# Patient Record
Sex: FEMALE | Race: BLACK OR AFRICAN AMERICAN | NOT HISPANIC OR LATINO | Employment: UNEMPLOYED | URBAN - METROPOLITAN AREA
[De-identification: names, ages, dates, MRNs, and addresses within clinical notes are randomized per-mention and may not be internally consistent; named-entity substitution may affect disease eponyms.]

---

## 2023-05-02 LAB
ESTIMATED AVERAGE GLUCOSE FOR HBA1C: 111 MG/DL
HEMOGLOBIN A1C/HEMOGLOBIN TOTAL IN BLOOD: 5.5 %
PROLACTIN (UG/L) IN SER/PLAS: 42.3 UG/L (ref 3–20)

## 2023-05-30 ENCOUNTER — PATIENT OUTREACH (OUTPATIENT)
Dept: CARE COORDINATION | Facility: CLINIC | Age: 23
End: 2023-05-30

## 2023-06-01 ENCOUNTER — OFFICE VISIT (OUTPATIENT)
Dept: PRIMARY CARE | Facility: CLINIC | Age: 23
End: 2023-06-01
Payer: MEDICAID

## 2023-06-01 ENCOUNTER — APPOINTMENT (OUTPATIENT)
Dept: LAB | Facility: LAB | Age: 23
End: 2023-06-01
Payer: MEDICAID

## 2023-06-01 VITALS
BODY MASS INDEX: 40.02 KG/M2 | DIASTOLIC BLOOD PRESSURE: 77 MMHG | HEART RATE: 125 BPM | TEMPERATURE: 98.1 F | OXYGEN SATURATION: 93 % | SYSTOLIC BLOOD PRESSURE: 113 MMHG | WEIGHT: 255 LBS | HEIGHT: 67 IN

## 2023-06-01 DIAGNOSIS — E55.9 VITAMIN D DEFICIENCY: ICD-10-CM

## 2023-06-01 DIAGNOSIS — Z09 HOSPITAL DISCHARGE FOLLOW-UP: Primary | ICD-10-CM

## 2023-06-01 DIAGNOSIS — F25.0 SCHIZOAFFECTIVE DISORDER, BIPOLAR TYPE (MULTI): ICD-10-CM

## 2023-06-01 DIAGNOSIS — K59.09 CHRONIC CONSTIPATION: ICD-10-CM

## 2023-06-01 DIAGNOSIS — N39.0 CHRONIC UTI (URINARY TRACT INFECTION): ICD-10-CM

## 2023-06-01 DIAGNOSIS — N39.42 URINARY INCONTINENCE WITHOUT SENSORY AWARENESS: ICD-10-CM

## 2023-06-01 DIAGNOSIS — E03.8 OTHER SPECIFIED HYPOTHYROIDISM: ICD-10-CM

## 2023-06-01 DIAGNOSIS — E11.9 TYPE 2 DIABETES MELLITUS WITHOUT COMPLICATION, WITHOUT LONG-TERM CURRENT USE OF INSULIN (MULTI): ICD-10-CM

## 2023-06-01 LAB
ESTIMATED AVERAGE GLUCOSE FOR HBA1C: 117 MG/DL
HEMOGLOBIN A1C/HEMOGLOBIN TOTAL IN BLOOD: 5.7 %
PROLACTIN (UG/L) IN SER/PLAS: 45.3 UG/L (ref 3–20)
THYROTROPIN (MIU/L) IN SER/PLAS BY DETECTION LIMIT <= 0.05 MIU/L: 1.81 MIU/L (ref 0.44–3.98)

## 2023-06-01 PROCEDURE — 3074F SYST BP LT 130 MM HG: CPT | Performed by: STUDENT IN AN ORGANIZED HEALTH CARE EDUCATION/TRAINING PROGRAM

## 2023-06-01 PROCEDURE — 3078F DIAST BP <80 MM HG: CPT | Performed by: STUDENT IN AN ORGANIZED HEALTH CARE EDUCATION/TRAINING PROGRAM

## 2023-06-01 PROCEDURE — 99214 OFFICE O/P EST MOD 30 MIN: CPT | Performed by: STUDENT IN AN ORGANIZED HEALTH CARE EDUCATION/TRAINING PROGRAM

## 2023-06-01 PROCEDURE — 3044F HG A1C LEVEL LT 7.0%: CPT | Performed by: STUDENT IN AN ORGANIZED HEALTH CARE EDUCATION/TRAINING PROGRAM

## 2023-06-01 PROCEDURE — 1036F TOBACCO NON-USER: CPT | Performed by: STUDENT IN AN ORGANIZED HEALTH CARE EDUCATION/TRAINING PROGRAM

## 2023-06-01 RX ORDER — FLASH GLUCOSE SENSOR
KIT MISCELLANEOUS
Status: ON HOLD | COMMUNITY
Start: 2023-05-16 | End: 2023-12-02 | Stop reason: WASHOUT

## 2023-06-01 RX ORDER — BUPROPION HYDROCHLORIDE 150 MG/1
TABLET ORAL
Status: ON HOLD | COMMUNITY
Start: 2023-02-15 | End: 2023-12-02 | Stop reason: WASHOUT

## 2023-06-01 RX ORDER — CLONAZEPAM 0.5 MG/1
TABLET ORAL
Status: ON HOLD | COMMUNITY
Start: 2023-05-19 | End: 2023-12-02 | Stop reason: WASHOUT

## 2023-06-01 RX ORDER — ETONOGESTREL 68 MG/1
IMPLANT SUBCUTANEOUS
COMMUNITY
Start: 2017-10-18 | End: 2023-12-11 | Stop reason: HOSPADM

## 2023-06-01 RX ORDER — MULTIVIT,CALC,MINS/IRON/FOLIC 9MG-400MCG
TABLET ORAL
Status: ON HOLD | COMMUNITY
Start: 2023-01-31 | End: 2023-12-02 | Stop reason: WASHOUT

## 2023-06-01 RX ORDER — FERROUS GLUCONATE 324(38)MG
TABLET ORAL
Status: ON HOLD | COMMUNITY
Start: 2023-01-13 | End: 2023-12-02 | Stop reason: WASHOUT

## 2023-06-01 RX ORDER — CHOLECALCIFEROL (VITAMIN D3) 25 MCG
1000 TABLET ORAL
Qty: 90 TABLET | Refills: 3 | Status: SHIPPED | OUTPATIENT
Start: 2023-06-01 | End: 2023-12-11 | Stop reason: HOSPADM

## 2023-06-01 RX ORDER — PRAZOSIN HYDROCHLORIDE 2 MG/1
2 CAPSULE ORAL NIGHTLY
Qty: 90 CAPSULE | Refills: 3 | Status: SHIPPED | OUTPATIENT
Start: 2023-06-01 | End: 2023-12-02 | Stop reason: WASHOUT

## 2023-06-01 RX ORDER — BLOOD SUGAR DIAGNOSTIC
STRIP MISCELLANEOUS
Status: ON HOLD | COMMUNITY
Start: 2021-04-13 | End: 2023-12-02 | Stop reason: WASHOUT

## 2023-06-01 RX ORDER — MULTIVITAMIN
TABLET ORAL
COMMUNITY
Start: 2023-03-20 | End: 2023-06-01 | Stop reason: SDUPTHER

## 2023-06-01 RX ORDER — BLOOD-GLUCOSE METER
EACH MISCELLANEOUS
Status: ON HOLD | COMMUNITY
Start: 2022-12-05 | End: 2023-12-02 | Stop reason: WASHOUT

## 2023-06-01 RX ORDER — HALOPERIDOL 10 MG/1
TABLET ORAL
COMMUNITY
Start: 2023-01-31 | End: 2023-11-14 | Stop reason: WASHOUT

## 2023-06-01 RX ORDER — ACETAMINOPHEN 325 MG/1
1 TABLET ORAL EVERY 6 HOURS PRN
Status: ON HOLD | COMMUNITY
Start: 2022-01-08 | End: 2023-12-02 | Stop reason: WASHOUT

## 2023-06-01 RX ORDER — PIOGLITAZONEHYDROCHLORIDE 15 MG/1
15 TABLET ORAL
COMMUNITY
Start: 2022-11-18 | End: 2023-06-01 | Stop reason: SDUPTHER

## 2023-06-01 RX ORDER — MULTIVITAMIN
1 TABLET ORAL DAILY
Qty: 90 TABLET | Refills: 3 | Status: ON HOLD | OUTPATIENT
Start: 2023-06-01 | End: 2023-12-02 | Stop reason: WASHOUT

## 2023-06-01 RX ORDER — PRAZOSIN HYDROCHLORIDE 2 MG/1
CAPSULE ORAL
COMMUNITY
Start: 2023-05-19 | End: 2023-06-01 | Stop reason: SDUPTHER

## 2023-06-01 RX ORDER — PIOGLITAZONEHYDROCHLORIDE 15 MG/1
15 TABLET ORAL
Qty: 90 TABLET | Refills: 3 | Status: SHIPPED | OUTPATIENT
Start: 2023-06-01 | End: 2023-11-29 | Stop reason: ALTCHOICE

## 2023-06-01 RX ORDER — LINACLOTIDE 145 UG/1
1 CAPSULE, GELATIN COATED ORAL DAILY
Status: ON HOLD | COMMUNITY
Start: 2021-01-20 | End: 2023-12-02 | Stop reason: WASHOUT

## 2023-06-01 RX ORDER — ZIPRASIDONE HYDROCHLORIDE 60 MG/1
CAPSULE ORAL
Status: ON HOLD | COMMUNITY
Start: 2023-05-19 | End: 2023-12-02 | Stop reason: WASHOUT

## 2023-06-01 RX ORDER — TRAZODONE HYDROCHLORIDE 100 MG/1
TABLET ORAL
Status: ON HOLD | COMMUNITY
Start: 2023-05-19 | End: 2023-12-02 | Stop reason: WASHOUT

## 2023-06-01 RX ORDER — HYDROXYZINE HYDROCHLORIDE 25 MG/1
TABLET, FILM COATED ORAL
Status: ON HOLD | COMMUNITY
Start: 2023-04-17 | End: 2023-12-02 | Stop reason: WASHOUT

## 2023-06-01 RX ORDER — DIVALPROEX SODIUM 500 MG/1
500 TABLET, DELAYED RELEASE ORAL 2 TIMES DAILY
Status: ON HOLD | COMMUNITY
Start: 2022-11-18 | End: 2023-12-02 | Stop reason: WASHOUT

## 2023-06-01 RX ORDER — BENZTROPINE MESYLATE 0.5 MG/1
TABLET ORAL
Status: ON HOLD | COMMUNITY
Start: 2023-05-19 | End: 2023-12-02 | Stop reason: WASHOUT

## 2023-06-01 RX ORDER — FAMOTIDINE 20 MG/1
TABLET, FILM COATED ORAL
Status: ON HOLD | COMMUNITY
End: 2023-12-02 | Stop reason: WASHOUT

## 2023-06-01 RX ORDER — AMMONIUM LACTATE 12 G/100G
CREAM TOPICAL
COMMUNITY
Start: 2022-02-21 | End: 2023-12-11 | Stop reason: HOSPADM

## 2023-06-01 RX ORDER — METFORMIN HYDROCHLORIDE 500 MG/1
1 TABLET ORAL
COMMUNITY
Start: 2022-11-18 | End: 2023-06-01 | Stop reason: ALTCHOICE

## 2023-06-01 RX ORDER — CHOLECALCIFEROL (VITAMIN D3) 25 MCG
1000 TABLET ORAL
COMMUNITY
Start: 2022-11-18 | End: 2023-06-01 | Stop reason: SDUPTHER

## 2023-06-01 RX ORDER — DOCUSATE SODIUM 100 MG/1
CAPSULE, LIQUID FILLED ORAL 2 TIMES DAILY
COMMUNITY
End: 2023-11-14 | Stop reason: WASHOUT

## 2023-06-01 RX ORDER — PAROXETINE HYDROCHLORIDE 20 MG/1
TABLET, FILM COATED ORAL
Status: ON HOLD | COMMUNITY
Start: 2023-05-19 | End: 2023-12-02 | Stop reason: WASHOUT

## 2023-06-01 ASSESSMENT — PAIN SCALES - GENERAL: PAINLEVEL: 0-NO PAIN

## 2023-06-01 NOTE — PROGRESS NOTES
"Subjective   Patient ID: Natividad Ricardo is a 22 y.o. female who presents for Follow-up (Follow up from hospital.).    HPI    Here with grandmother who provided most of the history. Pt lives in group home.    #Hospital follow up  #Pyelonephritis  #Sepsis  Patient admitted for pyenolnephritis and patient was septis. Patient does have a history of chronic UTIs and had told her grandmother \"her urine is broke\" meaning that she could not control her urine. Patient explains inability to make it to the bathroom in time and inability to sense when she has to pee.  Patient now wearing diapers due to this issue.  Patient follows with ID and is on Macrobid for chronic UTIs.  Patient states she is sexually active with a male partner and does not use condoms.  Patient is on birth control.  In the ED patient was tested for chlamydia, which was negative.  Since leaving the hospital patient states she still has some dysuria and still has urinary incontinence.  Otherwise patient feels much better and back to her normal self.    Unclear if patient was able to take macrobid BID for two days to finish abx course for the pyelonephritis. Grandmother and patient are unaware. However, patient is taking her daily macrobid for prophylaxis.      #Med Review  -methylprednisole 4mg  -famotidfine 20mg  -odansetron 4 mg  -macrobid 100mg  - multiple psych meds (not listed here)  Refills needed:  -pioglitazone 15mg  - vitamin D 1000mg  - levothyroxine 100mcg  -multivitamin  - Prazosin 2mg    Ms. Sophie busby [phone]- call with results of TSH and new levothryoxine dosing      Review of Systems   Constitutional:  Negative for chills and fever.   HENT:  Negative for congestion.    Eyes:  Negative for visual disturbance.   Respiratory:  Negative for cough and shortness of breath.    Cardiovascular:  Negative for chest pain.   Gastrointestinal:  Negative for abdominal pain, diarrhea and nausea.   Genitourinary:  Positive for dysuria " "(states sometimes) and urgency. Negative for flank pain and frequency.        Urinary incontence   Musculoskeletal:  Negative for arthralgias and joint swelling.   Neurological:  Negative for dizziness and headaches.   Psychiatric/Behavioral:          No changes in mood or behavior       Objective   /77 (BP Location: Right arm, Patient Position: Sitting)   Pulse (!) 125   Temp 36.7 °C (98.1 °F) (Temporal)   Ht 1.702 m (5' 7\")   Wt 116 kg (255 lb)   SpO2 93%   BMI 39.94 kg/m²      Physical Exam  Constitutional:       General: She is not in acute distress.  HENT:      Head: Normocephalic and atraumatic.   Eyes:      Extraocular Movements: Extraocular movements intact.      Conjunctiva/sclera: Conjunctivae normal.   Cardiovascular:      Rate and Rhythm: Normal rate and regular rhythm.      Heart sounds: Normal heart sounds. No murmur heard.  Pulmonary:      Effort: Pulmonary effort is normal.      Breath sounds: Normal breath sounds.   Abdominal:      General: There is no distension.      Palpations: Abdomen is soft.   Musculoskeletal:         General: Normal range of motion.      Cervical back: Normal range of motion.   Skin:     General: Skin is warm and dry.   Neurological:      General: No focal deficit present.      Mental Status: She is alert and oriented to person, place, and time.      Comments: CN 2-12 grossly intact   Psychiatric:         Mood and Affect: Mood normal.         Behavior: Behavior normal.      Comments: Blunted affect          Assessment/Plan   Natividad was seen today for follow-up.  Diagnoses and all orders for this visit:  Hospital discharge follow-up  Comments:  Recent hospiatlization for pyleonephritis and sepsis. Symptoms improved overall.  Chronic UTI (urinary tract infection)  Comments:  Improved sx from hospitalization. Still w/ dysuria and urgency. C/w home macrobid, follows w/ ID. Sent to [facility] for further workup  Orders:  -     Referral to Urogynecology; Future  -     " Referral to Urology; Future  Urinary incontinence without sensory awareness  Comments:  Sx suggestive of neurogenic vs urge incontinence. Sent to Urogyn for further workup  Orders:  -     Referral to Urogynecology; Future  -     Referral to Urology; Future  Vitamin D deficiency  Comments:  Stable. Refilled home Vit D supplement.  Orders:  -     cholecalciferol (Vitamin D-3) 25 MCG (1000 UT) tablet; Take 1 tablet (1,000 Units) by mouth once daily.  -     multivitamin tablet; Take 1 tablet by mouth once daily.  Type 2 diabetes mellitus without complication, without long-term current use of insulin (CMS/[facility])  Comments:  Stable. Refilled home pioglitazone 15mg QD. Follows w/ Endo.  Orders:  -     pioglitazone (Actos) 15 mg tablet; Take 1 tablet (15 mg) by mouth once daily.  Schizoaffective disorder, bipolar type (CMS/[facility])  Comments:  Stable. Lives in group home and follows closely w/ Pscyh. On multiple psych meds. Needs refill on only minipress until pt sees Pscyh.  Orders:  -     prazosin (Minipress) 2 mg capsule; Take 1 capsule (2 mg) by mouth once daily at bedtime.  Chronic constipation  Comments:  Stable. Needs refill on linzess. Follows w/ GI.  Orders:  -     linaCLOtide (Linzess) 145 mcg capsule; Take 1 capsule (145 mcg) by mouth every other day. Do not crush or chew. Take on empty stomach.  Other specified hypothyroidism  Comments:  Uncontrolled. Off synthroid for multiple months. Ordered repeat TSH  before refilling/restarting home syntrhoid.  Orders:  -     Tsh With Reflex To Free T4 If Abnormal; Future    Follow up/Return to the clinic in 3 months    Attending Supervision: Patient discussed with attending physician, Dr. Syeda Prieto MD  Family Medicine, PGY-2

## 2023-06-02 ENCOUNTER — PATIENT OUTREACH (OUTPATIENT)
Dept: CARE COORDINATION | Facility: CLINIC | Age: 23
End: 2023-06-02
Payer: MEDICARE

## 2023-06-02 NOTE — PROGRESS NOTES
Outreach call to patient to support a smooth transition of care from recent admission.  Spoke with patient, reviewed discharge medications, discharge instructions, assessed social needs, and provided education on importance of follow-up appointment with provider.  Will continue to monitor through transition period.  Engagement  Call Start Time: 1404 (6/2/2023  2:04 PM)    Medications  Medications reviewed with patient/caregiver?: Yes (6/2/2023  2:04 PM)  Is the patient having any side effects they believe may be caused by any medication additions or changes?: No (6/2/2023  2:04 PM)  Does the patient have all medications ordered at discharge?: Yes (6/2/2023  2:04 PM)  Prescription Comments: Macrobid 100 mg BID followed by daily dose until patient visit with ID (6/2/2023  2:04 PM)  Care Management Interventions: Provided patient education (6/2/2023  2:04 PM)    Appointments  Does the patient have a primary care provider?: Yes (6/2/2023  2:04 PM)  Care Management Interventions: -- (Patient has follow up already with new PCP) (6/2/2023  2:04 PM)  Has the patient kept scheduled appointments due by today?: Yes (6/2/2023  2:04 PM)    Patient Teaching  Does the patient have access to their discharge instructions?: Yes (6/2/2023  2:04 PM)  Care Management Interventions: Reviewed instructions with patient (6/2/2023  2:04 PM)    Wrap Up  Call End Time: 1407 (6/2/2023  2:04 PM)    NCM spoke with patient who stated that she has completed her Macrobid BID and is taking her Macrobid only once a day now.  Patient reported that she was feeling ok yesterday when she went to see her doctor but today she has not been able to go to the bathroom.  Patient reported that she has been drinking water and had the urge to urinate but when she went it would not come out.  Patient reported that she had lower abdominal pain and that she had to push really hard for the urine to start.  Patient reported burning and lower back pain.  Patient feels  like her UTI symptoms are still active.  Patient reported being cold and she never feels cold but does not feel like she has a fever.  Patient reported only having urinated one time today at this time.  Patient advised to call PCP or go to urgent care.  Patient reported that she would call her grandmother for a ride.  Patient gave permission for [facility] to speak to her group home coordinator.  [facility] educated coordinator that SHONDA was a RN and that based on patients symptoms it is recommended that patient seek medical attention at an urgent care center for futher recommendations.  Group home coordinator reported that patient has not been drinking water only sugar drinks.  [facility] educated group home coordinator that NCM could only make recommendation based off patient symptoms and that decision would be left to patient or guardian of patient.  Group home coordinator expressed understanding and would contact patients grandmother.  NCM will follow-up.    Rhoda MARTINEZ RN CCM

## 2023-06-02 NOTE — PROGRESS NOTES
NCM spoke with patients grandmother who provided more information regarding patient. Per grandmother patient is not compliant with her diabetic diet and valerio not drink appropriate fluids such as water. Patient is resistant to education provided by her grandmother for education on how to manage her diabetes.  NCM explained to patients grandmother that patient was not forth coming with NCM and that if patient is still having active UTI symptoms with urinary retention that she should be seen in urgent care just to make sure her bladder is not over extended.  Grandmother agreed to assess and to take appropriate actions if needed.  NCM contact provided for any further questions.    Rhoda BARRN RN CCM

## 2023-06-04 ASSESSMENT — ENCOUNTER SYMPTOMS
DIARRHEA: 0
COUGH: 0
CHILLS: 0
NAUSEA: 0
ABDOMINAL PAIN: 0
FREQUENCY: 0
HEADACHES: 0
JOINT SWELLING: 0
SHORTNESS OF BREATH: 0
FEVER: 0
FLANK PAIN: 0
DYSURIA: 1
DIZZINESS: 0
ARTHRALGIAS: 0

## 2023-06-05 NOTE — PROGRESS NOTES
I reviewed with the resident the medical history and the resident’s findings on physical examination.  I discussed with the resident the patient’s diagnosis and concur with the treatment plan as documented in the resident note. I saw patient and agree with plan of care.    Silver Landa MD       For information on Fall & Injury Prevention, visit: [email or web address] OR  [email or web address] OR  https://www.cdc.gov/steadi/patient.html

## 2023-06-08 ENCOUNTER — TELEPHONE (OUTPATIENT)
Dept: PRIMARY CARE | Facility: CLINIC | Age: 23
End: 2023-06-08
Payer: MEDICARE

## 2023-06-08 DIAGNOSIS — E03.8 OTHER SPECIFIED HYPOTHYROIDISM: Primary | ICD-10-CM

## 2023-06-08 NOTE — TELEPHONE ENCOUNTER
PT's mother would like to know the results of her blood work so she can get started on her medication.  Requests a call back.

## 2023-06-12 NOTE — TELEPHONE ENCOUNTER
Result Communication    Ordered TSH at last visit as pt has been off levothyroxine for over a month and it was only started back in March. Pt has no past history of thyroid disorder.     Spoke w/ grandmother over the phone. Given that patient has normal TSH while being off levo, it is okay to discontinue levothryoxine. In addition, we will recheck TSH in about 3 months to ensure that levels stay normal as pt has history of fluctuating TSH per chart review.    Results were successfully communicated with the grandmother and they acknowledged their understanding.      Natalee Prieto MD  Family Medicine, PGY-2

## 2023-06-13 ENCOUNTER — PATIENT OUTREACH (OUTPATIENT)
Dept: CARE COORDINATION | Facility: CLINIC | Age: 23
End: 2023-06-13
Payer: MEDICARE

## 2023-06-13 NOTE — PROGRESS NOTES
RN CM placed call to patient to follow up and assess his ongoing needs.  Patient reported that she remains symptomatic of a UTI and is currently in the ER. CM will monitor for discharge.    Rhoda MARTINEZ RN CCM

## 2023-07-19 LAB
ANION GAP IN SER/PLAS: 14 MMOL/L (ref 10–20)
CALCIUM (MG/DL) IN SER/PLAS: 10 MG/DL (ref 8.6–10.6)
CARBON DIOXIDE, TOTAL (MMOL/L) IN SER/PLAS: 28 MMOL/L (ref 21–32)
CHLORIDE (MMOL/L) IN SER/PLAS: 105 MMOL/L (ref 98–107)
CREATININE (MG/DL) IN SER/PLAS: 1.11 MG/DL (ref 0.5–1.05)
GFR FEMALE: 72 ML/MIN/1.73M2
GLUCOSE (MG/DL) IN SER/PLAS: 74 MG/DL (ref 74–99)
POTASSIUM (MMOL/L) IN SER/PLAS: 4.9 MMOL/L (ref 3.5–5.3)
SODIUM (MMOL/L) IN SER/PLAS: 142 MMOL/L (ref 136–145)
UREA NITROGEN (MG/DL) IN SER/PLAS: 7 MG/DL (ref 6–23)
VALPROIC ACID (UG/ML) IN SER/PLAS: 98 UG/ML (ref 50–100)

## 2023-08-29 PROBLEM — R45.6 VIOLENT BEHAVIOR: Status: ACTIVE | Noted: 2023-08-29

## 2023-08-29 PROBLEM — E11.69 DIABETES MELLITUS TYPE 2 IN OBESE: Status: ACTIVE | Noted: 2020-10-31

## 2023-08-29 PROBLEM — R29.818 SUSPECTED SLEEP APNEA: Status: ACTIVE | Noted: 2023-08-29

## 2023-08-29 PROBLEM — R03.0 ELEVATED BLOOD PRESSURE READING WITHOUT DIAGNOSIS OF HYPERTENSION: Status: ACTIVE | Noted: 2023-08-29

## 2023-08-29 PROBLEM — N64.3 GALACTORRHEA: Status: ACTIVE | Noted: 2023-08-29
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
## 2023-08-29 PROBLEM — E11.00 HYPEROSMOLAR HYPERGLYCEMIC STATE (HHS) (MULTI): Status: ACTIVE | Noted: 2020-10-31

## 2023-08-29 PROBLEM — R63.8 DECREASED ORAL INTAKE: Status: ACTIVE | Noted: 2023-08-29

## 2023-08-29 PROBLEM — J02.9 SORE THROAT: Status: ACTIVE | Noted: 2023-08-29

## 2023-08-29 PROBLEM — R10.9 FLANK PAIN: Status: ACTIVE | Noted: 2023-08-29

## 2023-08-29 PROBLEM — T14.91XA ATTEMPTED SUICIDE (MULTI): Status: ACTIVE | Noted: 2019-09-14

## 2023-08-29 PROBLEM — T50.901A POISONING BY DRUG OR MEDICINAL SUBSTANCE: Status: ACTIVE | Noted: 2019-03-04

## 2023-08-29 PROBLEM — N12 PYELONEPHRITIS: Status: ACTIVE | Noted: 2023-08-29

## 2023-08-29 PROBLEM — F25.0 SCHIZOAFFECTIVE DISORDER, BIPOLAR TYPE (MULTI): Status: ACTIVE | Noted: 2023-08-29

## 2023-08-29 PROBLEM — R00.0 TACHYCARDIA: Status: ACTIVE | Noted: 2020-11-04

## 2023-08-29 PROBLEM — Z86.0100 PERSONAL HISTORY OF COLONIC POLYPS: Status: ACTIVE | Noted: 2023-08-29

## 2023-08-29 PROBLEM — E22.1 HYPERPROLACTINEMIA (MULTI): Status: ACTIVE | Noted: 2023-08-29

## 2023-08-29 PROBLEM — R44.0 AUDITORY HALLUCINATIONS: Status: ACTIVE | Noted: 2020-08-20

## 2023-08-29 PROBLEM — R30.0 BURNING WITH URINATION: Status: ACTIVE | Noted: 2023-08-29

## 2023-08-29 PROBLEM — H53.8 BLURRY VISION: Status: ACTIVE | Noted: 2023-08-29

## 2023-08-29 PROBLEM — R45.850 HOMICIDAL IDEATION: Status: ACTIVE | Noted: 2023-08-29

## 2023-08-29 PROBLEM — N39.44 NOCTURNAL ENURESIS: Status: ACTIVE | Noted: 2023-08-29

## 2023-08-29 PROBLEM — T42.6X2A: Status: ACTIVE | Noted: 2019-03-04

## 2023-08-29 PROBLEM — G47.30 SLEEP APNEA: Status: ACTIVE | Noted: 2023-08-29

## 2023-08-29 PROBLEM — H52.03 HYPERMETROPIA OF BOTH EYES: Status: ACTIVE | Noted: 2023-08-29

## 2023-08-29 PROBLEM — F33.3 MDD (MAJOR DEPRESSIVE DISORDER), RECURRENT, SEVERE, WITH PSYCHOSIS (MULTI): Status: ACTIVE | Noted: 2022-11-15

## 2023-08-29 PROBLEM — A54.9 GONORRHEA: Status: ACTIVE | Noted: 2023-08-29

## 2023-08-29 PROBLEM — I10 HYPERTENSION: Status: ACTIVE | Noted: 2023-08-29

## 2023-08-29 PROBLEM — F33.2 MDD (MAJOR DEPRESSIVE DISORDER), RECURRENT SEVERE, WITHOUT PSYCHOSIS (MULTI): Status: ACTIVE | Noted: 2018-10-10

## 2023-08-29 PROBLEM — S61.519A SELF-CUTTING OF WRIST (MULTI): Status: ACTIVE | Noted: 2023-08-29

## 2023-08-29 PROBLEM — Z86.59 HISTORY OF COMMAND HALLUCINATIONS: Status: ACTIVE | Noted: 2023-08-29

## 2023-08-29 PROBLEM — S62.609A FRACTURE, FINGER: Status: ACTIVE | Noted: 2023-08-29

## 2023-08-29 PROBLEM — R10.814 ABDOMINAL TENDERNESS OF LEFT LOWER QUADRANT: Status: ACTIVE | Noted: 2019-04-05

## 2023-08-29 PROBLEM — Z90.49 HISTORY OF CHOLECYSTECTOMY: Status: ACTIVE | Noted: 2023-08-29

## 2023-08-29 PROBLEM — Z86.010 PERSONAL HISTORY OF COLONIC POLYPS: Status: ACTIVE | Noted: 2023-08-29

## 2023-08-29 PROBLEM — H46.9 OPTIC NEURITIS: Status: ACTIVE | Noted: 2023-08-29

## 2023-08-29 PROBLEM — R82.71 BACTERIURIA: Status: ACTIVE | Noted: 2023-08-29

## 2023-08-29 PROBLEM — T65.91XA INGESTION OF SUBSTANCE: Status: ACTIVE | Noted: 2023-08-29

## 2023-08-29 PROBLEM — F43.12 CHRONIC POST-TRAUMATIC STRESS DISORDER (PTSD): Status: ACTIVE | Noted: 2023-08-29

## 2023-08-29 PROBLEM — T39.1X1A TYLENOL TOXICITY: Status: ACTIVE | Noted: 2023-08-29

## 2023-08-29 PROBLEM — E66.9 DIABETES MELLITUS TYPE 2 IN OBESE: Status: ACTIVE | Noted: 2020-10-31

## 2023-08-29 PROBLEM — F41.9 ANXIETY DISORDER: Status: ACTIVE | Noted: 2023-08-29

## 2023-08-29 PROBLEM — G47.19 OTHER HYPERSOMNIA: Status: ACTIVE | Noted: 2023-08-29

## 2023-08-29 PROBLEM — E11.65 TYPE 2 DIABETES MELLITUS WITH HYPERGLYCEMIA (MULTI): Status: ACTIVE | Noted: 2023-08-29

## 2023-08-29 PROBLEM — E78.5 HLD (HYPERLIPIDEMIA): Status: ACTIVE | Noted: 2021-07-14

## 2023-08-29 PROBLEM — T88.7XXA MEDICATION SIDE EFFECT: Status: ACTIVE | Noted: 2023-08-29

## 2023-08-29 PROBLEM — F43.21 COMPLICATED BEREAVEMENT: Status: ACTIVE | Noted: 2023-08-29

## 2023-08-29 PROBLEM — E55.9 VITAMIN D DEFICIENCY: Status: ACTIVE | Noted: 2023-08-29

## 2023-08-29 PROBLEM — T39.1X1A ACETAMINOPHEN OVERDOSE: Status: ACTIVE | Noted: 2023-08-29

## 2023-08-29 PROBLEM — K80.20 CHOLELITHIASIS: Status: ACTIVE | Noted: 2023-08-29

## 2023-08-29 PROBLEM — R06.02 SHORTNESS OF BREATH: Status: ACTIVE | Noted: 2020-11-04

## 2023-08-29 PROBLEM — R79.89 LOW VITAMIN D LEVEL: Status: ACTIVE | Noted: 2018-10-11

## 2023-08-29 PROBLEM — F12.959: Status: ACTIVE | Noted: 2023-08-29

## 2023-08-29 PROBLEM — H52.13 MYOPIA OF BOTH EYES: Status: ACTIVE | Noted: 2023-08-29

## 2023-08-29 PROBLEM — R68.89 UNINTENTIONAL WEIGHT CHANGE: Status: ACTIVE | Noted: 2023-08-29
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
## 2023-08-29 PROBLEM — S41.112A ARM LACERATION, LEFT, INITIAL ENCOUNTER: Status: ACTIVE | Noted: 2018-10-19

## 2023-08-29 PROBLEM — R82.81 STERILE PYURIA: Status: ACTIVE | Noted: 2023-08-29

## 2023-08-29 PROBLEM — F31.30 BIPOLAR AFFECTIVE DISORDER, CURRENT EPISODE DEPRESSED (MULTI): Status: ACTIVE | Noted: 2020-06-08

## 2023-08-29 PROBLEM — K80.20 CALCULUS OF GALLBLADDER: Status: ACTIVE | Noted: 2019-06-13

## 2023-08-29 PROBLEM — R63.4 ABNORMAL WEIGHT LOSS: Status: ACTIVE | Noted: 2023-08-29

## 2023-08-29 PROBLEM — K21.9 GERD (GASTROESOPHAGEAL REFLUX DISEASE): Status: ACTIVE | Noted: 2023-08-29

## 2023-08-29 PROBLEM — R45.89 SUICIDAL BEHAVIOR: Status: ACTIVE | Noted: 2023-08-29

## 2023-08-29 RX ORDER — HYDROXYZINE HYDROCHLORIDE 50 MG/1
50 TABLET, FILM COATED ORAL EVERY 8 HOURS PRN
Status: ON HOLD | COMMUNITY
Start: 2020-11-09 | End: 2023-12-02 | Stop reason: WASHOUT

## 2023-08-29 RX ORDER — VENLAFAXINE HYDROCHLORIDE 225 MG/1
225 TABLET, EXTENDED RELEASE ORAL DAILY
Status: ON HOLD | COMMUNITY
Start: 2020-11-09 | End: 2023-12-02 | Stop reason: WASHOUT

## 2023-08-29 RX ORDER — RISPERIDONE 1 MG/1
1 TABLET ORAL 2 TIMES DAILY
COMMUNITY
Start: 2022-09-07 | End: 2023-11-14 | Stop reason: WASHOUT

## 2023-08-29 RX ORDER — BENZTROPINE MESYLATE 1 MG/1
1 TABLET ORAL 2 TIMES DAILY
Status: ON HOLD | COMMUNITY
Start: 2020-09-09 | End: 2023-12-02 | Stop reason: WASHOUT

## 2023-08-29 RX ORDER — METFORMIN HYDROCHLORIDE 500 MG/1
500 TABLET ORAL 2 TIMES DAILY
COMMUNITY
Start: 2020-11-09 | End: 2023-11-29 | Stop reason: ALTCHOICE

## 2023-08-29 RX ORDER — OLANZAPINE AND SAMIDORPHAN L-MALATE 20; 10 MG/1; MG/1
TABLET, FILM COATED ORAL
Status: ON HOLD | COMMUNITY
Start: 2022-09-15 | End: 2023-12-02 | Stop reason: WASHOUT

## 2023-08-29 RX ORDER — BUSPIRONE HYDROCHLORIDE 10 MG/1
TABLET ORAL
Status: ON HOLD | COMMUNITY
Start: 2023-04-17 | End: 2023-12-02 | Stop reason: WASHOUT

## 2023-08-29 RX ORDER — TRAZODONE HYDROCHLORIDE 150 MG/1
1 TABLET ORAL NIGHTLY
Status: ON HOLD | COMMUNITY
Start: 2022-06-01 | End: 2023-12-02 | Stop reason: WASHOUT

## 2023-08-29 RX ORDER — VENLAFAXINE HYDROCHLORIDE 150 MG/1
150 CAPSULE, EXTENDED RELEASE ORAL NIGHTLY
Status: ON HOLD | COMMUNITY
Start: 2020-11-09 | End: 2023-12-02 | Stop reason: WASHOUT

## 2023-08-29 RX ORDER — ZIPRASIDONE HYDROCHLORIDE 80 MG/1
80 CAPSULE ORAL 2 TIMES DAILY
Status: ON HOLD | COMMUNITY
Start: 2023-06-07 | End: 2023-12-02 | Stop reason: WASHOUT

## 2023-08-29 RX ORDER — VENLAFAXINE HYDROCHLORIDE 75 MG/1
75 TABLET, EXTENDED RELEASE ORAL DAILY
Status: ON HOLD | COMMUNITY
Start: 2020-11-09 | End: 2023-12-02 | Stop reason: WASHOUT

## 2023-08-29 RX ORDER — DIVALPROEX SODIUM 250 MG/1
250 TABLET, FILM COATED, EXTENDED RELEASE ORAL
Status: ON HOLD | COMMUNITY
Start: 2020-09-08 | End: 2023-12-02 | Stop reason: WASHOUT

## 2023-08-29 RX ORDER — INSULIN GLARGINE 100 [IU]/ML
45 INJECTION, SOLUTION SUBCUTANEOUS DAILY
Status: ON HOLD | COMMUNITY
Start: 2020-11-09 | End: 2023-12-02 | Stop reason: WASHOUT

## 2023-08-29 RX ORDER — ACETAMINOPHEN 500 MG
5 TABLET ORAL NIGHTLY
COMMUNITY
Start: 2020-11-09 | End: 2023-11-14 | Stop reason: WASHOUT

## 2023-08-29 RX ORDER — BUPROPION HYDROCHLORIDE 300 MG/1
TABLET ORAL
Status: ON HOLD | COMMUNITY
Start: 2023-05-04 | End: 2023-12-02 | Stop reason: WASHOUT

## 2023-08-29 RX ORDER — PAROXETINE 10 MG/1
10 TABLET, FILM COATED ORAL
Status: ON HOLD | COMMUNITY
Start: 2023-06-07 | End: 2023-12-02 | Stop reason: WASHOUT

## 2023-08-29 RX ORDER — PALIPERIDONE 6 MG/1
6 TABLET, EXTENDED RELEASE ORAL DAILY
Status: ON HOLD | COMMUNITY
Start: 2020-11-09 | End: 2023-12-02 | Stop reason: WASHOUT

## 2023-08-29 RX ORDER — RISPERIDONE 2 MG/1
TABLET ORAL
COMMUNITY
Start: 2022-10-07 | End: 2023-11-14 | Stop reason: WASHOUT

## 2023-08-29 RX ORDER — OLANZAPINE 10 MG/1
10 TABLET ORAL NIGHTLY
Status: ON HOLD | COMMUNITY
Start: 2020-11-09 | End: 2023-12-02 | Stop reason: WASHOUT

## 2023-08-30 PROBLEM — N32.81 OAB (OVERACTIVE BLADDER): Status: ACTIVE | Noted: 2023-08-30

## 2023-08-30 PROBLEM — K59.09 CHRONIC CONSTIPATION: Status: ACTIVE | Noted: 2023-08-30

## 2023-08-30 PROBLEM — K58.9 IBS (IRRITABLE BOWEL SYNDROME): Status: ACTIVE | Noted: 2023-08-30

## 2023-08-30 PROBLEM — R51.9 HEADACHE: Status: ACTIVE | Noted: 2023-08-30

## 2023-08-30 PROBLEM — N64.4 BREAST PAIN: Status: ACTIVE | Noted: 2023-08-30

## 2023-09-13 ENCOUNTER — HOSPITAL ENCOUNTER (OUTPATIENT)
Dept: DATA CONVERSION | Facility: HOSPITAL | Age: 23
End: 2023-09-13
Attending: INTERNAL MEDICINE | Admitting: INTERNAL MEDICINE
Payer: MEDICARE

## 2023-09-13 DIAGNOSIS — Z86.010 PERSONAL HISTORY OF COLONIC POLYPS: ICD-10-CM

## 2023-09-13 DIAGNOSIS — Z12.11 ENCOUNTER FOR SCREENING FOR MALIGNANT NEOPLASM OF COLON: ICD-10-CM

## 2023-10-05 ENCOUNTER — APPOINTMENT (OUTPATIENT)
Dept: INFECTIOUS DISEASES | Facility: CLINIC | Age: 23
End: 2023-10-05
Payer: MEDICARE

## 2023-10-12 ENCOUNTER — TELEMEDICINE (OUTPATIENT)
Dept: INFECTIOUS DISEASES | Facility: CLINIC | Age: 23
End: 2023-10-12
Payer: MEDICARE

## 2023-10-12 DIAGNOSIS — N39.0 RECURRENT UTI: Primary | ICD-10-CM

## 2023-10-12 PROCEDURE — 99214 OFFICE O/P EST MOD 30 MIN: CPT | Performed by: INTERNAL MEDICINE

## 2023-10-12 NOTE — PROGRESS NOTES
Infectious Diseases Clinic Follow-up:    Reason for Visit: Routine clinic visit    History of Current Issue    Natividad Ricardo is a 23 y.o. year old female. This was a virtual visit. Spoke to pt and her grandma for 12 mins and spent an additional 10 mins for documentation.    Initially saw her in 12/2022 for recurrent UTIs, full details of consult as below:     INITIAL ID CONSULT NOTE     23 yo F, group home resident, psych issues who as referred by PCP for recurrent UTIs. She is here with grandmother who was the primary historian has pt has limited ability to give a coherent history.     Per grandmother and chat review, had recurrent UTIs as a child and was on Bactrim ppx from age [age]. She was reportedly taken off when UTIs improved. However in the last 2 years, UTIs have returned. Primary syxs appear to be painful urination and frequency.      Last UTI was in October 2022. Denies has UTI now. Denies any history of renal stones. Records are not available to confirm this. One UCX from 6/2021 grew E. coli (S=Keflex, Macrobid, R= Bactrim). Apparently no imaging has been done recently to rule out structural abnormalities. She has been treated multiple times with Bactrim.     Endorses a hx of allergies to cipro and sulfa drugs (rash).     PAST MEDICAL HISTORY:  Past Medical History:   Diagnosis Date    Abnormal weight gain 12/17/2018    Abnormal weight gain    Blister (nonthermal), unspecified lower leg, initial encounter 05/30/2018    Blister of leg    Crushing injury of unspecified finger(s), initial encounter 06/30/2017    Crushing injury of finger, initial encounter    Drug-induced hypoglycemia without coma 08/04/2021    Hypoglycemia due to insulin    Migraine, unspecified, not intractable, without status migrainosus 12/27/2016    Headache, migraine    Other microscopic hematuria 06/01/2018    Hematuria, microscopic    Pain in left hand 06/29/2017    Pain of left hand    Pain in unspecified joint 12/30/2013     Multiple joint pain    Personal history of diseases of the blood and blood-forming organs and certain disorders involving the immune mechanism 06/07/2017    History of anemia    Personal history of diseases of the skin and subcutaneous tissue 06/13/2019    History of acne    Personal history of other complications of pregnancy, childbirth and the puerperium 07/25/2017    History of galactorrhea    Personal history of other diseases of the female genital tract 06/05/2018    History of vaginal bleeding    Personal history of other diseases of the nervous system and sense organs 08/03/2022    History of optic neuritis    Personal history of other endocrine, nutritional and metabolic disease 12/17/2018    History of elevated glucose    Personal history of other specified conditions 06/09/2017    History of nipple discharge    Urge incontinence 06/01/2018    Urge incontinence of urine       PAST SURGICAL HISTORY:  Past Surgical History:   Procedure Laterality Date    OTHER SURGICAL HISTORY  11/11/2019    Cholecystectomy       ALLERGIES:    Allergies   Allergen Reactions    Cefixime Hives    Ditropan Unknown     Reaction unknown    Esomeprazole Unknown    Hyoscyamine Unknown     Unknown reaction, documented from hospital stay per grandmother.    Penicillin Hives    Sulfa (Sulfonamide Antibiotics) Unknown    Ciprofloxacin Hives and Rash       MEDICATIONS:      Current Outpatient Medications:     acetaminophen (Tylenol) 325 mg tablet, Take 1 tablet (325 mg) by mouth every 6 hours if needed., Disp: , Rfl:     ammonium lactate (Amlactin) 12 % cream, Ammonium Lactate 12 % External Cream  Quantity: 385  Refills: 0      Start : 21-Feb-2022  Active, Disp: , Rfl:     benztropine (Cogentin) 0.5 mg tablet, , Disp: , Rfl:     benztropine (Cogentin) 1 mg tablet, Take 1 tablet (1 mg) by mouth 2 times a day., Disp: , Rfl:     buPROPion XL (Wellbutrin XL) 150 mg 24 hr tablet, , Disp: , Rfl:     buPROPion XL (Wellbutrin XL) 300 mg 24 hr  tablet, , Disp: , Rfl:     busPIRone (Buspar) 10 mg tablet, , Disp: , Rfl:     cholecalciferol (Vitamin D-3) 25 MCG (1000 UT) tablet, Take 1 tablet (1,000 Units) by mouth once daily., Disp: 90 tablet, Rfl: 3    clonazePAM (KlonoPIN) 0.5 mg tablet, , Disp: , Rfl:     divalproex (Depakote ER) 250 mg 24 hr tablet, 1 tablet (250 mg). As directed, Disp: , Rfl:     divalproex (Depakote) 500 mg EC tablet, Take 1 tablet (500 mg) by mouth twice a day., Disp: , Rfl:     docusate sodium (Colace) 100 mg capsule, Take by mouth twice a day., Disp: , Rfl:     Embrace Lancets 30 gauge misc, , Disp: , Rfl:     etonogestrel-eluting contraceptive (Nexplanon) 68 mg implant implant, Inject under the skin., Disp: , Rfl:     famotidine (Pepcid) 20 mg tablet, TAKE 1 TABLET EVERY DAY BY ORAL ROUTE IN THE MORNING FOR 30 DAYS., Disp: , Rfl:     ferrous gluconate 324 (38 Fe) MG tablet, , Disp: , Rfl:     FreeStyle Vic 2 Sensor kit, , Disp: , Rfl:     haloperidol (Haldol) 10 mg tablet, , Disp: , Rfl:     High Potency Multivit, w-iron, 9 mg iron-400 mcg tablet, , Disp: , Rfl:     hydrOXYzine HCL (Atarax) 25 mg tablet, , Disp: , Rfl:     hydrOXYzine HCL (Atarax) 50 mg tablet, Take 1 tablet (50 mg) by mouth every 8 hours if needed., Disp: , Rfl:     insulin glargine (Lantus U-100 Insulin) 100 unit/mL injection, 45 Units once daily., Disp: , Rfl:     linaCLOtide (Linzess) 145 mcg capsule, Take 1 capsule (145 mcg) by mouth every other day. Do not crush or chew. Take on empty stomach., Disp: 30 capsule, Rfl: 11    linaCLOtide (Linzess) 72 mcg capsule, TAKE 1 CAPSULE AT LEAST 30 MINUTES BEFORE FIRST MEAL OF THE DAY, Disp: 30 capsule, Rfl: 1    Linzess 145 mcg capsule, Take 1 capsule (145 mcg) by mouth once daily., Disp: , Rfl:     Lybalvi 20-10 mg tablet, , Disp: , Rfl:     melatonin 5 mg tablet, Take 1 tablet (5 mg) by mouth once daily at bedtime., Disp: , Rfl:     metFORMIN (Glucophage) 500 mg tablet, Take 1 tablet (500 mg) by mouth 2 times a  day., Disp: , Rfl:     multivitamin tablet, Take 1 tablet by mouth once daily., Disp: 90 tablet, Rfl: 3    multivitamin with minerals tablet, TAKE 1 TABLET BY MOUTH ONCE DAILY, Disp: 30 tablet, Rfl: 2    NON FORMULARY, Use one as directed when changing Free Style Vic sensor, Disp: , Rfl:     OLANZapine (ZyPREXA) 10 mg tablet, Take 1 tablet (10 mg) by mouth once daily at bedtime., Disp: , Rfl:     OneTouch Ultra Test strip, TEST BLOOD SUGARS BEFORE AND AFTER MEALS AS DIRECTED. 6 TIMES A DAY, Disp: , Rfl:     paliperidone (Invega) 6 mg 24 hr tablet, Take 1 tablet (6 mg) by mouth once daily., Disp: , Rfl:     paliperidone palmitate ER (Invega Sustenna) 234 mg/1.5 mL syringe, Inject 234 mg into the shoulder, thigh, or buttocks every 21 (twenty-one) days., Disp: , Rfl:     PARoxetine (Paxil) 10 mg tablet, 1 tablet (10 mg)., Disp: , Rfl:     PARoxetine (Paxil) 20 mg tablet, , Disp: , Rfl:     pioglitazone (Actos) 15 mg tablet, Take 1 tablet (15 mg) by mouth once daily., Disp: 90 tablet, Rfl: 3    prazosin (Minipress) 2 mg capsule, Take 1 capsule (2 mg) by mouth once daily at bedtime., Disp: 90 capsule, Rfl: 3    risperiDONE (RisperDAL) 1 mg tablet, Take 1 tablet (1 mg) by mouth twice a day., Disp: , Rfl:     risperiDONE (RisperDAL) 2 mg tablet, , Disp: , Rfl:     traZODone (Desyrel) 100 mg tablet, , Disp: , Rfl:     traZODone (Desyrel) 150 mg tablet, Take 1 tablet (150 mg) by mouth once daily at bedtime., Disp: , Rfl:     venlafaxine 225 mg 24 hr tablet, Take 1 tablet (225 mg) by mouth once daily., Disp: , Rfl:     venlafaxine 75 mg 24 hr tablet, Take 1 tablet (75 mg) by mouth once daily., Disp: , Rfl:     venlafaxine XR (Effexor XR) 150 mg 24 hr capsule, Take 1 capsule (150 mg) by mouth once daily at bedtime., Disp: , Rfl:     ziprasidone (Geodon) 60 mg capsule, , Disp: , Rfl:     ziprasidone (Geodon) 80 mg capsule, , Disp: , Rfl:     REVIEW OF SYSTEMS:  Review of Systems    PHYSICAL EXAMINATION:     Visit  Vitals  Smoking Status Never        EXAM: Not applicable as this was a virtual visit.       ASSESSMENT / RECOMMENDATIONS:    23 yo F, group home resident, psych issues who as referred by PCP for recurrent UTIs. She is here with grandmother who was the primary historian has pt has limited ability to give a coherent history.     Per grandmother and chat review, had recurrent UTIs as a child and was on Bactrim ppx from age [age]. She was reportedly taken off when UTIs improved. However in the last 2 years, UTIs have returned. Primary syxs appear to be painful urination and frequency.      Last UTI was in October 2022. Denies has UTI now. Denies any history of renal stones. Records are not available to confirm this. One UCX from 6/2021 grew E. coli (S=Keflex, Macrobid, R= Bactrim). Apparently no imaging has been done recently to rule out structural abnormalities. She has been treated multiple times with Bactrim.    Endorses a hx of allergies to cipro and sulfa drugs (rash).     CURRENT (10/12/2023):  Doing well on macrobid.  No new UTIs since last visit.    PLAN:  Macrobid 100 mg daily  RTC in 6 mo    Pt and grandma are in agreement with plan    I spent 28 minutes in the professional and overall care of this patient.      Celso Thomas MD MPH

## 2023-10-17 ENCOUNTER — APPOINTMENT (OUTPATIENT)
Dept: OBSTETRICS AND GYNECOLOGY | Facility: CLINIC | Age: 23
End: 2023-10-17
Payer: MEDICAID

## 2023-10-17 ENCOUNTER — HOSPITAL ENCOUNTER (EMERGENCY)
Facility: HOSPITAL | Age: 23
Discharge: OTHER INSTITUTION | End: 2023-10-18
Attending: STUDENT IN AN ORGANIZED HEALTH CARE EDUCATION/TRAINING PROGRAM
Payer: MEDICARE

## 2023-10-17 DIAGNOSIS — R45.851 SUICIDAL IDEATION: Primary | ICD-10-CM

## 2023-10-17 LAB
ALBUMIN SERPL BCP-MCNC: 3.6 G/DL (ref 3.4–5)
ALP SERPL-CCNC: 71 U/L (ref 33–110)
ALT SERPL W P-5'-P-CCNC: 10 U/L (ref 7–45)
ANION GAP SERPL CALC-SCNC: 12 MMOL/L (ref 10–20)
APAP SERPL-MCNC: <10 UG/ML
AST SERPL W P-5'-P-CCNC: 12 U/L (ref 9–39)
BASOPHILS # BLD AUTO: 0.06 X10*3/UL (ref 0–0.1)
BASOPHILS NFR BLD AUTO: 0.6 %
BILIRUB SERPL-MCNC: 0.3 MG/DL (ref 0–1.2)
BUN SERPL-MCNC: 8 MG/DL (ref 6–23)
CALCIUM SERPL-MCNC: 9.2 MG/DL (ref 8.6–10.6)
CHLORIDE SERPL-SCNC: 105 MMOL/L (ref 98–107)
CO2 SERPL-SCNC: 26 MMOL/L (ref 21–32)
CREAT SERPL-MCNC: 1.04 MG/DL (ref 0.5–1.05)
EOSINOPHIL # BLD AUTO: 0.09 X10*3/UL (ref 0–0.7)
EOSINOPHIL NFR BLD AUTO: 0.9 %
ERYTHROCYTE [DISTWIDTH] IN BLOOD BY AUTOMATED COUNT: 14.6 % (ref 11.5–14.5)
ETHANOL SERPL-MCNC: <10 MG/DL
GFR SERPL CREATININE-BSD FRML MDRD: 78 ML/MIN/1.73M*2
GLUCOSE SERPL-MCNC: 120 MG/DL (ref 74–99)
HCT VFR BLD AUTO: 36.1 % (ref 36–46)
HGB BLD-MCNC: 12.3 G/DL (ref 12–16)
HOLD SPECIMEN: NORMAL
IMM GRANULOCYTES # BLD AUTO: 0.03 X10*3/UL (ref 0–0.7)
IMM GRANULOCYTES NFR BLD AUTO: 0.3 % (ref 0–0.9)
LYMPHOCYTES # BLD AUTO: 4.14 X10*3/UL (ref 1.2–4.8)
LYMPHOCYTES NFR BLD AUTO: 41.8 %
MCH RBC QN AUTO: 31 PG (ref 26–34)
MCHC RBC AUTO-ENTMCNC: 34.1 G/DL (ref 32–36)
MCV RBC AUTO: 91 FL (ref 80–100)
MONOCYTES # BLD AUTO: 0.83 X10*3/UL (ref 0.1–1)
MONOCYTES NFR BLD AUTO: 8.4 %
NEUTROPHILS # BLD AUTO: 4.75 X10*3/UL (ref 1.2–7.7)
NEUTROPHILS NFR BLD AUTO: 48 %
NRBC BLD-RTO: 0 /100 WBCS (ref 0–0)
PLATELET # BLD AUTO: 282 X10*3/UL (ref 150–450)
PMV BLD AUTO: 10.4 FL (ref 7.5–11.5)
POTASSIUM SERPL-SCNC: 4.1 MMOL/L (ref 3.5–5.3)
PROT SERPL-MCNC: 7 G/DL (ref 6.4–8.2)
RBC # BLD AUTO: 3.97 X10*6/UL (ref 4–5.2)
SALICYLATES SERPL-MCNC: <3 MG/DL
SODIUM SERPL-SCNC: 139 MMOL/L (ref 136–145)
WBC # BLD AUTO: 9.9 X10*3/UL (ref 4.4–11.3)

## 2023-10-17 PROCEDURE — 99285 EMERGENCY DEPT VISIT HI MDM: CPT | Performed by: STUDENT IN AN ORGANIZED HEALTH CARE EDUCATION/TRAINING PROGRAM

## 2023-10-17 PROCEDURE — 99284 EMERGENCY DEPT VISIT MOD MDM: CPT | Performed by: STUDENT IN AN ORGANIZED HEALTH CARE EDUCATION/TRAINING PROGRAM

## 2023-10-17 PROCEDURE — 36415 COLL VENOUS BLD VENIPUNCTURE: CPT | Mod: CMCLAB

## 2023-10-17 PROCEDURE — 80053 COMPREHEN METABOLIC PANEL: CPT

## 2023-10-17 PROCEDURE — 80329 ANALGESICS NON-OPIOID 1 OR 2: CPT

## 2023-10-17 PROCEDURE — 85025 COMPLETE CBC W/AUTO DIFF WBC: CPT

## 2023-10-17 PROCEDURE — 93010 ELECTROCARDIOGRAM REPORT: CPT | Performed by: STUDENT IN AN ORGANIZED HEALTH CARE EDUCATION/TRAINING PROGRAM

## 2023-10-17 PROCEDURE — 99285 EMERGENCY DEPT VISIT HI MDM: CPT | Mod: 25 | Performed by: STUDENT IN AN ORGANIZED HEALTH CARE EDUCATION/TRAINING PROGRAM

## 2023-10-17 PROCEDURE — 84702 CHORIONIC GONADOTROPIN TEST: CPT | Mod: CMCLAB | Performed by: EMERGENCY MEDICINE

## 2023-10-17 ASSESSMENT — PAIN DESCRIPTION - PROGRESSION: CLINICAL_PROGRESSION: NOT CHANGED

## 2023-10-17 ASSESSMENT — COLUMBIA-SUICIDE SEVERITY RATING SCALE - C-SSRS
1. IN THE PAST MONTH, HAVE YOU WISHED YOU WERE DEAD OR WISHED YOU COULD GO TO SLEEP AND NOT WAKE UP?: YES
6. HAVE YOU EVER DONE ANYTHING, STARTED TO DO ANYTHING, OR PREPARED TO DO ANYTHING TO END YOUR LIFE?: YES
5. HAVE YOU STARTED TO WORK OUT OR WORKED OUT THE DETAILS OF HOW TO KILL YOURSELF? DO YOU INTEND TO CARRY OUT THIS PLAN?: YES
4. HAVE YOU HAD THESE THOUGHTS AND HAD SOME INTENTION OF ACTING ON THEM?: YES
2. HAVE YOU ACTUALLY HAD ANY THOUGHTS OF KILLING YOURSELF?: YES

## 2023-10-17 ASSESSMENT — LIFESTYLE VARIABLES
EVER FELT BAD OR GUILTY ABOUT YOUR DRINKING: NO
EVER HAD A DRINK FIRST THING IN THE MORNING TO STEADY YOUR NERVES TO GET RID OF A HANGOVER: NO
HAVE YOU EVER FELT YOU SHOULD CUT DOWN ON YOUR DRINKING: NO
HAVE PEOPLE ANNOYED YOU BY CRITICIZING YOUR DRINKING: NO
REASON UNABLE TO ASSESS: NO

## 2023-10-17 ASSESSMENT — PAIN SCALES - GENERAL: PAINLEVEL_OUTOF10: 0 - NO PAIN

## 2023-10-17 ASSESSMENT — PAIN - FUNCTIONAL ASSESSMENT: PAIN_FUNCTIONAL_ASSESSMENT: 0-10

## 2023-10-18 VITALS
SYSTOLIC BLOOD PRESSURE: 103 MMHG | HEIGHT: 66 IN | BODY MASS INDEX: 38.57 KG/M2 | RESPIRATION RATE: 16 BRPM | OXYGEN SATURATION: 96 % | TEMPERATURE: 97.8 F | WEIGHT: 240 LBS | DIASTOLIC BLOOD PRESSURE: 67 MMHG | HEART RATE: 69 BPM

## 2023-10-18 LAB
AMPHETAMINES UR QL SCN: ABNORMAL
B-HCG SERPL-ACNC: <3 MIU/ML
BARBITURATES UR QL SCN: ABNORMAL
BENZODIAZ UR QL SCN: ABNORMAL
BZE UR QL SCN: ABNORMAL
CANNABINOIDS UR QL SCN: ABNORMAL
FENTANYL+NORFENTANYL UR QL SCN: ABNORMAL
OPIATES UR QL SCN: ABNORMAL
OXYCODONE+OXYMORPHONE UR QL SCN: ABNORMAL
PCP UR QL SCN: ABNORMAL
SARS-COV-2 RNA RESP QL NAA+PROBE: NOT DETECTED

## 2023-10-18 PROCEDURE — 80307 DRUG TEST PRSMV CHEM ANLYZR: CPT | Mod: CMCLAB

## 2023-10-18 PROCEDURE — 87635 SARS-COV-2 COVID-19 AMP PRB: CPT | Mod: CMCLAB | Performed by: EMERGENCY MEDICINE

## 2023-10-18 SDOH — HEALTH STABILITY: MENTAL HEALTH: BEHAVIORS/MOOD: ANXIOUS

## 2023-10-18 SDOH — SOCIAL STABILITY: SOCIAL INSECURITY: FAMILY BEHAVIORS: UNABLE TO ASSESS

## 2023-10-18 SDOH — HEALTH STABILITY: MENTAL HEALTH: HOW DID YOU TRY TO KILL YOURSELF?: CUTTING, JUMPING INTO TRAFFIC, AND JUMPING FROM HEIGHTS

## 2023-10-18 SDOH — HEALTH STABILITY: MENTAL HEALTH: WHEN DID YOU TRY TO KILL YOURSELF?: PATIENT DOES NOT REMEMBER

## 2023-10-18 SDOH — HEALTH STABILITY: MENTAL HEALTH: SUICIDAL BEHAVIOR (LIFETIME): YES

## 2023-10-18 SDOH — SOCIAL STABILITY: SOCIAL NETWORK: EMOTIONAL SUPPORT GIVEN: PATIENT COUNSELING

## 2023-10-18 SDOH — HEALTH STABILITY: MENTAL HEALTH
SUICIDAL BEHAVIOR (DESCRIPTION): THE PATIENT HAS A VAST HISTORY OF SUICIDAL IDEATIONS AND ATTEMPTS. THE PATIENT IS NOW EXPERIENCING COMMAND HALLUCINATIONS TELLING HER TO KILL HERSELF.

## 2023-10-18 SDOH — HEALTH STABILITY: MENTAL HEALTH: ACTIVE SUICIDAL IDEATION WITH SPECIFIC PLAN AND INTENT (PAST 1 MONTH): YES

## 2023-10-18 SDOH — HEALTH STABILITY: MENTAL HEALTH: HAVE YOU EVER TRIED TO KILL YOURSELF?: YES

## 2023-10-18 SDOH — HEALTH STABILITY: MENTAL HEALTH: IN THE PAST FEW WEEKS, HAVE YOU FELT THAT YOU OR YOUR FAMILY WOULD BE BETTER OFF IF YOU WERE DEAD?: YES

## 2023-10-18 SDOH — HEALTH STABILITY: MENTAL HEALTH: WISH TO BE DEAD (PAST 1 MONTH): YES

## 2023-10-18 SDOH — HEALTH STABILITY: MENTAL HEALTH: NON-SPECIFIC ACTIVE SUICIDAL THOUGHTS (PAST 1 MONTH): YES

## 2023-10-18 SDOH — HEALTH STABILITY: MENTAL HEALTH: IN THE PAST FEW WEEKS, HAVE YOU WISHED YOU WERE DEAD?: YES

## 2023-10-18 SDOH — ECONOMIC STABILITY: HOUSING INSECURITY: FEELS SAFE LIVING IN HOME: YES

## 2023-10-18 SDOH — HEALTH STABILITY: MENTAL HEALTH: ANXIETY SYMPTOMS: GENERALIZED

## 2023-10-18 SDOH — HEALTH STABILITY: MENTAL HEALTH: ACTIVE SUICIDAL IDEATION WITH SOME INTENT TO ACT, WITHOUT SPECIFIC PLAN (PAST 1 MONTH): YES

## 2023-10-18 SDOH — HEALTH STABILITY: MENTAL HEALTH
DESCRIBE YOUR THOUGHTS OF KILLING YOURSELF RIGHT NOW:: "THE VOICES ARE MAKING ME WANT TO KILL MYSELF. THEY ARE TELLING ME TO KILL MYSELF"

## 2023-10-18 SDOH — HEALTH STABILITY: MENTAL HEALTH: IN THE PAST WEEK, HAVE YOU BEEN HAVING THOUGHTS ABOUT KILLING YOURSELF?: YES

## 2023-10-18 SDOH — HEALTH STABILITY: MENTAL HEALTH: DEPRESSION SYMPTOMS: FEELINGS OF HOPELESSESS;IMPAIRED CONCENTRATION;SLEEP DISTURBANCE

## 2023-10-18 SDOH — HEALTH STABILITY: MENTAL HEALTH: ARE YOU HAVING THOUGHTS OF KILLING YOURSELF RIGHT NOW?: YES

## 2023-10-18 SDOH — HEALTH STABILITY: MENTAL HEALTH: SUICIDAL BEHAVIOR (3 MONTHS): YES

## 2023-10-18 SDOH — SOCIAL STABILITY: SOCIAL NETWORK: VISITOR BEHAVIORS: UNABLE TO ASSESS

## 2023-10-18 ASSESSMENT — PAIN SCALES - GENERAL: PAINLEVEL_OUTOF10: 0 - NO PAIN

## 2023-10-18 ASSESSMENT — LIFESTYLE VARIABLES
PRESCIPTION_ABUSE_PAST_12_MONTHS: NO
SUBSTANCE_ABUSE_PAST_12_MONTHS: YES

## 2023-10-18 NOTE — SIGNIFICANT EVENT
Application for Emergency Admission      Ready for Transfer?  Is the patient medically cleared for transfer to inpatient psychiatry: Yes  Has the patient been accepted to an inpatient psychiatric hospital: Yes    Application for Emergency Admission  IN ACCORDANCE WITH [place] 5122[place]10 [place]  The Chief Clinical Officer of: Salvador Hayward 10/18/2023 .10:20 AM    Reason for Hospitalization  The undersigned has reason to believe that: Natividad Ricardo Is a mentally ill person subject to hospitalization by court order under division B Section 5122.01 of the Revised Code, i.e., this person:    1.Yes  Represents a substantial risk of physical harm to self as manifested by evidence of threats of, or attempts at, suicide or serious self-inflicted bodily harm    2. No Represents a substantial risk of physical harm to others as manifested by evidence of recent homicidal or other violent behavior, evidence of recent threats that place another in reasonable fear of violent behavior and serious physical harm, or other evidence of present dangerousness    3.Yes Represents a substantial and immediate risk of serious physical impairment or injury to self as manifested by  evidence that the person is unable to provide for and is not providing for the person's basic physical needs because of the person's mental illness and that appropriate provision for those needs cannot be made  immediately available in the community    4.Yes Would benefit from treatment in a hospital for his mental illness and is in need of such treatment as manifested by evidence of behavior that creates a grave and imminent risk to substantial rights of others or  himself.    5.Yes Would benefit from treatment as manifested by evidence of behavior that indicates all of the following:       (a) The person is unlikely to survive safely in the community without supervision, based on a clinical determination.       (b) The person has a history of lack of  compliance with treatment for mental illness and one of the following applies:      (i) At least twice within the thirty-six months prior to the filing of an affidavit seeking court-ordered treatment of the person under section 5122.111 of the Revised Code, the lack of compliance has been a significant factor in necessitating hospitalization in a hospital or receipt of services in a forensic or other mental health unit of a correctional facility, provided that the thirty-six-month period shall be extended by the length of any hospitalization or incarceration of the person that occurred within the thirty-six-month period.      (ii) Within the forty-eight months prior to the filing of an affidavit seeking court-ordered treatment of the person under section 5122.111 of the Revised Code, the lack of compliance resulted in one or more acts of serious violent behavior toward self or others or threats of, or attempts at, serious physical harm to self or others, provided that the forty-eight-month period shall be extended by the length of any hospitalization or incarceration of the person that occurred within the forty-eight-month period.      (c) The person, as a result of mental illness, is unlikely to voluntarily participate in necessary treatment.       (d) In view of the person's treatment history and current behavior, the person is in need of treatment in order to prevent a relapse or deterioration that would be likely to result in substantial risk of serious harm to the person or others.    (e) Represents a substantial risk of physical harm to self or others if allowed to remain at liberty pending examination.    Therefore, it is requested that said person be admitted to the above named facility.    STATEMENT OF BELIEF    Must be filled out by one of the following: a psychiatrist, licensed physician, licensed clinical psychologist, health or ,  or .  (Statement shall include the  circumstances under which the individual was taken into custody and the reason for the person's belief that hospitalization is necessary. The statement shall also include a reference to efforts made to secure the individual's property at his residence if he was taken into custody there. Every reasonable and appropriate effort should be made to take this person into custody in the least conspicuous manner possible.)    Patient endorsed suicidal ideations wanting to jump into traffic because the voices are telling her to.  Has history of suicide attempts and is high risk according to the [place] scale.  Denies HI.  Auditory hallucinations command hallucinations present telling her to call herself.  She would benefit from inpatient psychiatry at this point.    GAURAV Ramirez 10/18/2023     _____________________________________________________________   Place of Employment: [facility] emergency department    STATEMENT OF OBSERVATION BY PSYCHIATRIST, LICENSED PHYSICIAN, OR LICENSED CLINICAL PSYCHOLOGIST, IF APPLICABLE    Place of Observation (e.g., [facility], general hospital, office, emergency facility)  (If applicable, please complete)    GAURAV Ramirez 10/18/2023    _____________________________________________________________

## 2023-10-18 NOTE — ED PROVIDER NOTES
CC: Suicidal     HPI:  Patient is a 22-year-old female with history of schizoaffective disorder, NIDDM 2, presenting  to the emergency department today due to suicidal ideation.  Patient reports she has been having auditory hallucinations for a while now, and the voices have been telling her to do bad things to her self.  Reports she has had multiple suicide attempts and self-injurious attempts including cutting herself, jumping into traffic, jumping off of roofs, etc. In the past. Reports the voices are now worsening despite taking her medications appropriately and thinks she is decompensating.  Denies visual hallucinations.  Currently living in a group home and feels safe there.  Reports no other acute medical complaints, no fevers, chills, chest pain, shortness of breath, abdominal pain, or changes in urination/stool.        Records Reviewed:  Recent available ED and inpatient notes reviewed in EMR.    PMHx/PSHx:  Per HPI.   - has a past medical history of Abnormal weight gain, Blister (nonthermal), unspecified lower leg, initial encounter, Crushing injury of unspecified finger(s), initial encounter, Drug-induced hypoglycemia without coma, Migraine, unspecified, not intractable, without status migrainosus, Other microscopic hematuria, Pain in left hand, Pain in unspecified joint, Personal history of diseases of the blood and blood-forming organs and certain disorders involving the immune mechanism, Personal history of diseases of the skin and subcutaneous tissue, Personal history of other complications of pregnancy, childbirth and the puerperium, Personal history of other diseases of the female genital tract, Personal history of other diseases of the nervous system and sense organs, Personal history of other endocrine, nutritional and metabolic disease, Personal history of other specified conditions, and Urge incontinence.  - has a past surgical history that includes Other surgical history  (11/11/2019).    Medications:  Reviewed in EMR. See EMR for complete list of medications and doses.    Allergies:  Cefixime, Ditropan, Esomeprazole, Hyoscyamine, Penicillin, Sulfa (sulfonamide antibiotics), and Ciprofloxacin    Social History:  - Tobacco:  reports that she has never smoked. She has never used smokeless tobacco.   - Alcohol:  reports no history of alcohol use.   - Illicit Drugs:  reports no history of drug use.     ROS:  Per HPI.       ???????????????????????????????????????????????????????????????  Triage Vitals:  T 36.7 °C (98.1 °F)  HR 94  /68  RR 18  O2 95 %      Physical Exam  ???????????????????????????????????????????????????????????????  EKG:  Regular rate, regular rhythm.  [place], QRS, QTc intervals within normal limits.  No ST elevations, depressions, T wave inversions.    Assessment and Plan:  Patient is a 23-year-old female presented emergency department today for suicidal ideation.  On arrival, vital signs within normal limits.  On examination, patient appears internally stimulated.  Reports the voices are currently talking to her and appears disheveled in the room.  Reports she had multiple suicide attempts in the past and is worried that she is going to do something she would regret, and decided come to the emergency department today for evaluation.  Has had multiple previous hospitalizations for mental health, and reports is compliant with her medications.  We will get psychiatric labs and requesting EPAT consult today for further evaluation. Chemistries relatively unremarkable.  CBC within normal limits.  U tox panel negative.  At this time, patient is medically cleared and will be sent out to the oncoming provider with EPAT recommendations pending.      Social Determinants Limiting Care:      Disposition:  EPAT pending    Lawrence Krishna MD  Emergency Medicine PGY2      Procedures ? SmartLinks last updated 10/17/2023 8:46 PM          Lawrence Krishna  MD  Resident  10/17/23 [id number]
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
## 2023-10-19 ENCOUNTER — HOSPITAL ENCOUNTER (OUTPATIENT)
Dept: CARDIOLOGY | Facility: HOSPITAL | Age: 23
Discharge: HOME | End: 2023-10-19
Payer: MEDICARE

## 2023-10-19 LAB
ATRIAL RATE: 80 BPM
P AXIS: 40 DEGREES
P OFFSET: 203 MS
P ONSET: 155 MS
PR INTERVAL: 136 MS
Q ONSET: 223 MS
QRS COUNT: 13 BEATS
QRS DURATION: 76 MS
QT INTERVAL: 368 MS
QTC CALCULATION(BAZETT): 424 MS
QTC FREDERICIA: 405 MS
R AXIS: 29 DEGREES
T AXIS: 5 DEGREES
T OFFSET: 407 MS
VENTRICULAR RATE: 80 BPM

## 2023-10-19 PROCEDURE — 93005 ELECTROCARDIOGRAM TRACING: CPT

## 2023-11-10 ENCOUNTER — LAB (OUTPATIENT)
Dept: LAB | Facility: LAB | Age: 23
End: 2023-11-10
Payer: MEDICARE

## 2023-11-10 DIAGNOSIS — E03.8 OTHER SPECIFIED HYPOTHYROIDISM: ICD-10-CM

## 2023-11-10 LAB — TSH SERPL-ACNC: 2.16 MIU/L (ref 0.44–3.98)

## 2023-11-10 PROCEDURE — 36415 COLL VENOUS BLD VENIPUNCTURE: CPT

## 2023-11-10 PROCEDURE — 84443 ASSAY THYROID STIM HORMONE: CPT

## 2023-11-14 ENCOUNTER — LAB (OUTPATIENT)
Dept: LAB | Facility: LAB | Age: 23
End: 2023-11-14
Payer: MEDICARE

## 2023-11-14 ENCOUNTER — APPOINTMENT (OUTPATIENT)
Dept: OBSTETRICS AND GYNECOLOGY | Facility: HOSPITAL | Age: 23
End: 2023-11-14
Payer: MEDICARE

## 2023-11-14 ENCOUNTER — PROCEDURE VISIT (OUTPATIENT)
Dept: OBSTETRICS AND GYNECOLOGY | Facility: HOSPITAL | Age: 23
End: 2023-11-14
Payer: MEDICARE

## 2023-11-14 VITALS — BODY MASS INDEX: 39.06 KG/M2 | DIASTOLIC BLOOD PRESSURE: 77 MMHG | SYSTOLIC BLOOD PRESSURE: 114 MMHG | WEIGHT: 242 LBS

## 2023-11-14 DIAGNOSIS — Z30.017 ENCOUNTER FOR INITIAL PRESCRIPTION OF IMPLANTABLE SUBDERMAL CONTRACEPTIVE: Primary | ICD-10-CM

## 2023-11-14 DIAGNOSIS — Z11.3 ROUTINE SCREENING FOR STI (SEXUALLY TRANSMITTED INFECTION): ICD-10-CM

## 2023-11-14 DIAGNOSIS — E03.8 OTHER SPECIFIED HYPOTHYROIDISM: ICD-10-CM

## 2023-11-14 LAB
HBV SURFACE AG SERPL QL IA: NONREACTIVE
HCV AB SER QL: NONREACTIVE
HIV 1+2 AB+HIV1 P24 AG SERPL QL IA: NONREACTIVE
T PALLIDUM AB SER QL: NONREACTIVE
TSH SERPL-ACNC: 1.1 MIU/L (ref 0.44–3.98)

## 2023-11-14 PROCEDURE — 36415 COLL VENOUS BLD VENIPUNCTURE: CPT

## 2023-11-14 PROCEDURE — 87491 CHLMYD TRACH DNA AMP PROBE: CPT | Performed by: OBSTETRICS & GYNECOLOGY

## 2023-11-14 PROCEDURE — 87661 TRICHOMONAS VAGINALIS AMPLIF: CPT | Performed by: OBSTETRICS & GYNECOLOGY

## 2023-11-14 PROCEDURE — 86803 HEPATITIS C AB TEST: CPT

## 2023-11-14 PROCEDURE — 11983 REMOVE/INSERT DRUG IMPLANT: CPT | Performed by: OBSTETRICS & GYNECOLOGY

## 2023-11-14 PROCEDURE — 86780 TREPONEMA PALLIDUM: CPT

## 2023-11-14 PROCEDURE — 87591 N.GONORRHOEAE DNA AMP PROB: CPT | Performed by: OBSTETRICS & GYNECOLOGY

## 2023-11-14 PROCEDURE — 84443 ASSAY THYROID STIM HORMONE: CPT

## 2023-11-14 PROCEDURE — 2500000004 HC RX 250 GENERAL PHARMACY W/ HCPCS (ALT 636 FOR OP/ED): Performed by: OBSTETRICS & GYNECOLOGY

## 2023-11-14 PROCEDURE — 87389 HIV-1 AG W/HIV-1&-2 AB AG IA: CPT

## 2023-11-14 PROCEDURE — 87340 HEPATITIS B SURFACE AG IA: CPT

## 2023-11-14 PROCEDURE — 99213 OFFICE O/P EST LOW 20 MIN: CPT | Performed by: OBSTETRICS & GYNECOLOGY

## 2023-11-14 RX ADMIN — ETONOGESTREL 1 EACH: 68 IMPLANT SUBCUTANEOUS at 15:49

## 2023-11-14 ASSESSMENT — ENCOUNTER SYMPTOMS
SHORTNESS OF BREATH: 0
HEADACHES: 0
VOMITING: 0
PALPITATIONS: 0
HEMATURIA: 0
FREQUENCY: 0
OCCASIONAL FEELINGS OF UNSTEADINESS: 0
NAUSEA: 0
WEAKNESS: 0
DIARRHEA: 0
LOSS OF SENSATION IN FEET: 0
COUGH: 0
DYSURIA: 0
CONSTIPATION: 0
DIZZINESS: 0
DEPRESSION: 0
CHILLS: 0
ABDOMINAL PAIN: 0
FEVER: 0

## 2023-11-14 ASSESSMENT — PAIN SCALES - GENERAL: PAINLEVEL: 0-NO PAIN

## 2023-11-14 NOTE — PROGRESS NOTES
SUBJECTIVE    23 y.o. G0 Implant female presents for   Chief Complaint   Patient presents with    Contraception     Patient here for nexplanon removal and insertion  Patient wants STI testing  Patient denies falls  Patient denies pain      Amenorrheic on nexplanon. Would like to continue method.     OB/GYN History  No LMP recorded. Patient has had an implant.    Social History     Substance and Sexual Activity   Sexual Activity Not on file       OB History    Para Term  AB Living   0 0 0 0 0 0   SAB IAB Ectopic Multiple Live Births   0 0 0 0 0       The following portions of the chart were reviewed this encounter and updated as appropriate:           Screenings  Social Determinants of Health     Tobacco Use: Low Risk  (2023)    Patient History     Smoking Tobacco Use: Never     Smokeless Tobacco Use: Never     Passive Exposure: Not on file   Alcohol Use: Not on file   Financial Resource Strain: Not on file   Food Insecurity: Not on file   Transportation Needs: Not on file   Physical Activity: Not on file   Stress: Not on file   Social Connections: Not on file   Intimate Partner Violence: Not on file   Depression: Not at risk (2022)    PHQ-2     PHQ-2 Score: 2   Housing Stability: Not on file   Utilities: Not on file   Digital Equity: Not on file         Review of Systems  Review of Systems   Constitutional:  Negative for chills and fever.   Eyes:  Negative for visual disturbance.   Respiratory:  Negative for cough and shortness of breath.    Cardiovascular:  Negative for chest pain and palpitations.   Gastrointestinal:  Negative for abdominal pain, constipation, diarrhea, nausea and vomiting.   Genitourinary:  Negative for dyspareunia, dysuria, frequency, hematuria, urgency, vaginal bleeding and vaginal discharge.   Neurological:  Negative for dizziness, weakness and headaches.        OBJECTIVE  Vitals:    23 1504   BP: 114/77   Weight: 110 kg (242 lb)     Body mass index is 39.06  kg/m².     Physical Exam  Constitutional:       Appearance: Normal appearance.   HENT:      Head: Normocephalic and atraumatic.      Nose: Nose normal.      Mouth/Throat:      Mouth: Mucous membranes are moist.   Eyes:      Extraocular Movements: Extraocular movements intact.      Pupils: Pupils are equal, round, and reactive to light.   Cardiovascular:      Rate and Rhythm: Normal rate.   Pulmonary:      Effort: Pulmonary effort is normal.   Musculoskeletal:         General: Normal range of motion.      Cervical back: Normal range of motion.   Neurological:      General: No focal deficit present.      Mental Status: She is alert and oriented to person, place, and time.   Skin:     General: Skin is warm and dry.   Psychiatric:         Mood and Affect: Mood normal.         Behavior: Behavior normal.   Vitals and nursing note reviewed.          Last Pap: approximate date 12/2022 and was normal    Immunization History   Administered Date(s) Administered    DTaP vaccine, pediatric  (INFANRIX) 2001, 01/17/2002, 03/18/2002, 12/10/2002, 04/10/2006    Flu vaccine (IIV4), preservative free *Check age/dose* 10/14/2016, 10/11/2018, 09/16/2019    HPV, Quadrivalent 08/14/2012, 10/25/2012, 05/12/2014    Hep A, Unspecified 08/02/2011, 10/25/2012    Hepatitis B vaccine, adult (RECOMBIVAX, ENGERIX) 2001, 03/18/2002    HiB PRP-OMP conjugate vaccine, pediatric (PEDVAXHIB) 2001, 01/17/2002, 03/18/2002    Influenza, Unspecified 10/09/2009, 11/10/2010, 10/25/2012    Influenza, seasonal, injectable 03/14/2016    MMR vaccine, subcutaneous (MMR II) 07/30/2002, 04/10/2006    Meningococcal ACWY vaccine (MENVEO) 08/14/2012    Meningococcal B, Unspecified 05/30/2018, 07/10/2020    Meningococcal MCV4P 05/30/2018    PPD Test 06/17/2020    Pneumococcal conjugate vaccine, 13-valent (PREVNAR 13) 01/17/2002, 03/18/2002, 05/20/2002    Poliovirus vaccine, subcutaneous (IPOL) 2001, 01/17/2002, 03/18/2002, 04/10/2006    Td  vaccine, age 7 years and older (TDVAX) 08/14/2012    Varicella vaccine, subcutaneous (VARIVAX) 07/30/2002, 03/05/2007      Gardasil received    ASSESSMENT & PLAN  Problem List Items Addressed This Visit    None  Visit Diagnoses       Encounter for initial prescription of implantable subdermal contraceptive    -  Primary    Nexplanon placed in 2019. Extended use for 5 years only in BMI <30. Removal and replacement today.    Relevant Medications    etonogestrel-eluting contraceptive implant    Other Relevant Orders    Insertion of Contraceptive Capsule    Routine screening for STI (sexually transmitted infection)        Full panel STI screen    Relevant Orders    Chlamydia, PCR    Neisseria gonorrhoeae, Amplified    Trichomonas vaginalis, Amplified    HIV-1 and HIV-2 antibodies    Hepatitis B surface Ag    Hepatitis C Antibody    Syphilis Screen with Reflex          Follow up: KAILA Swanson MD  Obstetrics & Gynecology  11/14/23

## 2023-11-14 NOTE — PROGRESS NOTES
Patient ID: Natividad Ricardo is a 23 y.o. female.    Insertion of Contraceptive Capsule    Date/Time: 11/14/2023 3:34 PM    Performed by: Tamara Swanson MD  Authorized by: Tamara Swanson MD    Consent:     Consent obtained:  Written    Consent given by:  Guardian    Procedural risks discussed:  Bleeding and infection    Patient questions answered: yes      Patient agrees, verbalizes understanding, and wants to proceed: yes      Educational handouts given: yes      Instructions and paperwork completed: yes    Indication:     Indication: Insertion of non-biodegradable drug delivery implant    Pre-procedure:     Pre-procedure timeout performed: yes      Prepped with: povidone-iodine      Local anesthetic:  Lidocaine 1%  Procedure:     Procedure:  Removal with reinsertion    Small stab incision was made in arm: yes (And nexplanon removed intact)      Left/right:  Left    Preloaded contraceptive capsule trocar was placed subdermally: yes      Visualization of implant was obtained: yes      Contraceptive capsule was inserted and trocar removed: yes      Visualization of notch in stylet and palpation of device: yes      Palpation confirms placement by provider and patient: yes      Site was closed with steri-strips and pressure bandage applied: yes

## 2023-11-15 DIAGNOSIS — A59.01 TRICHOMONAL VAGINITIS: Primary | ICD-10-CM

## 2023-11-15 LAB
C TRACH RRNA SPEC QL NAA+PROBE: NEGATIVE
N GONORRHOEA DNA SPEC QL PROBE+SIG AMP: NEGATIVE
T VAGINALIS RRNA SPEC QL NAA+PROBE: POSITIVE

## 2023-11-15 RX ORDER — METRONIDAZOLE 500 MG/1
500 TABLET ORAL 2 TIMES DAILY
Qty: 14 TABLET | Refills: 0 | Status: SHIPPED | OUTPATIENT
Start: 2023-11-15 | End: 2023-11-22

## 2023-11-29 ENCOUNTER — OFFICE VISIT (OUTPATIENT)
Dept: ENDOCRINOLOGY | Facility: CLINIC | Age: 23
End: 2023-11-29
Payer: MEDICARE

## 2023-11-29 ENCOUNTER — HOSPITAL ENCOUNTER (EMERGENCY)
Facility: HOSPITAL | Age: 23
Discharge: HOME | End: 2023-11-29
Attending: EMERGENCY MEDICINE
Payer: MEDICARE

## 2023-11-29 VITALS
SYSTOLIC BLOOD PRESSURE: 120 MMHG | HEART RATE: 92 BPM | RESPIRATION RATE: 18 BRPM | TEMPERATURE: 97.8 F | WEIGHT: 239 LBS | BODY MASS INDEX: 37.51 KG/M2 | HEIGHT: 67 IN | DIASTOLIC BLOOD PRESSURE: 77 MMHG | OXYGEN SATURATION: 98 %

## 2023-11-29 VITALS
BODY MASS INDEX: 38.41 KG/M2 | HEART RATE: 133 BPM | SYSTOLIC BLOOD PRESSURE: 107 MMHG | DIASTOLIC BLOOD PRESSURE: 48 MMHG | HEIGHT: 66 IN | WEIGHT: 239 LBS

## 2023-11-29 DIAGNOSIS — R44.3 HALLUCINATIONS: ICD-10-CM

## 2023-11-29 DIAGNOSIS — R45.851 SUICIDAL IDEATIONS: Primary | ICD-10-CM

## 2023-11-29 DIAGNOSIS — R45.89 SUICIDAL BEHAVIOR WITHOUT ATTEMPTED SELF-INJURY: ICD-10-CM

## 2023-11-29 DIAGNOSIS — Z86.59 HISTORY OF COMMAND HALLUCINATIONS: ICD-10-CM

## 2023-11-29 DIAGNOSIS — E11.65 TYPE 2 DIABETES MELLITUS WITH HYPERGLYCEMIA, WITHOUT LONG-TERM CURRENT USE OF INSULIN (MULTI): Primary | ICD-10-CM

## 2023-11-29 PROBLEM — F20.9 SCHIZOPHRENIA (MULTI): Status: ACTIVE | Noted: 2023-08-29

## 2023-11-29 LAB
ALBUMIN SERPL BCP-MCNC: 3.2 G/DL (ref 3.4–5)
ALP SERPL-CCNC: 68 U/L (ref 33–110)
ALT SERPL W P-5'-P-CCNC: 9 U/L (ref 7–45)
AMPHETAMINES UR QL SCN: ABNORMAL
ANION GAP SERPL CALC-SCNC: 11 MMOL/L (ref 10–20)
APAP SERPL-MCNC: <10 UG/ML
AST SERPL W P-5'-P-CCNC: 18 U/L (ref 9–39)
BARBITURATES UR QL SCN: ABNORMAL
BASOPHILS # BLD AUTO: 0.08 X10*3/UL (ref 0–0.1)
BASOPHILS NFR BLD AUTO: 0.6 %
BENZODIAZ UR QL SCN: ABNORMAL
BILIRUB SERPL-MCNC: 0.3 MG/DL (ref 0–1.2)
BUN SERPL-MCNC: 9 MG/DL (ref 6–23)
BZE UR QL SCN: ABNORMAL
CALCIUM SERPL-MCNC: 9.2 MG/DL (ref 8.6–10.6)
CANNABINOIDS UR QL SCN: ABNORMAL
CHLORIDE SERPL-SCNC: 105 MMOL/L (ref 98–107)
CO2 SERPL-SCNC: 26 MMOL/L (ref 21–32)
CREAT SERPL-MCNC: 1.12 MG/DL (ref 0.5–1.05)
EOSINOPHIL # BLD AUTO: 0.03 X10*3/UL (ref 0–0.7)
EOSINOPHIL NFR BLD AUTO: 0.2 %
ERYTHROCYTE [DISTWIDTH] IN BLOOD BY AUTOMATED COUNT: 14.6 % (ref 11.5–14.5)
ETHANOL SERPL-MCNC: <10 MG/DL
FENTANYL+NORFENTANYL UR QL SCN: ABNORMAL
GFR SERPL CREATININE-BSD FRML MDRD: 71 ML/MIN/1.73M*2
GLUCOSE SERPL-MCNC: 65 MG/DL (ref 74–99)
HCT VFR BLD AUTO: 38.1 % (ref 36–46)
HGB BLD-MCNC: 12.8 G/DL (ref 12–16)
IMM GRANULOCYTES # BLD AUTO: 0.23 X10*3/UL (ref 0–0.7)
IMM GRANULOCYTES NFR BLD AUTO: 1.9 % (ref 0–0.9)
LYMPHOCYTES # BLD AUTO: 3.95 X10*3/UL (ref 1.2–4.8)
LYMPHOCYTES NFR BLD AUTO: 32.1 %
MCH RBC QN AUTO: 31.8 PG (ref 26–34)
MCHC RBC AUTO-ENTMCNC: 33.6 G/DL (ref 32–36)
MCV RBC AUTO: 95 FL (ref 80–100)
MONOCYTES # BLD AUTO: 1.09 X10*3/UL (ref 0.1–1)
MONOCYTES NFR BLD AUTO: 8.9 %
NEUTROPHILS # BLD AUTO: 6.93 X10*3/UL (ref 1.2–7.7)
NEUTROPHILS NFR BLD AUTO: 56.3 %
NRBC BLD-RTO: 0 /100 WBCS (ref 0–0)
OPIATES UR QL SCN: ABNORMAL
OXYCODONE+OXYMORPHONE UR QL SCN: ABNORMAL
PCP UR QL SCN: ABNORMAL
PLATELET # BLD AUTO: 397 X10*3/UL (ref 150–450)
POTASSIUM SERPL-SCNC: 4.3 MMOL/L (ref 3.5–5.3)
PROT SERPL-MCNC: 7.5 G/DL (ref 6.4–8.2)
RBC # BLD AUTO: 4.03 X10*6/UL (ref 4–5.2)
SALICYLATES SERPL-MCNC: <3 MG/DL
SODIUM SERPL-SCNC: 138 MMOL/L (ref 136–145)
WBC # BLD AUTO: 12.3 X10*3/UL (ref 4.4–11.3)

## 2023-11-29 PROCEDURE — 3074F SYST BP LT 130 MM HG: CPT | Performed by: PHYSICIAN ASSISTANT

## 2023-11-29 PROCEDURE — 3078F DIAST BP <80 MM HG: CPT | Performed by: PHYSICIAN ASSISTANT

## 2023-11-29 PROCEDURE — 80307 DRUG TEST PRSMV CHEM ANLYZR: CPT

## 2023-11-29 PROCEDURE — 99215 OFFICE O/P EST HI 40 MIN: CPT | Performed by: PHYSICIAN ASSISTANT

## 2023-11-29 PROCEDURE — 36415 COLL VENOUS BLD VENIPUNCTURE: CPT

## 2023-11-29 PROCEDURE — 80143 DRUG ASSAY ACETAMINOPHEN: CPT

## 2023-11-29 PROCEDURE — 99285 EMERGENCY DEPT VISIT HI MDM: CPT | Performed by: EMERGENCY MEDICINE

## 2023-11-29 PROCEDURE — 3044F HG A1C LEVEL LT 7.0%: CPT | Performed by: PHYSICIAN ASSISTANT

## 2023-11-29 PROCEDURE — 99284 EMERGENCY DEPT VISIT MOD MDM: CPT

## 2023-11-29 PROCEDURE — 95251 CONT GLUC MNTR ANALYSIS I&R: CPT | Performed by: PHYSICIAN ASSISTANT

## 2023-11-29 PROCEDURE — 80053 COMPREHEN METABOLIC PANEL: CPT

## 2023-11-29 PROCEDURE — 1036F TOBACCO NON-USER: CPT | Performed by: PHYSICIAN ASSISTANT

## 2023-11-29 PROCEDURE — 85025 COMPLETE CBC W/AUTO DIFF WBC: CPT

## 2023-11-29 RX ORDER — LEVOTHYROXINE SODIUM 100 UG/1
100 TABLET ORAL
Status: ON HOLD | COMMUNITY
End: 2023-12-02 | Stop reason: WASHOUT

## 2023-11-29 RX ORDER — DIVALPROEX SODIUM 250 MG/1
TABLET, DELAYED RELEASE ORAL
Status: ON HOLD | COMMUNITY
Start: 2023-08-30 | End: 2023-12-02 | Stop reason: WASHOUT

## 2023-11-29 RX ORDER — HYDROXYZINE PAMOATE 25 MG/1
25 CAPSULE ORAL 2 TIMES DAILY
Status: ON HOLD | COMMUNITY
Start: 2023-08-17 | End: 2023-12-02 | Stop reason: WASHOUT

## 2023-11-29 RX ORDER — UREA 40 %
CREAM (GRAM) TOPICAL 2 TIMES DAILY
COMMUNITY
Start: 2023-09-13 | End: 2023-12-11 | Stop reason: HOSPADM

## 2023-11-29 RX ORDER — PRAZOSIN HYDROCHLORIDE 1 MG/1
CAPSULE ORAL
Status: ON HOLD | COMMUNITY
Start: 2023-08-17 | End: 2023-12-02 | Stop reason: WASHOUT

## 2023-11-29 RX ORDER — NAPROXEN 500 MG/1
500 TABLET ORAL 2 TIMES DAILY PRN
Status: ON HOLD | COMMUNITY
Start: 2023-06-14 | End: 2023-12-02 | Stop reason: WASHOUT

## 2023-11-29 RX ORDER — IBUPROFEN 600 MG/1
600 TABLET ORAL EVERY 6 HOURS PRN
Status: ON HOLD | COMMUNITY
End: 2023-12-02 | Stop reason: WASHOUT

## 2023-11-29 SDOH — HEALTH STABILITY: MENTAL HEALTH: SUICIDAL BEHAVIOR (LIFETIME): YES

## 2023-11-29 SDOH — ECONOMIC STABILITY: HOUSING INSECURITY: FEELS SAFE LIVING IN HOME: YES

## 2023-11-29 SDOH — HEALTH STABILITY: MENTAL HEALTH: ACTIVE SUICIDAL IDEATION WITH SPECIFIC PLAN AND INTENT (PAST 1 MONTH): NO

## 2023-11-29 SDOH — HEALTH STABILITY: MENTAL HEALTH: IN THE PAST FEW WEEKS, HAVE YOU WISHED YOU WERE DEAD?: YES

## 2023-11-29 SDOH — HEALTH STABILITY: MENTAL HEALTH: NON-SPECIFIC ACTIVE SUICIDAL THOUGHTS (PAST 1 MONTH): YES

## 2023-11-29 SDOH — HEALTH STABILITY: MENTAL HEALTH

## 2023-11-29 SDOH — HEALTH STABILITY: MENTAL HEALTH: SUICIDAL BEHAVIOR (DESCRIPTION): PRIOR ATTEMPTS OF JUMPING IN FRONT OF CAR AND JUMPING OFF BALCONY

## 2023-11-29 SDOH — HEALTH STABILITY: MENTAL HEALTH: HOW DID YOU TRY TO KILL YOURSELF?: JUMPING IN FRONT OF CAR, JUMPING OFF BALCONY

## 2023-11-29 SDOH — HEALTH STABILITY: MENTAL HEALTH: HAVE YOU EVER TRIED TO KILL YOURSELF?: YES

## 2023-11-29 SDOH — HEALTH STABILITY: MENTAL HEALTH: SUICIDAL BEHAVIOR (3 MONTHS): NO

## 2023-11-29 SDOH — HEALTH STABILITY: MENTAL HEALTH: ACTIVE SUICIDAL IDEATION WITH SOME INTENT TO ACT, WITHOUT SPECIFIC PLAN (PAST 1 MONTH): NO

## 2023-11-29 SDOH — HEALTH STABILITY: MENTAL HEALTH: WHEN DID YOU TRY TO KILL YOURSELF?: ~FEW YEARS AGO

## 2023-11-29 SDOH — HEALTH STABILITY: MENTAL HEALTH: DEPRESSION SYMPTOMS: APPETITE CHANGE;CHANGE IN ENERGY LEVEL;LOSS OF INTEREST;SLEEP DISTURBANCE

## 2023-11-29 SDOH — HEALTH STABILITY: MENTAL HEALTH: ARE YOU HAVING THOUGHTS OF KILLING YOURSELF RIGHT NOW?: NO

## 2023-11-29 SDOH — ECONOMIC STABILITY: GENERAL

## 2023-11-29 SDOH — HEALTH STABILITY: MENTAL HEALTH: ANXIETY SYMPTOMS: NO PROBLEMS REPORTED OR OBSERVED.

## 2023-11-29 SDOH — HEALTH STABILITY: MENTAL HEALTH: WISH TO BE DEAD (PAST 1 MONTH): YES

## 2023-11-29 SDOH — HEALTH STABILITY: MENTAL HEALTH: IN THE PAST FEW WEEKS, HAVE YOU FELT THAT YOU OR YOUR FAMILY WOULD BE BETTER OFF IF YOU WERE DEAD?: YES

## 2023-11-29 SDOH — HEALTH STABILITY: MENTAL HEALTH: IN THE PAST WEEK, HAVE YOU BEEN HAVING THOUGHTS ABOUT KILLING YOURSELF?: NO

## 2023-11-29 ASSESSMENT — COLUMBIA-SUICIDE SEVERITY RATING SCALE - C-SSRS
1. IN THE PAST MONTH, HAVE YOU WISHED YOU WERE DEAD OR WISHED YOU COULD GO TO SLEEP AND NOT WAKE UP?: NO
4. HAVE YOU HAD THESE THOUGHTS AND HAD SOME INTENTION OF ACTING ON THEM?: NO
6. HAVE YOU EVER DONE ANYTHING, STARTED TO DO ANYTHING, OR PREPARED TO DO ANYTHING TO END YOUR LIFE?: YES
6. HAVE YOU EVER DONE ANYTHING, STARTED TO DO ANYTHING, OR PREPARED TO DO ANYTHING TO END YOUR LIFE?: YES
5. HAVE YOU STARTED TO WORK OUT OR WORKED OUT THE DETAILS OF HOW TO KILL YOURSELF? DO YOU INTEND TO CARRY OUT THIS PLAN?: NO
2. HAVE YOU ACTUALLY HAD ANY THOUGHTS OF KILLING YOURSELF?: YES

## 2023-11-29 ASSESSMENT — LIFESTYLE VARIABLES
SUBSTANCE_ABUSE_PAST_12_MONTHS: YES
PRESCIPTION_ABUSE_PAST_12_MONTHS: NO

## 2023-11-29 ASSESSMENT — PAIN SCALES - GENERAL
PAINLEVEL_OUTOF10: 0 - NO PAIN
PAINLEVEL: 4

## 2023-11-29 ASSESSMENT — PAIN - FUNCTIONAL ASSESSMENT: PAIN_FUNCTIONAL_ASSESSMENT: 0-10

## 2023-11-29 NOTE — PROGRESS NOTES
"Nahun Ricardo is a 23 y.o. female who presents for follow-up of Type 2 diabetes mellitus. The initial diagnosis of diabetes was made in 2018 . The patient does not have a known family history of diabetes.    Known complications due to diabetes included none    Cardiovascular risk factors include diabetes mellitus. The patient is not on an ACE inhibitor or angiotensin II receptor blocker.  The patient has not been previously hospitalized due to diabetic ketoacidosis.     Current symptoms/problems include none. Her clinical course has been stable.     Current diabetes regimen is as follows: Oral agent (monotherapy): pioglitazone (Actos)     The patient is currently checking the blood glucose 5+ times per day.    Patient is using: continuous glucose monitor - freestyle heather 2    CGM INTERPRETATION:  Average blood sugar 86 mg/dL, glucose management indicator 5.4%    Glucose less than 70 mg/dL equals 7% of time worn  Glucose ranging between 70 to 180 mg/dL represented by 93% of time worn  Glucose ranging greater than 180 mg/dL represented by 0% of time worn    72 hours of data reviewed in order to inform diabetes treatment plan decision making, patient is not currently at risk for recurrent hypoglycemia safety concerns      Hypoglycemia frequency: n/a  Hypoglycemia awareness: Yes     Exercise: never   Meal panning: She is using avoidance of concentrated sweets.    Review of Systems   Psychiatric/Behavioral:  Positive for dysphoric mood and suicidal ideas. Negative for agitation and self-injury.         Pt has been hearing voices (one is her own and one is not hers) telling her to kill herself for about 4 days   All other systems reviewed and are negative.      Objective   BP (!) 107/48 (BP Location: Right arm, Patient Position: Sitting, BP Cuff Size: Large adult)   Pulse (!) 133   Ht 1.676 m (5' 6\")   Wt 108 kg (239 lb)   BMI 38.58 kg/m²   Physical Exam  Psychiatric:         Attention and Perception: " Attention normal. She perceives auditory hallucinations. She does not perceive visual hallucinations.         Mood and Affect: Mood is depressed. Affect is blunt.         Speech: Speech normal.         Behavior: Behavior is cooperative.         Thought Content: Thought content includes suicidal ideation.         Cognition and Memory: Cognition and memory normal.         Judgment: Judgment normal.         Lab Review  Glucose (mg/dL)   Date Value   11/30/2023 97   11/29/2023 65 (L)   10/17/2023 120 (H)     Hemoglobin A1C (%)   Date Value   06/01/2023 5.7 (A)   05/02/2023 5.5   11/16/2022 5.6   10/29/2022 6.1 (A)     Bicarbonate (mmol/L)   Date Value   11/30/2023 26   11/29/2023 26   10/17/2023 26     Urea Nitrogen (mg/dL)   Date Value   11/30/2023 8   11/29/2023 9   10/17/2023 8     Creatinine (mg/dL)   Date Value   11/30/2023 1.08 (H)   11/29/2023 1.12 (H)   10/17/2023 1.04       Health Maintenance:   Foot Exam: due for update  Eye Exam: due for update  Lipid Panel: due for update, ordered today  Urine Albumin: dur for update, ordered today    Assessment/Plan   Diagnoses and all orders for this visit:  Type 2 diabetes mellitus with hyperglycemia, without long-term current use of insulin (CMS/[facility])  Suicidal behavior without attempted self-injury  History of command hallucinations      Type 2 diabetes mellitus, is at goal. A1C at target <6.5%    RX changes: none   Education:  self-monitoring of blood glucose skills - no  need to continue CGM use  Follow up: I recommend diabetes care be 6 months.    Suicidal Ideation/Depression  We walked to the Emergency room together for psychiatry asssessment  Please keep your upcoming therapy and psychiatry appointments
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
## 2023-11-29 NOTE — ED PROVIDER NOTES
Emergency Department Encounter  [facility] EMERGENCY MEDICINE    Patient: Natividad Ricardo  MRN: [id number]  : 2000  Date of Evaluation: 2023  ED Provider: Kendrick Zhong PA-C    ED care was supervised by Dr. Puri who independently examined and evaluated the patient. Please see their attestation note for further details.    Chief Complaint       Chief Complaint   Patient presents with    Suicidal     [place]    (Location/Symptom, Timing/Onset, Context/Setting, Quality, Duration, Modifying Factors, Severity) Note limiting factors.     Natividad Ricardo is a 23 y.o. female who presents to the emergency department for suicidal ideations and auditory hallucinations. Patient has a history of schizoaffective disorder.  Patient said in the past few days she has been having suicidal thoughts.  No suicidal plan at this time.  Patient's had previous suicidal attempts.  Patient also has been hearing voices telling her to harmself.  Denies any visual hallucinations. Denies any HI or drug use    Limitations to History: None  Records reviewed: EMR inpatient and outpatient notes, Care Everywhere    Past History     Past Medical History:   Diagnosis Date    Abnormal weight gain 2018    Abnormal weight gain    Blister (nonthermal), unspecified lower leg, initial encounter 2018    Blister of leg    Crushing injury of unspecified finger(s), initial encounter 2017    Crushing injury of finger, initial encounter    Depression     Diabetes mellitus (CMS/[facility])     Drug-induced hypoglycemia without coma 2021    Hypoglycemia due to insulin    Migraine, unspecified, not intractable, without status migrainosus 2016    Headache, migraine    Other microscopic hematuria 2018    Hematuria, microscopic    Pain in left hand 2017    Pain of left hand    Pain in unspecified joint 2013    Multiple joint pain    Personal history of diseases of the blood and blood-forming organs  and certain disorders involving the immune mechanism 06/07/2017    History of anemia    Personal history of diseases of the skin and subcutaneous tissue 06/13/2019    History of acne    Personal history of other complications of pregnancy, childbirth and the puerperium 07/25/2017    History of galactorrhea    Personal history of other diseases of the female genital tract 06/05/2018    History of vaginal bleeding    Personal history of other diseases of the nervous system and sense organs 08/03/2022    History of optic neuritis    Personal history of other endocrine, nutritional and metabolic disease 12/17/2018    History of elevated glucose    Personal history of other specified conditions 06/09/2017    History of nipple discharge    Schizophrenia (CMS/HCC)     Urge incontinence 06/01/2018    Urge incontinence of urine     Past Surgical History:   Procedure Laterality Date    OTHER SURGICAL HISTORY  11/11/2019    Cholecystectomy     Social History     Socioeconomic History    Marital status: Single     Spouse name: None    Number of children: None    Years of education: None    Highest education level: None   Occupational History    None   Tobacco Use    Smoking status: Never    Smokeless tobacco: Never   Substance and Sexual Activity    Alcohol use: Never    Drug use: Never    Sexual activity: None   Other Topics Concern    None   Social History Narrative    None     Social Determinants of Health     Financial Resource Strain: Not on file   Food Insecurity: Not on file   Transportation Needs: Not on file   Physical Activity: Not on file   Stress: Not on file   Social Connections: Not on file   Intimate Partner Violence: Not on file   Housing Stability: Not on file         Medications/Allergies     Discharge Medication List as of 11/29/2023  1:26 PM        CONTINUE these medications which have NOT CHANGED    Details   acetaminophen (Tylenol) 325 mg tablet Take 1 tablet (325 mg) by mouth every 6 hours if needed.,  Starting Sat 1/8/2022, Historical Med      ammonium lactate (Amlactin) 12 % cream Ammonium Lactate 12 % External Cream   Quantity: 385  Refills: 0        Start : 21-Feb-2022   Active, Historical Med      !! benztropine (Cogentin) 0.5 mg tablet Historical Med      !! benztropine (Cogentin) 1 mg tablet Take 1 tablet (1 mg) by mouth 2 times a day., Starting Wed 9/9/2020, Historical Med      !! buPROPion XL (Wellbutrin XL) 150 mg 24 hr tablet Historical Med      !! buPROPion XL (Wellbutrin XL) 300 mg 24 hr tablet Historical Med      busPIRone (Buspar) 10 mg tablet Historical Med      cholecalciferol (Vitamin D-3) 25 MCG (1000 UT) tablet Take 1 tablet (1,000 Units) by mouth once daily., Starting Thu 6/1/2023, Until Fri 5/31/2024, Normal      clonazePAM (KlonoPIN) 0.5 mg tablet Historical Med      divalproex (Depakote ER) 250 mg 24 hr tablet 1 tablet (250 mg). As directed, Starting Tue 9/8/2020, Historical Med      !! divalproex (Depakote) 250 mg EC tablet Historical Med      !! divalproex (Depakote) 500 mg EC tablet Take 1 tablet (500 mg) by mouth twice a day., Starting Fri 11/18/2022, Historical Med      Embrace Lancets 30 gauge misc Historical Med      etonogestrel-eluting contraceptive (Nexplanon) 68 mg implant implant Inject under the skin., Starting Wed 10/18/2017, Historical Med      famotidine (Pepcid) 20 mg tablet TAKE 1 TABLET EVERY DAY BY ORAL ROUTE IN THE MORNING FOR 30 DAYS., Historical Med      ferrous gluconate 324 (38 Fe) MG tablet Historical Med      FreeStyle Vic 2 Sensor kit Historical Med      High Potency Multivit, w-iron, 9 mg iron-400 mcg tablet Historical Med      !! hydrOXYzine HCL (Atarax) 25 mg tablet Historical Med      !! hydrOXYzine HCL (Atarax) 50 mg tablet Take 1 tablet (50 mg) by mouth every 8 hours if needed., Starting Mon 11/9/2020, Historical Med      hydrOXYzine pamoate (Vistaril) 25 mg capsule Take 1 capsule (25 mg) by mouth 2 times a day., Starting Thu 8/17/2023, Historical Med       ibuprofen 600 mg tablet Take 1 tablet (600 mg) by mouth every 6 hours if needed., Historical Med      insulin glargine (Lantus U-100 Insulin) 100 unit/mL injection 45 Units once daily., Starting Mon 11/9/2020, Historical Med      levothyroxine (Synthroid, Levoxyl) 100 mcg tablet Take 1 tablet (100 mcg) by mouth once daily in the morning. Take before meals., Historical Med      !! linaCLOtide (Linzess) 145 mcg capsule Take 1 capsule (145 mcg) by mouth every other day. Do not crush or chew. Take on empty stomach., Starting Thu 6/1/2023, Until Fri 5/31/2024, Normal      !! Linzess 145 mcg capsule Take 1 capsule (145 mcg) by mouth once daily., Starting Wed 1/20/2021, Historical Med      Lybalvi 20-10 mg tablet Historical Med      multivitamin tablet Take 1 tablet by mouth once daily., Starting Thu 6/1/2023, Until Fri 5/31/2024, Normal      naproxen (Naprosyn) 500 mg tablet Take 1 tablet (500 mg) by mouth 2 times a day as needed., Starting Wed 6/14/2023, Historical Med      NON FORMULARY Use one as directed when changing Free Style Vic sensor, Starting Wed 8/4/2021, Historical Med      OLANZapine (ZyPREXA) 10 mg tablet Take 1 tablet (10 mg) by mouth once daily at bedtime., Starting Mon 11/9/2020, Historical Med      OneTouch Ultra Test strip TEST BLOOD SUGARS BEFORE AND AFTER MEALS AS DIRECTED. 6 TIMES A DAY, Historical Med      paliperidone (Invega) 6 mg 24 hr tablet Take 1 tablet (6 mg) by mouth once daily., Starting Mon 11/9/2020, Historical Med      paliperidone palmitate ER (Invega Sustenna) 234 mg/1.5 mL syringe Inject 1.5 mL (234 mg) into the muscle every 21 (twenty-one) days., Starting Wed 6/9/2021, Historical Med      !! PARoxetine (Paxil) 10 mg tablet 1 tablet (10 mg)., Starting Wed 6/7/2023, Historical Med      !! PARoxetine (Paxil) 20 mg tablet Historical Med      prazosin (Minipress) 1 mg capsule TAKE 2 CAPS EVERY NIGHT AT BEDTIME FOR NIGHTMARES, Historical Med      !! traZODone (Desyrel) 100 mg tablet  Historical Med      !! traZODone (Desyrel) 150 mg tablet Take 1 tablet (150 mg) by mouth once daily at bedtime., Starting Wed 6/1/2022, Historical Med      urea (Carmol) 40 % cream Apply topically 2 times a day., Starting Wed 9/13/2023, Historical Med      !! venlafaxine 225 mg 24 hr tablet Take 1 tablet (225 mg) by mouth once daily., Starting Mon 11/9/2020, Historical Med      !! venlafaxine 75 mg 24 hr tablet Take 1 tablet (75 mg) by mouth once daily., Starting Mon 11/9/2020, Historical Med      venlafaxine XR (Effexor XR) 150 mg 24 hr capsule Take 1 capsule (150 mg) by mouth once daily at bedtime., Starting Mon 11/9/2020, Historical Med      !! ziprasidone (Geodon) 60 mg capsule Historical Med      !! ziprasidone (Geodon) 80 mg capsule Take 1 capsule (80 mg) by mouth 2 times a day., Historical Med       !! - Potential duplicate medications found. Please discuss with provider.        Allergies   Allergen Reactions    Penicillins Hives and Rash    Cefixime Hives    Ditropan Unknown     Reaction unknown    Esomeprazole Unknown    Hyoscyamine Unknown     Unknown reaction, documented from hospital stay per grandmother.    Hyoscyamine Sulfate Unknown    Penicillin Hives    Sulfa (Sulfonamide Antibiotics) Unknown    Ciprofloxacin Hives and Rash        Physical Exam       ED Triage Vitals [11/29/23 1058]   Temp Heart Rate Resp BP   36.6 °C (97.8 °F) (!) 125 18 142/77      SpO2 Temp src Heart Rate Source Patient Position   96 % -- -- --      BP Location FiO2 (%)     -- --       Physical Exam  GENERAL APPEARANCE: Awake and alert. Cooperative.   HEENT: Normocephalic. Atraumatic. Sclera anicteric. Tolerates saliva. No trismus.   NECK: Supple. Trachea midline.   CARDIO: Tachycardic. Radial pulses symmetrical and palpable  LUNGS: Respirations unlabored. CTAB.  ABDOMEN: Soft. Non-distended. Non-tender throughout.  MUSCULOSKELETAL: No acute deformities.   SKIN: Warm and dry.   NEUROLOGICAL: No gross facial drooping. No obvious  neurologic deficits.  strength symmetrical. Moves all 4 extremities spontaneously.   PSYCH: Flat affect      Diagnostics   Labs:  Labs Reviewed   CBC WITH AUTO DIFFERENTIAL - Abnormal       Result Value    WBC 12.3 (*)     nRBC 0.0      RBC 4.03      Hemoglobin 12.8      Hematocrit 38.1      MCV 95      MCH 31.8      MCHC 33.6      RDW 14.6 (*)     Platelets 397      Neutrophils % 56.3      Immature Granulocytes %, Automated 1.9 (*)     Lymphocytes % 32.1      Monocytes % 8.9      Eosinophils % 0.2      Basophils % 0.6      Neutrophils Absolute 6.93      Immature Granulocytes Absolute, Automated 0.23      Lymphocytes Absolute 3.95      Monocytes Absolute 1.09 (*)     Eosinophils Absolute 0.03      Basophils Absolute 0.08     COMPREHENSIVE METABOLIC PANEL - Abnormal    Glucose 65 (*)     Sodium 138      Potassium 4.3      Chloride 105      Bicarbonate 26      Anion Gap 11      Urea Nitrogen 9      Creatinine 1.12 (*)     eGFR 71      Calcium 9.2      Albumin 3.2 (*)     Alkaline Phosphatase 68      Total Protein 7.5      AST 18      Bilirubin, Total 0.3      ALT 9     DRUG SCREEN,URINE - Abnormal    Amphetamine Screen, Urine Presumptive Negative      Barbiturate Screen, Urine Presumptive Negative      Benzodiazepines Screen, Urine Presumptive Negative      Cannabinoid Screen, Urine Presumptive Positive (*)     Cocaine Metabolite Screen, Urine Presumptive Negative      Fentanyl Screen, Urine Presumptive Negative      Opiate Screen, Urine Presumptive Negative      Oxycodone Screen, Urine Presumptive Negative      PCP Screen, Urine Presumptive Negative      Narrative:     Drug screen results are presumptive and should not be used to assess   compliance with prescribed medication. Contact the performing [facility] laboratory   to add-on definitive confirmatory testing if clinically indicated.    Toxicology screening results are reported qualitatively. The concentration must   be greater than or equal to the cutoff to be  reported as positive. The concentration   at which the screening test can detect an individual drug or metabolite varies.   The absence of expected drug(s) and/or drug metabolite(s) may indicate non-compliance,   inappropriate timing of specimen collection relative to drug administration, poor drug   absorption, diluted/adulterated urine, or limitations of testing. For medical purposes   only; not valid for forensic use.    Interpretive questions should be directed to the laboratory medical directors.   ACUTE TOXICOLOGY PANEL, BLOOD - Normal    Acetaminophen <10.0      Salicylate  <3      Alcohol <10     POCT PREGNANCY, URINE     Radiographs:  No orders to display         EMERGENCY DEPARTMENT COURSE and DIFFERENTIAL DIAGNOSIS/MDM/PLAN:   Natividad Ricardo is a 23 y.o. female who presented to the emergency department for suicidal ideations and auditory hallucinations. Differential diagnosis included schizoaffective disorder, PTSD, anxiety, drug intoxication. Our workup consisted of ordering/reviewing CBC, CMP, POCT hCG, toxicology panel, UDS and showed white blood cell 12.3.  No anemia.  CMP showed glucose 65.  Patient given juice.  Ethanol negative.  UDS positive for cannabinoids.  Consulted EPAT team.  EPAT team recommended discharging patient with outpatient follow-up.    Patient presented with suicidal thoughts.  Patient will follow up closely with psychiatry.  Patient was comfortable with this plan.  Patient was encouraged to return to the ED immediately if she develops suicidal thoughts or call the suicide hotline.  Patient is able to contract for safety and I am comfortable discharging patient at this point with outpatient follow-up with psychiatry.  Patient has follow-up on 12/4/2023.  Natividad Ricardo (or their surrogate) and I have discussed the diagnosis and risks, and we agree with discharging home with close follow-up. We also discussed returning to the Emergency Department immediately if new or worsening  symptoms occur. We have discussed the symptoms which are most concerning that necessitate immediate return.     Final Diagnosis:   1. Suicidal ideations    2. Hallucinations        CONSULTS:  None    PROCEDURES:  Unless otherwise noted below, none     Procedures    DISPOSITION/PLAN  Discharge 11/29/2023 01:25:33 PM    PATIENT REFERRED TO:  Natalee Prieto MD  [address] [address] [address]  [phone]    In 2 days      [facility] Emergency Medicine  [address] [address] [address]  [phone]    As needed, If symptoms worsen      DISCHARGE MEDICATIONS:  Discharge Medication List as of 11/29/2023  1:26 PM        @[facility]([id number]:[email or web address])@    (Please note:  Portions of this note were completed with a voice recognition program.  Efforts were made to edit the dictations but occasionally words and phrases are mis-transcribed.)  Form [id number]       Kendrick Zhong PA-C  11/29/23 1409

## 2023-11-29 NOTE — DISCHARGE INSTRUCTIONS
Please follow-up with psychiatry as planned on 12/4/2023.  Return to ED if you develop any suicidal thoughts or call suicide hotline immediately.

## 2023-11-30 ENCOUNTER — HOSPITAL ENCOUNTER (EMERGENCY)
Facility: HOSPITAL | Age: 23
Discharge: PSYCHIATRIC OR SUBSTANCE USE FACILITY | End: 2023-12-02
Attending: EMERGENCY MEDICINE
Payer: MEDICARE

## 2023-11-30 DIAGNOSIS — R45.851 SUICIDAL IDEATION: Primary | ICD-10-CM

## 2023-11-30 LAB
ALBUMIN SERPL BCP-MCNC: 3.2 G/DL (ref 3.4–5)
ALP SERPL-CCNC: 72 U/L (ref 33–110)
ALT SERPL W P-5'-P-CCNC: 9 U/L (ref 7–45)
AMPHETAMINES UR QL SCN: ABNORMAL
ANION GAP SERPL CALC-SCNC: 11 MMOL/L (ref 10–20)
APAP SERPL-MCNC: <10 UG/ML
APPEARANCE UR: ABNORMAL
AST SERPL W P-5'-P-CCNC: 16 U/L (ref 9–39)
BARBITURATES UR QL SCN: ABNORMAL
BASOPHILS # BLD AUTO: 0.07 X10*3/UL (ref 0–0.1)
BASOPHILS NFR BLD AUTO: 0.5 %
BENZODIAZ UR QL SCN: ABNORMAL
BILIRUB SERPL-MCNC: 0.3 MG/DL (ref 0–1.2)
BILIRUB UR STRIP.AUTO-MCNC: NEGATIVE MG/DL
BUN SERPL-MCNC: 8 MG/DL (ref 6–23)
BZE UR QL SCN: ABNORMAL
CALCIUM SERPL-MCNC: 9.3 MG/DL (ref 8.6–10.6)
CANNABINOIDS UR QL SCN: ABNORMAL
CHLORIDE SERPL-SCNC: 102 MMOL/L (ref 98–107)
CO2 SERPL-SCNC: 26 MMOL/L (ref 21–32)
COLOR UR: ABNORMAL
CREAT SERPL-MCNC: 1.08 MG/DL (ref 0.5–1.05)
EOSINOPHIL # BLD AUTO: 0.05 X10*3/UL (ref 0–0.7)
EOSINOPHIL NFR BLD AUTO: 0.4 %
ERYTHROCYTE [DISTWIDTH] IN BLOOD BY AUTOMATED COUNT: 14.6 % (ref 11.5–14.5)
ETHANOL SERPL-MCNC: <10 MG/DL
FENTANYL+NORFENTANYL UR QL SCN: ABNORMAL
GFR SERPL CREATININE-BSD FRML MDRD: 75 ML/MIN/1.73M*2
GLUCOSE SERPL-MCNC: 97 MG/DL (ref 74–99)
GLUCOSE UR STRIP.AUTO-MCNC: NEGATIVE MG/DL
HCT VFR BLD AUTO: 37 % (ref 36–46)
HGB BLD-MCNC: 12.3 G/DL (ref 12–16)
HOLD SPECIMEN: NORMAL
IMM GRANULOCYTES # BLD AUTO: 0.35 X10*3/UL (ref 0–0.7)
IMM GRANULOCYTES NFR BLD AUTO: 2.5 % (ref 0–0.9)
KETONES UR STRIP.AUTO-MCNC: NEGATIVE MG/DL
LEUKOCYTE ESTERASE UR QL STRIP.AUTO: ABNORMAL
LYMPHOCYTES # BLD AUTO: 5.05 X10*3/UL (ref 1.2–4.8)
LYMPHOCYTES NFR BLD AUTO: 36.7 %
MCH RBC QN AUTO: 31.1 PG (ref 26–34)
MCHC RBC AUTO-ENTMCNC: 33.2 G/DL (ref 32–36)
MCV RBC AUTO: 93 FL (ref 80–100)
MONOCYTES # BLD AUTO: 0.97 X10*3/UL (ref 0.1–1)
MONOCYTES NFR BLD AUTO: 7 %
MUCOUS THREADS #/AREA URNS AUTO: ABNORMAL /LPF
NEUTROPHILS # BLD AUTO: 7.28 X10*3/UL (ref 1.2–7.7)
NEUTROPHILS NFR BLD AUTO: 52.9 %
NITRITE UR QL STRIP.AUTO: NEGATIVE
NRBC BLD-RTO: 0 /100 WBCS (ref 0–0)
OPIATES UR QL SCN: ABNORMAL
OXYCODONE+OXYMORPHONE UR QL SCN: ABNORMAL
PCP UR QL SCN: ABNORMAL
PH UR STRIP.AUTO: 5 [PH]
PLATELET # BLD AUTO: 359 X10*3/UL (ref 150–450)
POTASSIUM SERPL-SCNC: 4.3 MMOL/L (ref 3.5–5.3)
PREGNANCY TEST URINE, POC: NEGATIVE
PROT SERPL-MCNC: 6.9 G/DL (ref 6.4–8.2)
PROT UR STRIP.AUTO-MCNC: ABNORMAL MG/DL
RBC # BLD AUTO: 3.96 X10*6/UL (ref 4–5.2)
RBC # UR STRIP.AUTO: NEGATIVE /UL
RBC #/AREA URNS AUTO: ABNORMAL /HPF
SALICYLATES SERPL-MCNC: <3 MG/DL
SODIUM SERPL-SCNC: 135 MMOL/L (ref 136–145)
SP GR UR STRIP.AUTO: 1.02
SQUAMOUS #/AREA URNS AUTO: ABNORMAL /HPF
UROBILINOGEN UR STRIP.AUTO-MCNC: 2 MG/DL
WBC # BLD AUTO: 13.8 X10*3/UL (ref 4.4–11.3)
WBC #/AREA URNS AUTO: ABNORMAL /HPF
WBC CLUMPS #/AREA URNS AUTO: ABNORMAL /HPF

## 2023-11-30 PROCEDURE — 85025 COMPLETE CBC W/AUTO DIFF WBC: CPT | Performed by: STUDENT IN AN ORGANIZED HEALTH CARE EDUCATION/TRAINING PROGRAM

## 2023-11-30 PROCEDURE — 87086 URINE CULTURE/COLONY COUNT: CPT | Performed by: EMERGENCY MEDICINE

## 2023-11-30 PROCEDURE — 99285 EMERGENCY DEPT VISIT HI MDM: CPT | Performed by: EMERGENCY MEDICINE

## 2023-11-30 PROCEDURE — 80307 DRUG TEST PRSMV CHEM ANLYZR: CPT | Performed by: STUDENT IN AN ORGANIZED HEALTH CARE EDUCATION/TRAINING PROGRAM

## 2023-11-30 PROCEDURE — 99285 EMERGENCY DEPT VISIT HI MDM: CPT | Mod: 25 | Performed by: EMERGENCY MEDICINE

## 2023-11-30 PROCEDURE — 81001 URINALYSIS AUTO W/SCOPE: CPT | Performed by: EMERGENCY MEDICINE

## 2023-11-30 PROCEDURE — 93010 ELECTROCARDIOGRAM REPORT: CPT | Performed by: EMERGENCY MEDICINE

## 2023-11-30 PROCEDURE — 81025 URINE PREGNANCY TEST: CPT | Performed by: STUDENT IN AN ORGANIZED HEALTH CARE EDUCATION/TRAINING PROGRAM

## 2023-11-30 PROCEDURE — 80179 DRUG ASSAY SALICYLATE: CPT | Performed by: STUDENT IN AN ORGANIZED HEALTH CARE EDUCATION/TRAINING PROGRAM

## 2023-11-30 PROCEDURE — 36415 COLL VENOUS BLD VENIPUNCTURE: CPT | Performed by: STUDENT IN AN ORGANIZED HEALTH CARE EDUCATION/TRAINING PROGRAM

## 2023-11-30 PROCEDURE — 80053 COMPREHEN METABOLIC PANEL: CPT | Performed by: STUDENT IN AN ORGANIZED HEALTH CARE EDUCATION/TRAINING PROGRAM

## 2023-11-30 RX ORDER — NITROFURANTOIN (MACROCRYSTALS) 100 MG/1
100 CAPSULE ORAL ONCE
Status: COMPLETED | OUTPATIENT
Start: 2023-11-30 | End: 2023-12-01

## 2023-11-30 SDOH — HEALTH STABILITY: MENTAL HEALTH: HALLUCINATION: AUDITORY

## 2023-11-30 SDOH — HEALTH STABILITY: MENTAL HEALTH: NEEDS EXPRESSED: DENIES

## 2023-11-30 SDOH — HEALTH STABILITY: MENTAL HEALTH: CONTENT: UNREMARKABLE

## 2023-11-30 SDOH — HEALTH STABILITY: MENTAL HEALTH: BEHAVIORS/MOOD: CALM;HALLUCINATIONS

## 2023-11-30 ASSESSMENT — COLUMBIA-SUICIDE SEVERITY RATING SCALE - C-SSRS
1. IN THE PAST MONTH, HAVE YOU WISHED YOU WERE DEAD OR WISHED YOU COULD GO TO SLEEP AND NOT WAKE UP?: YES
6. HAVE YOU EVER DONE ANYTHING, STARTED TO DO ANYTHING, OR PREPARED TO DO ANYTHING TO END YOUR LIFE?: YES
5. HAVE YOU STARTED TO WORK OUT OR WORKED OUT THE DETAILS OF HOW TO KILL YOURSELF? DO YOU INTEND TO CARRY OUT THIS PLAN?: YES
6. HAVE YOU EVER DONE ANYTHING, STARTED TO DO ANYTHING, OR PREPARED TO DO ANYTHING TO END YOUR LIFE?: YES
4. HAVE YOU HAD THESE THOUGHTS AND HAD SOME INTENTION OF ACTING ON THEM?: NO
2. HAVE YOU ACTUALLY HAD ANY THOUGHTS OF KILLING YOURSELF?: NO

## 2023-11-30 NOTE — ED TRIAGE NOTES
Per PD and group home, Pt had an unknown trigger and came downstairs stating her voices are telling her to cut her throat. Pt confirms. +auditory hallucinations. States taking meds. Pt calm and cooperative. Belongings secured.
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
## 2023-12-01 LAB
HOLD SPECIMEN: NORMAL
SARS-COV-2 RNA RESP QL NAA+PROBE: DETECTED

## 2023-12-01 PROCEDURE — 2500000001 HC RX 250 WO HCPCS SELF ADMINISTERED DRUGS (ALT 637 FOR MEDICARE OP): Performed by: STUDENT IN AN ORGANIZED HEALTH CARE EDUCATION/TRAINING PROGRAM

## 2023-12-01 PROCEDURE — 2500000004 HC RX 250 GENERAL PHARMACY W/ HCPCS (ALT 636 FOR OP/ED): Performed by: STUDENT IN AN ORGANIZED HEALTH CARE EDUCATION/TRAINING PROGRAM

## 2023-12-01 PROCEDURE — 87635 SARS-COV-2 COVID-19 AMP PRB: CPT | Performed by: STUDENT IN AN ORGANIZED HEALTH CARE EDUCATION/TRAINING PROGRAM

## 2023-12-01 RX ORDER — ZIPRASIDONE HYDROCHLORIDE 20 MG/1
60 CAPSULE ORAL ONCE
Status: COMPLETED | OUTPATIENT
Start: 2023-12-01 | End: 2023-12-01

## 2023-12-01 RX ORDER — DIVALPROEX SODIUM 250 MG/1
500 TABLET, FILM COATED, EXTENDED RELEASE ORAL ONCE
Status: COMPLETED | OUTPATIENT
Start: 2023-12-01 | End: 2023-12-01

## 2023-12-01 RX ORDER — HYDROXYZINE HYDROCHLORIDE 25 MG/1
50 TABLET, FILM COATED ORAL ONCE
Status: COMPLETED | OUTPATIENT
Start: 2023-12-01 | End: 2023-12-01

## 2023-12-01 RX ADMIN — ZIPRASIDONE HYDROCHLORIDE 60 MG: 20 CAPSULE ORAL at 18:14

## 2023-12-01 RX ADMIN — DIVALPROEX SODIUM 500 MG: 250 TABLET, EXTENDED RELEASE ORAL at 18:14

## 2023-12-01 RX ADMIN — HYDROXYZINE HYDROCHLORIDE 50 MG: 25 TABLET, FILM COATED ORAL at 14:47

## 2023-12-01 RX ADMIN — NITROFURANTOIN 100 MG: 100 CAPSULE ORAL at 02:57

## 2023-12-01 ASSESSMENT — LIFESTYLE VARIABLES
EVER FELT BAD OR GUILTY ABOUT YOUR DRINKING: NO
REASON UNABLE TO ASSESS: NO
HAVE PEOPLE ANNOYED YOU BY CRITICIZING YOUR DRINKING: NO
EVER HAD A DRINK FIRST THING IN THE MORNING TO STEADY YOUR NERVES TO GET RID OF A HANGOVER: NO
HAVE YOU EVER FELT YOU SHOULD CUT DOWN ON YOUR DRINKING: NO
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
## 2023-12-01 NOTE — SIGNIFICANT EVENT
Application for Emergency Admission      Ready for Transfer?  Is the patient medically cleared for transfer to inpatient psychiatry: Yes  Has the patient been accepted to an inpatient psychiatric hospital: Yes    Application for Emergency Admission  IN ACCORDANCE WITH [place]  The Chief Clinical Officer of: Luke Jake Carlos 12/1/2023 .9:24 AM    Reason for Hospitalization  The undersigned has reason to believe that: Natividad Ricardo Is a mentally ill person subject to hospitalization by court order under division B Section 5122.01 of the Revised Code, i.e., this person:    1.Yes  Represents a substantial risk of physical harm to self as manifested by evidence of threats of, or attempts at, suicide or serious self-inflicted bodily harm    2.No Represents a substantial risk of physical harm to others as manifested by evidence of recent homicidal or other violent behavior, evidence of recent threats that place another in reasonable fear of violent behavior and serious physical harm, or other evidence of present dangerousness    3.Yes Represents a substantial and immediate risk of serious physical impairment or injury to self as manifested by  evidence that the person is unable to provide for and is not providing for the person's basic physical needs because of the person's mental illness and that appropriate provision for those needs cannot be made  immediately available in the community    4.Yes Would benefit from treatment in a hospital for his mental illness and is in need of such treatment as manifested by evidence of behavior that creates a grave and imminent risk to substantial rights of others or  himself.    5.Yes Would benefit from treatment as manifested by evidence of behavior that indicates all of the following:       (a) The person is unlikely to survive safely in the community without supervision, based on a clinical determination.       (b) The person has a history of lack of compliance with  treatment for mental illness and one of the following applies:      (i) At least twice within the thirty-six months prior to the filing of an affidavit seeking court-ordered treatment of the person under section 5122.111 of the Revised Code, the lack of compliance has been a significant factor in necessitating hospitalization in a hospital or receipt of services in a forensic or other mental health unit of a correctional facility, provided that the thirty-six-month period shall be extended by the length of any hospitalization or incarceration of the person that occurred within the thirty-six-month period.      (ii) Within the forty-eight months prior to the filing of an affidavit seeking court-ordered treatment of the person under section 5122.111 of the Revised Code, the lack of compliance resulted in one or more acts of serious violent behavior toward self or others or threats of, or attempts at, serious physical harm to self or others, provided that the forty-eight-month period shall be extended by the length of any hospitalization or incarceration of the person that occurred within the forty-eight-month period.      (c) The person, as a result of mental illness, is unlikely to voluntarily participate in necessary treatment.       (d) In view of the person's treatment history and current behavior, the person is in need of treatment in order to prevent a relapse or deterioration that would be likely to result in substantial risk of serious harm to the person or others.    (e) Represents a substantial risk of physical harm to self or others if allowed to remain at liberty pending examination.    Therefore, it is requested that said person be admitted to the above named facility.    STATEMENT OF BELIEF    Must be filled out by one of the following: a psychiatrist, licensed physician, licensed clinical psychologist, health or ,  or .  (Statement shall include the circumstances under  which the individual was taken into custody and the reason for the person's belief that hospitalization is necessary. The statement shall also include a reference to efforts made to secure the individual's property at his residence if he was taken into custody there. Every reasonable and appropriate effort should be made to take this person into custody in the least conspicuous manner possible.)    Patient is acute risk of self-harm and also suffering from auditory visual hallucinations and would benefit from inpatient psychiatric hospitalization.    Arley Dowd DO 12/1/2023     _____________________________________________________________   Place of Employment: [facility]    STATEMENT OF OBSERVATION BY PSYCHIATRIST, LICENSED PHYSICIAN, OR LICENSED CLINICAL PSYCHOLOGIST, IF APPLICABLE    Place of Observation (e.g., [facility] center, general hospital, office, emergency facility)  (If applicable, please complete)    Arley Dowd DO 12/1/2023    _____________________________________________________________

## 2023-12-01 NOTE — PROGRESS NOTES
Emergency Medicine Transition of Care Note.    I assumed care of Natividad Ricardo at signout.  Please see the previous ED provider note for all HPI, PE and MDM prior to my arrival. This is in addition to the primary record.    In brief Natividad Ricardo is an 23 y.o. female presenting for   Chief Complaint   Patient presents with    Suicidal     At the time of signout we were awaiting: Placement by [facility].    Under my care, patient was successfully placed at [facility].  [place] slip and transfer note were placed.  Patient stable for transfer.          Procedure  Procedures    Arley Dowd, DO     -------------------------------------------  This patient was seen by the resident physician.  I have seen and examined the patient, agree with the workup, evaluation, management and diagnosis. I reviewed and edited the above documentation where necessary.     Delbert Puri MD  EM Attending Physician

## 2023-12-02 ENCOUNTER — HOSPITAL ENCOUNTER (INPATIENT)
Facility: HOSPITAL | Age: 23
LOS: 9 days | Discharge: HOME | DRG: 885 | End: 2023-12-11
Attending: PSYCHIATRY & NEUROLOGY | Admitting: PSYCHIATRY & NEUROLOGY
Payer: MEDICARE

## 2023-12-02 VITALS
WEIGHT: 239 LBS | BODY MASS INDEX: 37.51 KG/M2 | DIASTOLIC BLOOD PRESSURE: 75 MMHG | HEART RATE: 112 BPM | RESPIRATION RATE: 20 BRPM | SYSTOLIC BLOOD PRESSURE: 108 MMHG | HEIGHT: 67 IN | OXYGEN SATURATION: 95 % | TEMPERATURE: 98.1 F

## 2023-12-02 DIAGNOSIS — F25.0 SCHIZOAFFECTIVE DISORDER, BIPOLAR TYPE (MULTI): Primary | ICD-10-CM

## 2023-12-02 LAB
BACTERIA UR CULT: NORMAL
GLUCOSE BLD MANUAL STRIP-MCNC: 131 MG/DL (ref 74–99)

## 2023-12-02 PROCEDURE — 2500000001 HC RX 250 WO HCPCS SELF ADMINISTERED DRUGS (ALT 637 FOR MEDICARE OP): Performed by: PSYCHIATRY & NEUROLOGY

## 2023-12-02 PROCEDURE — 2500000004 HC RX 250 GENERAL PHARMACY W/ HCPCS (ALT 636 FOR OP/ED)

## 2023-12-02 PROCEDURE — 2500000004 HC RX 250 GENERAL PHARMACY W/ HCPCS (ALT 636 FOR OP/ED): Performed by: PSYCHIATRY & NEUROLOGY

## 2023-12-02 PROCEDURE — 82947 ASSAY GLUCOSE BLOOD QUANT: CPT

## 2023-12-02 PROCEDURE — 1140000001 HC PRIVATE PSYCH ROOM DAILY

## 2023-12-02 PROCEDURE — 2500000001 HC RX 250 WO HCPCS SELF ADMINISTERED DRUGS (ALT 637 FOR MEDICARE OP): Performed by: STUDENT IN AN ORGANIZED HEALTH CARE EDUCATION/TRAINING PROGRAM

## 2023-12-02 RX ORDER — DIVALPROEX SODIUM 500 MG/1
500 TABLET, FILM COATED, EXTENDED RELEASE ORAL 2 TIMES DAILY
COMMUNITY
End: 2023-12-11 | Stop reason: HOSPADM

## 2023-12-02 RX ORDER — HYDROXYZINE HYDROCHLORIDE 25 MG/1
50 TABLET, FILM COATED ORAL ONCE
Status: COMPLETED | OUTPATIENT
Start: 2023-12-02 | End: 2023-12-02

## 2023-12-02 RX ORDER — TRAZODONE HYDROCHLORIDE 50 MG/1
100 TABLET ORAL NIGHTLY
Status: DISCONTINUED | OUTPATIENT
Start: 2023-12-02 | End: 2023-12-11 | Stop reason: HOSPADM

## 2023-12-02 RX ORDER — PALIPERIDONE PALMITATE 234 MG/1.5ML
234 INJECTION INTRAMUSCULAR
COMMUNITY
End: 2023-12-11 | Stop reason: HOSPADM

## 2023-12-02 RX ORDER — ZIPRASIDONE HYDROCHLORIDE 80 MG/1
80 CAPSULE ORAL
COMMUNITY
End: 2023-12-11 | Stop reason: HOSPADM

## 2023-12-02 RX ORDER — PRAZOSIN HYDROCHLORIDE 2 MG/1
2 CAPSULE ORAL NIGHTLY
COMMUNITY
End: 2023-12-11 | Stop reason: HOSPADM

## 2023-12-02 RX ORDER — ACETAMINOPHEN 325 MG/1
650 TABLET ORAL EVERY 6 HOURS PRN
COMMUNITY
End: 2023-12-11 | Stop reason: HOSPADM

## 2023-12-02 RX ORDER — PIOGLITAZONEHYDROCHLORIDE 30 MG/1
15 TABLET ORAL DAILY
Status: DISCONTINUED | OUTPATIENT
Start: 2023-12-02 | End: 2023-12-11 | Stop reason: HOSPADM

## 2023-12-02 RX ORDER — PRAZOSIN HYDROCHLORIDE 1 MG/1
2 CAPSULE ORAL NIGHTLY
Status: DISCONTINUED | OUTPATIENT
Start: 2023-12-02 | End: 2023-12-02

## 2023-12-02 RX ORDER — NITROFURANTOIN 25; 75 MG/1; MG/1
100 CAPSULE ORAL EVERY 12 HOURS SCHEDULED
Status: DISCONTINUED | OUTPATIENT
Start: 2023-12-02 | End: 2023-12-11

## 2023-12-02 RX ORDER — OLANZAPINE 5 MG/1
5 TABLET ORAL 4 TIMES DAILY PRN
Status: DISCONTINUED | OUTPATIENT
Start: 2023-12-02 | End: 2023-12-03

## 2023-12-02 RX ORDER — BENZTROPINE MESYLATE 1 MG/1
1 TABLET ORAL 2 TIMES DAILY
COMMUNITY
End: 2023-12-11 | Stop reason: HOSPADM

## 2023-12-02 RX ORDER — PIOGLITAZONEHYDROCHLORIDE 15 MG/1
15 TABLET ORAL DAILY
COMMUNITY
End: 2024-02-19 | Stop reason: WASHOUT

## 2023-12-02 RX ORDER — DIVALPROEX SODIUM 250 MG/1
500 TABLET, DELAYED RELEASE ORAL EVERY 12 HOURS SCHEDULED
Status: DISCONTINUED | OUTPATIENT
Start: 2023-12-02 | End: 2023-12-11 | Stop reason: HOSPADM

## 2023-12-02 RX ORDER — TRAZODONE HYDROCHLORIDE 100 MG/1
100 TABLET ORAL NIGHTLY
COMMUNITY
End: 2024-05-08 | Stop reason: WASHOUT

## 2023-12-02 RX ORDER — ZIPRASIDONE HYDROCHLORIDE 40 MG/1
80 CAPSULE ORAL
Status: DISCONTINUED | OUTPATIENT
Start: 2023-12-02 | End: 2023-12-11 | Stop reason: HOSPADM

## 2023-12-02 RX ORDER — METRONIDAZOLE 500 MG/1
500 TABLET ORAL 2 TIMES DAILY
COMMUNITY
End: 2023-12-11 | Stop reason: HOSPADM

## 2023-12-02 RX ORDER — HYDROXYZINE HYDROCHLORIDE 25 MG/1
25 TABLET, FILM COATED ORAL 2 TIMES DAILY
COMMUNITY
End: 2023-12-11 | Stop reason: HOSPADM

## 2023-12-02 RX ADMIN — DIVALPROEX SODIUM 500 MG: 250 TABLET, DELAYED RELEASE ORAL at 21:47

## 2023-12-02 RX ADMIN — DIVALPROEX SODIUM 500 MG: 250 TABLET, DELAYED RELEASE ORAL at 15:13

## 2023-12-02 RX ADMIN — ZIPRASIDONE HYDROCHLORIDE 80 MG: 40 CAPSULE ORAL at 17:02

## 2023-12-02 RX ADMIN — NITROFURANTOIN (MONOHYDRATE/MACROCRYSTALS) 100 MG: 75; 25 CAPSULE ORAL at 21:47

## 2023-12-02 RX ADMIN — TRAZODONE HYDROCHLORIDE 100 MG: 50 TABLET ORAL at 21:47

## 2023-12-02 RX ADMIN — PIOGLITAZONE HYDROCHLORIDE 15 MG: 30 TABLET ORAL at 17:02

## 2023-12-02 RX ADMIN — HYDROXYZINE HYDROCHLORIDE 50 MG: 25 TABLET, FILM COATED ORAL at 10:02

## 2023-12-02 RX ADMIN — OLANZAPINE 5 MG: 5 TABLET, FILM COATED ORAL at 15:14

## 2023-12-02 SDOH — HEALTH STABILITY: MENTAL HEALTH: HOW MANY STANDARD DRINKS CONTAINING ALCOHOL DO YOU HAVE ON A TYPICAL DAY?: PATIENT DOES NOT DRINK

## 2023-12-02 SDOH — SOCIAL STABILITY: SOCIAL INSECURITY
WITHIN THE LAST YEAR, HAVE TO BEEN RAPED OR FORCED TO HAVE ANY KIND OF SEXUAL ACTIVITY BY YOUR PARTNER OR EX-PARTNER?: YES

## 2023-12-02 SDOH — SOCIAL STABILITY: SOCIAL NETWORK: HOW OFTEN DO YOU ATTEND CHURCH OR RELIGIOUS SERVICES?: 1 TO 4 TIMES PER YEAR

## 2023-12-02 SDOH — SOCIAL STABILITY: SOCIAL NETWORK: ARE YOU MARRIED, WIDOWED, DIVORCED, SEPARATED, NEVER MARRIED, OR LIVING WITH A PARTNER?: NEVER MARRIED

## 2023-12-02 SDOH — HEALTH STABILITY: MENTAL HEALTH
STRESS IS WHEN SOMEONE FEELS TENSE, NERVOUS, ANXIOUS, OR CAN'T SLEEP AT NIGHT BECAUSE THEIR MIND IS TROUBLED. HOW STRESSED ARE YOU?: TO SOME EXTENT

## 2023-12-02 SDOH — ECONOMIC STABILITY: FOOD INSECURITY: WITHIN THE PAST 12 MONTHS, YOU WORRIED THAT YOUR FOOD WOULD RUN OUT BEFORE YOU GOT MONEY TO BUY MORE.: NEVER TRUE

## 2023-12-02 SDOH — ECONOMIC STABILITY: FOOD INSECURITY: WITHIN THE PAST 12 MONTHS, THE FOOD YOU BOUGHT JUST DIDN'T LAST AND YOU DIDN'T HAVE MONEY TO GET MORE.: NEVER TRUE

## 2023-12-02 SDOH — ECONOMIC STABILITY: HOUSING INSECURITY
IN THE LAST 12 MONTHS, WAS THERE A TIME WHEN YOU DID NOT HAVE A STEADY PLACE TO SLEEP OR SLEPT IN A SHELTER (INCLUDING NOW)?: NO

## 2023-12-02 SDOH — SOCIAL STABILITY: SOCIAL INSECURITY: WITHIN THE LAST YEAR, HAVE YOU BEEN AFRAID OF YOUR PARTNER OR EX-PARTNER?: YES

## 2023-12-02 SDOH — SOCIAL STABILITY: SOCIAL NETWORK: HOW OFTEN DO YOU ATTENT MEETINGS OF THE CLUB OR ORGANIZATION YOU BELONG TO?: MORE THAN 4 TIMES PER YEAR

## 2023-12-02 SDOH — ECONOMIC STABILITY: TRANSPORTATION INSECURITY
IN THE PAST 12 MONTHS, HAS LACK OF TRANSPORTATION KEPT YOU FROM MEETINGS, WORK, OR FROM GETTING THINGS NEEDED FOR DAILY LIVING?: NO

## 2023-12-02 SDOH — SOCIAL STABILITY: SOCIAL INSECURITY
WITHIN THE LAST YEAR, HAVE YOU BEEN KICKED, HIT, SLAPPED, OR OTHERWISE PHYSICALLY HURT BY YOUR PARTNER OR EX-PARTNER?: NO

## 2023-12-02 SDOH — HEALTH STABILITY: MENTAL HEALTH: HOW OFTEN DO YOU HAVE A DRINK CONTAINING ALCOHOL?: NEVER

## 2023-12-02 SDOH — SOCIAL STABILITY: SOCIAL NETWORK
DO YOU BELONG TO ANY CLUBS OR ORGANIZATIONS SUCH AS CHURCH GROUPS UNIONS, FRATERNAL OR ATHLETIC GROUPS, OR SCHOOL GROUPS?: YES

## 2023-12-02 SDOH — ECONOMIC STABILITY: INCOME INSECURITY: IN THE LAST 12 MONTHS, WAS THERE A TIME WHEN YOU WERE NOT ABLE TO PAY THE MORTGAGE OR RENT ON TIME?: NO

## 2023-12-02 SDOH — ECONOMIC STABILITY: INCOME INSECURITY: IN THE PAST 12 MONTHS, HAS THE ELECTRIC, GAS, OIL, OR WATER COMPANY THREATENED TO SHUT OFF SERVICE IN YOUR HOME?: NO

## 2023-12-02 SDOH — ECONOMIC STABILITY: TRANSPORTATION INSECURITY
IN THE PAST 12 MONTHS, HAS THE LACK OF TRANSPORTATION KEPT YOU FROM MEDICAL APPOINTMENTS OR FROM GETTING MEDICATIONS?: NO

## 2023-12-02 SDOH — ECONOMIC STABILITY: HOUSING INSECURITY: IN THE LAST 12 MONTHS, HOW MANY PLACES HAVE YOU LIVED?: 2

## 2023-12-02 SDOH — HEALTH STABILITY: PHYSICAL HEALTH: ON AVERAGE, HOW MANY MINUTES DO YOU ENGAGE IN EXERCISE AT THIS LEVEL?: 0 MIN

## 2023-12-02 SDOH — HEALTH STABILITY: MENTAL HEALTH: HOW OFTEN DO YOU HAVE 6 OR MORE DRINKS ON ONE OCCASION?: NEVER

## 2023-12-02 SDOH — HEALTH STABILITY: PHYSICAL HEALTH: ON AVERAGE, HOW MANY DAYS PER WEEK DO YOU ENGAGE IN MODERATE TO STRENUOUS EXERCISE (LIKE A BRISK WALK)?: 0 DAYS

## 2023-12-02 SDOH — SOCIAL STABILITY: SOCIAL NETWORK
IN A TYPICAL WEEK, HOW MANY TIMES DO YOU TALK ON THE PHONE WITH FAMILY, FRIENDS, OR NEIGHBORS?: MORE THAN THREE TIMES A WEEK

## 2023-12-02 SDOH — SOCIAL STABILITY: SOCIAL INSECURITY: WITHIN THE LAST YEAR, HAVE YOU BEEN HUMILIATED OR EMOTIONALLY ABUSED IN OTHER WAYS BY YOUR PARTNER OR EX-PARTNER?: YES

## 2023-12-02 SDOH — SOCIAL STABILITY: SOCIAL NETWORK: HOW OFTEN DO YOU GET TOGETHER WITH FRIENDS OR RELATIVES?: MORE THAN THREE TIMES A WEEK

## 2023-12-02 SDOH — ECONOMIC STABILITY: INCOME INSECURITY: HOW HARD IS IT FOR YOU TO PAY FOR THE VERY BASICS LIKE FOOD, HOUSING, MEDICAL CARE, AND HEATING?: NOT HARD AT ALL

## 2023-12-02 ASSESSMENT — COLUMBIA-SUICIDE SEVERITY RATING SCALE - C-SSRS
1. SINCE LAST CONTACT, HAVE YOU WISHED YOU WERE DEAD OR WISHED YOU COULD GO TO SLEEP AND NOT WAKE UP?: YES
6. HAVE YOU EVER DONE ANYTHING, STARTED TO DO ANYTHING, OR PREPARED TO DO ANYTHING TO END YOUR LIFE?: NO
6. HAVE YOU EVER DONE ANYTHING, STARTED TO DO ANYTHING, OR PREPARED TO DO ANYTHING TO END YOUR LIFE?: NO
2. HAVE YOU ACTUALLY HAD ANY THOUGHTS OF KILLING YOURSELF?: NO
1. SINCE LAST CONTACT, HAVE YOU WISHED YOU WERE DEAD OR WISHED YOU COULD GO TO SLEEP AND NOT WAKE UP?: YES
2. HAVE YOU ACTUALLY HAD ANY THOUGHTS OF KILLING YOURSELF?: NO

## 2023-12-02 ASSESSMENT — ENCOUNTER SYMPTOMS
FREQUENCY: 1
AGITATION: 0
ENDOCRINE NEGATIVE: 1
RESPIRATORY NEGATIVE: 1
MUSCULOSKELETAL NEGATIVE: 1
CONSTITUTIONAL NEGATIVE: 1
CARDIOVASCULAR NEGATIVE: 1
EYES NEGATIVE: 1
DYSPHORIC MOOD: 1
GASTROINTESTINAL NEGATIVE: 1
NEUROLOGICAL NEGATIVE: 1

## 2023-12-02 ASSESSMENT — PAIN - FUNCTIONAL ASSESSMENT
PAIN_FUNCTIONAL_ASSESSMENT: 0-10
PAIN_FUNCTIONAL_ASSESSMENT: 0-10

## 2023-12-02 ASSESSMENT — PAIN SCALES - GENERAL
PAINLEVEL_OUTOF10: 0 - NO PAIN
PAINLEVEL_OUTOF10: 0 - NO PAIN

## 2023-12-02 ASSESSMENT — LIFESTYLE VARIABLES
AUDIT-C TOTAL SCORE: 0
SKIP TO QUESTIONS 9-10: 1
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
## 2023-12-02 NOTE — PATIENT INSTRUCTIONS
Type 2 diabetes mellitus with hyperglycemia, without long-term current use of insulin (CMS/[facility])  Suicidal behavior without attempted self-injury  History of command hallucinations      Type 2 diabetes mellitus, is at goal. A1C at target <6.5%    RX changes: none   Education:  self-monitoring of blood glucose skills - no  need to continue CGM use  Follow up: I recommend diabetes care be 6 months.    Suicidal Ideation/Depression  We walked to the Emergency room together for psychiatry asssessment  Please keep your upcoming therapy and psychiatry appointments

## 2023-12-03 PROCEDURE — 99222 1ST HOSP IP/OBS MODERATE 55: CPT | Performed by: PSYCHIATRY & NEUROLOGY

## 2023-12-03 PROCEDURE — 2500000004 HC RX 250 GENERAL PHARMACY W/ HCPCS (ALT 636 FOR OP/ED)

## 2023-12-03 PROCEDURE — 2500000001 HC RX 250 WO HCPCS SELF ADMINISTERED DRUGS (ALT 637 FOR MEDICARE OP): Performed by: PSYCHIATRY & NEUROLOGY

## 2023-12-03 PROCEDURE — 1140000001 HC PRIVATE PSYCH ROOM DAILY

## 2023-12-03 PROCEDURE — 2500000004 HC RX 250 GENERAL PHARMACY W/ HCPCS (ALT 636 FOR OP/ED): Performed by: PSYCHIATRY & NEUROLOGY

## 2023-12-03 RX ORDER — MAGNESIUM HYDROXIDE 2400 MG/10ML
10 SUSPENSION ORAL DAILY PRN
Status: DISCONTINUED | OUTPATIENT
Start: 2023-12-03 | End: 2023-12-11 | Stop reason: HOSPADM

## 2023-12-03 RX ORDER — ALUMINUM HYDROXIDE, MAGNESIUM HYDROXIDE, AND SIMETHICONE 1200; 120; 1200 MG/30ML; MG/30ML; MG/30ML
30 SUSPENSION ORAL 4 TIMES DAILY PRN
Status: DISCONTINUED | OUTPATIENT
Start: 2023-12-03 | End: 2023-12-11 | Stop reason: HOSPADM

## 2023-12-03 RX ORDER — IBUPROFEN 400 MG/1
400 TABLET ORAL EVERY 6 HOURS PRN
Status: DISCONTINUED | OUTPATIENT
Start: 2023-12-03 | End: 2023-12-11 | Stop reason: HOSPADM

## 2023-12-03 RX ORDER — HALOPERIDOL 5 MG/1
5 TABLET ORAL EVERY 6 HOURS PRN
Status: DISCONTINUED | OUTPATIENT
Start: 2023-12-03 | End: 2023-12-11 | Stop reason: HOSPADM

## 2023-12-03 RX ORDER — DIPHENHYDRAMINE HCL 25 MG
50 TABLET ORAL EVERY 6 HOURS PRN
Status: DISCONTINUED | OUTPATIENT
Start: 2023-12-03 | End: 2023-12-11 | Stop reason: HOSPADM

## 2023-12-03 RX ORDER — HYDROXYZINE PAMOATE 25 MG/1
50 CAPSULE ORAL EVERY 6 HOURS PRN
Status: DISCONTINUED | OUTPATIENT
Start: 2023-12-03 | End: 2023-12-11 | Stop reason: HOSPADM

## 2023-12-03 RX ADMIN — HALOPERIDOL 5 MG: 5 TABLET ORAL at 10:35

## 2023-12-03 RX ADMIN — DIVALPROEX SODIUM 500 MG: 250 TABLET, DELAYED RELEASE ORAL at 20:50

## 2023-12-03 RX ADMIN — HALOPERIDOL 5 MG: 5 TABLET ORAL at 17:06

## 2023-12-03 RX ADMIN — NITROFURANTOIN (MONOHYDRATE/MACROCRYSTALS) 100 MG: 75; 25 CAPSULE ORAL at 20:50

## 2023-12-03 RX ADMIN — HYDROXYZINE PAMOATE 50 MG: 25 CAPSULE ORAL at 10:36

## 2023-12-03 RX ADMIN — TRAZODONE HYDROCHLORIDE 100 MG: 50 TABLET ORAL at 20:50

## 2023-12-03 RX ADMIN — PIOGLITAZONE HYDROCHLORIDE 15 MG: 30 TABLET ORAL at 10:36

## 2023-12-03 RX ADMIN — ZIPRASIDONE HYDROCHLORIDE 80 MG: 40 CAPSULE ORAL at 17:06

## 2023-12-03 RX ADMIN — NITROFURANTOIN (MONOHYDRATE/MACROCRYSTALS) 100 MG: 75; 25 CAPSULE ORAL at 10:36

## 2023-12-03 RX ADMIN — HYDROXYZINE PAMOATE 50 MG: 25 CAPSULE ORAL at 17:06

## 2023-12-03 RX ADMIN — ZIPRASIDONE HYDROCHLORIDE 80 MG: 40 CAPSULE ORAL at 10:36

## 2023-12-03 RX ADMIN — DIVALPROEX SODIUM 500 MG: 250 TABLET, DELAYED RELEASE ORAL at 10:35

## 2023-12-03 ASSESSMENT — COLUMBIA-SUICIDE SEVERITY RATING SCALE - C-SSRS
1. SINCE LAST CONTACT, HAVE YOU WISHED YOU WERE DEAD OR WISHED YOU COULD GO TO SLEEP AND NOT WAKE UP?: YES
5. HAVE YOU STARTED TO WORK OUT OR WORKED OUT THE DETAILS OF HOW TO KILL YOURSELF? DO YOU INTEND TO CARRY OUT THIS PLAN?: YES
1. SINCE LAST CONTACT, HAVE YOU WISHED YOU WERE DEAD OR WISHED YOU COULD GO TO SLEEP AND NOT WAKE UP?: YES
6. HAVE YOU EVER DONE ANYTHING, STARTED TO DO ANYTHING, OR PREPARED TO DO ANYTHING TO END YOUR LIFE?: NO
5. HAVE YOU STARTED TO WORK OUT OR WORKED OUT THE DETAILS OF HOW TO KILL YOURSELF? DO YOU INTEND TO CARRY OUT THIS PLAN?: NO
6. HAVE YOU EVER DONE ANYTHING, STARTED TO DO ANYTHING, OR PREPARED TO DO ANYTHING TO END YOUR LIFE?: NO
2. HAVE YOU ACTUALLY HAD ANY THOUGHTS OF KILLING YOURSELF?: YES
2. HAVE YOU ACTUALLY HAD ANY THOUGHTS OF KILLING YOURSELF?: YES

## 2023-12-03 ASSESSMENT — PAIN SCALES - GENERAL
PAINLEVEL_OUTOF10: 0 - NO PAIN
PAINLEVEL_OUTOF10: 0 - NO PAIN

## 2023-12-03 ASSESSMENT — PAIN - FUNCTIONAL ASSESSMENT
PAIN_FUNCTIONAL_ASSESSMENT: 0-10
PAIN_FUNCTIONAL_ASSESSMENT: 0-10

## 2023-12-03 NOTE — NURSING NOTE
Verified with pts grandmother/guardian that pts appointment for her invega sustenna is tomorrow and that she did NOT receive it during the last week.     Pt remains on one to one today and one to one in room at this time due to mentioning removing/gouging eyes due to hallucinations. Pt states that she is continuing to have these thoughts. Pt will talk about them and is able to then watch a show on the ipad like spongebob and laugh but if not distracted in the moment will begin to focus on hallucinations again. Pt with one to one talking and watching spongebobn currently

## 2023-12-03 NOTE — CARE PLAN
Problem: Sensory Perceptual Alteration as Evidenced by  Goal: Discusses signs/symptoms of illness/treatment options  Outcome: Progressing     Problem: Sensory Perceptual Alteration as Evidenced by  Goal: Will not act on psychotic perception  Outcome: Progressing     Problem: Potential for Harm to Self or Others  Goal: Notifies staff when experiencing harmful thoughts toward self/others  Outcome: Progressing     Problem: Ineffective Coping  Goal: Identifies healthy coping skills  Outcome: Progressing     Problem: Self Care Deficit  Goal: Accepts need for medications  Outcome: Progressing     Problem: Anxiety  Goal: Verbalizes ways to manage anxiety  Outcome: Progressing   The patient's goals for the shift include have voices be better    The clinical goals for the shift include no med side effects or self injury    Over the shift, the patient did not make progress toward the following goals. Barriers to progression include Continued voices, pt states that she does feel haldol helped them lessen.  Recommendations to address these barriers include continued hospitalization and med review, one to one to ensure pt does not act on any hallucinations.       Pt states that she was having both auditory and visual hallucinations. Pt given haldol and stated that it helped and also helped to have ipad and staff to talk to. Pt state sthat she hears these voices but knows she cannot act but feels she is in trouble for having them, reassured pt. Pt aughing and joking while talking or watching ipad but needs to be distracted or will talk aboutvoices. Ate well.

## 2023-12-03 NOTE — DISCHARGE INSTR - APPOINTMENTS
Invega Sustenna 234mg IM given 12/03/23     [facility] with Dr. Garcia  12/14/2023 at 10:20am  [facility] [phone]
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
## 2023-12-03 NOTE — CARE PLAN
The patient's goals for the shift include sleep    The clinical goals for the shift include sleep    Over the shift, the patient did not make progress toward the following goals. Barriers to progression include . Recommendations to address these barriers include .    Pt reports continued auditory command type hallucinations to harm self. Pt does not have plan and agrees to be safe while on unit. Informed pt to notify staff if voices become too overwhelming. Pt took HS medications and given snack per pt request. Pt rested in bed all night. 1:1 remains in place for safety.

## 2023-12-04 ENCOUNTER — APPOINTMENT (OUTPATIENT)
Dept: CARDIOLOGY | Facility: HOSPITAL | Age: 23
DRG: 885 | End: 2023-12-04
Payer: MEDICARE

## 2023-12-04 PROBLEM — F25.9 SCHIZOAFFECTIVE DISORDER (MULTI): Status: ACTIVE | Noted: 2023-12-04

## 2023-12-04 PROCEDURE — 97530 THERAPEUTIC ACTIVITIES: CPT | Mod: GO

## 2023-12-04 PROCEDURE — 99232 SBSQ HOSP IP/OBS MODERATE 35: CPT | Performed by: PSYCHIATRY & NEUROLOGY

## 2023-12-04 PROCEDURE — 2500000001 HC RX 250 WO HCPCS SELF ADMINISTERED DRUGS (ALT 637 FOR MEDICARE OP): Performed by: PSYCHIATRY & NEUROLOGY

## 2023-12-04 PROCEDURE — 2500000004 HC RX 250 GENERAL PHARMACY W/ HCPCS (ALT 636 FOR OP/ED): Performed by: PSYCHIATRY & NEUROLOGY

## 2023-12-04 PROCEDURE — 97165 OT EVAL LOW COMPLEX 30 MIN: CPT | Mod: GO

## 2023-12-04 PROCEDURE — 93010 ELECTROCARDIOGRAM REPORT: CPT | Performed by: INTERNAL MEDICINE

## 2023-12-04 PROCEDURE — 2500000004 HC RX 250 GENERAL PHARMACY W/ HCPCS (ALT 636 FOR OP/ED)

## 2023-12-04 PROCEDURE — 93005 ELECTROCARDIOGRAM TRACING: CPT

## 2023-12-04 PROCEDURE — 1140000001 HC PRIVATE PSYCH ROOM DAILY

## 2023-12-04 RX ADMIN — HYDROXYZINE PAMOATE 50 MG: 25 CAPSULE ORAL at 09:07

## 2023-12-04 RX ADMIN — DIVALPROEX SODIUM 500 MG: 250 TABLET, DELAYED RELEASE ORAL at 20:30

## 2023-12-04 RX ADMIN — DIVALPROEX SODIUM 500 MG: 250 TABLET, DELAYED RELEASE ORAL at 09:07

## 2023-12-04 RX ADMIN — TRAZODONE HYDROCHLORIDE 100 MG: 50 TABLET ORAL at 20:30

## 2023-12-04 RX ADMIN — NITROFURANTOIN (MONOHYDRATE/MACROCRYSTALS) 100 MG: 75; 25 CAPSULE ORAL at 09:08

## 2023-12-04 RX ADMIN — ZIPRASIDONE HYDROCHLORIDE 80 MG: 40 CAPSULE ORAL at 18:42

## 2023-12-04 RX ADMIN — NITROFURANTOIN (MONOHYDRATE/MACROCRYSTALS) 100 MG: 75; 25 CAPSULE ORAL at 20:30

## 2023-12-04 RX ADMIN — PIOGLITAZONE HYDROCHLORIDE 15 MG: 30 TABLET ORAL at 09:08

## 2023-12-04 RX ADMIN — HALOPERIDOL 5 MG: 5 TABLET ORAL at 09:08

## 2023-12-04 RX ADMIN — ZIPRASIDONE HYDROCHLORIDE 80 MG: 40 CAPSULE ORAL at 09:07

## 2023-12-04 SDOH — HEALTH STABILITY: MENTAL HEALTH: ANXIETY SYMPTOMS: GENERALIZED

## 2023-12-04 SDOH — HEALTH STABILITY: MENTAL HEALTH

## 2023-12-04 ASSESSMENT — LIFESTYLE VARIABLES: SUBSTANCE_ABUSE_PAST_12_MONTHS: YES

## 2023-12-04 ASSESSMENT — COLUMBIA-SUICIDE SEVERITY RATING SCALE - C-SSRS
1. SINCE LAST CONTACT, HAVE YOU WISHED YOU WERE DEAD OR WISHED YOU COULD GO TO SLEEP AND NOT WAKE UP?: YES
2. HAVE YOU ACTUALLY HAD ANY THOUGHTS OF KILLING YOURSELF?: YES
6. HAVE YOU EVER DONE ANYTHING, STARTED TO DO ANYTHING, OR PREPARED TO DO ANYTHING TO END YOUR LIFE?: NO
6. HAVE YOU EVER DONE ANYTHING, STARTED TO DO ANYTHING, OR PREPARED TO DO ANYTHING TO END YOUR LIFE?: NO
5. HAVE YOU STARTED TO WORK OUT OR WORKED OUT THE DETAILS OF HOW TO KILL YOURSELF? DO YOU INTEND TO CARRY OUT THIS PLAN?: NO
5. HAVE YOU STARTED TO WORK OUT OR WORKED OUT THE DETAILS OF HOW TO KILL YOURSELF? DO YOU INTEND TO CARRY OUT THIS PLAN?: YES
1. SINCE LAST CONTACT, HAVE YOU WISHED YOU WERE DEAD OR WISHED YOU COULD GO TO SLEEP AND NOT WAKE UP?: YES
2. HAVE YOU ACTUALLY HAD ANY THOUGHTS OF KILLING YOURSELF?: NO

## 2023-12-04 ASSESSMENT — PAIN SCALES - GENERAL
PAINLEVEL_OUTOF10: 0 - NO PAIN
PAINLEVEL_OUTOF10: 0 - NO PAIN

## 2023-12-04 ASSESSMENT — PAIN - FUNCTIONAL ASSESSMENT
PAIN_FUNCTIONAL_ASSESSMENT: 0-10
PAIN_FUNCTIONAL_ASSESSMENT: 0-10
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
## 2023-12-04 NOTE — CARE PLAN
Problem: Sensory Perceptual Alteration as Evidenced by  Goal: Discusses signs/symptoms of illness/treatment options  Outcome: Progressing     Problem: Sensory Perceptual Alteration as Evidenced by  Goal: Able to discuss content of hallucinations/delusions  Outcome: Progressing     Problem: Sensory Perceptual Alteration as Evidenced by  Goal: Will not act on psychotic perception  Outcome: Progressing   The patient's goals for the shift include minimize voices    The clinical goals for the shift include maintain safety    Over the shift, the patient did not make progress toward the following goals. Barriers to progression include continued auditory and visual hallucinations, command hallucinations.  Recommendations to address these barriers include continued inpt stay, med review.    Pt remains one to one due to continued thoughts/hallucinations to remove eyes. Pt states  that she does not think she will act on these thoughts. Pt is watching videos on ipad at times and reaching out to family. Pt did call her boyfriend who seemed to bring on an episode of t he voices being especially bad for her. He was upset at her for not calling earlier. Talked with pt about relationship. She said she met him during her last stay at a [facility] and he is much older than her. Pt states that he does not like it if she does not answer the phone or call him quickly. Pt states that they have been dating since October of this year. Pt remains one t one in room, is currently sleeping. Pt states that she does not want to die but the voices tell her to hurt herself.

## 2023-12-04 NOTE — CARE PLAN
"Met with pt privately to assess current mood and affect. Pt currently on COVID precautions and is isolated to room per protocol. Pt currently afebrile, not demonstrating any further symptoms of COVID. Will continue to monitor via protocol. Pt currently endorses SI with a plan to gouge her eyes out with a pen. Pt reports that these thoughts are worsened by hallucinations telling her to follow through with this plan. Pt currently on strict 1:1 protocol with staff downing PPE  present at bedside, pt having only supervised access to writing utensils or other like objects that may be used for self harm. Pt states that she does not feel safe to be alone but has demonstrated a pleasant and incongruent affect when engaged by 1:1 staff. Pt reports that she also has command hallucinations telling her to throw things at others. Pt reports high levels of depression and anxiety. Pt reports that aside from the command auditory hallucinations, she also experiences visual hallucinations of flashing lights or visions of her stabbing herself. Pt reports having a good appetite for the last 3 meals. Pt medication compliant. Pt slept 8 hours this shift as of 6 am.     The patient's goals for the shift include: \"I just don't want to feel this way.\"     The clinical goals for the shift include: maintain safety and management feelings of self harm, sleep at least 6 hours this shift, medication adherence, participation in current treatment plan     Over the shift, the patient made progress towards above listed clinical goals. Barriers to progression include: current mental health. Recommendations to address these barriers include: continue with current treatment plan.      Problem: Sensory Perceptual Alteration as Evidenced by  Goal: Cooperates with admission process  Outcome: Progressing  Goal: Discusses signs/symptoms of illness/treatment options  Outcome: Progressing  Goal: Initiates reality-based interactions  Outcome: " Progressing  Goal: Able to discuss content of hallucinations/delusions  Outcome: Progressing  Goal: Will not act on psychotic perception  Outcome: Progressing     Problem: Altered Thought Processes as Evidenced by  Goal: STG - Desires improvement in ability to think and concentrate  Outcome: Progressing     Problem: Potential for Harm to Self or Others  Goal: Notifies staff when experiencing harmful thoughts toward self/others  Outcome: Progressing  Goal: Denies harm toward self or others  Outcome: Progressing     Problem: Ineffective Coping  Goal: Identifies ineffective coping skills  Outcome: Progressing  Goal: Identifies healthy coping skills  Outcome: Progressing  Goal: Demonstrates healthy coping skills  Outcome: Progressing     Problem: Alteration in Sleep  Goal: STG - Reports nightly sleep, duration, and quality  Outcome: Progressing  Goal: STG - Identifies sleep hygiene aids  Outcome: Progressing  Goal: STG - Informs staff if unable to sleep  Outcome: Progressing     Problem: Anxiety  Goal: Attempts to manage anxiety with help  Outcome: Progressing  Goal: Verbalizes ways to manage anxiety  Outcome: Progressing  Goal: Implements measures to reduce anxiety  Outcome: Progressing     Problem: Self Care Deficit  Goal: Accepts need for medications  Outcome: Progressing     Problem: Defensive Coping  Goal: Identifies reckless/dangerous behavior  Outcome: Progressing  Goal: Identifies stressors that lead to reckless/dangerous behavior  Outcome: Progressing  Goal: Discusses and identifies healthy coping skills  Outcome: Progressing  Goal: Discusses signs/symptoms of illness/treatment options  Outcome: Progressing     Problem: Risk for Suicide  Goal: Accepts medications as prescribed/needed this shift  Outcome: Progressing  Goal: Identifies supports this shift  Outcome: Progressing  Goal: Makes needs known through verbalization or behaviors this shift  Outcome: Progressing  Goal: No self harm this shift  Outcome:  Progressing  Goal: Read Safety Guidelines this shift  Outcome: Progressing  Goal: Complete Mental Health Safety Plan (psychiatry only) this shift  Outcome: Progressing

## 2023-12-04 NOTE — PROGRESS NOTES
"Occupational Therapy     REHAB Therapy & Treatment    Patient Name: Natividad Ricardo  MRN: [id number]  Today's Date: 12/4/2023  Attendance:  Attendance  Activity: One-to-one  Participation: Active participation    1:1 Therapeutic Recreation Activity:  Treatment Approach  Approach : 1 to1 Therapy sessions  Patient Stated Goals: to\" learn helping coping tools to ignore the noise of my brain\"  Cognition: Attention  Emotional: Mood (Pt was given a paper on \"Identifying what makes me ME\" to improve self esteem)  Treatment Approach Comments: Pt was able to identify her skills as \"...a caring person caring for pets like grandma's cat and a neighbor's small dog as skills to pursue a career in and make money to live on my own.\"      Encounter Problems       Encounter Problems (Active)       OT Goals       OT Goal 1 (Progressing)       Start:  12/04/23    Expected End:  01/01/24       Communication/Interaction Skills (Self-expression/social Behavior/assertive)  Explore/demonstrate 1-2 effective methods of expressing feelings/wants/needs (can include assertion/engaging/sharing/asking) prior to discharge          OT Goal 2 (Progressing)       Start:  12/04/23    Expected End:  01/01/24       Coping Skills  Explore/identify 1-2 effective strategies of expressing feelings, wants, needs(can include assertion, engaging, sharing, asking) prior to discharge          OT Goal 3 (Progressing)       Start:  12/04/23    Expected End:  01/01/24        Emotion Regulation/self control  Pt will exhibit appropriate range, regulation of emotions, impulse control, frustration tolerance in OT groups prior to discharge.                Educational Documents provided & review ed w/Pt in learning how she can improve her self esteem by focusing on tasks that give Pt fernando and distraction from A/V hallucinations. Pt was given a paper to complete for next OT session on \"Identifying and establishing a concrete, structured schedule to effectively complete " ADLS/IADLs for increased esteem.

## 2023-12-04 NOTE — PROGRESS NOTES
Natividad Ricardo is a 23 y.o. female on day 2 of admission presenting with schizoaffective disorder, bipolar type.    Subjective   Patient continues to endorse intrusive thoughts, and intermittent suicidal ideation.  She states that she is trying to stay positive and maintain a future-oriented thinking.  She is looking forward to spending time with family for the upcoming holidays.  Patient remains on one-to-one observation, due to impulsivity, and thoughts of self-harm.  Patient received Invega Sustenna 234 mg IM yesterday, patient tolerating medication well.  Will continue with Geodon 80 oral twice daily.  Patient remains on COVID precautions.    Objective     MSE  General: Appropriately groomed and dressed.  Appearance: Appears stated age.  Attitude: Calm, cooperative.  Behavior: Appropriate eye contact.  Motor activity: No agitation or retardation. no EPS.  Normal gait.  Speech: Regular rate, rhythm, volume and tone.  Mood: Dysphoric  Affect: Restricted  Thought process: Organized, reports intrusive negative thoughts  Thought content: Suicidal ideation  Thought perception: Endorses auditory and visual hallucinations   Cognition: Alert, oriented x3.  no deficit in memory or attention.  Insight: Poor  Judgment: Poor    Last Recorded Vitals  /61 (Patient Position: Lying)   Pulse 90   Temp 37.1 °C (98.8 °F) (Temporal)   Resp 12   SpO2 92%      Relevant Results  Scheduled medications  divalproex, 500 mg, oral, q12h DAGO  nitrofurantoin (macrocrystal-monohydrate), 100 mg, oral, q12h DAGO  pioglitazone, 15 mg, oral, Daily  traZODone, 100 mg, oral, Nightly  ziprasidone, 80 mg, oral, BID with meals      Continuous medications     PRN medications  PRN medications: alum-mag hydroxide-simeth, diphenhydrAMINE, haloperidol, hydrOXYzine pamoate, ibuprofen, magnesium hydroxide    Results for orders placed or performed during the hospital encounter of 12/02/23 (from the past 24 hour(s))   EKG 12 lead   Result Value Ref  Range    Ventricular Rate 105 BPM    Atrial Rate 105 BPM    MS Interval 122 ms    QRS Duration 70 ms    QT Interval 350 ms    QTC Calculation(Bazett) 462 ms    P Axis 55 degrees    R Axis 38 degrees    T Axis 10 degrees    QRS Count 17 beats    Q Onset 226 ms    P Onset 165 ms    P Offset 216 ms    T Offset 401 ms    QTC Fredericia 421 ms        Assessment/Plan   Diagnosis:  Schizoaffective disorder, bipolar type    Impression:     Labs and Chart: reviewed  Case discussed with treatment team members  Encouraged patient to attend group and other mileu activity  Collateral from family - pending  Obtain updated EKG today  Discharge planing  Medication:       -Patient received Invega Sustenna 234 mg IM on 12/3       -Geodon 80 mg oral twice daily    Medication Consent  Medication Consent: no medication changes necessary for review    JEANETTE Peacock-CNP

## 2023-12-05 LAB
ALBUMIN SERPL BCP-MCNC: 3.4 G/DL (ref 3.4–5)
ALP SERPL-CCNC: 63 U/L (ref 33–110)
ALT SERPL W P-5'-P-CCNC: 5 U/L (ref 7–45)
ANION GAP SERPL CALC-SCNC: 15 MMOL/L (ref 10–20)
AST SERPL W P-5'-P-CCNC: 15 U/L (ref 9–39)
BILIRUB SERPL-MCNC: 0.4 MG/DL (ref 0–1.2)
BUN SERPL-MCNC: 11 MG/DL (ref 6–23)
CALCIUM SERPL-MCNC: 9.5 MG/DL (ref 8.6–10.3)
CHLORIDE SERPL-SCNC: 102 MMOL/L (ref 98–107)
CO2 SERPL-SCNC: 21 MMOL/L (ref 21–32)
CREAT SERPL-MCNC: 1.12 MG/DL (ref 0.5–1.05)
GFR SERPL CREATININE-BSD FRML MDRD: 71 ML/MIN/1.73M*2
GLUCOSE SERPL-MCNC: 87 MG/DL (ref 74–99)
POTASSIUM SERPL-SCNC: 4 MMOL/L (ref 3.5–5.3)
PROT SERPL-MCNC: 7.5 G/DL (ref 6.4–8.2)
SODIUM SERPL-SCNC: 134 MMOL/L (ref 136–145)

## 2023-12-05 PROCEDURE — 2500000005 HC RX 250 GENERAL PHARMACY W/O HCPCS: Performed by: REGISTERED NURSE

## 2023-12-05 PROCEDURE — 2500000001 HC RX 250 WO HCPCS SELF ADMINISTERED DRUGS (ALT 637 FOR MEDICARE OP): Performed by: PSYCHIATRY & NEUROLOGY

## 2023-12-05 PROCEDURE — 99232 SBSQ HOSP IP/OBS MODERATE 35: CPT | Performed by: PSYCHIATRY & NEUROLOGY

## 2023-12-05 PROCEDURE — 84155 ASSAY OF PROTEIN SERUM: CPT | Performed by: PSYCHIATRY & NEUROLOGY

## 2023-12-05 PROCEDURE — 1140000001 HC PRIVATE PSYCH ROOM DAILY

## 2023-12-05 PROCEDURE — 36415 COLL VENOUS BLD VENIPUNCTURE: CPT | Performed by: PSYCHIATRY & NEUROLOGY

## 2023-12-05 PROCEDURE — 2500000004 HC RX 250 GENERAL PHARMACY W/ HCPCS (ALT 636 FOR OP/ED)

## 2023-12-05 RX ORDER — ONDANSETRON 4 MG/1
4 TABLET, FILM COATED ORAL ONCE
Status: COMPLETED | OUTPATIENT
Start: 2023-12-05 | End: 2023-12-05

## 2023-12-05 RX ADMIN — NITROFURANTOIN (MONOHYDRATE/MACROCRYSTALS) 100 MG: 75; 25 CAPSULE ORAL at 20:58

## 2023-12-05 RX ADMIN — PIOGLITAZONE HYDROCHLORIDE 15 MG: 30 TABLET ORAL at 08:39

## 2023-12-05 RX ADMIN — DIVALPROEX SODIUM 500 MG: 250 TABLET, DELAYED RELEASE ORAL at 08:39

## 2023-12-05 RX ADMIN — ONDANSETRON 4 MG: 4 TABLET, FILM COATED ORAL at 10:23

## 2023-12-05 RX ADMIN — DIVALPROEX SODIUM 500 MG: 250 TABLET, DELAYED RELEASE ORAL at 20:58

## 2023-12-05 RX ADMIN — TRAZODONE HYDROCHLORIDE 100 MG: 50 TABLET ORAL at 20:58

## 2023-12-05 RX ADMIN — ZIPRASIDONE HYDROCHLORIDE 80 MG: 40 CAPSULE ORAL at 16:53

## 2023-12-05 RX ADMIN — NITROFURANTOIN (MONOHYDRATE/MACROCRYSTALS) 100 MG: 75; 25 CAPSULE ORAL at 08:39

## 2023-12-05 RX ADMIN — ZIPRASIDONE HYDROCHLORIDE 80 MG: 40 CAPSULE ORAL at 08:39

## 2023-12-05 ASSESSMENT — COLUMBIA-SUICIDE SEVERITY RATING SCALE - C-SSRS
5. HAVE YOU STARTED TO WORK OUT OR WORKED OUT THE DETAILS OF HOW TO KILL YOURSELF? DO YOU INTEND TO CARRY OUT THIS PLAN?: NO
2. HAVE YOU ACTUALLY HAD ANY THOUGHTS OF KILLING YOURSELF?: NO
2. HAVE YOU ACTUALLY HAD ANY THOUGHTS OF KILLING YOURSELF?: YES
1. SINCE LAST CONTACT, HAVE YOU WISHED YOU WERE DEAD OR WISHED YOU COULD GO TO SLEEP AND NOT WAKE UP?: NO
5. HAVE YOU STARTED TO WORK OUT OR WORKED OUT THE DETAILS OF HOW TO KILL YOURSELF? DO YOU INTEND TO CARRY OUT THIS PLAN?: NO
2. HAVE YOU ACTUALLY HAD ANY THOUGHTS OF KILLING YOURSELF?: YES
6. HAVE YOU EVER DONE ANYTHING, STARTED TO DO ANYTHING, OR PREPARED TO DO ANYTHING TO END YOUR LIFE?: NO
5. HAVE YOU STARTED TO WORK OUT OR WORKED OUT THE DETAILS OF HOW TO KILL YOURSELF? DO YOU INTEND TO CARRY OUT THIS PLAN?: YES
6. HAVE YOU EVER DONE ANYTHING, STARTED TO DO ANYTHING, OR PREPARED TO DO ANYTHING TO END YOUR LIFE?: NO
1. SINCE LAST CONTACT, HAVE YOU WISHED YOU WERE DEAD OR WISHED YOU COULD GO TO SLEEP AND NOT WAKE UP?: NO
1. SINCE LAST CONTACT, HAVE YOU WISHED YOU WERE DEAD OR WISHED YOU COULD GO TO SLEEP AND NOT WAKE UP?: YES
6. HAVE YOU EVER DONE ANYTHING, STARTED TO DO ANYTHING, OR PREPARED TO DO ANYTHING TO END YOUR LIFE?: NO

## 2023-12-05 ASSESSMENT — PAIN - FUNCTIONAL ASSESSMENT
PAIN_FUNCTIONAL_ASSESSMENT: 0-10
PAIN_FUNCTIONAL_ASSESSMENT: 0-10

## 2023-12-05 ASSESSMENT — PAIN SCALES - GENERAL
PAINLEVEL_OUTOF10: 0 - NO PAIN
PAINLEVEL_OUTOF10: 0 - NO PAIN

## 2023-12-05 NOTE — PROGRESS NOTES
Natividad Ricardo is a 23 y.o. female on day 3 of admission presenting with schizoaffective disorder, bipolar type.    Subjective   Patient reports improved mood.  Patient continues to endorse auditory visualizations, but states they are decreasing in frequency and severity.  Patient slept well last night.  Patient tolerating medication with no adverse effects.  Will draw valproic acid level tomorrow morning.    Objective     MSE  General: Appropriately groomed and dressed.  Appearance: Appears stated age.  Attitude: Calm, cooperative.  Behavior: Appropriate eye contact.  Motor activity: No agitation or retardation. no EPS.  Normal gait.  Speech: Regular rate, rhythm, volume and tone.  Mood: Dysphoric  Affect: Restricted  Thought process: Organized, reports intrusive negative thoughts  Thought content: Suicidal ideation  Thought perception: Endorses auditory and visual hallucinations but improving  Cognition: Alert, oriented x3.  no deficit in memory or attention.  Insight: Poor  Judgment: Poor    Last Recorded Vitals  /63   Pulse 80   Temp 37.2 °C (99 °F) Comment: recorded for [facility] behavioral health tech  Resp 16   SpO2 97%      Relevant Results  Scheduled medications  divalproex, 500 mg, oral, q12h DAGO  nitrofurantoin (macrocrystal-monohydrate), 100 mg, oral, q12h DAGO  pioglitazone, 15 mg, oral, Daily  traZODone, 100 mg, oral, Nightly  ziprasidone, 80 mg, oral, BID with meals      Continuous medications     PRN medications  PRN medications: alum-mag hydroxide-simeth, diphenhydrAMINE, haloperidol, hydrOXYzine pamoate, ibuprofen, magnesium hydroxide    Results for orders placed or performed during the hospital encounter of 12/02/23 (from the past 24 hour(s))   Comprehensive metabolic panel   Result Value Ref Range    Glucose 87 74 - 99 mg/dL    Sodium 134 (L) 136 - 145 mmol/L    Potassium 4.0 3.5 - 5.3 mmol/L    Chloride 102 98 - 107 mmol/L    Bicarbonate 21 21 - 32 mmol/L    Anion Gap 15 10 - 20 mmol/L     Urea Nitrogen 11 6 - 23 mg/dL    Creatinine 1.12 (H) 0.50 - 1.05 mg/dL    eGFR 71 >60 mL/min/1.73m*2    Calcium 9.5 8.6 - 10.3 mg/dL    Albumin 3.4 3.4 - 5.0 g/dL    Alkaline Phosphatase 63 33 - 110 U/L    Total Protein 7.5 6.4 - 8.2 g/dL    AST 15 9 - 39 U/L    Bilirubin, Total 0.4 0.0 - 1.2 mg/dL    ALT 5 (L) 7 - 45 U/L        Assessment/Plan   Diagnosis:  Schizoaffective disorder, bipolar type    Impression:     Labs and Chart: reviewed  Case discussed with treatment team members  Encouraged patient to attend group and other mileu activity  Collateral from family - pending  Discharge planing  Medication:         -Patient received Invega Sustenna 234 mg IM on 12/3         -Geodon 80 mg oral twice daily         -Depakote 500 mg oral twice daily       Medication Consent  Medication Consent: no medication changes necessary for review    JEANETTE Peacock-CNP

## 2023-12-05 NOTE — CARE PLAN
"Met with pt privately to assess current affect and mood. Pt affect continues to be incongruent with pt reported affect. Pt pleasant and smiling when interacting with 1:1 staff. Due to the severity of patient suicidal statements ( pt continues to state that she hears voices telling her to gouge her eyes out and kill herself), 1:1 staff remains at bedside donning full PPE related to pt positive COVID status. In reference to pt COVID status, pt has not demonstrated symptoms of COVID this shift and states that she is in no distress when breathing.     When asked about presence of SI, pt states that it has been intermittent today and states \"I still wouldn't be safe alone.\" 1:1 at bedside as ordered. Pt denies HI. Pt rates depression 7/10 and anxiety 5/10 on a 1-10 scale with 10 being the worst.  Pt continues to report auditory command hallucinations telling her to gouge her eyes out and visual hallucinations of \"bright lights.\" Pt reports having a good appetite for the last 3 meals.     Pt slept 7 hours this shift as of 6 am.      The patient's goals for the shift include: \"have a good night and be happy.\"     The clinical goals for the shift include: sleep at least 6 hours this shift, maintain safety and reduce self harm ideation and suciidal ideation, medication adherence, participation in the treatment plan     Over the shift, the patient made progress towards above listed clinical goals. Barriers to progression include: current mental health. Recommendations to address these barriers include: continue with current treatment plan.      Problem: Sensory Perceptual Alteration as Evidenced by  Goal: Cooperates with admission process  Outcome: Progressing  Goal: Discusses signs/symptoms of illness/treatment options  Outcome: Progressing  Goal: Initiates reality-based interactions  Outcome: Progressing  Goal: Able to discuss content of hallucinations/delusions  Outcome: Progressing  Goal: Will not act on psychotic " perception  Outcome: Progressing     Problem: Altered Thought Processes as Evidenced by  Goal: STG - Desires improvement in ability to think and concentrate  Outcome: Progressing     Problem: Potential for Harm to Self or Others  Goal: Notifies staff when experiencing harmful thoughts toward self/others  Outcome: Progressing  Goal: Denies harm toward self or others  Outcome: Progressing     Problem: Ineffective Coping  Goal: Identifies ineffective coping skills  Outcome: Progressing  Goal: Identifies healthy coping skills  Outcome: Progressing  Goal: Demonstrates healthy coping skills  Outcome: Progressing     Problem: Alteration in Sleep  Goal: STG - Reports nightly sleep, duration, and quality  Outcome: Progressing  Goal: STG - Identifies sleep hygiene aids  Outcome: Progressing  Goal: STG - Informs staff if unable to sleep  Outcome: Progressing     Problem: Anxiety  Goal: Attempts to manage anxiety with help  Outcome: Progressing  Goal: Verbalizes ways to manage anxiety  Outcome: Progressing  Goal: Implements measures to reduce anxiety  Outcome: Progressing     Problem: Self Care Deficit  Goal: Accepts need for medications  Outcome: Progressing     Problem: Defensive Coping  Goal: Identifies reckless/dangerous behavior  Outcome: Progressing  Goal: Identifies stressors that lead to reckless/dangerous behavior  Outcome: Progressing  Goal: Discusses and identifies healthy coping skills  Outcome: Progressing  Goal: Discusses signs/symptoms of illness/treatment options  Outcome: Progressing     Problem: Risk for Suicide  Goal: Accepts medications as prescribed/needed this shift  Outcome: Progressing  Goal: Identifies supports this shift  Outcome: Progressing  Goal: Makes needs known through verbalization or behaviors this shift  Outcome: Progressing  Goal: No self harm this shift  Outcome: Progressing  Goal: Read Safety Guidelines this shift  Outcome: Progressing  Goal: Complete Mental Health Safety Plan (psychiatry  only) this shift  Outcome: Progressing

## 2023-12-05 NOTE — NURSING NOTE
"1:1 staff remains at bedside donning full PPE related to pt positive COVID status and active auditory hallucinations telling her to harm self. Says she sees pictures and \"like movies\" showing different ways to harm self. Following safety plan of care. In reference to pts COVID status, pt has not demonstrated symptoms of COVID this shift and states that she is in no distress when breathing. Received order for Zofran 4mg due to complaints of nausea and reported emesis. Unobserved by staff.      Patient states effectiveness from Zofran. Spent most of day utilizing tablet and talking on phone. Had meals in room, denies HI/AVH, Patient did say she had one episode of thoughts to hurt self this afternoon but was able redirect self with talking to nurse and then resting. Continues on 1:1, Complying with safety plan and staates feeling better today compared to yesterday.      Goal: Notifies staff when experiencing harmful thoughts toward self/others  Outcome: Progressing  Goal: Denies harm toward self or others  Outcome: Progressing     Problem: Ineffective Coping  Goal: Identifies ineffective coping skills  Outcome: Progressing  Goal: Identifies healthy coping skills  Outcome: Progressing  Goal: Demonstrates healthy coping skills  Outcome: Progressing     Problem: Alteration in Sleep  Goal: STG - Reports nightly sleep, duration, and quality  Outcome: Progressing  Goal: STG - Identifies sleep hygiene aids  Outcome: Progressing  Goal: STG - Informs staff if unable to sleep  Outcome: Progressing     Problem: Anxiety  Goal: Attempts to manage anxiety with help  Outcome: Progressing  Goal: Verbalizes ways to manage anxiety  Outcome: Progressing  Goal: Implements measures to reduce anxiety  Outcome: Progressing     Problem: Self Care Deficit  Goal: Accepts need for medications  Outcome: Progressing     Problem: Defensive Coping  Goal: Identifies reckless/dangerous behavior  Outcome: Progressing  Goal: Identifies stressors that " lead to reckless/dangerous behavior  Outcome: Progressing  Goal: Discusses and identifies healthy coping skills  Outcome: Progressing  Goal: Discusses signs/symptoms of illness/treatment options  Outcome: Progressing     Problem: Risk for Suicide  Goal: Accepts medications as prescribed/needed this shift  Outcome: Progressing  Goal: Identifies supports this shift  Outcome: Progressing  Goal: Makes needs known through verbalization or behaviors this shift  Outcome: Progressing  Goal: No self harm this shift  Outcome: Progressing  Goal: Read Safety Guidelines this shift  Outcome: Progressing  Goal: Complete Mental Health Safety Plan (psychiatry only) this shift  Outcome: Progressing     Problem: Risk for Suicide  Goal: Accepts medications as prescribed/needed this shift  Outcome: Progressing  Goal: Identifies supports this shift  Outcome: Progressing  Goal: Makes needs known through verbalization or behaviors this shift  Outcome: Progressing  Goal: No self harm this shift  Outcome: Progressing  Goal: Read Safety Guidelines this shift  Outcome: Progressing  Goal: Complete Mental Health Safety Plan (psychiatry only) this shift  Outcome: Progressing   The patient's goals for the shift include minimize voices    The clinical goals for the shift include maintain safety

## 2023-12-05 NOTE — PROGRESS NOTES
Social Work Note  Message left for [facility] team Opal Hurtado [phone] about appts and  likely needing to  meds from [facility] post-discharge.

## 2023-12-05 NOTE — PROGRESS NOTES
Social Work Note  Left message for [facility] team member Opal Hurtado [phone] to call back about follow up appointments and  likely needing to  discharge meds from [facility] for group home post discharge.
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
## 2023-12-06 LAB
ATRIAL RATE: 105 BPM
HOLD SPECIMEN: NORMAL
P AXIS: 55 DEGREES
P OFFSET: 216 MS
P ONSET: 165 MS
PR INTERVAL: 122 MS
Q ONSET: 226 MS
QRS COUNT: 17 BEATS
QRS DURATION: 70 MS
QT INTERVAL: 350 MS
QTC CALCULATION(BAZETT): 462 MS
QTC FREDERICIA: 421 MS
R AXIS: 38 DEGREES
T AXIS: 10 DEGREES
T OFFSET: 401 MS
VALPROATE SERPL-MCNC: 76 UG/ML (ref 50–100)
VENTRICULAR RATE: 105 BPM

## 2023-12-06 PROCEDURE — 2500000004 HC RX 250 GENERAL PHARMACY W/ HCPCS (ALT 636 FOR OP/ED)

## 2023-12-06 PROCEDURE — 80164 ASSAY DIPROPYLACETIC ACD TOT: CPT | Performed by: PSYCHIATRY & NEUROLOGY

## 2023-12-06 PROCEDURE — 2500000001 HC RX 250 WO HCPCS SELF ADMINISTERED DRUGS (ALT 637 FOR MEDICARE OP): Performed by: PSYCHIATRY & NEUROLOGY

## 2023-12-06 PROCEDURE — 97535 SELF CARE MNGMENT TRAINING: CPT | Mod: GO

## 2023-12-06 PROCEDURE — 99232 SBSQ HOSP IP/OBS MODERATE 35: CPT | Performed by: PSYCHIATRY & NEUROLOGY

## 2023-12-06 PROCEDURE — 36415 COLL VENOUS BLD VENIPUNCTURE: CPT | Performed by: PSYCHIATRY & NEUROLOGY

## 2023-12-06 PROCEDURE — 1140000001 HC PRIVATE PSYCH ROOM DAILY

## 2023-12-06 RX ADMIN — NITROFURANTOIN (MONOHYDRATE/MACROCRYSTALS) 100 MG: 75; 25 CAPSULE ORAL at 08:08

## 2023-12-06 RX ADMIN — DIVALPROEX SODIUM 500 MG: 250 TABLET, DELAYED RELEASE ORAL at 21:14

## 2023-12-06 RX ADMIN — TRAZODONE HYDROCHLORIDE 100 MG: 50 TABLET ORAL at 21:14

## 2023-12-06 RX ADMIN — PIOGLITAZONE HYDROCHLORIDE 15 MG: 30 TABLET ORAL at 08:09

## 2023-12-06 RX ADMIN — DIVALPROEX SODIUM 500 MG: 250 TABLET, DELAYED RELEASE ORAL at 08:08

## 2023-12-06 RX ADMIN — ZIPRASIDONE HYDROCHLORIDE 80 MG: 40 CAPSULE ORAL at 08:08

## 2023-12-06 RX ADMIN — ZIPRASIDONE HYDROCHLORIDE 80 MG: 40 CAPSULE ORAL at 17:40

## 2023-12-06 RX ADMIN — NITROFURANTOIN (MONOHYDRATE/MACROCRYSTALS) 100 MG: 75; 25 CAPSULE ORAL at 21:14

## 2023-12-06 ASSESSMENT — PAIN SCALES - GENERAL
PAINLEVEL_OUTOF10: 0 - NO PAIN
PAINLEVEL_OUTOF10: 0 - NO PAIN

## 2023-12-06 ASSESSMENT — PAIN - FUNCTIONAL ASSESSMENT
PAIN_FUNCTIONAL_ASSESSMENT: 0-10
PAIN_FUNCTIONAL_ASSESSMENT: 0-10

## 2023-12-06 ASSESSMENT — ACTIVITIES OF DAILY LIVING (ADL): HOME_MANAGEMENT_TIME_ENTRY: 15

## 2023-12-06 NOTE — PROGRESS NOTES
"Occupational Therapy     REHAB Therapy Assessment & Treatment    Patient Name: Natividad Ricardo  MRN: [id number]  Today's Date: 12/6/2023      Activity:  \"Short-Circuiting Stress\" Group    Attendance:  Attendance  Activity: Discussion/reminisce (Short-Circuiting Stress)  Participation: Unable/off unit (Pt unable to participate w/ group in dayroom due to being restricted to room for COVID precautions. Will attempt to review this information in 1:1 w/ pt at next opportunity.)        Encounter Problems       Encounter Problems (Active)       OT Goals       OT Goal 1 (Progressing)       Start:  12/04/23    Expected End:  01/01/24       Communication/Interaction Skills (Self-expression/social Behavior/assertive)  Explore/demonstrate 1-2 effective methods of expressing feelings/wants/needs (can include assertion/engaging/sharing/asking) prior to discharge          OT Goal 2 (Progressing)       Start:  12/04/23    Expected End:  01/01/24       Coping Skills  Explore/identify 1-2 effective strategies of expressing feelings, wants, needs(can include assertion, engaging, sharing, asking) prior to discharge          OT Goal 3 (Progressing)       Start:  12/04/23    Expected End:  01/01/24        Emotion Regulation/self control  Pt will exhibit appropriate range, regulation of emotions, impulse control, frustration tolerance in OT groups prior to discharge.                  "

## 2023-12-06 NOTE — PROGRESS NOTES
Social Work Note  [facility] Opal Hurtado [phone] to call back about follow up appointments and  likely needing to  discharge meds from [facility] for group home post discharge.

## 2023-12-06 NOTE — PROGRESS NOTES
"Occupational Therapy     REHAB Therapy Assessment & Treatment    Patient Name: Natividad Ricardo  MRN: [id number]  Today's Date: 12/6/2023      Activity:  1:1 Therapy Session - Depression Education & Counteracting Negative Thoughts    Attendance:  Attendance  Activity: One-to-one  Participation: Active participation    Treatment Approach  Approach : 1 to1 Therapy sessions, 15 to 25 minutes  Patient Stated Goals: \"to learn more coping tools\"  Cognition: Attention, Directions (Pt demonstrates sustained attention for duration of session w/ active listening & responses to prompts that are on-topic & organized. Pt requires min. additional verbal cueing to follow complex directions.)  Social Skills: Stimulation, Skills (Pt appears appropriately stimulated during session, no signs of responding to internal stimuli. Pt social skills WFL - demonstrates active listening, good eye contact, appropriate disclosure & boundaries)  Emotional: Mood (Pt mood euthymic, affect blunted)  Treatment Approach Comments: Pt participated in 1:1 therapy session focused on depression coping skills. Pt provided handout describing \"The Cycle of Depression\". Pt attentive while handout reviewed, w/ emphasis on the role of thoughts, feelings, &  physical sensations. Asked pt how depression usually feels to her. Pt responds, \"Not wanting to do things I usually like to do.\" Pt also describes difficulty w/ self-care when depressed. Pt also provided handout on \"Counteracting Negative Thoughts\". This handout used in conjunction w/ handout on cognitive distortions, which pt had from a previous session. Pt reviewed the sample negative statements & successfully identified what cognitive distortions were represented in these. Regarding her own thought processes, pt relates especially to \"catastrophizing\" & \"mind-reading\". Pt then participated in discussion of rational counterstatements, first to sample statements then to one of pt's own negative thoughts. Pt " "required some additional verbal cueing & time to relate the material to her own thoughts to come up w/ an example; eventually stated that she often assumes her grandmother is mad at her. When asked about a rational counterstatement, pt stated she could combat mind-reading by asking her grandmother if she's mad & having a conversation. Pt very receptive to education & demonstrates emerging insight.      Encounter Problems       Encounter Problems (Active)       OT Goals       OT Goal 1 (Progressing)       Start:  12/04/23    Expected End:  01/01/24       Communication/Interaction Skills (Self-expression/social Behavior/assertive)  Explore/demonstrate 1-2 effective methods of expressing feelings/wants/needs (can include assertion/engaging/sharing/asking) prior to discharge          OT Goal 2 (Progressing)       Start:  12/04/23    Expected End:  01/01/24       Coping Skills  Explore/identify 1-2 effective strategies of expressing feelings, wants, needs(can include assertion, engaging, sharing, asking) prior to discharge          OT Goal 3 (Progressing)       Start:  12/04/23    Expected End:  01/01/24        Emotion Regulation/self control  Pt will exhibit appropriate range, regulation of emotions, impulse control, frustration tolerance in OT groups prior to discharge.                  Education:  Pt provided educational handouts on \"The Cycle of Depression\" & \"Counteracting Negative Thoughts\". Reviewed & discussed. Pt demonstrates good understanding.       "

## 2023-12-06 NOTE — CARE PLAN
"Met with pt privately to assess current affect and mood. Pt affect bright and agreeable, however pt is much more guarded when answering assessment questions. Pt reports that she talked with her grandma today and \"it made me feel better about everything.\" Pt continues to be on COVID protocol. Vitals WNL and pt not currently presenting with symptoms of COVID. Pt remains on 1:1 observation for safety. Due to pt improvement, Dr. Zaidi was agreeable to 1:1 staff staying outside the patient's room at this time per day shift report. Dr. Zaidi called by this writer this shift to confirm as it was not indicated in note or order. Dr. Zaidi confirmed.     Pt currently denies SI and HI. Pt denies current feelings of depression and anxiety. Pt denies having any sort of hallucination at this time and has not been observed responding to internal stimuli this shift. Pt reports having a good appetite for the last 3 meals.     Pt slept 8.5 hours this shift as of 6 am.     The patient's goals for the shift include: \"watch Youtube.\"     The clinical goals for the shift include: sleep at least 6 hours, maintain safety via 1:1 observation,     Over the shift, the patient made progress towards above listed clinical goals. Barriers to progression include: current mental health. Recommendations to address these barriers include: continue with treatment plan.      Problem: Sensory Perceptual Alteration as Evidenced by  Goal: Cooperates with admission process  Outcome: Progressing  Goal: Discusses signs/symptoms of illness/treatment options  Outcome: Progressing  Goal: Initiates reality-based interactions  Outcome: Progressing  Goal: Able to discuss content of hallucinations/delusions  Outcome: Progressing  Goal: Will not act on psychotic perception  Outcome: Progressing     Problem: Altered Thought Processes as Evidenced by  Goal: STG - Desires improvement in ability to think and concentrate  Outcome: Progressing     Problem: Potential for Harm to " Self or Others  Goal: Notifies staff when experiencing harmful thoughts toward self/others  Outcome: Progressing  Goal: Denies harm toward self or others  Outcome: Progressing     Problem: Ineffective Coping  Goal: Identifies ineffective coping skills  Outcome: Progressing  Goal: Identifies healthy coping skills  Outcome: Progressing  Goal: Demonstrates healthy coping skills  Outcome: Progressing     Problem: Alteration in Sleep  Goal: STG - Reports nightly sleep, duration, and quality  Outcome: Progressing  Goal: STG - Identifies sleep hygiene aids  Outcome: Progressing  Goal: STG - Informs staff if unable to sleep  Outcome: Progressing     Problem: Anxiety  Goal: Attempts to manage anxiety with help  Outcome: Progressing  Goal: Verbalizes ways to manage anxiety  Outcome: Progressing  Goal: Implements measures to reduce anxiety  Outcome: Progressing     Problem: Self Care Deficit  Goal: Accepts need for medications  Outcome: Progressing     Problem: Defensive Coping  Goal: Identifies reckless/dangerous behavior  Outcome: Progressing  Goal: Identifies stressors that lead to reckless/dangerous behavior  Outcome: Progressing  Goal: Discusses and identifies healthy coping skills  Outcome: Progressing  Goal: Discusses signs/symptoms of illness/treatment options  Outcome: Progressing     Problem: Risk for Suicide  Goal: Accepts medications as prescribed/needed this shift  Outcome: Progressing  Goal: Identifies supports this shift  Outcome: Progressing  Goal: Makes needs known through verbalization or behaviors this shift  Outcome: Progressing  Goal: No self harm this shift  Outcome: Progressing  Goal: Read Safety Guidelines this shift  Outcome: Progressing  Goal: Complete Mental Health Safety Plan (psychiatry only) this shift  Outcome: Progressing     Problem: Risk for Suicide  Goal: Accepts medications as prescribed/needed this shift  Outcome: Progressing  Goal: Identifies supports this shift  Outcome:  Progressing  Goal: Makes needs known through verbalization or behaviors this shift  Outcome: Progressing  Goal: No self harm this shift  Outcome: Progressing  Goal: Read Safety Guidelines this shift  Outcome: Progressing  Goal: Complete Mental Health Safety Plan (psychiatry only) this shift  Outcome: Progressing

## 2023-12-06 NOTE — PROGRESS NOTES
Occupational Therapy    [facility] Occupational Therapy Assessment & Treatment    Patient Name: Natividad Ricardo  MRN: [id number]  Today's Date: 12/6/2023      Activity:  Assertive Communication Group    Attendance:  Attendance  Activity: Discussion/reminisce (Assertive Communication)  Participation: Unable/off unit    Pt unable to participate in group this date due to being restricted to room w/ COVID precautions. Plan to deliver this information to pt in 1:1 setting at next opportunity.      Encounter Problems       Encounter Problems (Active)       OT Goals       OT Goal 1 (Progressing)       Start:  12/04/23    Expected End:  01/01/24       Communication/Interaction Skills (Self-expression/social Behavior/assertive)  Explore/demonstrate 1-2 effective methods of expressing feelings/wants/needs (can include assertion/engaging/sharing/asking) prior to discharge          OT Goal 2 (Progressing)       Start:  12/04/23    Expected End:  01/01/24       Coping Skills  Explore/identify 1-2 effective strategies of expressing feelings, wants, needs(can include assertion, engaging, sharing, asking) prior to discharge          OT Goal 3 (Progressing)       Start:  12/04/23    Expected End:  01/01/24        Emotion Regulation/self control  Pt will exhibit appropriate range, regulation of emotions, impulse control, frustration tolerance in OT groups prior to discharge.

## 2023-12-06 NOTE — NURSING NOTE
Pt has been alert, occupying self on tablet and phone. Did cooperate with private group in room by OT. Verbal with needs. Eating in room approximately 75% each meal. Denies pain, denies SI/HI/AVH. Discharge focused. 1:1 discontinued per Dr Zaidi. Pt has been compliant with plan of care and does not voice desire to harm self or others. Mood bright, engages in conversation, expresses appreciation.

## 2023-12-06 NOTE — PROGRESS NOTES
Social Work Note  Spoke with  Sophie Cabrales [phone], to advise her pt was COVID positive 12-1-23.  Their policy is to quarantine 10 days.  Pt's room is on second floor and if discharged and in quarantine window, would be unable to come down for dinner, etc.

## 2023-12-06 NOTE — PROGRESS NOTES
Natividad Ricardo is a 23 y.o. female on day 4 of admission presenting with schizoaffective disorder, bipolar type    Subjective   Patient reports improved mood, no longer having commanding hallucinations.  No thoughts of self-harm.  Patient reports that she slept well.  Patient tolerating medication without adverse effects.  Will continue to monitor patient for any reoccurring symptoms of psychosis.  Will discontinue one-to-one observation at this time.    Objective     MSE  General: Appropriately groomed and dressed.  Appearance: Appears stated age.  Attitude: Calm, cooperative.  Behavior: Appropriate eye contact.  Motor activity: No agitation or retardation. no EPS.  Normal gait.  Speech: Regular rate, rhythm, volume and tone.  Mood: Less depressed  Affect: Appropriate range  Thought process: Organized, linear, goal-directed.  Associations are logical.  Thought content: Does not endorse suicidal or homicidal ideation, no delusions elicited.  Thought perception: Still reports occasional auditory gestations, not commanding in nature  Cognition: Alert, oriented x3.  no deficit in memory or attention.  Insight: Fair.  Judgment: Fair.    Last Recorded Vitals  /56   Pulse 91   Temp 36.5 °C (97.7 °F)   Resp 16   SpO2 97%      Relevant Results  Scheduled medications  divalproex, 500 mg, oral, q12h DAGO  nitrofurantoin (macrocrystal-monohydrate), 100 mg, oral, q12h DAGO  pioglitazone, 15 mg, oral, Daily  traZODone, 100 mg, oral, Nightly  ziprasidone, 80 mg, oral, BID with meals      Continuous medications     PRN medications  PRN medications: alum-mag hydroxide-simeth, diphenhydrAMINE, haloperidol, hydrOXYzine pamoate, ibuprofen, magnesium hydroxide    Results for orders placed or performed during the hospital encounter of 12/02/23 (from the past 24 hour(s))   Valproic Acid   Result Value Ref Range    Valproic Acid 76 50 - 100 ug/mL   Lavender Top   Result Value Ref Range    Extra Tube Hold for add-ons.    SST TOP    Result Value Ref Range    Extra Tube Hold for add-ons.    SST TOP   Result Value Ref Range    Extra Tube Hold for add-ons.         Assessment/Plan   Diagnosis:  Schizoaffective disorder, bipolar type    Impression:     Labs and Chart: reviewed  Case discussed with treatment team members  Encouraged patient to attend group and other mileu activity  Collateral from family - pending  Discharge planing  Medication:         -Patient received Invega Sustenna 234 mg IM on 12/3         -Geodon 80 mg oral twice daily         -Depakote 500 mg oral twice daily       Medication Consent  Medication Consent: no medication changes necessary for review    José Williamson, APRN-CNP
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
## 2023-12-07 PROCEDURE — 2500000001 HC RX 250 WO HCPCS SELF ADMINISTERED DRUGS (ALT 637 FOR MEDICARE OP): Performed by: PSYCHIATRY & NEUROLOGY

## 2023-12-07 PROCEDURE — 2500000004 HC RX 250 GENERAL PHARMACY W/ HCPCS (ALT 636 FOR OP/ED)

## 2023-12-07 PROCEDURE — 99232 SBSQ HOSP IP/OBS MODERATE 35: CPT | Performed by: PSYCHIATRY & NEUROLOGY

## 2023-12-07 PROCEDURE — 97530 THERAPEUTIC ACTIVITIES: CPT | Mod: GO

## 2023-12-07 PROCEDURE — 1140000001 HC PRIVATE PSYCH ROOM DAILY

## 2023-12-07 PROCEDURE — 97535 SELF CARE MNGMENT TRAINING: CPT | Mod: GO

## 2023-12-07 RX ADMIN — NITROFURANTOIN (MONOHYDRATE/MACROCRYSTALS) 100 MG: 75; 25 CAPSULE ORAL at 08:44

## 2023-12-07 RX ADMIN — DIVALPROEX SODIUM 500 MG: 250 TABLET, DELAYED RELEASE ORAL at 08:44

## 2023-12-07 RX ADMIN — DIVALPROEX SODIUM 500 MG: 250 TABLET, DELAYED RELEASE ORAL at 20:47

## 2023-12-07 RX ADMIN — NITROFURANTOIN (MONOHYDRATE/MACROCRYSTALS) 100 MG: 75; 25 CAPSULE ORAL at 20:47

## 2023-12-07 RX ADMIN — ZIPRASIDONE HYDROCHLORIDE 80 MG: 40 CAPSULE ORAL at 17:08

## 2023-12-07 RX ADMIN — ZIPRASIDONE HYDROCHLORIDE 80 MG: 40 CAPSULE ORAL at 08:44

## 2023-12-07 RX ADMIN — PIOGLITAZONE HYDROCHLORIDE 15 MG: 30 TABLET ORAL at 08:44

## 2023-12-07 RX ADMIN — TRAZODONE HYDROCHLORIDE 100 MG: 50 TABLET ORAL at 20:47

## 2023-12-07 ASSESSMENT — ACTIVITIES OF DAILY LIVING (ADL): HOME_MANAGEMENT_TIME_ENTRY: 15

## 2023-12-07 ASSESSMENT — PAIN SCALES - GENERAL
PAINLEVEL_OUTOF10: 0 - NO PAIN
PAINLEVEL_OUTOF10: 0 - NO PAIN

## 2023-12-07 ASSESSMENT — PAIN - FUNCTIONAL ASSESSMENT
PAIN_FUNCTIONAL_ASSESSMENT: 0-10
PAIN_FUNCTIONAL_ASSESSMENT: 0-10

## 2023-12-07 NOTE — PROGRESS NOTES
Occupational Therapy     [facility] Occupational Therapy Assessment & Treatment    Patient Name: Natividad Ricardo  MRN: [id number]  Today's Date: 12/7/2023      Activity:  Relaxation Skills Group    Attendance:  Attendance  Activity: Relaxation therapy, Discussion/reminisce  Participation: Unable/off unit (Pt unable to attend group in day room due to being restricted to room for COVID precautions. Will review this information 1:1 w/ pt at next opportunity.)      Encounter Problems       Encounter Problems (Active)       OT Goals       OT Goal 1 (Progressing)       Start:  12/04/23    Expected End:  01/01/24       Communication/Interaction Skills (Self-expression/social Behavior/assertive)  Explore/demonstrate 1-2 effective methods of expressing feelings/wants/needs (can include assertion/engaging/sharing/asking) prior to discharge          OT Goal 2 (Progressing)       Start:  12/04/23    Expected End:  01/01/24       Coping Skills  Explore/identify 1-2 effective strategies of expressing feelings, wants, needs(can include assertion, engaging, sharing, asking) prior to discharge          OT Goal 3 (Progressing)       Start:  12/04/23    Expected End:  01/01/24        Emotion Regulation/self control  Pt will exhibit appropriate range, regulation of emotions, impulse control, frustration tolerance in OT groups prior to discharge.

## 2023-12-07 NOTE — PROGRESS NOTES
"Natividad Ricardo is a 23 y.o. female on day 5 of admission presenting with schizoaffective disorder, bipolar type.    Subjective   Patient ports improved mood, brighter affect.  Patient still endorses auditory hallucinations, but states that they are \"manageable \".  Patient not ruminating on negative thoughts.  Patient tolerating medication well with no adverse effects.  Patient remains on COVID isolation precautions.    Objective     MSE  General: Appropriately groomed and dressed.  Appearance: Appears stated age.  Attitude: Calm, cooperative.  Behavior: Appropriate eye contact.  Motor activity: No agitation or retardation. no EPS.  Normal gait.  Speech: Regular rate, rhythm, volume and tone.  Mood: Less depressed  Affect: Reported  Thought process: Organized, linear, goal-directed.  Associations are logical.  Thought content: Does not endorse suicidal or homicidal ideation, no delusions elicited.  Thought perception: Endorses auditory and visual donations, states they are manageable, much improved  Cognition: Alert, oriented x3.  no deficit in memory or attention.  Insight: Fair.  Judgment: Fair.    Last Recorded Vitals  /60   Pulse 90   Temp 37 °C (98.6 °F)   Resp 16   SpO2 98%      Relevant Results  Scheduled medications  divalproex, 500 mg, oral, q12h DAGO  nitrofurantoin (macrocrystal-monohydrate), 100 mg, oral, q12h DAGO  pioglitazone, 15 mg, oral, Daily  traZODone, 100 mg, oral, Nightly  ziprasidone, 80 mg, oral, BID with meals      Continuous medications     PRN medications  PRN medications: alum-mag hydroxide-simeth, diphenhydrAMINE, haloperidol, hydrOXYzine pamoate, ibuprofen, magnesium hydroxide    No results found for this or any previous visit (from the past 24 hour(s)).     Assessment/Plan   Diagnosis:  Schizoaffective disorder, bipolar type    Impression:     Labs and Chart: reviewed  Case discussed with treatment team members  Encouraged patient to attend group and other mileu " activity  Collateral from family - pending  Discharge planing  Medication:       - Reviewed. To continue as ordered    Medication Consent  Medication Consent: no medication changes necessary for review    JEANETTE Peacock-CNP

## 2023-12-07 NOTE — CARE PLAN
The patient's goals for the shift include sleep    The clinical goals for the shift include sleep    Over the shift, the patient did not make progress toward the following goals. Barriers to progression include   Problem: Sensory Perceptual Alteration as Evidenced by  Goal: Cooperates with admission process  Outcome: Progressing  Goal: Discusses signs/symptoms of illness/treatment options  Outcome: Progressing  Goal: Initiates reality-based interactions  Outcome: Progressing  Goal: Able to discuss content of hallucinations/delusions  Outcome: Progressing  Goal: Will not act on psychotic perception  Outcome: Progressing   . Recommendations to address these barriers include .    Pt reports decreased depression and anxiety. Pt denies self harm thoughts however continues to have intermitted auditory and visual hallucinations. Pt given snack and patient allowed to shower. Pt took HS medications and rested in bed all night.

## 2023-12-07 NOTE — PROGRESS NOTES
"Occupational Therapy     [facility] Occupational Therapy Assessment & Treatment    Patient Name: Natividad Ricardo  MRN: [id number]  Today's Date: 12/7/2023      Activity:  1:1 Therapeutic Session - \"Short-Circuiting Stress\" & Grounding Techniques       Attendance:  Attendance  Activity: One-to-one (\"Short-Circuiting Stress\" & ground techniques)  Participation: Active participation    Treatment Approach  Approach : 1 to1 Therapy sessions, 30 to 45 minutes  Patient Stated Goals: none stated  Cognition: Attention, Directions (Pt focused & attentive during session. Responses to discussion questions are linear, organized, & on-topic. Pt able to follow simple multi-step instructions.)  Social Skills: Stimulation, Skills (Pt appears appropriately stimulated. Social skills WFL during interaction - pt demonstrates active listening, calm & cooperative interaction skills, appropriate boundaries & disclosure)  Emotional: Mood (Pt mood euthymic & affect somewhat constricted)  Stress Management/Relaxation Training: Identifies benefits of stress management/relaxation techniques  Treatment Approach Comments: Pt participated in 1:1 therapeutic session focused on stress management & grounding techniques. Pt provided handout on “Short-Circuiting Stress” & educated on cognitive restructuring. Related this to previous lesson on \"cognitive distortions\". Pt able to independently recall cognitive distortions including catastrophizing, black-and-white thinking, & labeling. Pt actively listening while \"Short-Circuiting Stress\" handout reviewed. Pt answered questions about stress responses accurately, and provided some examples to different \"Short-Circuiting\" methods; for example, in \"finding the good in the bad\" in a stressful situation, pt stated, \"Maybe someone can help you.\" Pt receptive to this education. Pt then provided a handout on grounding techniques. Pt expressed that she already utilizes the \"5-4-3-2-1\" technique & was able to describe " "why this is helpful, demonstrating good insight. Reviewed the mental exercises w/ pt, then attempted to engage pt in \"Body Awareness\" activity on handout. During first step, deep breathing, pt stated, \"I don't like this\", so moved on to next step. A few steps later, when doing progressive muscle relaxation w/ hands, pt stated again, \"I don't like this.\" Attempted to inquire further; pt stated she was not liking the body awareness activity as a whole but was not sure why. Stopped the activity at this point. Encouraged pt to review handout for some additional strategies for managing distress.      Encounter Problems       Encounter Problems (Active)       OT Goals       OT Goal 1 (Progressing)       Start:  12/04/23    Expected End:  01/01/24       Communication/Interaction Skills (Self-expression/social Behavior/assertive)  Explore/demonstrate 1-2 effective methods of expressing feelings/wants/needs (can include assertion/engaging/sharing/asking) prior to discharge          OT Goal 2 (Progressing)       Start:  12/04/23    Expected End:  01/01/24       Coping Skills  Explore/identify 1-2 effective strategies of expressing feelings, wants, needs(can include assertion, engaging, sharing, asking) prior to discharge          OT Goal 3 (Progressing)       Start:  12/04/23    Expected End:  01/01/24        Emotion Regulation/self control  Pt will exhibit appropriate range, regulation of emotions, impulse control, frustration tolerance in OT groups prior to discharge.                    Education:   Pt provided educational handouts on cognitive restructuring to reduce stress & grounding techniques. Reviewed & discussed. Pt demonstrates emerging-good understanding.      "

## 2023-12-07 NOTE — CARE PLAN
Problem: Sensory Perceptual Alteration as Evidenced by  Goal: Initiates reality-based interactions  Outcome: Progressing     Problem: Sensory Perceptual Alteration as Evidenced by  Goal: Will not act on psychotic perception  Outcome: Progressing     Problem: Potential for Harm to Self or Others  Goal: Denies harm toward self or others  Outcome: Progressing     Problem: Ineffective Coping  Goal: Identifies healthy coping skills  Outcome: Progressing   The patient's goals for the shift include use coping skills    The clinical goals for the shift include no side effects, continue to have minimal hallucinations    Pt states that she is feeling much better since she last saw this nurse on Monday night. Pt states that her auditory hallucinations are back to a level that they are usually at where they do not bother her and that her visual hallucinations are not active at all now and that they are often there. Pt states that she is sleeping well and looking forward to going home and that is how she knows she is doing well. Pt states that she has no suicidal thoughts, no homicidal thoughts. Pt talked about support system and how she has continued to talk to grandma and sister while here and that she is looking forward to spending time with them once discharged and through holidays. Pt states that she is going to try to spend more time doing creative type crafts once discharged. Pt states that watching funny videos on the ipad and talking have really helped her. Pt has went over coping skills paperwork and I statements about fighting fair. Pt states that she is also trying to work on her self esteem stating that she knows it is not always the best, that she has dated people that have negatively affected it. Pt in room eating dinner.

## 2023-12-07 NOTE — PROGRESS NOTES
Social Work Discharge Planning Note  Messages left for Opal at [facility] about 12/11/23 for pending discharge and scripts to [place] on 12/8/23, and for  of pt's pending discharge for 12/11/23.

## 2023-12-08 PROCEDURE — 99232 SBSQ HOSP IP/OBS MODERATE 35: CPT | Performed by: PSYCHIATRY & NEUROLOGY

## 2023-12-08 PROCEDURE — 97530 THERAPEUTIC ACTIVITIES: CPT | Mod: GO

## 2023-12-08 PROCEDURE — 2500000004 HC RX 250 GENERAL PHARMACY W/ HCPCS (ALT 636 FOR OP/ED)

## 2023-12-08 PROCEDURE — 1140000001 HC PRIVATE PSYCH ROOM DAILY

## 2023-12-08 PROCEDURE — 2500000001 HC RX 250 WO HCPCS SELF ADMINISTERED DRUGS (ALT 637 FOR MEDICARE OP): Performed by: PSYCHIATRY & NEUROLOGY

## 2023-12-08 RX ORDER — DIVALPROEX SODIUM 500 MG/1
500 TABLET, DELAYED RELEASE ORAL EVERY 12 HOURS SCHEDULED
Qty: 60 TABLET | Refills: 0 | Status: SHIPPED | OUTPATIENT
Start: 2023-12-08 | End: 2024-02-19

## 2023-12-08 RX ORDER — ZIPRASIDONE HYDROCHLORIDE 80 MG/1
80 CAPSULE ORAL
Qty: 60 CAPSULE | Refills: 0 | Status: SHIPPED | OUTPATIENT
Start: 2023-12-08 | End: 2024-05-08 | Stop reason: WASHOUT

## 2023-12-08 RX ADMIN — ZIPRASIDONE HYDROCHLORIDE 80 MG: 40 CAPSULE ORAL at 17:47

## 2023-12-08 RX ADMIN — NITROFURANTOIN (MONOHYDRATE/MACROCRYSTALS) 100 MG: 75; 25 CAPSULE ORAL at 21:15

## 2023-12-08 RX ADMIN — DIVALPROEX SODIUM 500 MG: 250 TABLET, DELAYED RELEASE ORAL at 21:15

## 2023-12-08 RX ADMIN — PIOGLITAZONE HYDROCHLORIDE 15 MG: 30 TABLET ORAL at 09:28

## 2023-12-08 RX ADMIN — DIVALPROEX SODIUM 500 MG: 250 TABLET, DELAYED RELEASE ORAL at 09:28

## 2023-12-08 RX ADMIN — TRAZODONE HYDROCHLORIDE 100 MG: 50 TABLET ORAL at 21:15

## 2023-12-08 RX ADMIN — NITROFURANTOIN (MONOHYDRATE/MACROCRYSTALS) 100 MG: 75; 25 CAPSULE ORAL at 09:28

## 2023-12-08 RX ADMIN — ZIPRASIDONE HYDROCHLORIDE 80 MG: 40 CAPSULE ORAL at 09:28

## 2023-12-08 ASSESSMENT — PAIN SCALES - GENERAL
PAINLEVEL_OUTOF10: 0 - NO PAIN
PAINLEVEL_OUTOF10: 0 - NO PAIN

## 2023-12-08 ASSESSMENT — COLUMBIA-SUICIDE SEVERITY RATING SCALE - C-SSRS
2. HAVE YOU ACTUALLY HAD ANY THOUGHTS OF KILLING YOURSELF?: NO
1. SINCE LAST CONTACT, HAVE YOU WISHED YOU WERE DEAD OR WISHED YOU COULD GO TO SLEEP AND NOT WAKE UP?: NO
6. HAVE YOU EVER DONE ANYTHING, STARTED TO DO ANYTHING, OR PREPARED TO DO ANYTHING TO END YOUR LIFE?: NO

## 2023-12-08 ASSESSMENT — PAIN - FUNCTIONAL ASSESSMENT: PAIN_FUNCTIONAL_ASSESSMENT: 0-10

## 2023-12-08 NOTE — PROGRESS NOTES
"Occupational Therapy    [facility] Occupational Therapy Assessment & Treatment    Patient Name: Natividad Ricardo  MRN: [id number]  Today's Date: 12/8/2023      Activity:  1:1 Session - Assertive Communication    Attendance:  Attendance  Activity: One-to-one (Assertive Communication)  Participation: Active participation    Treatment Approach  Approach : 1 to1 Therapy sessions, 30 to 45 minutes  Patient Stated Goals: none stated  Cognition: Attention (Pt focused & attentive for duration of session)  Social Skills: Stimulation, Skills (Pt appropiately stimulated & social skills WFL)  Emotional: Mood (Pt mood euthymic, affect animated & appropriate)  Stress Management/Relaxation Training: Other (Comment) (n/a this session)  Treatment Approach Comments: Pt participated in 1:1 session focsed on assertive communication skills. Pt attentive while educational handouts on different types of communication, “I” statements, and assertive communication techniques were reviewed. Pt demonstrates good baseline understanding of communication styles & \"I\" statements. Pt admitted that she tends to be a passive-aggressive communicator. Discussed strategies to practice & improve assertive communication, including \"I\" statements, avoiding blame, & compromise. Pt receptive & discussed how she can use these skills in her romantic relationship & w/ her grandmother.      Encounter Problems       Encounter Problems (Active)       OT Goals       OT Goal 1 (Progressing)       Start:  12/04/23    Expected End:  01/01/24       Communication/Interaction Skills (Self-expression/social Behavior/assertive)  Explore/demonstrate 1-2 effective methods of expressing feelings/wants/needs (can include assertion/engaging/sharing/asking) prior to discharge          OT Goal 2 (Progressing)       Start:  12/04/23    Expected End:  01/01/24       Coping Skills  Explore/identify 1-2 effective strategies of expressing feelings, wants, needs(can include assertion, " engaging, sharing, asking) prior to discharge          OT Goal 3 (Progressing)       Start:  12/04/23    Expected End:  01/01/24        Emotion Regulation/self control  Pt will exhibit appropriate range, regulation of emotions, impulse control, frustration tolerance in OT groups prior to discharge.                  Education:   Pt provided educational handouts on assertive communication. Reviewed & discussed. Pt demonstrates good understanding.

## 2023-12-08 NOTE — PROGRESS NOTES
Natividad Ricardo is a 23 y.o. female on day 6 of admission presenting with schizoaffective disorder, bipolar type.    Subjective   Patient reports she is sleeping well, mood continues to improve.  Patient remaining hopeful despite being in isolation precautions for COVID-19.  Patient states she has been speaking with her grandmother daily, which has been acting her spirits.  She states her grandma is very supportive of her.  Patient tolerating medication without adverse effects.  Patient reporting auditory hallucinations but states they are manageable.    Objective     MSE  General: Appropriately groomed and dressed.  Appearance: Appears stated age.  Attitude: Calm, cooperative.  Behavior: Appropriate eye contact.  Motor activity: No agitation or retardation. no EPS.  Normal gait.  Speech: Regular rate, rhythm, volume and tone.  Mood: Less depressed  Affect: Reported  Thought process: Organized, linear, goal-directed.  Associations are logical.  Thought content: Does not endorse suicidal or homicidal ideation, no delusions elicited.  Thought perception: Endorses auditory and visual donations, states they are manageable, much improved  Cognition: Alert, oriented x3.  no deficit in memory or attention.  Insight: Fair.  Judgment: Fair.       Last Recorded Vitals  /54   Pulse 89   Temp 36.7 °C (98.1 °F)   Resp 16   SpO2 97%      Relevant Results  Scheduled medications  divalproex, 500 mg, oral, q12h DAGO  nitrofurantoin (macrocrystal-monohydrate), 100 mg, oral, q12h DAGO  pioglitazone, 15 mg, oral, Daily  traZODone, 100 mg, oral, Nightly  ziprasidone, 80 mg, oral, BID with meals      Continuous medications     PRN medications  PRN medications: alum-mag hydroxide-simeth, diphenhydrAMINE, haloperidol, hydrOXYzine pamoate, ibuprofen, magnesium hydroxide    No results found for this or any previous visit (from the past 24 hour(s)).     Assessment/Plan   Diagnosis:  Schizoaffective disorder, bipolar  type    Impression:     Labs and Chart: reviewed  Case discussed with treatment team members  Encouraged patient to attend group and other mileu activity  Collateral from family - pending  Discharge planing  Medication:        -Patient received Invega Sustenna 234 mg IM on 12/3        -Geodon 80 mg oral twice daily        -Depakote 500 mg oral twice daily    Medication Consent  Medication Consent: no medication changes necessary for review    José Williamson, APRN-CNP

## 2023-12-08 NOTE — CARE PLAN
Problem: Sensory Perceptual Alteration as Evidenced by  Goal: Cooperates with admission process  Outcome: Progressing  Goal: Discusses signs/symptoms of illness/treatment options  Outcome: Progressing  Goal: Initiates reality-based interactions  Outcome: Progressing  Goal: Able to discuss content of hallucinations/delusions  Outcome: Progressing  Goal: Will not act on psychotic perception  Outcome: Progressing   The patient's goals for the shift include sleep    The clinical goals for the shift include sleep    Over the shift, the patient did not make progress toward the following goals. Barriers to progression include . Recommendations to address these barriers include .    PT reports low level of anxiety and depression. Pt denies self harm thoughts however some intermittent auditory hallucinations persist. Pt affect appears more bright and reactive. Pt is discharge focused and future thinking. Pt spoke at length about what laurence presents she is going to get her family when she is discharged. Pt continues to be in isolation for + covid protocols. Pt rested in bed all night.

## 2023-12-09 PROCEDURE — 2500000001 HC RX 250 WO HCPCS SELF ADMINISTERED DRUGS (ALT 637 FOR MEDICARE OP): Performed by: PSYCHIATRY & NEUROLOGY

## 2023-12-09 PROCEDURE — 97530 THERAPEUTIC ACTIVITIES: CPT | Mod: GO

## 2023-12-09 PROCEDURE — 96372 THER/PROPH/DIAG INJ SC/IM: CPT | Performed by: NURSE PRACTITIONER

## 2023-12-09 PROCEDURE — 2500000004 HC RX 250 GENERAL PHARMACY W/ HCPCS (ALT 636 FOR OP/ED): Performed by: PSYCHIATRY & NEUROLOGY

## 2023-12-09 PROCEDURE — 99232 SBSQ HOSP IP/OBS MODERATE 35: CPT | Performed by: PSYCHIATRY & NEUROLOGY

## 2023-12-09 PROCEDURE — 1140000001 HC PRIVATE PSYCH ROOM DAILY

## 2023-12-09 PROCEDURE — 2500000004 HC RX 250 GENERAL PHARMACY W/ HCPCS (ALT 636 FOR OP/ED)

## 2023-12-09 PROCEDURE — 2500000004 HC RX 250 GENERAL PHARMACY W/ HCPCS (ALT 636 FOR OP/ED): Performed by: NURSE PRACTITIONER

## 2023-12-09 RX ORDER — PRAZOSIN HYDROCHLORIDE 1 MG/1
2 CAPSULE ORAL NIGHTLY
Status: DISCONTINUED | OUTPATIENT
Start: 2023-12-09 | End: 2023-12-11 | Stop reason: HOSPADM

## 2023-12-09 RX ORDER — PROCHLORPERAZINE EDISYLATE 5 MG/ML
10 INJECTION INTRAMUSCULAR; INTRAVENOUS EVERY 6 HOURS PRN
Status: DISCONTINUED | OUTPATIENT
Start: 2023-12-09 | End: 2023-12-11 | Stop reason: HOSPADM

## 2023-12-09 RX ADMIN — NITROFURANTOIN (MONOHYDRATE/MACROCRYSTALS) 100 MG: 75; 25 CAPSULE ORAL at 08:08

## 2023-12-09 RX ADMIN — DIVALPROEX SODIUM 500 MG: 250 TABLET, DELAYED RELEASE ORAL at 08:08

## 2023-12-09 RX ADMIN — PRAZOSIN HYDROCHLORIDE 2 MG: 1 CAPSULE ORAL at 20:55

## 2023-12-09 RX ADMIN — PROCHLORPERAZINE EDISYLATE 10 MG: 5 INJECTION INTRAMUSCULAR; INTRAVENOUS at 11:11

## 2023-12-09 RX ADMIN — HYDROXYZINE PAMOATE 50 MG: 25 CAPSULE ORAL at 15:00

## 2023-12-09 RX ADMIN — PIOGLITAZONE HYDROCHLORIDE 15 MG: 30 TABLET ORAL at 08:08

## 2023-12-09 RX ADMIN — DIVALPROEX SODIUM 500 MG: 250 TABLET, DELAYED RELEASE ORAL at 20:55

## 2023-12-09 RX ADMIN — TRAZODONE HYDROCHLORIDE 100 MG: 50 TABLET ORAL at 20:55

## 2023-12-09 RX ADMIN — NITROFURANTOIN (MONOHYDRATE/MACROCRYSTALS) 100 MG: 75; 25 CAPSULE ORAL at 20:56

## 2023-12-09 RX ADMIN — ZIPRASIDONE HYDROCHLORIDE 80 MG: 40 CAPSULE ORAL at 18:47

## 2023-12-09 RX ADMIN — ALUMINUM HYDROXIDE, MAGNESIUM HYDROXIDE, AND SIMETHICONE 30 ML: 200; 200; 20 SUSPENSION ORAL at 09:47

## 2023-12-09 RX ADMIN — ZIPRASIDONE HYDROCHLORIDE 80 MG: 40 CAPSULE ORAL at 08:08

## 2023-12-09 ASSESSMENT — PAIN - FUNCTIONAL ASSESSMENT
PAIN_FUNCTIONAL_ASSESSMENT: 0-10
PAIN_FUNCTIONAL_ASSESSMENT: 0-10

## 2023-12-09 ASSESSMENT — PAIN SCALES - GENERAL
PAINLEVEL_OUTOF10: 0 - NO PAIN
PAINLEVEL_OUTOF10: 0 - NO PAIN

## 2023-12-09 NOTE — CARE PLAN
Problem: Sensory Perceptual Alteration as Evidenced by  Goal: Cooperates with admission process  Outcome: Progressing  Goal: Discusses signs/symptoms of illness/treatment options  Outcome: Progressing  Goal: Initiates reality-based interactions  Outcome: Progressing  Goal: Able to discuss content of hallucinations/delusions  Outcome: Progressing  Goal: Will not act on psychotic perception  Outcome: Progressing     Problem: Altered Thought Processes as Evidenced by  Goal: STG - Desires improvement in ability to think and concentrate  Outcome: Progressing     Problem: Potential for Harm to Self or Others  Goal: Notifies staff when experiencing harmful thoughts toward self/others  Outcome: Progressing     Problem: Ineffective Coping  Goal: Identifies ineffective coping skills  Outcome: Progressing  Goal: Identifies healthy coping skills  Outcome: Progressing  Goal: Demonstrates healthy coping skills  Outcome: Progressing     Problem: Alteration in Sleep  Goal: STG - Reports nightly sleep, duration, and quality  Outcome: Progressing  Goal: STG - Identifies sleep hygiene aids  Outcome: Progressing  Goal: STG - Informs staff if unable to sleep  Outcome: Progressing     Problem: Anxiety  Goal: Attempts to manage anxiety with help  Outcome: Progressing  Goal: Verbalizes ways to manage anxiety  Outcome: Progressing     Problem: Defensive Coping  Goal: Identifies reckless/dangerous behavior  Outcome: Progressing  Goal: Identifies stressors that lead to reckless/dangerous behavior  Outcome: Progressing  Goal: Discusses and identifies healthy coping skills  Outcome: Progressing  Goal: Discusses signs/symptoms of illness/treatment options  Outcome: Progressing     Problem: Risk for Suicide  Goal: Accepts medications as prescribed/needed this shift  Outcome: Progressing  Goal: Identifies supports this shift  Outcome: Progressing  Goal: Read Safety Guidelines this shift  Outcome: Progressing  Goal: Complete Mental Health Safety  Plan (psychiatry only) this shift  Outcome: Progressing     Problem: Risk for Suicide  Goal: Accepts medications as prescribed/needed this shift  Outcome: Progressing  Goal: Identifies supports this shift  Outcome: Progressing  Goal: Makes needs known through verbalization or behaviors this shift  Outcome: Progressing  Goal: Read Safety Guidelines this shift  Outcome: Progressing  Goal: Complete Mental Health Safety Plan (psychiatry only) this shift  Outcome: Progressing   The patient's goals for the shift include sleep    The clinical goals for the shift include sleep. Pt has been isolative to room related to Covid positive results upon admit. Pt currently on day 9. No respiratory distress, Had episode of nausea/vomiting this afternoon, compazine IM given per md order. Affective results. Pt given vistaril for anxiety x1 pt also voiced effectiveness. Utilized ipad and telephone while in room. OT provided group in room with good participation.

## 2023-12-09 NOTE — CARE PLAN
The patient's goals for the shift include be able to come out of room    The clinical goals for the shift include anticipate and meet Patient needs    Over the shift, the patient did not make progress toward the following goals. Barriers to progression include quarantine status.  Recommendations to address these barriers include maintain therapeutic environment,.

## 2023-12-09 NOTE — CARE PLAN
Problem: Sensory Perceptual Alteration as Evidenced by  Goal: Cooperates with admission process  Outcome: Progressing  Goal: Discusses signs/symptoms of illness/treatment options  Outcome: Progressing  Goal: Initiates reality-based interactions  Outcome: Progressing  Goal: Able to discuss content of hallucinations/delusions  Outcome: Progressing  Goal: Will not act on psychotic perception  Outcome: Progressing   The patient's goals for the shift include sleep    The clinical goals for the shift include sleep    Over the shift, the patient did not make progress toward the following goals. Barriers to progression include . Recommendations to address these barriers include .    Pt affect observed to be more bright and reactive this evening. Pt verbalized anxiety about having to continue isolation but is looking forward to discharge soon. Pt rested in bed all night.

## 2023-12-09 NOTE — PROGRESS NOTES
Occupational Therapy     REHAB Therapy Assessment & Treatment    Patient Name: Natividad Ricardo  MRN: [id number]  Today's Date: 12/9/2023      A  Attendance:  Attendance  Activity: One-to-one (Relapse Prevention Plan)  Participation: Active participation    Therapeutic Recreation:  Treatment Approach  Approach : 30 to 45 minutes  Cognition: Attention, Directions, Orientation (Pt alert and oriented, able to attend to directives of activity for full duration of session.  Pt able to follow multi step directions.  Appropriate and thoughtful answers given.)  Social Skills: Demonstrates ability to listen to others (Calm and cooperative, appropriate in conversation)  Emotional: Mood (Pt reporting improvement in mood and symptoms, pleasant throughout session)  Stress Management/Relaxation Training:  (Able to identify three current stressors and verbalize effective coping skills to use (music, talking with grandmother and boyfriend Dagoberto, writing negative thoughts down and ripping paper up).)  Treatment Approach Comments: Pt participated in 1:1 OT session focused on creating a relapse prevention plan for upcoming DC.  Attentive to handout and information provided by OT.  Pt. able to follow directives of handout to create relapse prevention plan including recognizing red flags, support people to reach out to and coping skills to utilize.      Encounter Problems       Encounter Problems (Active)       OT Goals       OT Goal 1 (Progressing)       Start:  12/04/23    Expected End:  01/01/24       Communication/Interaction Skills (Self-expression/social Behavior/assertive)  Explore/demonstrate 1-2 effective methods of expressing feelings/wants/needs (can include assertion/engaging/sharing/asking) prior to discharge          OT Goal 2 (Progressing)       Start:  12/04/23    Expected End:  01/01/24       Coping Skills  Explore/identify 1-2 effective strategies of expressing feelings, wants, needs(can include assertion, engaging,  sharing, asking) prior to discharge          OT Goal 3 (Progressing)       Start:  12/04/23    Expected End:  01/01/24        Emotion Regulation/self control  Pt will exhibit appropriate range, regulation of emotions, impulse control, frustration tolerance in OT groups prior to discharge.                    Education Documentation  No documentation found.  Education Comments  No comments found.          Additional Comments:

## 2023-12-09 NOTE — PROGRESS NOTES
Natividad Ricardo is a 23 y.o. female on day 7 of admission presenting with schizoaffective disorder, bipolar type.    Subjective   Pt continue to feel better  Still hear voices - asking to harm self but less intense and does not have plans to harm self  Pt live in [place]  Mood better  Following the covid seclusion rule  Speaking with grandma    Objective     MSE  General: Appropriately groomed and dressed.  Appearance: Appears stated age.  Attitude: Calm, cooperative.  Behavior: Appropriate eye contact.  Motor activity: No agitation or retardation. no EPS.  Normal gait.  Speech: Regular rate, rhythm, volume and tone.  Mood: Less depressed  Affect: Reported  Thought process: Organized, linear, goal-directed.  Associations are logical.  Thought content: Does not endorse suicidal or homicidal ideation, no delusions elicited.  Thought perception: Endorses auditory and visual donations, states they are manageable, much improved  Cognition: Alert, oriented x3.  no deficit in memory or attention.  Insight: Fair.  Judgment: Fair.       Last Recorded Vitals  /60   Pulse 82   Temp 37.2 °C (99 °F)   Resp 16   SpO2 100%      Relevant Results  Scheduled medications  divalproex, 500 mg, oral, q12h DAGO  nitrofurantoin (macrocrystal-monohydrate), 100 mg, oral, q12h DAGO  pioglitazone, 15 mg, oral, Daily  traZODone, 100 mg, oral, Nightly  ziprasidone, 80 mg, oral, BID with meals      Continuous medications     PRN medications  PRN medications: alum-mag hydroxide-simeth, diphenhydrAMINE, haloperidol, hydrOXYzine pamoate, ibuprofen, magnesium hydroxide    No results found for this or any previous visit (from the past 24 hour(s)).     Assessment/Plan   Diagnosis:  Schizoaffective disorder, bipolar type    Impression:     Labs and Chart: reviewed  Case discussed with treatment team members  Encouraged patient to attend group and other mileu activity  Collateral from family - pending  Discharge planing  Medication:        -Patient  received Invega Sustenna 234 mg IM on 12/3        -Geodon 80 mg oral twice daily        -Depakote 500 mg oral twice daily    Medication Consent  Medication Consent: no medication changes necessary for review    Vaibhav Spears MD

## 2023-12-10 PROCEDURE — 2500000004 HC RX 250 GENERAL PHARMACY W/ HCPCS (ALT 636 FOR OP/ED): Performed by: NURSE PRACTITIONER

## 2023-12-10 PROCEDURE — 99232 SBSQ HOSP IP/OBS MODERATE 35: CPT | Performed by: PSYCHIATRY & NEUROLOGY

## 2023-12-10 PROCEDURE — 2500000004 HC RX 250 GENERAL PHARMACY W/ HCPCS (ALT 636 FOR OP/ED)

## 2023-12-10 PROCEDURE — 1240000001 HC SEMI-PRIVATE BH ROOM DAILY

## 2023-12-10 PROCEDURE — 2500000001 HC RX 250 WO HCPCS SELF ADMINISTERED DRUGS (ALT 637 FOR MEDICARE OP): Performed by: PSYCHIATRY & NEUROLOGY

## 2023-12-10 PROCEDURE — 96372 THER/PROPH/DIAG INJ SC/IM: CPT | Performed by: NURSE PRACTITIONER

## 2023-12-10 RX ADMIN — NITROFURANTOIN (MONOHYDRATE/MACROCRYSTALS) 100 MG: 75; 25 CAPSULE ORAL at 21:04

## 2023-12-10 RX ADMIN — PRAZOSIN HYDROCHLORIDE 2 MG: 1 CAPSULE ORAL at 21:04

## 2023-12-10 RX ADMIN — ZIPRASIDONE HYDROCHLORIDE 80 MG: 40 CAPSULE ORAL at 17:45

## 2023-12-10 RX ADMIN — PIOGLITAZONE HYDROCHLORIDE 15 MG: 30 TABLET ORAL at 08:47

## 2023-12-10 RX ADMIN — DIVALPROEX SODIUM 500 MG: 250 TABLET, DELAYED RELEASE ORAL at 21:05

## 2023-12-10 RX ADMIN — DIVALPROEX SODIUM 500 MG: 250 TABLET, DELAYED RELEASE ORAL at 08:47

## 2023-12-10 RX ADMIN — PROCHLORPERAZINE EDISYLATE 10 MG: 5 INJECTION INTRAMUSCULAR; INTRAVENOUS at 11:37

## 2023-12-10 RX ADMIN — TRAZODONE HYDROCHLORIDE 100 MG: 50 TABLET ORAL at 21:04

## 2023-12-10 RX ADMIN — ZIPRASIDONE HYDROCHLORIDE 80 MG: 40 CAPSULE ORAL at 08:47

## 2023-12-10 RX ADMIN — NITROFURANTOIN (MONOHYDRATE/MACROCRYSTALS) 100 MG: 75; 25 CAPSULE ORAL at 08:47

## 2023-12-10 RX ADMIN — PROCHLORPERAZINE EDISYLATE 10 MG: 5 INJECTION INTRAMUSCULAR; INTRAVENOUS at 17:52

## 2023-12-10 ASSESSMENT — PAIN - FUNCTIONAL ASSESSMENT
PAIN_FUNCTIONAL_ASSESSMENT: 0-10
PAIN_FUNCTIONAL_ASSESSMENT: 0-10

## 2023-12-10 ASSESSMENT — PAIN SCALES - GENERAL
PAINLEVEL_OUTOF10: 0 - NO PAIN
PAINLEVEL_OUTOF10: 0 - NO PAIN

## 2023-12-10 ASSESSMENT — COLUMBIA-SUICIDE SEVERITY RATING SCALE - C-SSRS
6. HAVE YOU EVER DONE ANYTHING, STARTED TO DO ANYTHING, OR PREPARED TO DO ANYTHING TO END YOUR LIFE?: NO
1. SINCE LAST CONTACT, HAVE YOU WISHED YOU WERE DEAD OR WISHED YOU COULD GO TO SLEEP AND NOT WAKE UP?: NO
2. HAVE YOU ACTUALLY HAD ANY THOUGHTS OF KILLING YOURSELF?: NO

## 2023-12-10 NOTE — CARE PLAN
Problem: Sensory Perceptual Alteration as Evidenced by  Goal: Cooperates with admission process  Outcome: Progressing  Goal: Discusses signs/symptoms of illness/treatment options  Outcome: Progressing  Goal: Initiates reality-based interactions  Outcome: Progressing  Goal: Able to discuss content of hallucinations/delusions  Outcome: Progressing  Goal: Will not act on psychotic perception  Outcome: Progressing     Problem: Altered Thought Processes as Evidenced by  Goal: STG - Desires improvement in ability to think and concentrate  Outcome: Progressing     Problem: Potential for Harm to Self or Others  Goal: Notifies staff when experiencing harmful thoughts toward self/others  Outcome: Progressing     Problem: Ineffective Coping  Goal: Identifies ineffective coping skills  Outcome: Progressing  Goal: Identifies healthy coping skills  Outcome: Progressing  Goal: Demonstrates healthy coping skills  Outcome: Progressing     Problem: Alteration in Sleep  Goal: STG - Reports nightly sleep, duration, and quality  Outcome: Progressing  Goal: STG - Identifies sleep hygiene aids  Outcome: Progressing  Goal: STG - Informs staff if unable to sleep  Outcome: Progressing     Problem: Anxiety  Goal: Attempts to manage anxiety with help  Outcome: Progressing  Goal: Verbalizes ways to manage anxiety  Outcome: Progressing     Problem: Defensive Coping  Goal: Identifies reckless/dangerous behavior  Outcome: Progressing  Goal: Identifies stressors that lead to reckless/dangerous behavior  Outcome: Progressing  Goal: Discusses and identifies healthy coping skills  Outcome: Progressing  Goal: Discusses signs/symptoms of illness/treatment options  Outcome: Progressing     Problem: Risk for Suicide  Goal: Accepts medications as prescribed/needed this shift  Outcome: Progressing  Goal: Identifies supports this shift  Outcome: Progressing  Goal: Read Safety Guidelines this shift  Outcome: Progressing  Goal: Complete Mental Health Safety  Plan (psychiatry only) this shift  Outcome: Progressing     Problem: Risk for Suicide  Goal: Accepts medications as prescribed/needed this shift  Outcome: Progressing  Goal: Identifies supports this shift  Outcome: Progressing  Goal: Makes needs known through verbalization or behaviors this shift  Outcome: Progressing  Goal: Read Safety Guidelines this shift  Outcome: Progressing  Goal: Complete Mental Health Safety Plan (psychiatry only) this shift  Outcome: Progressing   The patient's goals for the shift include interact with staff and peers    The clinical goals for the shift include Remain free from self injury or harm throughout shift.    Pt has been cleared from isolation. No s/s of covid. Interacting with peers, laughing and talking with appropriate distance. Appetite good aeb eating 75-80% breakfast and lunch. Took nap this afternoon, compliant with plan of care and safety maintained. Denies SI/HI, Took shower and expresses desire to go home. Pt expresses that she worries about her Grandmother as she is all she has left, became tearful when talking about her mom and occasional visions of her in her casket after a car accident that took her life. Emotional support provided resulting in smiles and conversation.

## 2023-12-10 NOTE — PROGRESS NOTES
Natividad Ricardo is a 23 y.o. female on day 8 of admission presenting with schizoaffective disorder, bipolar type.    Subjective   Pt continue to feel better  Out of seclusion  Pt asked for nightmare which she took at home but was not started here  Slept better last night  Minimal AH- unable to understanding the message, non commanding    Objective     MSE  General: Appropriately groomed and dressed.  Appearance: Appears stated age.  Attitude: Calm, cooperative.  Behavior: Appropriate eye contact.  Motor activity: No agitation or retardation. no EPS.  Normal gait.  Speech: Regular rate, rhythm, volume and tone.  Mood: Less depressed  Affect: Reported  Thought process: Organized, linear, goal-directed.  Associations are logical.  Thought content: Does not endorse suicidal or homicidal ideation, no delusions elicited.  Thought perception: less intense non commanding AH  Cognition: Alert, oriented x3.  no deficit in memory or attention.  Insight: Fair.  Judgment: Fair.       Last Recorded Vitals  /74   Pulse 105   Temp 37.5 °C (99.5 °F)   Resp 16   SpO2 96%      Relevant Results  Scheduled medications  divalproex, 500 mg, oral, q12h DAGO  nitrofurantoin (macrocrystal-monohydrate), 100 mg, oral, q12h DAGO  pioglitazone, 15 mg, oral, Daily  prazosin, 2 mg, oral, Nightly  traZODone, 100 mg, oral, Nightly  ziprasidone, 80 mg, oral, BID with meals      Continuous medications     PRN medications  PRN medications: alum-mag hydroxide-simeth, diphenhydrAMINE, haloperidol, hydrOXYzine pamoate, ibuprofen, magnesium hydroxide, prochlorperazine    No results found for this or any previous visit (from the past 24 hour(s)).     Assessment/Plan   Diagnosis:  Schizoaffective disorder, bipolar type    Impression:     Labs and Chart: reviewed  Case discussed with treatment team members  Encouraged patient to attend group and other mileu activity  Collateral from family - pending  Discharge planing  Medication:        -Patient  received Invega Sustenna 234 mg IM on 12/3        -Geodon 80 mg oral twice daily        -Depakote 500 mg oral twice daily        - Prazosin 2 mg po qhs    Medication Consent  Medication Consent: no medication changes necessary for review    Vaibhav Spears MD

## 2023-12-10 NOTE — CARE PLAN
The patient's goals for the shift include sleep    The clinical goals for the shift include sleep    Over the shift, the patient did not make progress toward the following goals. Barriers to progression include . Recommendations to address these barriers include   Problem: Sensory Perceptual Alteration as Evidenced by  Goal: Cooperates with admission process  Outcome: Progressing  Goal: Discusses signs/symptoms of illness/treatment options  Outcome: Progressing  Goal: Initiates reality-based interactions  Outcome: Progressing  Goal: Able to discuss content of hallucinations/delusions  Outcome: Progressing  Goal: Will not act on psychotic perception  Outcome: Progressing      Pt reports high level of anxiety at this time due to concern for recurring night terrors. Pt requested home med Minipress be restarted to address this concern. Dr. Spears consulted and agreed with restarting this medication. Pt to receive this med with HS medications. Pt reported relief when informed of this. Pt affect is bright but is struggling with current isolation protocol due to being Covid +. Pt informed that tomorrow will be allowed onto unit as quarantine will have . Pt rested in bed all night.

## 2023-12-11 ENCOUNTER — APPOINTMENT (OUTPATIENT)
Dept: CARDIOLOGY | Facility: HOSPITAL | Age: 23
DRG: 885 | End: 2023-12-11
Payer: MEDICARE

## 2023-12-11 VITALS
DIASTOLIC BLOOD PRESSURE: 78 MMHG | SYSTOLIC BLOOD PRESSURE: 127 MMHG | HEART RATE: 116 BPM | TEMPERATURE: 97.9 F | OXYGEN SATURATION: 97 % | RESPIRATION RATE: 18 BRPM

## 2023-12-11 LAB
ALBUMIN SERPL BCP-MCNC: 3.5 G/DL (ref 3.4–5)
ALP SERPL-CCNC: 67 U/L (ref 33–110)
ALT SERPL W P-5'-P-CCNC: 6 U/L (ref 7–45)
ANION GAP SERPL CALC-SCNC: 13 MMOL/L (ref 10–20)
AST SERPL W P-5'-P-CCNC: 16 U/L (ref 9–39)
ATRIAL RATE: 78 BPM
BILIRUB SERPL-MCNC: 0.5 MG/DL (ref 0–1.2)
BUN SERPL-MCNC: 10 MG/DL (ref 6–23)
CALCIUM SERPL-MCNC: 9.4 MG/DL (ref 8.6–10.3)
CHLORIDE SERPL-SCNC: 102 MMOL/L (ref 98–107)
CO2 SERPL-SCNC: 24 MMOL/L (ref 21–32)
CREAT SERPL-MCNC: 1.03 MG/DL (ref 0.5–1.05)
GFR SERPL CREATININE-BSD FRML MDRD: 79 ML/MIN/1.73M*2
GLUCOSE SERPL-MCNC: 88 MG/DL (ref 74–99)
HOLD SPECIMEN: NORMAL
HOLD SPECIMEN: NORMAL
P AXIS: 45 DEGREES
P OFFSET: 208 MS
P ONSET: 158 MS
POTASSIUM SERPL-SCNC: 4.1 MMOL/L (ref 3.5–5.3)
PR INTERVAL: 130 MS
PROT SERPL-MCNC: 7.9 G/DL (ref 6.4–8.2)
Q ONSET: 223 MS
QRS COUNT: 13 BEATS
QRS DURATION: 76 MS
QT INTERVAL: 388 MS
QTC CALCULATION(BAZETT): 442 MS
QTC FREDERICIA: 423 MS
R AXIS: 61 DEGREES
SODIUM SERPL-SCNC: 135 MMOL/L (ref 136–145)
T AXIS: 27 DEGREES
T OFFSET: 417 MS
VENTRICULAR RATE: 78 BPM

## 2023-12-11 PROCEDURE — 80053 COMPREHEN METABOLIC PANEL: CPT | Performed by: REGISTERED NURSE

## 2023-12-11 PROCEDURE — 97535 SELF CARE MNGMENT TRAINING: CPT | Mod: GO

## 2023-12-11 PROCEDURE — 93010 ELECTROCARDIOGRAM REPORT: CPT | Performed by: INTERNAL MEDICINE

## 2023-12-11 PROCEDURE — 2500000001 HC RX 250 WO HCPCS SELF ADMINISTERED DRUGS (ALT 637 FOR MEDICARE OP): Performed by: PSYCHIATRY & NEUROLOGY

## 2023-12-11 PROCEDURE — 93005 ELECTROCARDIOGRAM TRACING: CPT

## 2023-12-11 PROCEDURE — 2500000004 HC RX 250 GENERAL PHARMACY W/ HCPCS (ALT 636 FOR OP/ED)

## 2023-12-11 PROCEDURE — 97150 GROUP THERAPEUTIC PROCEDURES: CPT | Mod: GO

## 2023-12-11 PROCEDURE — 99239 HOSP IP/OBS DSCHRG MGMT >30: CPT | Performed by: PSYCHIATRY & NEUROLOGY

## 2023-12-11 PROCEDURE — 36415 COLL VENOUS BLD VENIPUNCTURE: CPT | Performed by: REGISTERED NURSE

## 2023-12-11 RX ORDER — PRAZOSIN HYDROCHLORIDE 2 MG/1
2 CAPSULE ORAL NIGHTLY
Qty: 30 CAPSULE | Refills: 0 | Status: SHIPPED | OUTPATIENT
Start: 2023-12-11 | End: 2024-02-19

## 2023-12-11 RX ADMIN — DIVALPROEX SODIUM 500 MG: 250 TABLET, DELAYED RELEASE ORAL at 08:44

## 2023-12-11 RX ADMIN — NITROFURANTOIN (MONOHYDRATE/MACROCRYSTALS) 100 MG: 75; 25 CAPSULE ORAL at 08:43

## 2023-12-11 RX ADMIN — PIOGLITAZONE HYDROCHLORIDE 15 MG: 30 TABLET ORAL at 08:43

## 2023-12-11 RX ADMIN — ZIPRASIDONE HYDROCHLORIDE 80 MG: 40 CAPSULE ORAL at 08:43

## 2023-12-11 ASSESSMENT — PAIN SCALES - GENERAL: PAINLEVEL_OUTOF10: 0 - NO PAIN

## 2023-12-11 ASSESSMENT — ACTIVITIES OF DAILY LIVING (ADL): HOME_MANAGEMENT_TIME_ENTRY: 30

## 2023-12-11 ASSESSMENT — PAIN - FUNCTIONAL ASSESSMENT: PAIN_FUNCTIONAL_ASSESSMENT: 0-10

## 2023-12-11 NOTE — PROGRESS NOTES
Occupational Therapy     REHAB Therapy Assessment & Treatment    Patient Name: Natividad Ricardo  MRN: [id number]  Today's Date: 12/11/2023      Activity:  Balanced Lifestyle Group       Attendance:  Attendance  Activity: Discussion/reminisce (Balanced Lifestyle)  Participation: Active participation    Treatment Approach  Approach : Group therapy sessions, 30 to 45 minutes  Patient Stated Goals: none stated  Cognition: Attention (Pt attentive to  & peers for duration of session. Pt's contributions to discussion are linear, organized, & on-topic, though pt sometimes requires additional verbal cueing to understand.)  Social Skills: Stimulation, Skills (Pt appears appropriately stimulated during session & social skills WFL demonstrated by active listening, turn-taking, good eye contact, & appropriate boundaries & disclosure.)  Emotional: Mood (Pt mood euthymic, affect bright)  Stress Management/Relaxation Training: Identifies benefits of stress management/relaxation techniques  Treatment Approach Comments: Pt attended & actively participated in therapy group focused on stress management through building a balanced lifestyle. Pt provided handouts on ideal work/leisure balance and using PACE (Play, Achieve, Care, Energy) & PLAN (Prepare, List, Action, Notice) to assist w/ creating a balanced routine. Pt participates in discussion independently, sometimes requiring min. verbal cueing to understand what is being asked. Pt also provided handouts on stress management for the holidays & participated in discussion about managing holiday stress. Pt participates in discussion by stating supportive people she will try to spend time around during the holidays, including her sisters & grandma. Pt receptive to education & demonstrates emerging understanding of topic.      Encounter Problems       Encounter Problems (Active)       OT Goals       OT Goal 1 (Progressing)       Start:  12/04/23    Expected End:  01/01/24        Communication/Interaction Skills (Self-expression/social Behavior/assertive)  Explore/demonstrate 1-2 effective methods of expressing feelings/wants/needs (can include assertion/engaging/sharing/asking) prior to discharge          OT Goal 2 (Progressing)       Start:  12/04/23    Expected End:  01/01/24       Coping Skills  Explore/identify 1-2 effective strategies of expressing feelings, wants, needs(can include assertion, engaging, sharing, asking) prior to discharge          OT Goal 3 (Progressing)       Start:  12/04/23    Expected End:  01/01/24        Emotion Regulation/self control  Pt will exhibit appropriate range, regulation of emotions, impulse control, frustration tolerance in OT groups prior to discharge.                Education:  Pt provided educational handouts on an ideal balanced lifestyle, ways to pace & plan to promote a balanced lifestyle, & managing holiday stress. Reviewed & discussed. Pt demonstrates emerging understanding.

## 2023-12-11 NOTE — PROGRESS NOTES
Pt is discharging back to group home via [facility] this afternoon.   Notified Sophie Cabrales [phone] and guardian Joelle Sheetsetter [phone].  Mike REID also spoke to guardian about the medications.  Message left for ACT team Opal Hurtado [phone] that scripts were called into [facility] on 12/08/23, except the Prazocin that was sent in 12/11/23.

## 2023-12-11 NOTE — CARE PLAN
"The patient's goals for the shift include interact w/ peers    The clinical goals for the shift include sleep with no nightmares    Patient reports chronic thoughts of SI with no plan. Patient is future oriented, talks about wishes to spend Sandy with family. Patient denies HI. Patient endorses AH of voices telling her to hurt herself. Patient endorses VH and describes the hallucination as a television show. Patient tearful upon assessment. Patient states she wants to go home and wants to \"do right\" but she is worried about the suicidal thoughts she has. Patient reports she has chronic thoughts of SI. Reports she lives in a group home with 24 hour supervision and feels comfortable talking to peer and staff. Patient states she will report thoughts of SI to support staff should they arise. Patient compliant with scheduled medications. Patient observed to be out on unit interacting with peers and staff this shift. Patient up to day room for breakfast this morning.   Problem: Sensory Perceptual Alteration as Evidenced by  Goal: Cooperates with admission process  Outcome: Progressing  Goal: Discusses signs/symptoms of illness/treatment options  Outcome: Progressing  Goal: Initiates reality-based interactions  Outcome: Progressing  Goal: Able to discuss content of hallucinations/delusions  Outcome: Progressing  Goal: Will not act on psychotic perception  Outcome: Progressing     Problem: Altered Thought Processes as Evidenced by  Goal: STG - Desires improvement in ability to think and concentrate  Outcome: Progressing     Problem: Potential for Harm to Self or Others  Goal: Notifies staff when experiencing harmful thoughts toward self/others  Outcome: Progressing     Problem: Ineffective Coping  Goal: Identifies ineffective coping skills  Outcome: Progressing  Goal: Identifies healthy coping skills  Outcome: Progressing  Goal: Demonstrates healthy coping skills  Outcome: Progressing     Problem: Alteration in " Sleep  Goal: STG - Reports nightly sleep, duration, and quality  Outcome: Progressing  Goal: STG - Identifies sleep hygiene aids  Outcome: Progressing  Goal: STG - Informs staff if unable to sleep  Outcome: Progressing     Problem: Anxiety  Goal: Attempts to manage anxiety with help  Outcome: Progressing  Goal: Verbalizes ways to manage anxiety  Outcome: Progressing     Problem: Defensive Coping  Goal: Identifies reckless/dangerous behavior  Outcome: Progressing  Goal: Identifies stressors that lead to reckless/dangerous behavior  Outcome: Progressing  Goal: Discusses and identifies healthy coping skills  Outcome: Progressing  Goal: Discusses signs/symptoms of illness/treatment options  Outcome: Progressing     Problem: Risk for Suicide  Goal: Accepts medications as prescribed/needed this shift  Outcome: Progressing  Goal: Identifies supports this shift  Outcome: Progressing  Goal: Read Safety Guidelines this shift  Outcome: Progressing  Goal: Complete Mental Health Safety Plan (psychiatry only) this shift  Outcome: Progressing     Problem: Risk for Suicide  Goal: Accepts medications as prescribed/needed this shift  Outcome: Progressing  Goal: Identifies supports this shift  Outcome: Progressing  Goal: Makes needs known through verbalization or behaviors this shift  Outcome: Progressing  Goal: Read Safety Guidelines this shift  Outcome: Progressing  Goal: Complete Mental Health Safety Plan (psychiatry only) this shift  Outcome: Progressing

## 2023-12-11 NOTE — PROGRESS NOTES
"Social Work Note  Pt returned from breakfast room with a scowl. When LISW inquired, pt reported \"I hate breakfast.\"  LISW engaged pt in discussing her other meal preferences, preferred main dishes, etc.  Pt smiling at times.  Pt reciprocated, appropriately asked LISW about food and family.  "
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
## 2023-12-11 NOTE — NURSING NOTE
Patient presents with tachycardia. Labs and EKG to be completed. José FRANK and Tere FRANK aware.

## 2023-12-11 NOTE — SIGNIFICANT EVENT
Medicine -  Contacted for elevated HR on machine  ECG shows NSR with 78 beats per minute  78bpm, 442 Qtc    Glucose 88, sodium 135, potassium 4.1, creatinine 1.03, AST ALT within normal limits  CMP within normal limits    Macrobid stopped due to completion of antibiotic course    Orthostatics completed, negative orthostatic vital signs  Heart rate 78, /76 supine  Heart rate 92, 135/91 sitting  Heart rate 116, 127/78 standing    Advised to take Apical HR if concerned with HR  Defer further management to Psych NP   Contact Medicine if there are acute needs that develop

## 2023-12-11 NOTE — CARE PLAN
Problem: Risk for Suicide  Goal: Accepts medications as prescribed/needed this shift  Outcome: Progressing  Goal: Identifies supports this shift  Outcome: Progressing  Goal: Makes needs known through verbalization or behaviors this shift  Outcome: Progressing   The patient's goals for the shift include interact w/ peers    The clinical goals for the shift include sleep with no nightmares    Over the shift, the patient made progress toward the following goals:  Had no awakening from nightmares and did interact with peers. Patient denies AVH, SI, and HI.  Rates her anxiety and depression as low feels that she is making progress. She was concerned that maybe she was not receiving her Minipress because she did have a vivid dream about her mother last night. Reassured her that she is receiving that medication. Encouraged her to inform staff if she has another recurrence of nightmares.  She rate her anxiety 5/10 and depression 3/10. She has been interacting with peers this evening and appropriate conversation and eye contact. She slept through the entire night.

## 2023-12-11 NOTE — NURSING NOTE
Labs collected, EKG completed.    EKG: normal sinus rhythm   HR: 78  QT: 388  QTC: 442    Tere FRANK aware.

## 2023-12-29 ENCOUNTER — OFFICE VISIT (OUTPATIENT)
Dept: UROLOGY | Facility: HOSPITAL | Age: 23
End: 2023-12-29
Payer: MEDICARE

## 2023-12-29 VITALS — TEMPERATURE: 97.5 F | WEIGHT: 239 LBS | BODY MASS INDEX: 34.22 KG/M2 | HEIGHT: 70 IN

## 2023-12-29 DIAGNOSIS — R39.15 URINARY URGENCY: Primary | ICD-10-CM

## 2023-12-29 PROCEDURE — 3044F HG A1C LEVEL LT 7.0%: CPT | Performed by: NURSE PRACTITIONER

## 2023-12-29 PROCEDURE — 1036F TOBACCO NON-USER: CPT | Performed by: NURSE PRACTITIONER

## 2023-12-29 PROCEDURE — 99214 OFFICE O/P EST MOD 30 MIN: CPT | Performed by: NURSE PRACTITIONER

## 2023-12-29 RX ORDER — SULFAMETHOXAZOLE AND TRIMETHOPRIM 800; 160 MG/1; MG/1
1 TABLET ORAL DAILY
COMMUNITY
End: 2024-02-19 | Stop reason: WASHOUT

## 2023-12-29 RX ORDER — METRONIDAZOLE 500 MG/1
500 TABLET ORAL SEE ADMIN INSTRUCTIONS
COMMUNITY
Start: 2023-12-26 | End: 2024-01-05

## 2023-12-29 ASSESSMENT — PATIENT HEALTH QUESTIONNAIRE - PHQ9
2. FEELING DOWN, DEPRESSED OR HOPELESS: NOT AT ALL
SUM OF ALL RESPONSES TO PHQ9 QUESTIONS 1 AND 2: 0
1. LITTLE INTEREST OR PLEASURE IN DOING THINGS: NOT AT ALL

## 2023-12-29 ASSESSMENT — COLUMBIA-SUICIDE SEVERITY RATING SCALE - C-SSRS
6. HAVE YOU EVER DONE ANYTHING, STARTED TO DO ANYTHING, OR PREPARED TO DO ANYTHING TO END YOUR LIFE?: NO
1. IN THE PAST MONTH, HAVE YOU WISHED YOU WERE DEAD OR WISHED YOU COULD GO TO SLEEP AND NOT WAKE UP?: NO
2. HAVE YOU ACTUALLY HAD ANY THOUGHTS OF KILLING YOURSELF?: NO

## 2023-12-29 ASSESSMENT — PAIN SCALES - GENERAL: PAINLEVEL: 0-NO PAIN

## 2023-12-29 ASSESSMENT — ENCOUNTER SYMPTOMS
LOSS OF SENSATION IN FEET: 0
DEPRESSION: 0
OCCASIONAL FEELINGS OF UNSTEADINESS: 0

## 2023-12-29 NOTE — PROGRESS NOTES
[facility]  Outpatient Clinic Note      Patient: Berna Ricardo  Age/Sex: 23 y.o., female  MRN: [id number]      History of Present Illness  23-year-old female presents for follow-up. She has a history of depression, schizoaffective disorder, PTSD, frequent UTIs followed by ID and DM. She reports a longstanding history of urinary incontinence, primarily urge incontinence which improved with constipation management. She voids 2-3 times per day on the toilet and feels she empties her bladder well. She admits to significant constipation and has a BM 1-2 times per week. No dysuria, gross hematuria, flank pain, fevers or chills.     Past Medical & Surgical History  Past Medical History:   Diagnosis Date    Abnormal weight gain 12/17/2018    Abnormal weight gain    Anxiety     Blister (nonthermal), unspecified lower leg, initial encounter 05/30/2018    Blister of leg    Crushing injury of unspecified finger(s), initial encounter 06/30/2017    Crushing injury of finger, initial encounter    Depression     Diabetes mellitus (CMS/HCC)     Drug-induced hypoglycemia without coma 08/04/2021    Hypoglycemia due to insulin    Migraine, unspecified, not intractable, without status migrainosus 12/27/2016    Headache, migraine    Other microscopic hematuria 06/01/2018    Hematuria, microscopic    Pain in left hand 06/29/2017    Pain of left hand    Pain in unspecified joint 12/30/2013    Multiple joint pain    Personal history of diseases of the blood and blood-forming organs and certain disorders involving the immune mechanism 06/07/2017    History of anemia    Personal history of diseases of the skin and subcutaneous tissue 06/13/2019    History of acne    Personal history of other complications of pregnancy, childbirth and the puerperium 07/25/2017    History of galactorrhea    Personal history of other diseases of the female genital tract 06/05/2018    History of vaginal bleeding    Personal history of other diseases of  the nervous system and sense organs 08/03/2022    History of optic neuritis    Personal history of other endocrine, nutritional and metabolic disease 12/17/2018    History of elevated glucose    Personal history of other specified conditions 06/09/2017    History of nipple discharge    Psychiatric illness     Schizophrenia (CMS/HCC)     Urge incontinence 06/01/2018    Urge incontinence of urine      Past Surgical History:   Procedure Laterality Date    OTHER SURGICAL HISTORY  11/11/2019    Cholecystectomy          Labs      Medications:  Current Outpatient Medications on File Prior to Visit   Medication Sig Dispense Refill    divalproex (Depakote) 500 mg EC tablet Take 1 tablet (500 mg) by mouth every 12 hours. Do not crush, chew, or split. 60 tablet 0    pioglitazone (Actos) 15 mg tablet Take 1 tablet (15 mg) by mouth once daily.      prazosin (Minipress) 2 mg capsule Take 1 capsule (2 mg) by mouth once daily at bedtime. 30 capsule 0    traZODone (Desyrel) 100 mg tablet Take 1 tablet (100 mg) by mouth once daily at bedtime.      ziprasidone (Geodon) 80 mg capsule Take 1 capsule (80 mg) by mouth 2 times a day with meals. 60 capsule 0     Current Facility-Administered Medications on File Prior to Visit   Medication Dose Route Frequency Provider Last Rate Last Admin    paliperidone palmitate ER (Invega Sustenna) injection 234 mg  234 mg intramuscular Once José Williamson, APRN-CNP              Physical Exam                                                                                                                      General: Well developed, well nourished, alert and cooperative, appears in no acute distress  Eyes: Non-injected conjunctiva, sclera clear, no proptosis  Cardiac: Extremities are warm and well perfused. No edema, cyanosis or pallor.   Lungs: Breathing is easy, non-labored. Speaking in clear and complete sentences. Normal diaphragmatic movement.  MSK: Ambulatory with steady gait, unassisted  Neuro:  "alert and oriented to person, place and time  Psych: Demonstrates good judgement and reason, without hallucinations, abnormal affect or abnormal behaviors.  Skin: no obvious lesions, no rashes      Review of Systems  Review of Systems   All other systems reviewed and are negative.         Imaging  NA      Assessment & Plan  23-year-old female presents for follow-up. She has a history of depression, schizoaffective disorder, PTSD, frequent UTIs followed by ID and DM. She reports a longstanding history of urinary incontinence, primarily urge incontinence which improved with constipation management. She voids 2-3 times per day on the toilet and feels she empties her bladder well. She admits to significant constipation and has a BM 1-2 times per week. No dysuria, gross hematuria, flank pain, fevers or chills.     Plan:     1) Recurrent UTI  - Continue to follow with ID  - Ideally if incontinence improves, infections should improve  - Daily prophylaxis      2) OAB  - Discussed behavioral modification, primarily timed voiding  - Today we discussed the definition of Overactive Bladder (OAB) as a clinical diagnosis that refers to \"urinary urgency, usually accompanied by frequency and nocturia, with or without urge incontinence, in the absence of urinary tract infection or any other obvious pathology.\" We discussed in detail the risk factors for OAB including bladder inflammation, chronic bladder outlet obstruction (BPH, urethral stenosis), central nervous systems disorders, and vaginal delivery of a child, postmenopausal status. I had a long discussion with patient regarding the first line treatment for OAB is behavioral therapy with or without medication therapy. Behavioral therapy include the following elements:   1)       Avoid activities that exacerbate incontinence  2)       Maintain a voiding diary  3)       Timed/scheduled voiding to empty the bladder before incontinence or severe urgency occurs  4)       Bladder " training  5)       Kegel exercises and pelvic floor muscle training  6)       Fluid intake management-avoid excessive fluid intake  7)       Dietary management-avoid bladder irritants (caffeine, alcohol, spicy food, acidic food)  8)       Avoid constipation     3) Constipation  - Continue to follow with GI     Follow-up 6 months, or sooner if needed.    Lizzy REID, CNP  Office Phone: [phone]

## 2024-01-05 ENCOUNTER — HOSPITAL ENCOUNTER (EMERGENCY)
Facility: HOSPITAL | Age: 24
Discharge: HOME | End: 2024-01-05
Attending: EMERGENCY MEDICINE
Payer: COMMERCIAL

## 2024-01-05 ENCOUNTER — APPOINTMENT (OUTPATIENT)
Dept: RADIOLOGY | Facility: HOSPITAL | Age: 24
End: 2024-01-05
Payer: COMMERCIAL

## 2024-01-05 VITALS
SYSTOLIC BLOOD PRESSURE: 112 MMHG | TEMPERATURE: 98.4 F | DIASTOLIC BLOOD PRESSURE: 84 MMHG | RESPIRATION RATE: 16 BRPM | OXYGEN SATURATION: 98 % | HEART RATE: 96 BPM

## 2024-01-05 DIAGNOSIS — R11.2 NAUSEA AND VOMITING, UNSPECIFIED VOMITING TYPE: ICD-10-CM

## 2024-01-05 DIAGNOSIS — K59.00 CONSTIPATION, UNSPECIFIED CONSTIPATION TYPE: Primary | ICD-10-CM

## 2024-01-05 LAB
ALBUMIN SERPL BCP-MCNC: 3.8 G/DL (ref 3.4–5)
ALP SERPL-CCNC: 67 U/L (ref 33–110)
ALT SERPL W P-5'-P-CCNC: 7 U/L (ref 7–45)
ANION GAP SERPL CALC-SCNC: 11 MMOL/L (ref 10–20)
APPEARANCE UR: ABNORMAL
AST SERPL W P-5'-P-CCNC: 19 U/L (ref 9–39)
BACTERIA #/AREA URNS AUTO: ABNORMAL /HPF
BASOPHILS # BLD AUTO: 0.03 X10*3/UL (ref 0–0.1)
BASOPHILS NFR BLD AUTO: 0.3 %
BILIRUB SERPL-MCNC: 0.4 MG/DL (ref 0–1.2)
BILIRUB UR STRIP.AUTO-MCNC: NEGATIVE MG/DL
BUN SERPL-MCNC: 3 MG/DL (ref 6–23)
CALCIUM SERPL-MCNC: 9.5 MG/DL (ref 8.6–10.6)
CHLORIDE SERPL-SCNC: 102 MMOL/L (ref 98–107)
CLUE CELLS SPEC QL WET PREP: NORMAL
CO2 SERPL-SCNC: 26 MMOL/L (ref 21–32)
COLOR UR: YELLOW
CREAT SERPL-MCNC: 1.23 MG/DL (ref 0.5–1.05)
EOSINOPHIL # BLD AUTO: 0.01 X10*3/UL (ref 0–0.7)
EOSINOPHIL NFR BLD AUTO: 0.1 %
ERYTHROCYTE [DISTWIDTH] IN BLOOD BY AUTOMATED COUNT: 14.6 % (ref 11.5–14.5)
GFR SERPL CREATININE-BSD FRML MDRD: 64 ML/MIN/1.73M*2
GLUCOSE SERPL-MCNC: 101 MG/DL (ref 74–99)
GLUCOSE UR STRIP.AUTO-MCNC: NEGATIVE MG/DL
HCT VFR BLD AUTO: 38.2 % (ref 36–46)
HGB BLD-MCNC: 13.2 G/DL (ref 12–16)
IMM GRANULOCYTES # BLD AUTO: 0.08 X10*3/UL (ref 0–0.7)
IMM GRANULOCYTES NFR BLD AUTO: 0.8 % (ref 0–0.9)
KETONES UR STRIP.AUTO-MCNC: NEGATIVE MG/DL
LEUKOCYTE ESTERASE UR QL STRIP.AUTO: ABNORMAL
LIPASE SERPL-CCNC: 23 U/L (ref 9–82)
LYMPHOCYTES # BLD AUTO: 3.4 X10*3/UL (ref 1.2–4.8)
LYMPHOCYTES NFR BLD AUTO: 33.4 %
MCH RBC QN AUTO: 31.7 PG (ref 26–34)
MCHC RBC AUTO-ENTMCNC: 34.6 G/DL (ref 32–36)
MCV RBC AUTO: 92 FL (ref 80–100)
MONOCYTES # BLD AUTO: 0.9 X10*3/UL (ref 0.1–1)
MONOCYTES NFR BLD AUTO: 8.8 %
NEUTROPHILS # BLD AUTO: 5.75 X10*3/UL (ref 1.2–7.7)
NEUTROPHILS NFR BLD AUTO: 56.6 %
NITRITE UR QL STRIP.AUTO: NEGATIVE
NRBC BLD-RTO: 0 /100 WBCS (ref 0–0)
PH UR STRIP.AUTO: 6 [PH]
PLATELET # BLD AUTO: 284 X10*3/UL (ref 150–450)
POTASSIUM SERPL-SCNC: 4.2 MMOL/L (ref 3.5–5.3)
PREGNANCY TEST URINE, POC: NEGATIVE
PROT SERPL-MCNC: 7.3 G/DL (ref 6.4–8.2)
PROT UR STRIP.AUTO-MCNC: NEGATIVE MG/DL
RBC # BLD AUTO: 4.17 X10*6/UL (ref 4–5.2)
RBC # UR STRIP.AUTO: NEGATIVE /UL
RBC #/AREA URNS AUTO: ABNORMAL /HPF
SODIUM SERPL-SCNC: 135 MMOL/L (ref 136–145)
SP GR UR STRIP.AUTO: 1.03
SQUAMOUS #/AREA URNS AUTO: ABNORMAL /HPF
T VAGINALIS SPEC QL WET PREP: NORMAL
TRICHOMONAS REFLEX COMMENT: NORMAL
TSH SERPL-ACNC: 3.49 MIU/L (ref 0.44–3.98)
UROBILINOGEN UR STRIP.AUTO-MCNC: <2 MG/DL
WBC # BLD AUTO: 10.2 X10*3/UL (ref 4.4–11.3)
WBC #/AREA URNS AUTO: ABNORMAL /HPF
WBC VAG QL WET PREP: NORMAL
YEAST VAG QL WET PREP: NORMAL

## 2024-01-05 PROCEDURE — 87800 DETECT AGNT MULT DNA DIREC: CPT | Performed by: PHYSICIAN ASSISTANT

## 2024-01-05 PROCEDURE — 81003 URINALYSIS AUTO W/O SCOPE: CPT | Performed by: EMERGENCY MEDICINE

## 2024-01-05 PROCEDURE — 2500000004 HC RX 250 GENERAL PHARMACY W/ HCPCS (ALT 636 FOR OP/ED): Performed by: EMERGENCY MEDICINE

## 2024-01-05 PROCEDURE — 2500000001 HC RX 250 WO HCPCS SELF ADMINISTERED DRUGS (ALT 637 FOR MEDICARE OP): Performed by: PHYSICIAN ASSISTANT

## 2024-01-05 PROCEDURE — 2500000004 HC RX 250 GENERAL PHARMACY W/ HCPCS (ALT 636 FOR OP/ED): Performed by: PHYSICIAN ASSISTANT

## 2024-01-05 PROCEDURE — 99284 EMERGENCY DEPT VISIT MOD MDM: CPT | Performed by: EMERGENCY MEDICINE

## 2024-01-05 PROCEDURE — 96374 THER/PROPH/DIAG INJ IV PUSH: CPT | Mod: 59 | Performed by: EMERGENCY MEDICINE

## 2024-01-05 PROCEDURE — 96361 HYDRATE IV INFUSION ADD-ON: CPT | Mod: 59 | Performed by: EMERGENCY MEDICINE

## 2024-01-05 PROCEDURE — 87086 URINE CULTURE/COLONY COUNT: CPT | Performed by: EMERGENCY MEDICINE

## 2024-01-05 PROCEDURE — 36415 COLL VENOUS BLD VENIPUNCTURE: CPT | Performed by: EMERGENCY MEDICINE

## 2024-01-05 PROCEDURE — 85025 COMPLETE CBC W/AUTO DIFF WBC: CPT | Performed by: EMERGENCY MEDICINE

## 2024-01-05 PROCEDURE — 74177 CT ABD & PELVIS W/CONTRAST: CPT

## 2024-01-05 PROCEDURE — 87661 TRICHOMONAS VAGINALIS AMPLIF: CPT | Mod: 59 | Performed by: PHYSICIAN ASSISTANT

## 2024-01-05 PROCEDURE — 96375 TX/PRO/DX INJ NEW DRUG ADDON: CPT | Mod: 59 | Performed by: EMERGENCY MEDICINE

## 2024-01-05 PROCEDURE — 84443 ASSAY THYROID STIM HORMONE: CPT | Performed by: PHYSICIAN ASSISTANT

## 2024-01-05 PROCEDURE — 99285 EMERGENCY DEPT VISIT HI MDM: CPT | Performed by: PHYSICIAN ASSISTANT

## 2024-01-05 PROCEDURE — 87210 SMEAR WET MOUNT SALINE/INK: CPT | Mod: 59 | Performed by: PHYSICIAN ASSISTANT

## 2024-01-05 PROCEDURE — 83690 ASSAY OF LIPASE: CPT | Performed by: EMERGENCY MEDICINE

## 2024-01-05 PROCEDURE — 80053 COMPREHEN METABOLIC PANEL: CPT | Performed by: EMERGENCY MEDICINE

## 2024-01-05 PROCEDURE — 2550000001 HC RX 255 CONTRASTS: Performed by: EMERGENCY MEDICINE

## 2024-01-05 PROCEDURE — 74177 CT ABD & PELVIS W/CONTRAST: CPT | Performed by: RADIOLOGY

## 2024-01-05 RX ORDER — ONDANSETRON HYDROCHLORIDE 2 MG/ML
4 INJECTION, SOLUTION INTRAVENOUS ONCE
Status: COMPLETED | OUTPATIENT
Start: 2024-01-05 | End: 2024-01-05

## 2024-01-05 RX ORDER — ACETAMINOPHEN 325 MG/1
975 TABLET ORAL ONCE
Status: COMPLETED | OUTPATIENT
Start: 2024-01-05 | End: 2024-01-05

## 2024-01-05 RX ORDER — DICYCLOMINE HYDROCHLORIDE 10 MG/1
20 CAPSULE ORAL ONCE
Status: COMPLETED | OUTPATIENT
Start: 2024-01-05 | End: 2024-01-05

## 2024-01-05 RX ORDER — ONDANSETRON 4 MG/1
4 TABLET, ORALLY DISINTEGRATING ORAL EVERY 8 HOURS PRN
Qty: 21 TABLET | Refills: 0 | Status: SHIPPED | OUTPATIENT
Start: 2024-01-05 | End: 2024-05-08 | Stop reason: WASHOUT

## 2024-01-05 RX ORDER — FAMOTIDINE 10 MG/ML
40 INJECTION INTRAVENOUS ONCE
Status: COMPLETED | OUTPATIENT
Start: 2024-01-05 | End: 2024-01-05

## 2024-01-05 RX ORDER — DOCUSATE SODIUM 100 MG/1
100 CAPSULE, LIQUID FILLED ORAL 2 TIMES DAILY
Qty: 14 CAPSULE | Refills: 0 | Status: SHIPPED | OUTPATIENT
Start: 2024-01-05 | End: 2024-01-12

## 2024-01-05 RX ADMIN — IOHEXOL 80 ML: 350 INJECTION, SOLUTION INTRAVENOUS at 15:41

## 2024-01-05 RX ADMIN — ACETAMINOPHEN 975 MG: 325 TABLET ORAL at 14:01

## 2024-01-05 RX ADMIN — ONDANSETRON 4 MG: 2 INJECTION INTRAMUSCULAR; INTRAVENOUS at 14:01

## 2024-01-05 RX ADMIN — SODIUM CHLORIDE, POTASSIUM CHLORIDE, SODIUM LACTATE AND CALCIUM CHLORIDE 1000 ML: 600; 310; 30; 20 INJECTION, SOLUTION INTRAVENOUS at 14:01

## 2024-01-05 RX ADMIN — FAMOTIDINE 40 MG: 10 INJECTION INTRAVENOUS at 14:01

## 2024-01-05 RX ADMIN — DICYCLOMINE HYDROCHLORIDE 20 MG: 10 CAPSULE ORAL at 17:39

## 2024-01-05 ASSESSMENT — COLUMBIA-SUICIDE SEVERITY RATING SCALE - C-SSRS
2. HAVE YOU ACTUALLY HAD ANY THOUGHTS OF KILLING YOURSELF?: NO
6. HAVE YOU EVER DONE ANYTHING, STARTED TO DO ANYTHING, OR PREPARED TO DO ANYTHING TO END YOUR LIFE?: NO
1. IN THE PAST MONTH, HAVE YOU WISHED YOU WERE DEAD OR WISHED YOU COULD GO TO SLEEP AND NOT WAKE UP?: NO

## 2024-01-05 NOTE — ED PROVIDER NOTES
This is a 23-year-old female with past medical history of schizoaffective disorder, anxiety, depression, bipolar disorder, chronic constipation who presents to the ED with abdominal pain.  She is present she came to the ED today with her grandmother who was at bedside.  She states that she has been having pain for the past 4 to 5 days.  The pain is located in epigastric region as well as lower abdomen, worse in the suprapubic region and right lower quadrant.  She states that she was recently taken off her Linzess and believes this may be contributing to her symptoms.  She states that she only had a bowel movement 3 days ago after taking magnesium citrate and has not had a bowel movement since then.  She is still able to pass gas.  She endorses intermittent nausea and vomiting.  She has otherwise been able to tolerate p.o. intake.  She denies any urinary frequency, urgency, or dysuria but does endorse having decreased urination versus her baseline.  Additionally she does state that she was at an urgent care at the beginning of January and was started on Bactrim as well as metronidazole and was given a dose of antibiotics while in the urgent care to treat for chlamydia.  She states that she has not been sexually active since this visit.      History provided by:  Patient   used: No       No trauma, falls, or injuries. No passing out. She denies any headache, dizziness, vision changes, neck pain, chest pain, shortness of breath, urinary symptoms, numbness, tingling, fevers, or chills.       Visit Vitals  /84   Pulse 96   Temp 36.9 °C (98.4 °F) (Temporal)   Resp 16   SpO2 98%   OB Status Implant   Smoking Status Never          Physical Exam     Physical exam:    General: Vitals noted, no distress. Afebrile. Resting calmly    Head: normocephalic, atraumatic.     EENT: PERRL, EOM's intact. Eye lids without lesions. No scleral icterus. Normal phonation. Nares patent. MMM.     Cardiac: Regular,  rate, rhythm, no murmur.    Pulmonary: Lungs clear bilaterally with good aeration. No adventitious breath sounds.    Abdomen: Tenderness palpation epigastric region, right lower quadrant, and suprapubic region.  No rebound or guarding.  Abdomen, soft, nondistended. No peritoneal signs. Normoactive bowel sounds. No CVA tenderness.     Pelvic Exam: Chaperone present. External genitalia normal. No rashes or lesions.  Patient declined speculum exam, there was a small amount of white discharge visualized within the vagina.  Bimanual exam shows no adnexal pain or mass. No cervical motion tenderness.    Extremities: No peripheral edema. Full range of motion. Moves all extremities freely.     Skin: No rash. Warm and dry. No discoloration noted.     Neuro: No focal neurologic deficits noted.  Pt is A&O x3, speech is clear, moves all extremities independently sensation is intact.      Psych: calm and cooperative, denies suicidal or homicidal ideation.       Labs Reviewed   CBC WITH AUTO DIFFERENTIAL - Abnormal       Result Value    WBC 10.2      nRBC 0.0      RBC 4.17      Hemoglobin 13.2      Hematocrit 38.2      MCV 92      MCH 31.7      MCHC 34.6      RDW 14.6 (*)     Platelets 284      Neutrophils % 56.6      Immature Granulocytes %, Automated 0.8      Lymphocytes % 33.4      Monocytes % 8.8      Eosinophils % 0.1      Basophils % 0.3      Neutrophils Absolute 5.75      Immature Granulocytes Absolute, Automated 0.08      Lymphocytes Absolute 3.40      Monocytes Absolute 0.90      Eosinophils Absolute 0.01      Basophils Absolute 0.03     COMPREHENSIVE METABOLIC PANEL - Abnormal    Glucose 101 (*)     Sodium 135 (*)     Potassium 4.2      Chloride 102      Bicarbonate 26      Anion Gap 11      Urea Nitrogen 3 (*)     Creatinine 1.23 (*)     eGFR 64      Calcium 9.5      Albumin 3.8      Alkaline Phosphatase 67      Total Protein 7.3      AST 19      Bilirubin, Total 0.4      ALT 7     URINALYSIS WITH REFLEX CULTURE AND  MICROSCOPIC - Abnormal    Color, Urine Yellow      Appearance, Urine Hazy (*)     Specific Gravity, Urine 1.031      pH, Urine 6.0      Protein, Urine NEGATIVE      Glucose, Urine NEGATIVE      Blood, Urine NEGATIVE      Ketones, Urine NEGATIVE      Bilirubin, Urine NEGATIVE      Urobilinogen, Urine <2.0      Nitrite, Urine NEGATIVE      Leukocyte Esterase, Urine LARGE (3+) (*)    MICROSCOPIC ONLY, URINE - Abnormal    WBC, Urine 21-50 (*)     RBC, Urine 3-5      Squamous Epithelial Cells, Urine 10-25 (FEW)      Bacteria, Urine 1+ (*)    LIPASE - Normal    Lipase 23      Narrative:     Venipuncture immediately after or during the administration of Metamizole may lead to falsely low results. Testing should be performed immediately prior to Metamizole dosing.   TSH WITH REFLEX TO FREE T4 IF ABNORMAL - Normal    Thyroid Stimulating Hormone 3.49      Narrative:     TSH testing is performed using different testing methodology at [facility] than at other [facility]. Direct result comparisons should only be made within the same method.     POCT PREGNANCY, URINE - Normal    Preg Test, Ur Negative     URINE CULTURE   TRICHOMONAS WET PREP REFLEX TO PCR    Trichomonas None Seen      Clue Cells None Seen      Yeast None Seen      WBC 9-20      Trichomonas reflex comment        Value: Trichomonas was not seen by wet prep. Reflex Trichomonas vaginalis by Amplified Detection.   URINALYSIS WITH REFLEX CULTURE AND MICROSCOPIC    Narrative:     The following orders were created for panel order Urinalysis with Reflex Culture and Microscopic.  Procedure                               Abnormality         Status                     ---------                               -----------         ------                     Urinalysis with Reflex C...[[id number]]  Abnormal            Final result               Extra Urine Gray Tube[[id number]]                            In process                   Please view results for these  tests on the individual orders.   EXTRA URINE GRAY TUBE   C. TRACHOMATIS + N. GONORRHOEAE, AMPLIFIED   TRICH VAGINALIS, AMPLIFIED       CT abdomen pelvis w IV contrast   Final Result   1. No evidence of acute appendicitis.   2. Again seen multiple injection granulomas within the subcutaneous   soft tissue of the bilateral lower back with associated fat   stranding, similar in appearance when compared to prior CT from   05/25/2023. No evidence of superficial skin thickening within this   region or soft tissue gas to suggest cellulitis or abscess. Recommend   correlation with physical exam.        I personally reviewed the images/study and I agree with the findings   as stated by Krys Dempsey DO, PGY-2. This study was interpreted   at [facility], [place], [place].        MACRO:   None        Signed by: Kendall Fabian 1/5/2024 5:11 PM   Dictation workstation:   [id number]            ED Course & MDM     Medical Decision Making  This is a 23-year-old female with a past medical history of schizoaffective, anxiety, depression, and bipolar disorders as well as chronic constipation who presents to the ED with abdominal pain associated nausea and vomiting as well as constipation.  Vitals did show she was tachycardic 111 bpm upon arrival to the ED, vitals otherwise grossly unremarkable.  On examination she had tenderness palpation epigastric region, suprapubic region, and right lower quadrant.  No CVA tenderness.  No rebound or guarding to her abdomen.  Examination of patient's external genitalia was performed by myself as well as a bimanual exam where she had no cervical motion tenderness no adnexal pain or masses palpated, however patient declined to have a speculum exam.  Wet prep as well as GC and chlamydia swabs were obtained by myself during her  exam.  Based on her having no cervical motion tenderness and no adnexal pain or masses I have low suspicion for PID/TOA at this  time.  IV established laboratory studies obtained.  CT abdomen pelvis ordered.  Patient medicated with Tylenol, Pepcid, Zofran, and 1 L IV fluids.  CT abdomen pelvis grossly unremarkable without any acute abnormalities.  There was multiple injection granulomas within the subcutaneous soft tissue of the bilateral lower back and associated fat stranding appear to be similar to previous CT scans.  Patient's blood work grossly unremarkable, creatinine is 1.23, however this appears to be fairly consistent with her baseline.  Wet prep negative.  Urinalysis positive for leukocyte Estrace but was otherwise grossly unremarkable.  At this time the patient was feeling improved.  She was able to tolerate p.o. intake.  She felt improved and felt comfortable being discharged home.  She was given a refill on her Linzess as well as Colace and a prescription for Zofran for her symptoms at home.  She was advised to follow with her primary care provider as well as her GI provider.  She is given signs and symptoms to return to the ED with and was discharged from emergency department in stable condition.    Amount and/or Complexity of Data Reviewed  Labs: ordered.  Radiology: ordered and independent interpretation performed.     Details: No visualized SBO on CT abdomen pelvis    Risk  Prescription drug management.         Diagnoses as of 01/05/24 2050   Constipation, unspecified constipation type   Nausea and vomiting, unspecified vomiting type       Procedures    MARY ELLEN Latif, PAUrielC     Keerthi Gillespie PA-C  01/05/24 2052       Suzanne D eSouza MD  01/05/24 2128

## 2024-01-05 NOTE — Clinical Note
Berna Ricardo was seen and treated in our emergency department on 1/5/2024.  She may return to work on 01/07/2024.       If you have any questions or concerns, please don't hesitate to call.      Keerthi Gillespie PA-C

## 2024-01-05 NOTE — ED TRIAGE NOTES
Pt reports to ED with abdominal pain, pt unable to report when it started, family reports that it started Sunday or Monday. Pt endorses vomiting and constipation. Pt also states her heart is racing. Was discharged from [facility] in December and was taken off meds, which family thinks is causing some symptoms. Pt denies HI/SI/AH/VH, family adamant that visit is not for psych. Pt lives in group home.

## 2024-01-06 LAB
C TRACH RRNA SPEC QL NAA+PROBE: NEGATIVE
HOLD SPECIMEN: NORMAL
N GONORRHOEA DNA SPEC QL PROBE+SIG AMP: NEGATIVE
T VAGINALIS RRNA SPEC QL NAA+PROBE: NEGATIVE

## 2024-01-07 LAB — BACTERIA UR CULT: NORMAL

## 2024-01-09 ENCOUNTER — HOSPITAL ENCOUNTER (EMERGENCY)
Facility: HOSPITAL | Age: 24
Discharge: OTHER INSTITUTION | End: 2024-01-10
Attending: EMERGENCY MEDICINE
Payer: COMMERCIAL

## 2024-01-09 ENCOUNTER — CLINICAL SUPPORT (OUTPATIENT)
Dept: EMERGENCY MEDICINE | Facility: HOSPITAL | Age: 24
End: 2024-01-09
Payer: COMMERCIAL

## 2024-01-09 DIAGNOSIS — R45.851 SUICIDAL IDEATION: Primary | ICD-10-CM

## 2024-01-09 LAB
ALBUMIN SERPL BCP-MCNC: 3.8 G/DL (ref 3.4–5)
ALP SERPL-CCNC: 57 U/L (ref 33–110)
ALT SERPL W P-5'-P-CCNC: 5 U/L (ref 7–45)
AMPHETAMINES UR QL SCN: NORMAL
ANION GAP SERPL CALC-SCNC: 15 MMOL/L (ref 10–20)
APAP SERPL-MCNC: <10 UG/ML
AST SERPL W P-5'-P-CCNC: 13 U/L (ref 9–39)
B-HCG SERPL-ACNC: <3 MIU/ML
BARBITURATES UR QL SCN: NORMAL
BASOPHILS # BLD AUTO: 0.05 X10*3/UL (ref 0–0.1)
BASOPHILS NFR BLD AUTO: 0.4 %
BENZODIAZ UR QL SCN: NORMAL
BILIRUB SERPL-MCNC: 0.5 MG/DL (ref 0–1.2)
BUN SERPL-MCNC: 6 MG/DL (ref 6–23)
BZE UR QL SCN: NORMAL
CALCIUM SERPL-MCNC: 9.7 MG/DL (ref 8.6–10.6)
CANNABINOIDS UR QL SCN: NORMAL
CHLORIDE SERPL-SCNC: 103 MMOL/L (ref 98–107)
CO2 SERPL-SCNC: 24 MMOL/L (ref 21–32)
CREAT SERPL-MCNC: 1.1 MG/DL (ref 0.5–1.05)
EGFRCR SERPLBLD CKD-EPI 2021: 73 ML/MIN/1.73M*2
EOSINOPHIL # BLD AUTO: 0.01 X10*3/UL (ref 0–0.7)
EOSINOPHIL NFR BLD AUTO: 0.1 %
ERYTHROCYTE [DISTWIDTH] IN BLOOD BY AUTOMATED COUNT: 14.6 % (ref 11.5–14.5)
ETHANOL SERPL-MCNC: <10 MG/DL
FENTANYL+NORFENTANYL UR QL SCN: NORMAL
GLUCOSE SERPL-MCNC: 91 MG/DL (ref 74–99)
HCT VFR BLD AUTO: 36.7 % (ref 36–46)
HGB BLD-MCNC: 12.9 G/DL (ref 12–16)
HOLD SPECIMEN: NORMAL
HOLD SPECIMEN: NORMAL
IMM GRANULOCYTES # BLD AUTO: 0.09 X10*3/UL (ref 0–0.7)
IMM GRANULOCYTES NFR BLD AUTO: 0.8 % (ref 0–0.9)
LYMPHOCYTES # BLD AUTO: 3.47 X10*3/UL (ref 1.2–4.8)
LYMPHOCYTES NFR BLD AUTO: 30.2 %
MCH RBC QN AUTO: 32.2 PG (ref 26–34)
MCHC RBC AUTO-ENTMCNC: 35.1 G/DL (ref 32–36)
MCV RBC AUTO: 92 FL (ref 80–100)
MONOCYTES # BLD AUTO: 0.98 X10*3/UL (ref 0.1–1)
MONOCYTES NFR BLD AUTO: 8.5 %
NEUTROPHILS # BLD AUTO: 6.9 X10*3/UL (ref 1.2–7.7)
NEUTROPHILS NFR BLD AUTO: 60 %
NRBC BLD-RTO: 0 /100 WBCS (ref 0–0)
OPIATES UR QL SCN: NORMAL
OXYCODONE+OXYMORPHONE UR QL SCN: NORMAL
PCP UR QL SCN: NORMAL
PLATELET # BLD AUTO: 213 X10*3/UL (ref 150–450)
POTASSIUM SERPL-SCNC: 4 MMOL/L (ref 3.5–5.3)
PREGNANCY TEST URINE, POC: NEGATIVE
PROT SERPL-MCNC: 7.9 G/DL (ref 6.4–8.2)
RBC # BLD AUTO: 4.01 X10*6/UL (ref 4–5.2)
SALICYLATES SERPL-MCNC: <3 MG/DL
SARS-COV-2 RNA RESP QL NAA+PROBE: NOT DETECTED
SODIUM SERPL-SCNC: 138 MMOL/L (ref 136–145)
WBC # BLD AUTO: 11.5 X10*3/UL (ref 4.4–11.3)

## 2024-01-09 PROCEDURE — 93010 ELECTROCARDIOGRAM REPORT: CPT | Performed by: EMERGENCY MEDICINE

## 2024-01-09 PROCEDURE — 87635 SARS-COV-2 COVID-19 AMP PRB: CPT

## 2024-01-09 PROCEDURE — 84702 CHORIONIC GONADOTROPIN TEST: CPT

## 2024-01-09 PROCEDURE — 2500000001 HC RX 250 WO HCPCS SELF ADMINISTERED DRUGS (ALT 637 FOR MEDICARE OP)

## 2024-01-09 PROCEDURE — 93005 ELECTROCARDIOGRAM TRACING: CPT

## 2024-01-09 PROCEDURE — 85025 COMPLETE CBC W/AUTO DIFF WBC: CPT

## 2024-01-09 PROCEDURE — 99285 EMERGENCY DEPT VISIT HI MDM: CPT | Performed by: EMERGENCY MEDICINE

## 2024-01-09 PROCEDURE — 81025 URINE PREGNANCY TEST: CPT | Performed by: EMERGENCY MEDICINE

## 2024-01-09 PROCEDURE — 36415 COLL VENOUS BLD VENIPUNCTURE: CPT

## 2024-01-09 PROCEDURE — 80307 DRUG TEST PRSMV CHEM ANLYZR: CPT

## 2024-01-09 PROCEDURE — 80053 COMPREHEN METABOLIC PANEL: CPT

## 2024-01-09 PROCEDURE — 80143 DRUG ASSAY ACETAMINOPHEN: CPT

## 2024-01-09 RX ORDER — LORAZEPAM 0.5 MG/1
1 TABLET ORAL ONCE
Status: COMPLETED | OUTPATIENT
Start: 2024-01-09 | End: 2024-01-09

## 2024-01-09 RX ORDER — LORAZEPAM 1 MG/1
TABLET ORAL
Status: COMPLETED
Start: 2024-01-09 | End: 2024-01-09

## 2024-01-09 RX ADMIN — LORAZEPAM 1 MG: 0.5 TABLET ORAL at 14:11

## 2024-01-09 RX ADMIN — LORAZEPAM 1 MG: 1 TABLET ORAL at 14:11

## 2024-01-09 RX ADMIN — LORAZEPAM 1 MG: 0.5 TABLET ORAL at 17:07

## 2024-01-09 SDOH — HEALTH STABILITY: MENTAL HEALTH: NON-SPECIFIC ACTIVE SUICIDAL THOUGHTS (PAST 1 MONTH): YES

## 2024-01-09 SDOH — HEALTH STABILITY: MENTAL HEALTH: SUICIDAL BEHAVIOR (DESCRIPTION): JUMPING IN FRONT OF CAR AND JUMPING OFF BALCONY.

## 2024-01-09 SDOH — HEALTH STABILITY: MENTAL HEALTH: DESCRIBE YOUR THOUGHTS OF KILLING YOURSELF RIGHT NOW:: CUTTING SELF WITH RAZOR WITH SOME INTENT TO ACT.

## 2024-01-09 SDOH — HEALTH STABILITY: MENTAL HEALTH: DEPRESSION SYMPTOMS: CHANGE IN ENERGY LEVEL;FEELINGS OF HOPELESSESS;FEELINGS OF HELPLESSNESS;LOSS OF INTEREST

## 2024-01-09 SDOH — HEALTH STABILITY: MENTAL HEALTH: NEEDS EXPRESSED: DENIES

## 2024-01-09 SDOH — HEALTH STABILITY: MENTAL HEALTH: SUICIDAL BEHAVIOR (3 MONTHS): NO

## 2024-01-09 SDOH — HEALTH STABILITY: MENTAL HEALTH: HOW DID YOU TRY TO KILL YOURSELF?: JUMPING IN FRONT OF CAR AND OFF BALCONY

## 2024-01-09 SDOH — ECONOMIC STABILITY: HOUSING INSECURITY: FEELS SAFE LIVING IN HOME: YES

## 2024-01-09 SDOH — HEALTH STABILITY: MENTAL HEALTH: BEHAVIORS/MOOD: CALM

## 2024-01-09 SDOH — HEALTH STABILITY: MENTAL HEALTH: IN THE PAST FEW WEEKS, HAVE YOU FELT THAT YOU OR YOUR FAMILY WOULD BE BETTER OFF IF YOU WERE DEAD?: YES

## 2024-01-09 SDOH — HEALTH STABILITY: MENTAL HEALTH: ARE YOU HAVING THOUGHTS OF KILLING YOURSELF RIGHT NOW?: YES

## 2024-01-09 SDOH — HEALTH STABILITY: MENTAL HEALTH: WISH TO BE DEAD (PAST 1 MONTH): YES

## 2024-01-09 SDOH — HEALTH STABILITY: MENTAL HEALTH: CONTENT: BLAMING SELF

## 2024-01-09 SDOH — SOCIAL STABILITY: SOCIAL NETWORK: VISITOR BEHAVIORS: ANXIOUS

## 2024-01-09 SDOH — HEALTH STABILITY: MENTAL HEALTH: SUICIDAL BEHAVIOR (LIFETIME): YES

## 2024-01-09 SDOH — HEALTH STABILITY: MENTAL HEALTH: IN THE PAST FEW WEEKS, HAVE YOU WISHED YOU WERE DEAD?: YES

## 2024-01-09 SDOH — HEALTH STABILITY: MENTAL HEALTH: HALLUCINATION: AUDITORY;VISUAL

## 2024-01-09 SDOH — HEALTH STABILITY: MENTAL HEALTH: HAVE YOU EVER TRIED TO KILL YOURSELF?: YES

## 2024-01-09 SDOH — HEALTH STABILITY: MENTAL HEALTH: ANXIETY SYMPTOMS: GENERALIZED;COMPULSIVE;FEELINGS OF DOOM

## 2024-01-09 SDOH — HEALTH STABILITY: MENTAL HEALTH: ACTIVE SUICIDAL IDEATION WITH SOME INTENT TO ACT, WITHOUT SPECIFIC PLAN (PAST 1 MONTH): NO

## 2024-01-09 SDOH — HEALTH STABILITY: MENTAL HEALTH: WHEN DID YOU TRY TO KILL YOURSELF?: ~FEW YEARS AGO

## 2024-01-09 SDOH — HEALTH STABILITY: MENTAL HEALTH: ACTIVE SUICIDAL IDEATION WITH SPECIFIC PLAN AND INTENT (PAST 1 MONTH): YES

## 2024-01-09 SDOH — HEALTH STABILITY: MENTAL HEALTH: IN THE PAST WEEK, HAVE YOU BEEN HAVING THOUGHTS ABOUT KILLING YOURSELF?: YES

## 2024-01-09 SDOH — HEALTH STABILITY: MENTAL HEALTH: BEHAVIORS/MOOD: CRYING;SAD;ANXIOUS

## 2024-01-09 SDOH — ECONOMIC STABILITY: GENERAL: FINANCIAL CONCERNS: OTHER (COMMENT)

## 2024-01-09 ASSESSMENT — LIFESTYLE VARIABLES
EVER HAD A DRINK FIRST THING IN THE MORNING TO STEADY YOUR NERVES TO GET RID OF A HANGOVER: NO
PRESCIPTION_ABUSE_PAST_12_MONTHS: NO
EVER FELT BAD OR GUILTY ABOUT YOUR DRINKING: NO
HAVE YOU EVER FELT YOU SHOULD CUT DOWN ON YOUR DRINKING: NO
REASON UNABLE TO ASSESS: NO
SUBSTANCE_ABUSE_PAST_12_MONTHS: YES
HAVE PEOPLE ANNOYED YOU BY CRITICIZING YOUR DRINKING: NO

## 2024-01-09 ASSESSMENT — COLUMBIA-SUICIDE SEVERITY RATING SCALE - C-SSRS
5. HAVE YOU STARTED TO WORK OUT OR WORKED OUT THE DETAILS OF HOW TO KILL YOURSELF? DO YOU INTEND TO CARRY OUT THIS PLAN?: NO
1. SINCE LAST CONTACT, HAVE YOU WISHED YOU WERE DEAD OR WISHED YOU COULD GO TO SLEEP AND NOT WAKE UP?: YES
6. HAVE YOU EVER DONE ANYTHING, STARTED TO DO ANYTHING, OR PREPARED TO DO ANYTHING TO END YOUR LIFE?: NO
2. HAVE YOU ACTUALLY HAD ANY THOUGHTS OF KILLING YOURSELF?: YES

## 2024-01-09 ASSESSMENT — PAIN - FUNCTIONAL ASSESSMENT: PAIN_FUNCTIONAL_ASSESSMENT: 0-10

## 2024-01-09 NOTE — PROGRESS NOTES
"Patient was handed off to me from the previous team. For full history, physical, and prior ED course, please see previous provider note prior to patient handoff. This is an addendum to the record.    HPI/prior hospital course:   In brief, patient is a 23-year-old female with history of schizoaffective disorder, anxiety, depression, diabetes presenting with concern for suicidal ideations.  Significant concern from prior provider for patient calling 911 with plan to hurt herself and patient having acute exacerbations every year around time of death of her mother.  Particular concern the patient was endorsing that she wants to \"go to heaven\".  Patient signed out pending EPAT recommendations.    Hospital Course/MDM:  Labs generally unremarkably deviated from normal limits.  Patient not endorsing any acute complaints or symptoms at this point.  Patient at this point is medically cleared for placement by EPAT.  EPAT recommending placement based on SI which is in agreement with ED assessment.  Patient treated for anxiety with additional 1 mg oral Ativan.  Patient handed off pending placement location.    Disposition:  Handed off pending placement by EPAT.    Patient discussed with Dr. Ramy Rivera MD, PhD  Emergency Medicine PGY2       I performed a history and physical examination of Berna Ricardo and discussed her management with Dr. Rivera.  I agree with the history, physical, assessment, and plan of care, with the following exceptions: None    I was present for the following procedures: None  Time Spent in Critical Care of the patient: None      Anh Mccann MD         "

## 2024-01-09 NOTE — ED PROVIDER NOTES
"CC: Suicidal     HPI:   Patient is a 23-year-old female with past medical history of schizoaffective disorder, anxiety, depression, bipolar disorder, chronic constipation presenting from her group home due to severe depressive mood.  Patient told 911 that she was very sad and had plans to hurt herself by either cutting or overdosing on medication.  Patient reports that she has access to both pills and sharp objects but does not have access to firearms.  Patient does report that she feels significantly sad for the past week because the death anniversary of her mother is coming up who passed away 2 years ago.  Patient reports that she has been admitted every year around her mother's death anniversary because she is extremely emotional and has thoughts of hurting herself.  She repeatedly reports that she \"wants to go to heaven to be with her mother \".  Patient is unable to be consoled and is tearing throughout her conversation and hyperventilating without being able to be redirected.  She denies any substance use and reports shortness of breath and chest tightness associated with her crying.    Limitations to History: crying  Additional History Obtained from: patient alone    PMHx/PSHx:  Per HPI.   - has a past medical history of Abnormal weight gain (12/17/2018), Anxiety, Blister (nonthermal), unspecified lower leg, initial encounter (05/30/2018), Crushing injury of unspecified finger(s), initial encounter (06/30/2017), Depression, Diabetes mellitus (CMS/[facility]), Drug-induced hypoglycemia without coma (08/04/2021), Migraine, unspecified, not intractable, without status migrainosus (12/27/2016), Other microscopic hematuria (06/01/2018), Pain in left hand (06/29/2017), Pain in unspecified joint (12/30/2013), Personal history of diseases of the blood and blood-forming organs and certain disorders involving the immune mechanism (06/07/2017), Personal history of diseases of the skin and subcutaneous tissue (06/13/2019), " Personal history of other complications of pregnancy, childbirth and the puerperium (07/25/2017), Personal history of other diseases of the female genital tract (06/05/2018), Personal history of other diseases of the nervous system and sense organs (08/03/2022), Personal history of other endocrine, nutritional and metabolic disease (12/17/2018), Personal history of other specified conditions (06/09/2017), Psychiatric illness, Schizophrenia (CMS/[facility]), and Urge incontinence (06/01/2018).  - has a past surgical history that includes Other surgical history (11/11/2019).    Social History:  - Tobacco:  reports that she has never smoked. She has never used smokeless tobacco.   - Alcohol:  reports no history of alcohol use.   - Drugs:  reports no history of drug use.     Medications: Reviewed in EMR.     Allergies:  Penicillins, Cefixime, Ditropan, Esomeprazole, Hyoscyamine, Hyoscyamine sulfate, Penicillin, Sulfa (sulfonamide antibiotics), and Ciprofloxacin    ???????????????????????????????????????????????????????????????  Triage Vitals:  T 36.8 °C (98.2 °F)  HR 66  BP (!) 147/115  RR 18  O2 98 % None (Room air)    Physical Exam  Vitals and nursing note reviewed.   Constitutional:       General: She is not in acute distress.     Appearance: She is well-developed.   HENT:      Head: Normocephalic and atraumatic.      Mouth/Throat:      Mouth: Mucous membranes are dry.      Pharynx: Oropharynx is clear.   Eyes:      Conjunctiva/sclera: Conjunctivae normal.   Cardiovascular:      Rate and Rhythm: Normal rate and regular rhythm.      Heart sounds: No murmur heard.  Pulmonary:      Effort: Pulmonary effort is normal. No respiratory distress.      Breath sounds: Normal breath sounds. No wheezing, rhonchi or rales.   Abdominal:      General: There is no distension.      Palpations: Abdomen is soft.      Tenderness: There is no abdominal tenderness. There is no guarding or rebound.   Musculoskeletal:         General: No  "swelling or tenderness.      Cervical back: Neck supple.   Skin:     General: Skin is warm and dry.      Capillary Refill: Capillary refill takes less than 2 seconds.   Neurological:      Mental Status: She is alert and oriented to person, place, and time.      Sensory: No sensory deficit.      Motor: No weakness.      Gait: Gait normal.   Psychiatric:      Comments: Crying throughout examination and repeatedly saying that she wishes to \"join her mother and have\".  No future planning and poor insight       ???????????????????????????????????????????????????????????????  EKG (per my interpretation):  Sinus rhythm with a rate of 97.  No SC or QRS progression.  Normal axis with a QTc of 430.  T wave inversions noted in leads aVR and V1 that are noted on prior EKG in December 2023.  No STEMI.    ED Course  Diagnoses as of 01/10/24 1947   Suicidal ideation       Medical Decision Making:  This is a 23-year-old female with history of schizoaffective disorder, anxiety, depression, bipolar disorder presenting due to a severely depressed mood secondary to her mother's death anniversary that is upcoming.  Patient is extremely tearful and is finding it difficult to comply with psychiatric workup.  Patient was given 1 mg of oral Ativan to help facilitate examination and lab work.  Patient is pending the results of her lab work and EPAT evaluation however given the fact that she has been admitted multiple times for similar presentations in the past and has no future planning and access to both pills and sharp objects with the plan, will likely need admission.  Patient care was handed off to oncoming ED team pending EPAT and lab work.    External records reviewed: recent inpatient, clinic, and prior ED notes  Diagnostic imaging independently reviewed/interpreted by me (as reflected in MDM) includes: none  Social Determinants Affecting Care:   Discussion of management with other providers: attending  Prescription Drug " Consideration: none  Escalation of Care: EPAT to help with disposition    Impression:   SI  Grief    Disposition: Handoff      Procedures ? [employer] last updated 1/9/2024 2:52 PM     Sahra Sandoval  PGY-2 Emergency Medicine  [facility]      Attending Note:  The patient was seen by the resident/fellow/SOULEYMANE.  I have personally performed a substantive portion of the encounter.  I have seen and examined the patient; agree with the workup, evaluation, MDM, management and diagnosis with the exception/addition of the following.  The care plan has been discussed with the resident/fellow; I have reviewed the resident/fellow’s note and agree with the documented findings with the exception/addition of the following:       HPI:   Berna Ricardo is a 23 year old female with history of schizoaffective disorder, anxiety, depression and chronic constipation presenting to the ED for concern for SI. The patient is presenting from  due to depression and SI.  She reports plan to OD or cut herself. She notes that it is the anniversary of her mother's death soon. She denies immediate access to knives or access to firearms. She also reports AH consisting of voices of unknown people telling her to harm herself and VH consisting of various unknown people.  She denies neck pain/stiffness, chest pain, SOB, palpitations, cough, abdominal pain, nausea, or vomiting.     Per chart review, admitted to [facility] 12/2/23 for psychosis and SI. Seen in ED 1/5/24 for abdominal pain and constipation. CT A/P without acute process per notes.  Discharged home.     Additional History Obtained from: See HPI       Physical Exam:  General: Grossly well-developed, awake, alert, NAD    Head: Normocephalic, no overt external signs of trauma    ENT: Mucus membranes moist, nares patent    Neck: Supple, trachea midline    Neurologic: A&Ox4, FS, TM, EOMI, PERRL, IVAN x 4 with grossly symmetric strength, 5/5, SILT grossly intact  BUE and BLE    Cardiovascular: RRR, no MRG, pulses grossly symmetric    Respiratory: CTA B/L, no wheeze, rales or rhonchi    Abdomen: Soft, not appreciably tender, no evident rebound/guarding, no pulsatile masses palpable.    Back: No apparent CVA TTP, no midline TLS spine TTP, stepoffs, or acute deformities    MSK: No gross bony deformities in BUE and BLE    Skin/Integumentary: Warm and dry    Psychiatric: Tearful but cooperative with exam.  No obvious signs of responding to internal stimulation on exam.        EKG Interpretation:  Pending    Differential Diagnoses Considered:  See MDM below    Chronic Medical Conditions Significantly Affecting Care:  See MDM below    External Records Reviewed:  I reviewed recent and relevant outside records as noted in HPI above and MDM below.     Independent Interpretation of Studies:    Laboratory/Imaging Studies:  Laboratory results personally reviewed and independently interpreted:  CBC without significant anemia or thrombocytopenia. WBC 11.5 (prior 10.2-13.8 recently)  Metabolic panel without TERESA, HAGMA or significant electrolyte anomalies. No transaminitis or hyperbilirubinemia   APAP/ASA/ETOH not elevated  HCG negative  Urine tox negative  COVID-19 pending    Social Determinants of Health Significantly Affecting Care:  See HPI/MDM    Prescription Drug Consideration:  See MDM    Diagnostic testing considered:  See MDM and relevant sections      Medical Decision Making/Course:  23 year old female with history of schizoaffective disorder, anxiety, depression and chronic constipation presenting to the ED for concern for SI. The patient was afebrile and HDS on arrival.  She exhibited symptoms of significant anxiety on exam. She denies chest pain, SOB or palpitations. EKG ordered and pending. She was given oral Ativan with improvement of symptoms.  Laboratory studies obtained and without significant leukocytosis (appears chronically borderline elevated on recent labs). No TERESA,  HAGMA or significant electrolyte anomalies.  She does not appear acutely intoxicated, and APAP/ASA/ETOH not elevated, tox and HCG negative. She was signed out to oncoming ED provider team at approximately 1600, pending EKG, remaining labs, and EPAT/psychiatry evaluation/final disposition. She was closely observed and had no signs of hemodynamic, respiratory or neurological decompensation during ED shift.      Sahra Sandoval MD  Resident  01/10/24 7622

## 2024-01-09 NOTE — ED TRIAGE NOTES
Pt brought in by PD having SI/HI thoughts. PT reports hearing voices and seeing things. Pt states her plan was to O.D and she tried in the past. Pt states this has been going on a for a week now. She at first refused to tell me her name. She is tearful and upset at the fact we have to take her phone. She is refusing to change at this time.
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
## 2024-01-10 VITALS
WEIGHT: 231 LBS | HEIGHT: 67 IN | BODY MASS INDEX: 36.26 KG/M2 | SYSTOLIC BLOOD PRESSURE: 118 MMHG | OXYGEN SATURATION: 99 % | DIASTOLIC BLOOD PRESSURE: 80 MMHG | HEART RATE: 92 BPM | RESPIRATION RATE: 18 BRPM | TEMPERATURE: 98.2 F

## 2024-01-10 LAB
ATRIAL RATE: 97 BPM
P AXIS: 61 DEGREES
P OFFSET: 208 MS
P ONSET: 159 MS
PR INTERVAL: 122 MS
Q ONSET: 220 MS
QRS COUNT: 16 BEATS
QRS DURATION: 76 MS
QT INTERVAL: 342 MS
QTC CALCULATION(BAZETT): 434 MS
QTC FREDERICIA: 401 MS
R AXIS: 84 DEGREES
T AXIS: 17 DEGREES
T OFFSET: 391 MS
VENTRICULAR RATE: 97 BPM

## 2024-01-10 PROCEDURE — 2500000001 HC RX 250 WO HCPCS SELF ADMINISTERED DRUGS (ALT 637 FOR MEDICARE OP): Performed by: NURSE PRACTITIONER

## 2024-01-10 RX ORDER — LORAZEPAM 0.5 MG/1
1 TABLET ORAL ONCE
Status: COMPLETED | OUTPATIENT
Start: 2024-01-10 | End: 2024-01-10

## 2024-01-10 RX ADMIN — LORAZEPAM 1 MG: 0.5 TABLET ORAL at 08:50

## 2024-01-10 NOTE — SIGNIFICANT EVENT
Application for Emergency Admission      Ready for Transfer?  Is the patient medically cleared for transfer to inpatient psychiatry: Yes  Has the patient been accepted to an inpatient psychiatric hospital: Yes    Application for Emergency Admission  IN ACCORDANCE [place]  The Chief Clinical Officer of: Salvador Hayward 1/10/2024 .3:02 AM    Reason for Hospitalization  The undersigned has reason to believe that: Berna Ricardo Is a mentally ill person subject to hospitalization by court order under division B Section 5122.01 of the Revised Code, i.e., this person:    1.Yes  Represents a substantial risk of physical harm to self as manifested by evidence of threats of, or attempts at, suicide or serious self-inflicted bodily harm    2.No Represents a substantial risk of physical harm to others as manifested by evidence of recent homicidal or other violent behavior, evidence of recent threats that place another in reasonable fear of violent behavior and serious physical harm, or other evidence of present dangerousness    3.Yes Represents a substantial and immediate risk of serious physical impairment or injury to self as manifested by  evidence that the person is unable to provide for and is not providing for the person's basic physical needs because of the person's mental illness and that appropriate provision for those needs cannot be made  immediately available in the community    4.Yes Would benefit from treatment in a hospital for his mental illness and is in need of such treatment as manifested by evidence of behavior that creates a grave and imminent risk to substantial rights of others or  himself.    5.Yes Would benefit from treatment as manifested by evidence of behavior that indicates all of the following:       (a) The person is unlikely to survive safely in the community without supervision, based on a clinical determination.       (b) The person has a history of lack of compliance  with treatment for mental illness and one of the following applies:      (i) At least twice within the thirty-six months prior to the filing of an affidavit seeking court-ordered treatment of the person under section 5122.111 of the Revised Code, the lack of compliance has been a significant factor in necessitating hospitalization in a hospital or receipt of services in a forensic or other mental health unit of a correctional facility, provided that the thirty-six-month period shall be extended by the length of any hospitalization or incarceration of the person that occurred within the thirty-six-month period.      (ii) Within the forty-eight months prior to the filing of an affidavit seeking court-ordered treatment of the person under section 5122.111 of the Revised Code, the lack of compliance resulted in one or more acts of serious violent behavior toward self or others or threats of, or attempts at, serious physical harm to self or others, provided that the forty-eight-month period shall be extended by the length of any hospitalization or incarceration of the person that occurred within the forty-eight-month period.      (c) The person, as a result of mental illness, is unlikely to voluntarily participate in necessary treatment.       (d) In view of the person's treatment history and current behavior, the person is in need of treatment in order to prevent a relapse or deterioration that would be likely to result in substantial risk of serious harm to the person or others.    (e) Represents a substantial risk of physical harm to self or others if allowed to remain at liberty pending examination.    Therefore, it is requested that said person be admitted to the above named facility.    STATEMENT OF BELIEF    Must be filled out by one of the following: a psychiatrist, licensed physician, licensed clinical psychologist, health or ,  or .  (Statement shall include the circumstances  under which the individual was taken into custody and the reason for the person's belief that hospitalization is necessary. The statement shall also include a reference to efforts made to secure the individual's property at his residence if he was taken into custody there. Every reasonable and appropriate effort should be made to take this person into custody in the least conspicuous manner possible.)    Patient is active suicidal ideation and meets inpatient criteria for admission today.    Lawrence Krishna MD 1/10/2024     _____________________________________________________________   Place of Employment: Joint venture between [employer] and [employer]    STATEMENT OF OBSERVATION BY PSYCHIATRIST, LICENSED PHYSICIAN, OR LICENSED CLINICAL PSYCHOLOGIST, IF APPLICABLE    Place of Observation (e.g., [facility] center, general hospital, office, emergency facility)  (If applicable, please complete)    Lawrence Krishna MD 1/10/2024    _____________________________________________________________

## 2024-01-10 NOTE — ED NOTES
Discharge paperwork gone over with pt. Education provided and prescriptions (if applicable) were given to pt. Pt IV removed and there is no bleeding at the site of removal. Pt was ambulatory out of ED independently. Pt has no further questions or concerns at this time. Treatment complete.      Belongings given to [facility].      Virgilio Jimenez, RN  01/10/24 [id number]

## 2024-01-15 ENCOUNTER — APPOINTMENT (OUTPATIENT)
Dept: PRIMARY CARE | Facility: CLINIC | Age: 24
End: 2024-01-15

## 2024-01-24 ENCOUNTER — HOSPITAL ENCOUNTER (EMERGENCY)
Facility: HOSPITAL | Age: 24
Discharge: HOME | End: 2024-01-24
Attending: EMERGENCY MEDICINE
Payer: COMMERCIAL

## 2024-01-24 ENCOUNTER — CLINICAL SUPPORT (OUTPATIENT)
Dept: EMERGENCY MEDICINE | Facility: HOSPITAL | Age: 24
End: 2024-01-24
Payer: COMMERCIAL

## 2024-01-24 VITALS
DIASTOLIC BLOOD PRESSURE: 85 MMHG | HEART RATE: 95 BPM | TEMPERATURE: 97.5 F | RESPIRATION RATE: 15 BRPM | SYSTOLIC BLOOD PRESSURE: 128 MMHG | OXYGEN SATURATION: 100 %

## 2024-01-24 DIAGNOSIS — N39.0 UTI (URINARY TRACT INFECTION), UNCOMPLICATED: Primary | ICD-10-CM

## 2024-01-24 DIAGNOSIS — R45.851 SUICIDAL IDEATION: ICD-10-CM

## 2024-01-24 LAB
ALBUMIN SERPL BCP-MCNC: 3.5 G/DL (ref 3.4–5)
ALP SERPL-CCNC: 50 U/L (ref 33–110)
ALT SERPL W P-5'-P-CCNC: 5 U/L (ref 7–45)
AMPHETAMINES UR QL SCN: ABNORMAL
ANION GAP SERPL CALC-SCNC: 13 MMOL/L (ref 10–20)
APAP SERPL-MCNC: <10 UG/ML
APPEARANCE UR: ABNORMAL
AST SERPL W P-5'-P-CCNC: 10 U/L (ref 9–39)
ATRIAL RATE: 79 BPM
BARBITURATES UR QL SCN: ABNORMAL
BASOPHILS # BLD AUTO: 0.03 X10*3/UL (ref 0–0.1)
BASOPHILS NFR BLD AUTO: 0.3 %
BENZODIAZ UR QL SCN: ABNORMAL
BILIRUB SERPL-MCNC: 0.5 MG/DL (ref 0–1.2)
BILIRUB UR STRIP.AUTO-MCNC: ABNORMAL MG/DL
BUN SERPL-MCNC: 6 MG/DL (ref 6–23)
BZE UR QL SCN: ABNORMAL
CALCIUM SERPL-MCNC: 9.3 MG/DL (ref 8.6–10.6)
CANNABINOIDS UR QL SCN: ABNORMAL
CHLORIDE SERPL-SCNC: 105 MMOL/L (ref 98–107)
CO2 SERPL-SCNC: 23 MMOL/L (ref 21–32)
COLOR UR: ABNORMAL
CREAT SERPL-MCNC: 1.19 MG/DL (ref 0.5–1.05)
EGFRCR SERPLBLD CKD-EPI 2021: 66 ML/MIN/1.73M*2
EOSINOPHIL # BLD AUTO: 0.04 X10*3/UL (ref 0–0.7)
EOSINOPHIL NFR BLD AUTO: 0.4 %
ERYTHROCYTE [DISTWIDTH] IN BLOOD BY AUTOMATED COUNT: 14.7 % (ref 11.5–14.5)
ETHANOL SERPL-MCNC: <10 MG/DL
FENTANYL+NORFENTANYL UR QL SCN: ABNORMAL
GLUCOSE SERPL-MCNC: 82 MG/DL (ref 74–99)
GLUCOSE UR STRIP.AUTO-MCNC: NEGATIVE MG/DL
HCT VFR BLD AUTO: 35.7 % (ref 36–46)
HGB BLD-MCNC: 12.4 G/DL (ref 12–16)
HOLD SPECIMEN: NORMAL
IMM GRANULOCYTES # BLD AUTO: 0.08 X10*3/UL (ref 0–0.7)
IMM GRANULOCYTES NFR BLD AUTO: 0.8 % (ref 0–0.9)
KETONES UR STRIP.AUTO-MCNC: ABNORMAL MG/DL
LEUKOCYTE ESTERASE UR QL STRIP.AUTO: ABNORMAL
LYMPHOCYTES # BLD AUTO: 3.1 X10*3/UL (ref 1.2–4.8)
LYMPHOCYTES NFR BLD AUTO: 32.5 %
MCH RBC QN AUTO: 32.2 PG (ref 26–34)
MCHC RBC AUTO-ENTMCNC: 34.7 G/DL (ref 32–36)
MCV RBC AUTO: 93 FL (ref 80–100)
MONOCYTES # BLD AUTO: 0.92 X10*3/UL (ref 0.1–1)
MONOCYTES NFR BLD AUTO: 9.6 %
MUCOUS THREADS #/AREA URNS AUTO: ABNORMAL /LPF
NEUTROPHILS # BLD AUTO: 5.37 X10*3/UL (ref 1.2–7.7)
NEUTROPHILS NFR BLD AUTO: 56.4 %
NITRITE UR QL STRIP.AUTO: NEGATIVE
NRBC BLD-RTO: 0 /100 WBCS (ref 0–0)
OPIATES UR QL SCN: ABNORMAL
OXYCODONE+OXYMORPHONE UR QL SCN: ABNORMAL
P AXIS: 21 DEGREES
P OFFSET: 206 MS
P ONSET: 160 MS
PCP UR QL SCN: ABNORMAL
PH UR STRIP.AUTO: 5 [PH]
PLATELET # BLD AUTO: 241 X10*3/UL (ref 150–450)
POTASSIUM SERPL-SCNC: 4.1 MMOL/L (ref 3.5–5.3)
PR INTERVAL: 120 MS
PREGNANCY TEST URINE, POC: NEGATIVE
PROT SERPL-MCNC: 7 G/DL (ref 6.4–8.2)
PROT UR STRIP.AUTO-MCNC: ABNORMAL MG/DL
Q ONSET: 220 MS
QRS COUNT: 13 BEATS
QRS DURATION: 76 MS
QT INTERVAL: 370 MS
QTC CALCULATION(BAZETT): 424 MS
QTC FREDERICIA: 405 MS
R AXIS: 18 DEGREES
RBC # BLD AUTO: 3.85 X10*6/UL (ref 4–5.2)
RBC # UR STRIP.AUTO: NEGATIVE /UL
RBC #/AREA URNS AUTO: ABNORMAL /HPF
SALICYLATES SERPL-MCNC: <3 MG/DL
SODIUM SERPL-SCNC: 137 MMOL/L (ref 136–145)
SP GR UR STRIP.AUTO: 1.03
SQUAMOUS #/AREA URNS AUTO: ABNORMAL /HPF
T AXIS: 6 DEGREES
T OFFSET: 405 MS
UROBILINOGEN UR STRIP.AUTO-MCNC: 4 MG/DL
VENTRICULAR RATE: 79 BPM
WBC # BLD AUTO: 9.5 X10*3/UL (ref 4.4–11.3)
WBC #/AREA URNS AUTO: ABNORMAL /HPF
YEAST BUDDING #/AREA UR COMP ASSIST: PRESENT /HPF

## 2024-01-24 PROCEDURE — 2500000004 HC RX 250 GENERAL PHARMACY W/ HCPCS (ALT 636 FOR OP/ED): Performed by: PHYSICIAN ASSISTANT

## 2024-01-24 PROCEDURE — 80143 DRUG ASSAY ACETAMINOPHEN: CPT | Performed by: PHYSICIAN ASSISTANT

## 2024-01-24 PROCEDURE — 80053 COMPREHEN METABOLIC PANEL: CPT | Performed by: PHYSICIAN ASSISTANT

## 2024-01-24 PROCEDURE — 85025 COMPLETE CBC W/AUTO DIFF WBC: CPT | Performed by: PHYSICIAN ASSISTANT

## 2024-01-24 PROCEDURE — 87086 URINE CULTURE/COLONY COUNT: CPT | Performed by: PHYSICIAN ASSISTANT

## 2024-01-24 PROCEDURE — 81001 URINALYSIS AUTO W/SCOPE: CPT | Mod: CCI | Performed by: PHYSICIAN ASSISTANT

## 2024-01-24 PROCEDURE — 36415 COLL VENOUS BLD VENIPUNCTURE: CPT | Performed by: PHYSICIAN ASSISTANT

## 2024-01-24 PROCEDURE — 93005 ELECTROCARDIOGRAM TRACING: CPT

## 2024-01-24 PROCEDURE — 80307 DRUG TEST PRSMV CHEM ANLYZR: CPT | Performed by: PHYSICIAN ASSISTANT

## 2024-01-24 PROCEDURE — 99285 EMERGENCY DEPT VISIT HI MDM: CPT | Performed by: PHYSICIAN ASSISTANT

## 2024-01-24 PROCEDURE — 81025 URINE PREGNANCY TEST: CPT | Performed by: PHYSICIAN ASSISTANT

## 2024-01-24 PROCEDURE — 99285 EMERGENCY DEPT VISIT HI MDM: CPT | Mod: 25,27

## 2024-01-24 PROCEDURE — 99285 EMERGENCY DEPT VISIT HI MDM: CPT | Performed by: EMERGENCY MEDICINE

## 2024-01-24 RX ORDER — NITROFURANTOIN 25; 75 MG/1; MG/1
100 CAPSULE ORAL 2 TIMES DAILY
Qty: 14 CAPSULE | Refills: 0 | Status: SHIPPED | OUTPATIENT
Start: 2024-01-24 | End: 2024-01-31

## 2024-01-24 RX ORDER — ACETAMINOPHEN 500 MG
500 TABLET ORAL EVERY 6 HOURS PRN
Qty: 20 TABLET | Refills: 0 | Status: SHIPPED | OUTPATIENT
Start: 2024-01-24 | End: 2024-01-29

## 2024-01-24 RX ORDER — NITROFURANTOIN 25; 75 MG/1; MG/1
100 CAPSULE ORAL EVERY 12 HOURS SCHEDULED
Status: DISCONTINUED | OUTPATIENT
Start: 2024-01-24 | End: 2024-01-24 | Stop reason: HOSPADM

## 2024-01-24 RX ADMIN — NITROFURANTOIN MONOHYDRATE/MACROCRYSTALS 100 MG: 75; 25 CAPSULE ORAL at 12:07

## 2024-01-24 SDOH — HEALTH STABILITY: MENTAL HEALTH: WHEN DID YOU TRY TO KILL YOURSELF?: "A LONG TIME AGO WHEN I WAS YOUNG"

## 2024-01-24 SDOH — HEALTH STABILITY: MENTAL HEALTH: ANXIETY SYMPTOMS: NO PROBLEMS REPORTED OR OBSERVED.

## 2024-01-24 SDOH — HEALTH STABILITY: MENTAL HEALTH: IN THE PAST FEW WEEKS, HAVE YOU WISHED YOU WERE DEAD?: NO

## 2024-01-24 SDOH — HEALTH STABILITY: MENTAL HEALTH: HAVE YOU EVER TRIED TO KILL YOURSELF?: YES

## 2024-01-24 SDOH — ECONOMIC STABILITY: HOUSING INSECURITY: FEELS SAFE LIVING IN HOME: YES

## 2024-01-24 SDOH — HEALTH STABILITY: MENTAL HEALTH: IN THE PAST WEEK, HAVE YOU BEEN HAVING THOUGHTS ABOUT KILLING YOURSELF?: NO

## 2024-01-24 SDOH — HEALTH STABILITY: MENTAL HEALTH: DEPRESSION SYMPTOMS: APPETITE CHANGE;SLEEP DISTURBANCE

## 2024-01-24 SDOH — HEALTH STABILITY: MENTAL HEALTH: IN THE PAST FEW WEEKS, HAVE YOU FELT THAT YOU OR YOUR FAMILY WOULD BE BETTER OFF IF YOU WERE DEAD?: NO

## 2024-01-24 SDOH — ECONOMIC STABILITY: GENERAL

## 2024-01-24 SDOH — HEALTH STABILITY: MENTAL HEALTH: ARE YOU HAVING THOUGHTS OF KILLING YOURSELF RIGHT NOW?: NO

## 2024-01-24 SDOH — HEALTH STABILITY: MENTAL HEALTH: HOW DID YOU TRY TO KILL YOURSELF?: OD

## 2024-01-24 ASSESSMENT — PAIN DESCRIPTION - LOCATION: LOCATION: HEAD

## 2024-01-24 ASSESSMENT — LIFESTYLE VARIABLES
PRESCIPTION_ABUSE_PAST_12_MONTHS: NO
REASON UNABLE TO ASSESS: NO
HAVE PEOPLE ANNOYED YOU BY CRITICIZING YOUR DRINKING: NO
EVER HAD A DRINK FIRST THING IN THE MORNING TO STEADY YOUR NERVES TO GET RID OF A HANGOVER: NO
EVER FELT BAD OR GUILTY ABOUT YOUR DRINKING: NO
SUBSTANCE_ABUSE_PAST_12_MONTHS: YES
HAVE YOU EVER FELT YOU SHOULD CUT DOWN ON YOUR DRINKING: NO

## 2024-01-24 ASSESSMENT — PAIN SCALES - GENERAL
PAINLEVEL_OUTOF10: 3
PAINLEVEL_OUTOF10: 6

## 2024-01-24 ASSESSMENT — PAIN - FUNCTIONAL ASSESSMENT: PAIN_FUNCTIONAL_ASSESSMENT: 0-10

## 2024-01-24 NOTE — ED NOTES
Spoke to pt's legal guardian, Joelle, and she stated pt is okay to take an Uber or Lyft home     Gio Oliver, ROXANNE  01/24/24 [id number]

## 2024-01-24 NOTE — ED NOTES
All belongings returned to pt. AVS provided. Will set up ride once dressed and ready.     Gio Oliver RN  01/24/24 [id number]

## 2024-01-24 NOTE — ED TRIAGE NOTES
[facility] where staff called 911 due to concern for SI. Pt c/o SI due to chronic voices telling her to O-D on meds or cut herself. She also reports chronic VH stating there are small animals, shadows, and tall people. She states she has no intentional self SI but does hear the voices worse today. No HI. Pt lives at a group home and states she is safe there. Calm and cooperative.    Pt also c/o 6/10 generalized HA, urinary frequency, and dysuria

## 2024-01-24 NOTE — ED NOTES
Pt reports voices telling her to cut herself or O-D but pt states she has no personal intent to hurt self.     Gio Oliver RN  01/24/24 [id number]

## 2024-01-24 NOTE — PROGRESS NOTES
EPAT - Social Work Psychiatric Assessment    Arrival Details  Mode of Arrival: Ambulance  Admission Source: Emergency department  Admission Type: Involuntary  EPAT Assessment Start Date: 24  EPAT Assessment Start Time: 1200  Name of : Kenyetta Murphy M.Ed., FAVIO    History of Present Illness    Admission Reason: HA/SI    HPI: The patient is a 23 yr old AA female with a history of schizoaffective disorder. She presents in the ED with concerns of auditory hallucinations and suicidal ideation. According to ED notes, the patient was at her recreational activity center when staff called 911 due to concerns for SI. The patient was reporting that she was hearing voices telling her to OD on meds or to cut herself. She also claims to have chronic visual hallucinations stating there are small animals, shadows, and tall people. The patient states that she has no intent to self-harm but is hearing voices worse today. The patient lives in a group home and claims to feel safe there. The patient scored low risk for suicide on the C-SSRS when assessed in the ED. The patient was referred to EPAT for further psychiatric evaluation.       Patient history was reviewed before EPAT evaluation.       Psychiatric Diagnosis History: schizoaffective disorder, schizophrenia, anxiety disorder, mood disorder, drug induced psychotic disorder, PTSD, and major depressive disorder.  (PTSD: Patient's mother  in a MVA in )       Psychiatric Medication/Treatment History: Cogentin, Wellbutrin, buspar, klonopin, Depakote, Haldol, atarax, melatonin, Zyprexa, lybalvi, invega, invega sustenna, paxil, Risperdal, trazodone, Effexor, venlafaxine, and Geodon/ Patient has a vast history of inpatient psychiatric admissions. Most recent hospitalization was at [facility] on 1/10/2024 and another at [address] [place] [address] in 2023.       The patient lives in a group home and receives outpatient MH care on a regular basis.       The patient’s  legal guardian is Joelle Neil [phone]     Readmission Information   Readmission within 30 Days: Yes  Previous ED Visit Date and Reason : 1/10/2024 HA/SI  Previous Discharge Date and Location: 1/10/2024 [facility] (Patient admitted 1/10/2024 to [facility])  Factors Contributing to  Readmission Inpatient/ED (Team Perspective): Other (Comments) (Patient has HA and SI at baseline)    Psychiatric Symptoms  Anxiety Symptoms: No problems reported or observed.  Depression Symptoms: Appetite change, Sleep disturbance  Helga Symptoms: No problems reported or observed.    Psychosis Symptoms  Hallucination Type: Auditory, Command, Visual  Delusion Type: No problems reported or observed.    Additional Symptoms - Adult  Generalized Anxiety Disorder: No problems reported or observed.  Obsessive Compulsive Disorder: No problems reported or observed.  Panic Attack: No problems reported or observed.  Post Traumatic Stress Disorder: Traumatic event (Mother killed in MVA)  Delirium: No problems reported or observed.  Review of Symptoms Comments: The patient is experiencing auditory hallucinations with voices telling her to OD or to cut herself.    Past Psychiatric History/Meds/Treatments  Past Psychiatric History: schizoaffective disorder, schizophrenia, anxiety disorder, mood disorder, drug induced psychotic disorder, PTSD, and major depressive disorder  Past Psychiatric Meds/Treatments: Cogentin, Wellbutrin, buspar, klonopin, Depakote, Haldol, atarax, melatonin, Zyprexa, lybalvi, invega, invega sustenna, paxil, Risperdal, trazodone, Effexor, venlafaxine, and Geodon/ Patient has a vast history of inpatient psychiatric admissions. Most recent hospitalization was at [facility] on 1/10/2024 and another at [address] [place] [address] in December 2023.  Past Violence/Victimization History: None reported    Current Mental Health Contacts   Name/Phone Number: Opal Hurtado [facility] .[phone]   Last  Appointment Date: Unknown  Provider Name/Phone Number: Natalee Prieto MD/ [phone]  Provider Last Appointment Date: 12/29/2023    Support System: Extended family, Community, Friends    Living Arrangement: Assisted living (Group Home)    Home Safety  Feels Safe Living in Home: Yes  Potentially Unsafe Housing Conditions: Unable to Assess    Income Information  Employment Status for: Patient  Employment Status: Disabled  Income Source: Disability  Current/Previous Occupation:  (Unknown)  Financial Concerns: None  Who Manages Finances if Patient Unable: Patient's legal guardian    [facility] Service/Education History  Current or Previous  Service: None  Education Level: College (some college courses at [employer])  History of Learning Problems: Yes  History of School Behavior Problems: Yes (Had IEP due to MH issues)    Social/Cultural History  Social History: Patient has a legal guardian and is a [place] citizen  Cultural Requests During Hospitalization: None  Spiritual Requests During Hospitalization: None  Important Activities: Social    Legal  Legal Considerations: Patient/ Family Ability to Make Healthcare Decisions (Patient has a legal guardian)  Assistance with Managing/Advocating Healthcare Needs: Legal Guardian  Criminal Activity/ Legal Involvement Pertinent to Current Situation/ Hospitalization: None  Legal Concerns: None  Legal Comments: None    Drug Screening  Have you used any substances (canabis, cocaine, heroin, hallucinogens, inhalants, etc.) in the past 12 months?: Yes (Marijuana)  Have you used any prescription drugs other than prescribed in the past 12 months?: No  Is a toxicology screen needed?: Yes    Stage of Change  Stage of Change: Action  History of Treatment: Other (Comment) (Patient claims that she currently has services through [facility])  Type of Treatment Offered: Other (Comment) (Patient reports already getting services offered through [facility] resources)  Treatment Offered: Other (Comment)  (Patient reports [facility])  Duration of Substance Use: Unknown  Frequency of Substance Use: Unknown  Age of First Substance Use: Unknown    Psychosocial  Psychosocial (WDL): Within Defined Limits    Orientation  Orientation Level: Oriented X4    General Appearance  Motor Activity: Unremarkable  Speech Pattern: Other (Comment) (Normal Speech Pattern)  General Attitude: Attentive, Cooperative, Pleasant  Appearance/Hygiene: Unremarkable    Thought Process  Coherency: Other (Comment) (Patient appeared coherent)  Content: Unremarkable  Delusions: Other (Comment) (No delusions observed)  Perception: Hallucinations  Hallucination: Auditory, Command, Visual  Judgment/Insight: Impaired  Confusion: None  Cognition: Appropriate judgement, Appropriate attention/concentration, Follows commands    Sleep Pattern  Sleep Pattern: Disturbed/interrupted sleep, Early awakening    Risk Factors  Self Harm/Suicidal Ideation Plan: Patient denies wanting to kill herself and says she hears voices telling her to OD or to cut self  Previous Self Harm/Suicidal Plans: OD/ Cut self  Risk Factors: 1st psychiatric hospitalization by age 19, Command hallucinations, Major mental illness  Description of Thoughts/Ideas Leaving Unit Now: The patient said she does not want to go to [facility] if she gets admitted    Violence Risk Assessment  Assessment of Violence: On admission  Thoughts of Harm to Others: No - not currently/within last 6 months    Ability to Assess Risk Screen  Risk Screen - Ability to Assess: Able to be screened  Ask Suicide-Screening Questions  1. In the past few weeks, have you wished you were dead?: No  2. In the past few weeks, have you felt that you or your family would be better off if you were dead?: No  3. In the past week, have you been having thoughts about killing yourself?: No  4. Have you ever tried to kill yourself?: Yes  How did you try to kill yourself?: OD  When did you try to  "kill yourself?: \"A long time ago when I was young\"  5. Are you having thoughts of killing yourself right now?: No (Just voices bothering her to harm herself.)  Calculated Risk Score: Potential Risk  Step 1: Risk Factors  Current & Past Psychiatric Dx: Psychotic disorder, PTSD  Presenting Symptoms: Command hallucinations, Psychosis  Family History: Other (Comment) (None reported)  Precipitants/Stressors: Triggering events leading to humiliation, shame, and/or despair (e.g. loss of relationship, financial or health status) (real or anticipated)  Change in Treatment: Recent inpatient discharge  Access to Lethal Methods : No  Step 2: Protective Factors   Protective Factors Internal: Identifies reasons for living  Protective Factors External: Supportive social network or family or friends, Positive therapeutic relationships  Step 3: Suicidal Ideation Intensity  Most Severe Suicidal Ideation Identified: Voices tell me to OD or to cut myself  How Many Times Have You Had These Thoughts: Daily or almost daily  When You Have the Thoughts How Long do They Last : 4-8 hours/most of the day  Could/Can You Stop Thinking About Killing Yourself or Wanting to Die if You Want to: Can control thoughts with a lot of difficulty  Are There Things - Anyone or Anything - That Stopped You From Wanting to Die or Acting on: Deterrents definitely stopped you from attempting suicide  What Sort of Reasons Did You Have For Thinking About Wanting to Die or Killing Yourself: Mostly to get attention, revenge, or a reaction from others  Total Score: 15  Step 5: Documentation  Risk Level: Moderate suicide risk (Patient will always be at moderate risk at baseline due to past attempts and baseline psychosis)    Psychiatric Impression and Plan of Care    Assessment and Plan: The patient is a 23 yr old AA female with a history of schizoaffective disorder. She presents with concerns of auditory hallucinations and suicidal ideation. The patient is in a bed " in the ED. She was pleasant and cooperative throughout the EPAT evaluation.       The patient denies any HI and alcohol use. She endorses SI due to auditory command hallucinations telling her to OD or to cut herself. She reports that she sees evil tall people, small animals, and shadows that “shape shift”. When asked if she had a plan for suicide the patient said, “The voices tell me to OD or to cut myself”. When asked if she intends to follow the command she said, “No. I don’t want to harm myself but the voices are mean and constantly tell me to. I think they want me to do it and then maybe they’ll go away”. The patient endorsed to smoking marijuana and that she already had treatment through [facility] services. Patient’s drug and alcohol screens were normal. The patient did not appear to be internally distracted and was smiling and appeared pleasant which is incongruent with reports of SI.       The patient was recently discharged from [facility] a few days ago and prior to that had inpatient admissions to [address] last month. She also has an upcoming outpatient psychiatry appointment on 1/29/2024. This writer has seen this patient before and she appears to be at her baseline. The patient scored low risk for suicide on the C-SSRS when assessed by the ED and moderate risk when assessed by EPAT. This difference in scores is due to EPAT considering the patient’s baseline of command AH telling her to harm herself. The patient is consistently hallucinating at baseline. Due to patient living in a group home she is well supported and most likely does not have access to sharp objects and/or her meds and therefore she does not have access to the means to harm herself. She also will be seeing her outpatient psychiatrist in a few days.   Collateral: This writer spoke with patient’s legal guardian and she said that she has no new concerns for the patient other than a urinary tract infection and if EPAT though she was  okay to discharge back to group home she is okay with it. She also said, “She just got out of inpatient psych admission a few days ago”.       The patient most likely would not benefit from another inpatient psychiatric admission and is recommended for discharge. ED provider agrees after discussing patient evaluation with EPAT.     Diagnostic Impression: Schizoaffective disorder    Specific Resources Provided to Patient: None (Patient already has outpatient psych, counseling, and [facility] services)  CM Notified: No  PHP/IOP Recommended: None  Specific Information Provided for PHP/IOP: None  Plan Comments: None    Outcome/Disposition  Patient's Perception of Outcome Achieved: Unable to assess  Assessment, Recommendations and Risk Level Reviewed with: Conner Rao MD  Contact Name: Joelle Neil  Contact Number(s): [phone]  Contact Relationship: Legal Guardian  EPAT Assessment Completed Date: 01/24/24  EPAT Assessment Completed Time: 1322  Patient Disposition: Home

## 2024-01-24 NOTE — DISCHARGE INSTRUCTIONS
You do have a UTI, we will treat with Macrobid given your extensive medication allergies until urine culture results, someone from pharmacy will be contacting you based on susceptibility results to change antibiotics.

## 2024-01-24 NOTE — ED NOTES
Pt changed into two gowns and gripped socks.    Belongings collected as follows:    Three single dollar bills  Bus pass  Debit card    Police card    Above items placed in small bag that was then placed inside a larger belongings bag    Vest  Jacket  Shirt  Pants  Boots    All belongings placed in two labeled white belongings bags, tied together, and placed in left locker in ambulance bay.     Pt kept cell phone and headphones. Behavioral expectations explained to pt to keep cell phone in which she provided understanding. Case removed and inspected by RN.     Gio Oliver RN  01/24/24 1104       Gio Oliver RN  01/24/24 122

## 2024-01-25 LAB — BACTERIA UR CULT: NORMAL

## 2024-01-30 NOTE — ED PROVIDER NOTES
"HPI   Chief Complaint   Patient presents with    Hallucinations       Patient is a 23-year-old female presents today for evaluation of chief complaint of hallucinations, patient tells me that she always hallucinates, states she sees animals, people and shapes, also states that she hears voices that tell her to kill herself.  Patient states that today they did a trip from her group home for activities, she states when they got there somebody asked her how she was doing today and she told him \"I want to kill myself\".  Patient states she oftentimes wants to kill herself, she does have a plan to overdose on all of her medications.  She is taking all of her medications as prescribed, has not had any recent overdose attempts.  She states she did previously attempt to kill herself in her youth but nothing recent.  Patient denies homicidal ideation.  She does endorse smoking marijuana and cigarettes but otherwise no drug or alcohol use.  She denies any medical complaints at this time however I was informed by her RN that she was complaining of some urinary frequency as well.                            [place] Coma Scale Score: 15                  Patient History   Past Medical History:   Diagnosis Date    Abnormal weight gain 12/17/2018    Abnormal weight gain    Anxiety     Blister (nonthermal), unspecified lower leg, initial encounter 05/30/2018    Blister of leg    Crushing injury of unspecified finger(s), initial encounter 06/30/2017    Crushing injury of finger, initial encounter    Depression     Diabetes mellitus (CMS/[facility])     Drug-induced hypoglycemia without coma 08/04/2021    Hypoglycemia due to insulin    Migraine, unspecified, not intractable, without status migrainosus 12/27/2016    Headache, migraine    Other microscopic hematuria 06/01/2018    Hematuria, microscopic    Pain in left hand 06/29/2017    Pain of left hand    Pain in unspecified joint 12/30/2013    Multiple joint pain    Personal history of " diseases of the blood and blood-forming organs and certain disorders involving the immune mechanism 06/07/2017    History of anemia    Personal history of diseases of the skin and subcutaneous tissue 06/13/2019    History of acne    Personal history of other complications of pregnancy, childbirth and the puerperium 07/25/2017    History of galactorrhea    Personal history of other diseases of the female genital tract 06/05/2018    History of vaginal bleeding    Personal history of other diseases of the nervous system and sense organs 08/03/2022    History of optic neuritis    Personal history of other endocrine, nutritional and metabolic disease 12/17/2018    History of elevated glucose    Personal history of other specified conditions 06/09/2017    History of nipple discharge    Psychiatric illness     Schizophrenia (CMS/HCC)     Urge incontinence 06/01/2018    Urge incontinence of urine     Past Surgical History:   Procedure Laterality Date    OTHER SURGICAL HISTORY  11/11/2019    Cholecystectomy     Family History   Problem Relation Name Age of Onset    Kidney nephrosis Mother      Other (Dialysis Patient) Mother's Sister      Lupus Mother's Sister          Systemic lupus erythematosus    Other (Dialysis Patient) Maternal Grandfather      Other (Cardiovascular disease) Maternal Grandfather      Hypertension Maternal Grandfather       Social History     Tobacco Use    Smoking status: Never    Smokeless tobacco: Never   Vaping Use    Vaping Use: Never used   Substance Use Topics    Alcohol use: Never    Drug use: Never       Physical Exam   ED Triage Vitals [01/24/24 1100]   Temperature Heart Rate Respirations BP   36.4 °C (97.5 °F) (!) 106 15 127/85      Pulse Ox Temp Source Heart Rate Source Patient Position   100 % Oral -- --      BP Location FiO2 (%)     -- --       Physical Exam  Vitals and nursing note reviewed.   Constitutional:       General: She is not in acute distress.     Appearance: Normal appearance.  She is not toxic-appearing.   HENT:      Head: Normocephalic and atraumatic.      Nose: Nose normal.   Eyes:      Extraocular Movements: Extraocular movements intact.   Cardiovascular:      Rate and Rhythm: Normal rate and regular rhythm.   Pulmonary:      Effort: Pulmonary effort is normal.   Abdominal:      Palpations: Abdomen is soft.   Musculoskeletal:         General: Normal range of motion.      Cervical back: Normal range of motion and neck supple.   Skin:     General: Skin is warm and dry.   Neurological:      General: No focal deficit present.      Mental Status: She is alert.   Psychiatric:         Mood and Affect: Mood normal.         Thought Content: Thought content normal.       No orders to display     Labs Reviewed   CBC WITH AUTO DIFFERENTIAL - Abnormal       Result Value    WBC 9.5      nRBC 0.0      RBC 3.85 (*)     Hemoglobin 12.4      Hematocrit 35.7 (*)     MCV 93      MCH 32.2      MCHC 34.7      RDW 14.7 (*)     Platelets 241      Neutrophils % 56.4      Immature Granulocytes %, Automated 0.8      Lymphocytes % 32.5      Monocytes % 9.6      Eosinophils % 0.4      Basophils % 0.3      Neutrophils Absolute 5.37      Immature Granulocytes Absolute, Automated 0.08      Lymphocytes Absolute 3.10      Monocytes Absolute 0.92      Eosinophils Absolute 0.04      Basophils Absolute 0.03     COMPREHENSIVE METABOLIC PANEL - Abnormal    Glucose 82      Sodium 137      Potassium 4.1      Chloride 105      Bicarbonate 23      Anion Gap 13      Urea Nitrogen 6      Creatinine 1.19 (*)     eGFR 66      Calcium 9.3      Albumin 3.5      Alkaline Phosphatase 50      Total Protein 7.0      AST 10      Bilirubin, Total 0.5      ALT 5 (*)    DRUG SCREEN,URINE - Abnormal    Amphetamine Screen, Urine Presumptive Negative      Barbiturate Screen, Urine Presumptive Negative      Benzodiazepines Screen, Urine Presumptive Negative      Cannabinoid Screen, Urine Presumptive Positive (*)     Cocaine Metabolite Screen,  Urine Presumptive Negative      Fentanyl Screen, Urine Presumptive Negative      Opiate Screen, Urine Presumptive Negative      Oxycodone Screen, Urine Presumptive Negative      PCP Screen, Urine Presumptive Negative      Narrative:     Drug screen results are presumptive and should not be used to assess   compliance with prescribed medication. Contact the performing [facility] laboratory   to add-on definitive confirmatory testing if clinically indicated.    Toxicology screening results are reported qualitatively. The concentration must   be greater than or equal to the cutoff to be reported as positive. The concentration   at which the screening test can detect an individual drug or metabolite varies.   The absence of expected drug(s) and/or drug metabolite(s) may indicate non-compliance,   inappropriate timing of specimen collection relative to drug administration, poor drug   absorption, diluted/adulterated urine, or limitations of testing. For medical purposes   only; not valid for forensic use.    Interpretive questions should be directed to the laboratory medical directors.   URINALYSIS WITH REFLEX CULTURE AND MICROSCOPIC - Abnormal    Color, Urine Marquita (*)     Appearance, Urine Hazy (*)     Specific Gravity, Urine 1.026      pH, Urine 5.0      Protein, Urine 100 (2+) (*)     Glucose, Urine NEGATIVE      Blood, Urine NEGATIVE      Ketones, Urine 5 (TRACE) (*)     Bilirubin, Urine SMALL (1+) (*)     Urobilinogen, Urine 4.0 (*)     Nitrite, Urine NEGATIVE      Leukocyte Esterase, Urine LARGE (3+) (*)    MICROSCOPIC ONLY, URINE - Abnormal    WBC, Urine 21-50 (*)     RBC, Urine 11-20 (*)     Squamous Epithelial Cells, Urine 26-50 (1+)      Budding Yeast, Urine PRESENT (*)     Mucus, Urine 2+     ACUTE TOXICOLOGY PANEL, BLOOD - Normal    Acetaminophen <10.0      Salicylate  <3      Alcohol <10     POCT PREGNANCY, URINE - Normal    Preg Test, Ur Negative     URINE CULTURE   URINALYSIS WITH REFLEX CULTURE AND MICROSCOPIC     Narrative:     The following orders were created for panel order Urinalysis with Reflex Culture and Microscopic.  Procedure                               Abnormality         Status                     ---------                               -----------         ------                     Urinalysis with Reflex C...[[id number]]  Abnormal            Final result               Extra Urine Gray Tube[[id number]]                            In process                   Please view results for these tests on the individual orders.   EXTRA URINE GRAY TUBE         ED Course & MDM   ED Course as of 01/24/24 1619   Wed Jan 24, 2024   1154 Leukocyte Esterase, Urine(!): LARGE (3+)  Patient does endorse urinary frequency as well as burning with urination, she is not sure how long it has been going on, no new flank pain or abdominal pain.  Has tolerated Macrobid in the past. [MK]      ED Course User Index  [MK] Gypsy Sommer PA-C         Diagnoses as of 01/24/24 1619   UTI (urinary tract infection), uncomplicated   Suicidal ideation       Medical Decision Making    MDM: Patient is a 23-year-old female presents today for evaluation of hallucinations of voices telling her to kill herself with the plan, she was placed on suicide precautions, labs were obtained, EPAT consulted.  Final disposition will be per EPAT.  Discussed with Dr. Rao. Patient CMP showed creatinine of 1.19 which is right around her baseline, tox panel was negative, CBC unremarkable without leukocytosis or anemia, pregnancy test negative, she is positive for cannabinoids on urine drug screen which she readily admits to, 3+ leukocyte esterase with 21-50 white blood cells, will start patient on Macrobid given that she has been on it in the past and is one of the only medications that she is not allergic to well pending urine culture results.  Patient seen and evaluated by EPAT who  do not feel that she warrants inpatient psychiatric admission.  Patient be  discharged in stable condition.  She also requested a prescription for Tylenol which was provided.        Procedure  Procedures     Gypsy Sommer PA-C  01/24/24 3231     Detail Level: Detailed Sunscreen Recommendations: Zinc based sunscreen

## 2024-02-01 ENCOUNTER — APPOINTMENT (OUTPATIENT)
Dept: PRIMARY CARE | Facility: CLINIC | Age: 24
End: 2024-02-01
Payer: MEDICARE

## 2024-02-18 ENCOUNTER — CLINICAL SUPPORT (OUTPATIENT)
Dept: EMERGENCY MEDICINE | Facility: HOSPITAL | Age: 24
End: 2024-02-18
Payer: COMMERCIAL

## 2024-02-18 ENCOUNTER — HOSPITAL ENCOUNTER (EMERGENCY)
Facility: HOSPITAL | Age: 24
Discharge: OTHER INSTITUTION | End: 2024-02-19
Attending: EMERGENCY MEDICINE
Payer: COMMERCIAL

## 2024-02-18 DIAGNOSIS — R45.851 SUICIDAL IDEATION: Primary | ICD-10-CM

## 2024-02-18 LAB
ALBUMIN SERPL BCP-MCNC: 3.4 G/DL (ref 3.4–5)
ALP SERPL-CCNC: 70 U/L (ref 33–110)
ALT SERPL W P-5'-P-CCNC: 4 U/L (ref 7–45)
AMPHETAMINES UR QL SCN: NORMAL
ANION GAP SERPL CALC-SCNC: 12 MMOL/L (ref 10–20)
APAP SERPL-MCNC: <10 UG/ML
AST SERPL W P-5'-P-CCNC: 11 U/L (ref 9–39)
ATRIAL RATE: 77 BPM
BARBITURATES UR QL SCN: NORMAL
BASOPHILS # BLD AUTO: 0.06 X10*3/UL (ref 0–0.1)
BASOPHILS NFR BLD AUTO: 0.6 %
BENZODIAZ UR QL SCN: NORMAL
BILIRUB SERPL-MCNC: 0.3 MG/DL (ref 0–1.2)
BUN SERPL-MCNC: 7 MG/DL (ref 6–23)
BZE UR QL SCN: NORMAL
CALCIUM SERPL-MCNC: 9.7 MG/DL (ref 8.6–10.6)
CANNABINOIDS UR QL SCN: NORMAL
CHLORIDE SERPL-SCNC: 107 MMOL/L (ref 98–107)
CO2 SERPL-SCNC: 22 MMOL/L (ref 21–32)
CREAT SERPL-MCNC: 0.94 MG/DL (ref 0.5–1.05)
EGFRCR SERPLBLD CKD-EPI 2021: 88 ML/MIN/1.73M*2
EOSINOPHIL # BLD AUTO: 0.04 X10*3/UL (ref 0–0.7)
EOSINOPHIL NFR BLD AUTO: 0.4 %
ERYTHROCYTE [DISTWIDTH] IN BLOOD BY AUTOMATED COUNT: 13.4 % (ref 11.5–14.5)
ETHANOL SERPL-MCNC: <10 MG/DL
FENTANYL+NORFENTANYL UR QL SCN: NORMAL
GLUCOSE SERPL-MCNC: 78 MG/DL (ref 74–99)
HCT VFR BLD AUTO: 34.8 % (ref 36–46)
HGB BLD-MCNC: 12.7 G/DL (ref 12–16)
IMM GRANULOCYTES # BLD AUTO: 0.04 X10*3/UL (ref 0–0.7)
IMM GRANULOCYTES NFR BLD AUTO: 0.4 % (ref 0–0.9)
LYMPHOCYTES # BLD AUTO: 4.65 X10*3/UL (ref 1.2–4.8)
LYMPHOCYTES NFR BLD AUTO: 43 %
MCH RBC QN AUTO: 32.8 PG (ref 26–34)
MCHC RBC AUTO-ENTMCNC: 36.5 G/DL (ref 32–36)
MCV RBC AUTO: 90 FL (ref 80–100)
MONOCYTES # BLD AUTO: 0.97 X10*3/UL (ref 0.1–1)
MONOCYTES NFR BLD AUTO: 9 %
NEUTROPHILS # BLD AUTO: 5.05 X10*3/UL (ref 1.2–7.7)
NEUTROPHILS NFR BLD AUTO: 46.6 %
NRBC BLD-RTO: 0 /100 WBCS (ref 0–0)
OPIATES UR QL SCN: NORMAL
OXYCODONE+OXYMORPHONE UR QL SCN: NORMAL
P AXIS: 36 DEGREES
P OFFSET: 197 MS
P ONSET: 148 MS
PCP UR QL SCN: NORMAL
PLATELET # BLD AUTO: 281 X10*3/UL (ref 150–450)
POTASSIUM SERPL-SCNC: 4.1 MMOL/L (ref 3.5–5.3)
PR INTERVAL: 142 MS
PREGNANCY TEST URINE, POC: NEGATIVE
PROT SERPL-MCNC: 6.7 G/DL (ref 6.4–8.2)
Q ONSET: 219 MS
QRS COUNT: 12 BEATS
QRS DURATION: 76 MS
QT INTERVAL: 386 MS
QTC CALCULATION(BAZETT): 436 MS
QTC FREDERICIA: 419 MS
R AXIS: 54 DEGREES
RBC # BLD AUTO: 3.87 X10*6/UL (ref 4–5.2)
SALICYLATES SERPL-MCNC: <3 MG/DL
SARS-COV-2 RNA RESP QL NAA+PROBE: NOT DETECTED
SODIUM SERPL-SCNC: 137 MMOL/L (ref 136–145)
T AXIS: 1 DEGREES
T OFFSET: 412 MS
TSH SERPL-ACNC: 1.48 MIU/L (ref 0.44–3.98)
VENTRICULAR RATE: 77 BPM
WBC # BLD AUTO: 10.8 X10*3/UL (ref 4.4–11.3)

## 2024-02-18 PROCEDURE — 80307 DRUG TEST PRSMV CHEM ANLYZR: CPT

## 2024-02-18 PROCEDURE — 93010 ELECTROCARDIOGRAM REPORT: CPT | Performed by: PHYSICIAN ASSISTANT

## 2024-02-18 PROCEDURE — 80053 COMPREHEN METABOLIC PANEL: CPT

## 2024-02-18 PROCEDURE — 85025 COMPLETE CBC W/AUTO DIFF WBC: CPT

## 2024-02-18 PROCEDURE — 84443 ASSAY THYROID STIM HORMONE: CPT

## 2024-02-18 PROCEDURE — 36415 COLL VENOUS BLD VENIPUNCTURE: CPT

## 2024-02-18 PROCEDURE — 87635 SARS-COV-2 COVID-19 AMP PRB: CPT

## 2024-02-18 PROCEDURE — 99285 EMERGENCY DEPT VISIT HI MDM: CPT | Mod: 25,CS

## 2024-02-18 PROCEDURE — 93005 ELECTROCARDIOGRAM TRACING: CPT

## 2024-02-18 PROCEDURE — 99285 EMERGENCY DEPT VISIT HI MDM: CPT

## 2024-02-18 PROCEDURE — 80143 DRUG ASSAY ACETAMINOPHEN: CPT

## 2024-02-18 SDOH — HEALTH STABILITY: MENTAL HEALTH: SUICIDAL BEHAVIOR (3 MONTHS): NO

## 2024-02-18 SDOH — HEALTH STABILITY: MENTAL HEALTH: SUICIDAL BEHAVIOR (LIFETIME): YES

## 2024-02-18 SDOH — HEALTH STABILITY: MENTAL HEALTH
HAVE YOU STARTED TO WORK OUT OR WORKED OUT THE DETAILS OF HOW TO KILL YOURSELF? DO YOU INTENT TO CARRY OUT THIS PLAN?: YES

## 2024-02-18 SDOH — HEALTH STABILITY: MENTAL HEALTH: BEHAVIORS/MOOD: CRYING;UNCOOPERATIVE;ANXIOUS

## 2024-02-18 SDOH — HEALTH STABILITY: MENTAL HEALTH: WHEN DID YOU TRY TO KILL YOURSELF?: MULTIPLE TIMES

## 2024-02-18 SDOH — HEALTH STABILITY: MENTAL HEALTH: IN THE PAST WEEK, HAVE YOU BEEN HAVING THOUGHTS ABOUT KILLING YOURSELF?: NO

## 2024-02-18 SDOH — HEALTH STABILITY: MENTAL HEALTH: DESCRIBE YOUR THOUGHTS OF KILLING YOURSELF RIGHT NOW:: COMMAND AH

## 2024-02-18 SDOH — HEALTH STABILITY: MENTAL HEALTH: WAS THIS WITHIN THE PAST THREE MONTHS?: YES

## 2024-02-18 SDOH — SOCIAL STABILITY: SOCIAL NETWORK: PARENT/GUARDIAN/SIGNIFICANT OTHER INVOLVEMENT: OTHER (COMMENT)

## 2024-02-18 SDOH — HEALTH STABILITY: MENTAL HEALTH: SUICIDAL BEHAVIOR (DESCRIPTION): PLAN TO CUT WITH A RAZOR

## 2024-02-18 SDOH — HEALTH STABILITY: MENTAL HEALTH: CONTENT: UNABLE TO ASSESS

## 2024-02-18 SDOH — HEALTH STABILITY: MENTAL HEALTH: HAVE YOU ACTUALLY HAD ANY THOUGHTS OF KILLING YOURSELF?: YES

## 2024-02-18 SDOH — SOCIAL STABILITY: SOCIAL INSECURITY: FAMILY BEHAVIORS: OTHER (COMMENT)

## 2024-02-18 SDOH — HEALTH STABILITY: MENTAL HEALTH: IN THE PAST FEW WEEKS, HAVE YOU FELT THAT YOU OR YOUR FAMILY WOULD BE BETTER OFF IF YOU WERE DEAD?: NO

## 2024-02-18 SDOH — HEALTH STABILITY: MENTAL HEALTH: NON-SPECIFIC ACTIVE SUICIDAL THOUGHTS (PAST 1 MONTH): NO

## 2024-02-18 SDOH — HEALTH STABILITY: MENTAL HEALTH: DELUSIONS: OTHER (COMMENT)

## 2024-02-18 SDOH — HEALTH STABILITY: MENTAL HEALTH: HOW DID YOU TRY TO KILL YOURSELF?: CUTTING

## 2024-02-18 SDOH — HEALTH STABILITY: MENTAL HEALTH: HAVE YOU BEEN THINKING ABOUT HOW YOU MIGHT DO THIS?: YES

## 2024-02-18 SDOH — HEALTH STABILITY: MENTAL HEALTH: HAVE YOU EVER DONE ANYTHING, STARTED TO DO ANYTHING, OR PREPARED TO DO ANYTHING TO END YOUR LIFE?: YES

## 2024-02-18 SDOH — HEALTH STABILITY: MENTAL HEALTH: NEEDS EXPRESSED: DENIES

## 2024-02-18 SDOH — HEALTH STABILITY: MENTAL HEALTH: SLEEP PATTERN: UNABLE TO ASSESS

## 2024-02-18 SDOH — HEALTH STABILITY: MENTAL HEALTH: WISH TO BE DEAD (PAST 1 MONTH): NO

## 2024-02-18 SDOH — HEALTH STABILITY: MENTAL HEALTH: IN THE PAST FEW WEEKS, HAVE YOU WISHED YOU WERE DEAD?: NO

## 2024-02-18 SDOH — HEALTH STABILITY: MENTAL HEALTH: HAVE YOU WISHED YOU WERE DEAD OR WISHED YOU COULD GO TO SLEEP AND NOT WAKE UP?: YES

## 2024-02-18 SDOH — HEALTH STABILITY: MENTAL HEALTH: HALLUCINATION: COMMAND

## 2024-02-18 SDOH — HEALTH STABILITY: MENTAL HEALTH: SUICIDE ASSESSMENT: ADULT (C-SSRS)

## 2024-02-18 SDOH — HEALTH STABILITY: MENTAL HEALTH: ANXIETY SYMPTOMS: GENERALIZED;PANIC ATTACK;COMPULSIVE

## 2024-02-18 SDOH — SOCIAL STABILITY: SOCIAL NETWORK: VISITOR BEHAVIORS: OTHER (COMMENT)

## 2024-02-18 SDOH — HEALTH STABILITY: MENTAL HEALTH: HAVE YOU EVER TRIED TO KILL YOURSELF?: YES

## 2024-02-18 SDOH — HEALTH STABILITY: MENTAL HEALTH: DEPRESSION SYMPTOMS: CHANGE IN ENERGY LEVEL;INCREASED IRRITABILITY

## 2024-02-18 SDOH — HEALTH STABILITY: MENTAL HEALTH: ARE YOU HAVING THOUGHTS OF KILLING YOURSELF RIGHT NOW?: YES

## 2024-02-18 SDOH — ECONOMIC STABILITY: HOUSING INSECURITY: FEELS SAFE LIVING IN HOME: YES

## 2024-02-18 SDOH — HEALTH STABILITY: MENTAL HEALTH: HAVE YOU HAD THESE THOUGHTS AND HAD SOME INTENTION OF ACTING ON THEM?: YES

## 2024-02-18 ASSESSMENT — LIFESTYLE VARIABLES
PRESCIPTION_ABUSE_PAST_12_MONTHS: NO
SUBSTANCE_ABUSE_PAST_12_MONTHS: YES

## 2024-02-18 NOTE — ED PROVIDER NOTES
HPI   Chief Complaint   Patient presents with    Psychiatric Evaluation       Limitations to History: None    HPI: This is a 23-year-old female with a past medical history of schizoaffective disorder, anxiety, depression, bipolar disorder, chronic constipation who presents to the emergency room with suicidal ideations.  Patient requested to be brought in on her own volition.  She denies any medical complaints at this time such as fevers, chills, cough, nasal congestion, chest pain, shortness of breath, nausea, vomiting, abdominal pain, or changes in bowel habits.  She endorses auditory hallucinations which are telling her to kill herself.  She has had previous attempts in the past.  She does not have a plan currently but says that she would try to harm herself.  She denies visual hallucinations or HI.    Additional History Obtained from: None    12 point review of systems was performed and is negative unless otherwise specified    ------------------------------------------------------------------------------------------------------------------------------------------  Physical Exam:    VS: As documented in the triage note and EMR flowsheet from this visit were reviewed.    CONSTITUTIONAL: Well appearing, well nourished, awake, alert, oriented to person, place, time/situation and in no apparent distress.   EYES: Clear bilaterally, pupils equal, round and reactive to light.   CARDIOVASCULAR: Normal rate, regular rhythm.  Heart sounds S1, S2.  No murmurs, rubs or gallops.  RESPIRATORY: Breath sounds clear and equal bilaterally. No wheezes or rhonchi.   GASTROINTESTINAL: Abdomen soft, non-distended, no rebound, no guarding.   MUSCULOSKELETAL: Spine appears normal, range of motion is not limited, no muscle or joint tenderness.   NEUROLOGICAL: Alert and oriented, no focal deficits, no motor or sensory deficits.   SKIN: Skin normal color for race, warm, dry and intact. No evidence of trauma.   PSYCHIATRIC: Alert and  oriented to person, place, time/situation.  Blunted affect.  Endorses SI and AH.  Denies VH or HI.    ------------------------------------------------------------------------------------------------------------------------------------------    Medical Decision Making:    This is a 23-year-old female who presents to the emergency room for psychiatric evaluation.  Patient remained stable throughout course of care.  This patient was staffed with my supervising attending.  Patient was placed on suicide precautions and elopement precautions.  EKG, acute tox panel, CBC with differential, CMP, drug screen, pregnancy test, COVID screen, and TSH ordered.  EPAT was consulted. Drug screen was negative.  CBC and CMP were unremarkable.  Pregnancy test was negative.  Acute tox panel was negative.  COVID was negative.  Patient has been medically cleared from an emergency room standpoint.  EPAT evaluated patient and did recommend psychiatric inpatient admission.  Patient was handed off with EPAT placement still pending.  Patient remained stable throughout the remainder course of this provider's care.      External Records Reviewed: I reviewed recent and relevant outside records including: Previous provider notes      Escalation of Care:  Appropriate for inpatient psychiatric admission    Social Determinants Affecting Care:  Schizoaffective patient      Discussion of Management with Other Providers:   I discussed the patient/results with: JOSEPH, supervising attending      YEVGENIY Lane PAUrielC  [facility]                            Tk Coma Scale Score: 15                     Patient History   Past Medical History:   Diagnosis Date    Abnormal weight gain 12/17/2018    Abnormal weight gain    Anxiety     Blister (nonthermal), unspecified lower leg, initial encounter 05/30/2018    Blister of leg    Crushing injury of unspecified finger(s), initial encounter  06/30/2017    Crushing injury of finger, initial encounter    Depression     Diabetes mellitus (CMS/formerly [facility])     Drug-induced hypoglycemia without coma 08/04/2021    Hypoglycemia due to insulin    Migraine, unspecified, not intractable, without status migrainosus 12/27/2016    Headache, migraine    Other microscopic hematuria 06/01/2018    Hematuria, microscopic    Pain in left hand 06/29/2017    Pain of left hand    Pain in unspecified joint 12/30/2013    Multiple joint pain    Personal history of diseases of the blood and blood-forming organs and certain disorders involving the immune mechanism 06/07/2017    History of anemia    Personal history of diseases of the skin and subcutaneous tissue 06/13/2019    History of acne    Personal history of other complications of pregnancy, childbirth and the puerperium 07/25/2017    History of galactorrhea    Personal history of other diseases of the female genital tract 06/05/2018    History of vaginal bleeding    Personal history of other diseases of the nervous system and sense organs 08/03/2022    History of optic neuritis    Personal history of other endocrine, nutritional and metabolic disease 12/17/2018    History of elevated glucose    Personal history of other specified conditions 06/09/2017    History of nipple discharge    Psychiatric illness     Schizophrenia (CMS/formerly [facility])     Urge incontinence 06/01/2018    Urge incontinence of urine     Past Surgical History:   Procedure Laterality Date    OTHER SURGICAL HISTORY  11/11/2019    Cholecystectomy     Family History   Problem Relation Name Age of Onset    Kidney nephrosis Mother      Other (Dialysis Patient) Mother's Sister      Lupus Mother's Sister          Systemic lupus erythematosus    Other (Dialysis Patient) Maternal Grandfather      Other (Cardiovascular disease) Maternal Grandfather      Hypertension Maternal Grandfather       Social History     Tobacco Use    Smoking status: Never    Smokeless tobacco: Never    Vaping Use    Vaping Use: Never used   Substance Use Topics    Alcohol use: Never    Drug use: Never       Physical Exam   ED Triage Vitals [02/18/24 1716]   Temperature Heart Rate Respirations BP   36.4 °C (97.6 °F) 76 16 (!) 142/98      Pulse Ox Temp Source Heart Rate Source Patient Position   98 % Oral Monitor --      BP Location FiO2 (%)     -- --       Physical Exam    ED Course & [facility]   ED Course as of 02/18/24 1814   Sun Feb 18, 2024   1700 Talked with the patient.  Patient was very tearful and was feeling sad.  She felt like she is having a lack of control over her situation.  She states that she just wanted her phone back so she could listen to gospel music which would help relax her.  I discussed with her that at this time that was not an option.  Patient was agreeable to have labs drawn so that she would be able to speak to our social workers.  Patient was comfortable with this plan and remained calm.  Patient was placed on elopement precautions given her thoughts.  Patient states that she did not want to kill herself but did feel that she might be better off dead.  At this time we are pending laboratories and psychiatric service evaluation. [YT]      ED Course User Index  [YT] Joaquina Jimenes MD       Medical Decision Making      Procedure  Procedures     Chandler Nguyen PA-C  02/18/24 6018

## 2024-02-18 NOTE — ED TRIAGE NOTES
Pt presents to the ED with the complaint of psychiatric evaluation. Pt endorses SI with command hallucinations telling her to kill herself. Upon arrival, pt refusing to answer questions and uncooperative with staff. Pt refusing to allow RN to obtain vital signs.

## 2024-02-19 VITALS
SYSTOLIC BLOOD PRESSURE: 121 MMHG | HEIGHT: 67 IN | RESPIRATION RATE: 18 BRPM | TEMPERATURE: 98.4 F | WEIGHT: 230 LBS | BODY MASS INDEX: 36.1 KG/M2 | DIASTOLIC BLOOD PRESSURE: 77 MMHG | OXYGEN SATURATION: 97 % | HEART RATE: 75 BPM

## 2024-02-19 RX ORDER — NITROFURANTOIN 25; 75 MG/1; MG/1
100 CAPSULE ORAL DAILY
COMMUNITY
End: 2024-04-26 | Stop reason: SDUPTHER

## 2024-02-19 RX ORDER — BENZTROPINE MESYLATE 1 MG/1
1 TABLET ORAL 2 TIMES DAILY
COMMUNITY
End: 2024-05-08 | Stop reason: WASHOUT

## 2024-02-19 RX ORDER — HYDROXYZINE HYDROCHLORIDE 25 MG/1
25 TABLET, FILM COATED ORAL 3 TIMES DAILY
COMMUNITY

## 2024-02-19 RX ORDER — LORAZEPAM 1 MG/1
TABLET ORAL
COMMUNITY
End: 2024-05-08 | Stop reason: WASHOUT

## 2024-02-19 RX ORDER — ACETAMINOPHEN 500 MG
TABLET ORAL
COMMUNITY
End: 2024-05-07 | Stop reason: SDUPTHER

## 2024-02-19 RX ORDER — DOCUSATE SODIUM 100 MG/1
100 CAPSULE, LIQUID FILLED ORAL 2 TIMES DAILY
COMMUNITY
End: 2024-05-08 | Stop reason: WASHOUT

## 2024-02-19 RX ORDER — PALIPERIDONE PALMITATE 234 MG/1.5ML
234 INJECTION INTRAMUSCULAR
COMMUNITY
End: 2024-05-08 | Stop reason: WASHOUT
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
## 2024-02-19 NOTE — SIGNIFICANT EVENT
Application for Emergency Admission      Ready for Transfer?  Is the patient medically cleared for transfer to inpatient psychiatry: Yes  Has the patient been accepted to an inpatient psychiatric hospital: Yes    Application for Emergency Admission  IN ACCORDANCE WITH [place]  The Chief Clinical Officer of: Jeff Peguero 2/19/2024 .4:17 AM    Reason for Hospitalization  The undersigned has reason to believe that: Berna Ricardo Is a mentally ill person subject to hospitalization by court order under division B Section 5122.01 of the Revised Code, i.e., this person:    1.Yes  Represents a substantial risk of physical harm to self as manifested by evidence of threats of, or attempts at, suicide or serious self-inflicted bodily harm    2.No Represents a substantial risk of physical harm to others as manifested by evidence of recent homicidal or other violent behavior, evidence of recent threats that place another in reasonable fear of violent behavior and serious physical harm, or other evidence of present dangerousness    3.Yes Represents a substantial and immediate risk of serious physical impairment or injury to self as manifested by  evidence that the person is unable to provide for and is not providing for the person's basic physical needs because of the person's mental illness and that appropriate provision for those needs cannot be made  immediately available in the community    4.Yes Would benefit from treatment in a hospital for his mental illness and is in need of such treatment as manifested by evidence of behavior that creates a grave and imminent risk to substantial rights of others or  himself.    5.No Would benefit from treatment as manifested by evidence of behavior that indicates all of the following:       (a) The person is unlikely to survive safely in the community without supervision, based on a clinical determination.       (b) The person has a history of lack of compliance with  treatment for mental illness and one of the following applies:      (i) At least twice within the thirty-six months prior to the filing of an affidavit seeking court-ordered treatment of the person under section 5122.111 of the Revised Code, the lack of compliance has been a significant factor in necessitating hospitalization in a hospital or receipt of services in a forensic or other mental health unit of a correctional facility, provided that the thirty-six-month period shall be extended by the length of any hospitalization or incarceration of the person that occurred within the thirty-six-month period.      (ii) Within the forty-eight months prior to the filing of an affidavit seeking court-ordered treatment of the person under section 5122.111 of the Revised Code, the lack of compliance resulted in one or more acts of serious violent behavior toward self or others or threats of, or attempts at, serious physical harm to self or others, provided that the forty-eight-month period shall be extended by the length of any hospitalization or incarceration of the person that occurred within the forty-eight-month period.      (c) The person, as a result of mental illness, is unlikely to voluntarily participate in necessary treatment.       (d) In view of the person's treatment history and current behavior, the person is in need of treatment in order to prevent a relapse or deterioration that would be likely to result in substantial risk of serious harm to the person or others.    (e) Represents a substantial risk of physical harm to self or others if allowed to remain at liberty pending examination.    Therefore, it is requested that said person be admitted to the above named facility.    STATEMENT OF BELIEF    Must be filled out by one of the following: a psychiatrist, licensed physician, licensed clinical psychologist, health or ,  or .  (Statement shall include the circumstances under  which the individual was taken into custody and the reason for the person's belief that hospitalization is necessary. The statement shall also include a reference to efforts made to secure the individual's property at his residence if he was taken into custody there. Every reasonable and appropriate effort should be made to take this person into custody in the least conspicuous manner possible.)    Patient endorsing suicidal ideations with history of prior suicide attempts.  Evidence of decompensated psychosis with command hallucinations.    Daniel Rivera MD 2/19/2024     _____________________________________________________________   Place of Employment: [employer]    STATEMENT OF OBSERVATION BY PSYCHIATRIST, LICENSED PHYSICIAN, OR LICENSED CLINICAL PSYCHOLOGIST, IF APPLICABLE    Place of Observation (e.g., [facility], general hospital, office, emergency facility)  (If applicable, please complete)    Daniel Rivera MD 2/19/2024    _____________________________________________________________

## 2024-02-19 NOTE — PROGRESS NOTES
Pharmacy Medication History Review    Berna Ricardo is a 23 y.o. female admitted for No Principal Problem: There is no principal problem currently on the Problem List. Please update the Problem List and refresh.. Pharmacy reviewed the patient's [id number] medications and allergies for accuracy.    The list below reflects the updated PTA list. Comments regarding how patient may be taking medications differently can be found in the Admit Orders Activity  Prior to Admission Medications   Prescriptions Last Dose Informant Patient Reported?   LORazepam (Ativan) 1 mg tablet  Care Giver Yes   Sig: Take 1 tablet by mouth as directed for emergency panic attacks.   acetaminophen (Tylenol) 500 mg tablet  Care Giver Yes   Sig: Take 1 tablet by mouth as needed.   benztropine (Cogentin) 1 mg tablet  Care Giver Yes   Sig: Take 1 tablet (1 mg) by mouth 2 times a day.   divalproex (Depakote) 500 mg EC tablet Past Month Care Giver No   Sig: Take 1 tablet (500 mg) by mouth every 12 hours. Do not crush, chew, or split.   docusate sodium (Colace) 100 mg capsule  Care Giver Yes   Sig: Take 1 capsule (100 mg) by mouth 2 times a day.   hydrOXYzine HCL (Atarax) 25 mg tablet  Care Giver Yes   Sig: Take 1 tablet (25 mg) by mouth 3 times a day.   linaCLOtide (Linzess) 72 mcg capsule  Care Giver No   Sig: Take 1 capsule (72 mcg) by mouth once daily in the morning. Take before meals. Do not crush or chew.   nitrofurantoin, macrocrystal-monohydrate, (Macrobid) 100 mg capsule  Care Giver Yes   Sig: Take 1 capsule (100 mg) by mouth once daily. With food   ondansetron ODT (Zofran-ODT) 4 mg disintegrating tablet  Care Giver No   Sig: Take 1 tablet (4 mg) by mouth every 8 hours if needed for nausea or vomiting.   paliperidone palmitate ER (Invega Sustenna) 234 mg/1.5 mL syringe  Care Giver Yes   Sig: Inject 1.5 mL (234 mg) into the muscle every 28 (twenty-eight) days.   prazosin (Minipress) 2 mg capsule Past Week Care Giver No   Sig: Take 1  capsule (2 mg) by mouth once daily at bedtime.   traZODone (Desyrel) 100 mg tablet  Care Giver Yes   Sig: Take 1 tablet (100 mg) by mouth once daily at bedtime.   ziprasidone (Geodon) 80 mg capsule Past Week Care Giver No   Sig: Take 1 capsule (80 mg) by mouth 2 times a day with meals.      Facility-Administered Medications Last Administration Doses Remaining   paliperidone palmitate ER (Invega Sustenna) injection 234 mg None recorded 1           The list below reflects the updated allergy list. Please review each documented allergy for additional clarification and justification.  Allergies  Reviewed by Aurea Barbour RN on 2/18/2024        Severity Reactions Comments    Penicillins High Hives, Rash     Cefixime Not Specified Hives     Ditropan Not Specified Unknown Reaction unknown    Esomeprazole Not Specified Unknown     Hyoscyamine Not Specified Unknown Unknown reaction, documented from hospital stay per grandmother.    Hyoscyamine Sulfate Not Specified Unknown     Penicillin Not Specified Hives     Sulfa (sulfonamide Antibiotics) Not Specified Unknown     Ciprofloxacin Low Hives, Rash             Patient was unable to be assessed for M2B at discharge. Pharmacy has been updated to [facility].    Sources used to complete the med history include out patient fill history, OARRS, and phone interview with [facility], spoke to Sophie Cabrales ([phone]).      Below are additional concerns with the patient's PTA list.  Patient resides at [facility], and medication list has been faxed to the hospital.  PTA has been updated accordingly.  The directions of Geodon 80 mg were not provided by the group home.    Samantha Soto PharmD  Transitions of Care Pharmacist  [facility]  Please reach out via Secure Chat for questions, or if no response call [facility] or [facility]

## 2024-04-25 ENCOUNTER — TELEPHONE (OUTPATIENT)
Dept: ENDOCRINOLOGY | Facility: CLINIC | Age: 24
End: 2024-04-25
Payer: MEDICARE

## 2024-04-25 NOTE — TELEPHONE ENCOUNTER
Patient came into office because she is supposed to have a standing order for a urine culture for UTI, but the lab could not find one for her. It was supposed to be ordered by her OBGYN but they are on a leave, her guardian would like to know if you would be able to order one for her instead, if not she will take her to urgent care.

## 2024-04-26 ENCOUNTER — LAB (OUTPATIENT)
Dept: LAB | Facility: LAB | Age: 24
End: 2024-04-26
Payer: COMMERCIAL

## 2024-04-26 DIAGNOSIS — N39.0 RECURRENT UTI: Primary | ICD-10-CM

## 2024-04-26 DIAGNOSIS — N39.0 RECURRENT UTI: ICD-10-CM

## 2024-04-26 LAB
ALBUMIN SERPL BCP-MCNC: 3.6 G/DL (ref 3.4–5)
ALP SERPL-CCNC: 121 U/L (ref 33–110)
ALT SERPL W P-5'-P-CCNC: 12 U/L (ref 7–45)
ANION GAP SERPL CALC-SCNC: 11 MMOL/L (ref 10–20)
APPEARANCE UR: ABNORMAL
AST SERPL W P-5'-P-CCNC: 19 U/L (ref 9–39)
BILIRUB SERPL-MCNC: 0.3 MG/DL (ref 0–1.2)
BILIRUB UR STRIP.AUTO-MCNC: NEGATIVE MG/DL
BUN SERPL-MCNC: 6 MG/DL (ref 6–23)
CALCIUM SERPL-MCNC: 8.9 MG/DL (ref 8.6–10.6)
CHLORIDE SERPL-SCNC: 103 MMOL/L (ref 98–107)
CO2 SERPL-SCNC: 23 MMOL/L (ref 21–32)
COLOR UR: ABNORMAL
CREAT SERPL-MCNC: 0.76 MG/DL (ref 0.5–1.05)
EGFRCR SERPLBLD CKD-EPI 2021: >90 ML/MIN/1.73M*2
ERYTHROCYTE [DISTWIDTH] IN BLOOD BY AUTOMATED COUNT: 13.5 % (ref 11.5–14.5)
GLUCOSE SERPL-MCNC: 213 MG/DL (ref 74–99)
GLUCOSE UR STRIP.AUTO-MCNC: ABNORMAL MG/DL
HCT VFR BLD AUTO: 37.7 % (ref 36–46)
HGB BLD-MCNC: 12.1 G/DL (ref 12–16)
KETONES UR STRIP.AUTO-MCNC: NEGATIVE MG/DL
LEUKOCYTE ESTERASE UR QL STRIP.AUTO: NEGATIVE
MCH RBC QN AUTO: 31.4 PG (ref 26–34)
MCHC RBC AUTO-ENTMCNC: 32.1 G/DL (ref 32–36)
MCV RBC AUTO: 98 FL (ref 80–100)
NITRITE UR QL STRIP.AUTO: NEGATIVE
NRBC BLD-RTO: 0 /100 WBCS (ref 0–0)
PH UR STRIP.AUTO: 6 [PH]
PLATELET # BLD AUTO: 353 X10*3/UL (ref 150–450)
POTASSIUM SERPL-SCNC: 4.2 MMOL/L (ref 3.5–5.3)
PROT SERPL-MCNC: 6.8 G/DL (ref 6.4–8.2)
PROT UR STRIP.AUTO-MCNC: NEGATIVE MG/DL
RBC # BLD AUTO: 3.85 X10*6/UL (ref 4–5.2)
RBC # UR STRIP.AUTO: NEGATIVE /UL
SODIUM SERPL-SCNC: 133 MMOL/L (ref 136–145)
SP GR UR STRIP.AUTO: 1.02
UROBILINOGEN UR STRIP.AUTO-MCNC: NORMAL MG/DL
WBC # BLD AUTO: 10.4 X10*3/UL (ref 4.4–11.3)

## 2024-04-26 PROCEDURE — 87086 URINE CULTURE/COLONY COUNT: CPT

## 2024-04-26 PROCEDURE — 36415 COLL VENOUS BLD VENIPUNCTURE: CPT

## 2024-04-26 PROCEDURE — 80053 COMPREHEN METABOLIC PANEL: CPT

## 2024-04-26 PROCEDURE — 85027 COMPLETE CBC AUTOMATED: CPT

## 2024-04-26 PROCEDURE — 81003 URINALYSIS AUTO W/O SCOPE: CPT

## 2024-04-26 RX ORDER — NITROFURANTOIN 25; 75 MG/1; MG/1
100 CAPSULE ORAL ONCE
Status: DISCONTINUED | OUTPATIENT
Start: 2024-04-26 | End: 2024-05-08

## 2024-04-26 RX ORDER — NITROFURANTOIN 25; 75 MG/1; MG/1
100 CAPSULE ORAL 4 TIMES DAILY
Qty: 28 CAPSULE | Refills: 0 | Status: SHIPPED | OUTPATIENT
Start: 2024-04-26 | End: 2024-05-08 | Stop reason: WASHOUT

## 2024-04-26 RX ORDER — NITROFURANTOIN 25; 75 MG/1; MG/1
100 CAPSULE ORAL DAILY
Qty: 30 CAPSULE | Refills: 1 | Status: SHIPPED | OUTPATIENT
Start: 2024-04-26 | End: 2024-07-25

## 2024-04-26 NOTE — PROGRESS NOTES
Spoke to patient's grandmother. The patient is at the group home. She has been off Macrobid since her last hospitalization. Now, she has frequency and burning, which are the usual symptoms she has with her episodes of UTIs    PLAN:  CBC, CMP  Urinalysis and UCX   Macrobid 100 mg PO q6 x 7 days, followed by 100 mg daily for ppx  RTC in 3-4 months to reassess.

## 2024-04-27 LAB — BACTERIA UR CULT: NORMAL

## 2024-05-07 ENCOUNTER — OFFICE VISIT (OUTPATIENT)
Dept: PRIMARY CARE | Facility: CLINIC | Age: 24
End: 2024-05-07
Payer: COMMERCIAL

## 2024-05-07 VITALS
HEIGHT: 67 IN | BODY MASS INDEX: 40.34 KG/M2 | DIASTOLIC BLOOD PRESSURE: 86 MMHG | WEIGHT: 257 LBS | OXYGEN SATURATION: 97 % | SYSTOLIC BLOOD PRESSURE: 125 MMHG | TEMPERATURE: 97 F | HEART RATE: 115 BPM

## 2024-05-07 DIAGNOSIS — G43.009 MIGRAINE WITHOUT AURA AND WITHOUT STATUS MIGRAINOSUS, NOT INTRACTABLE: Primary | ICD-10-CM

## 2024-05-07 DIAGNOSIS — E11.9 TYPE 2 DIABETES MELLITUS WITHOUT COMPLICATION, WITHOUT LONG-TERM CURRENT USE OF INSULIN (MULTI): ICD-10-CM

## 2024-05-07 LAB — POC FINGERSTICK BLOOD GLUCOSE: 114 MG/DL (ref 70–100)

## 2024-05-07 PROCEDURE — 3079F DIAST BP 80-89 MM HG: CPT | Performed by: STUDENT IN AN ORGANIZED HEALTH CARE EDUCATION/TRAINING PROGRAM

## 2024-05-07 PROCEDURE — 3044F HG A1C LEVEL LT 7.0%: CPT | Performed by: STUDENT IN AN ORGANIZED HEALTH CARE EDUCATION/TRAINING PROGRAM

## 2024-05-07 PROCEDURE — 3074F SYST BP LT 130 MM HG: CPT | Performed by: STUDENT IN AN ORGANIZED HEALTH CARE EDUCATION/TRAINING PROGRAM

## 2024-05-07 PROCEDURE — 82962 GLUCOSE BLOOD TEST: CPT | Performed by: STUDENT IN AN ORGANIZED HEALTH CARE EDUCATION/TRAINING PROGRAM

## 2024-05-07 PROCEDURE — 99214 OFFICE O/P EST MOD 30 MIN: CPT | Performed by: STUDENT IN AN ORGANIZED HEALTH CARE EDUCATION/TRAINING PROGRAM

## 2024-05-07 RX ORDER — PAROXETINE 10 MG/1
TABLET, FILM COATED ORAL
COMMUNITY
Start: 2023-12-06 | End: 2024-05-08 | Stop reason: WASHOUT

## 2024-05-07 RX ORDER — MIRTAZAPINE 15 MG/1
TABLET, FILM COATED ORAL
COMMUNITY
Start: 2024-05-06

## 2024-05-07 RX ORDER — HYDROXYZINE PAMOATE 25 MG/1
CAPSULE ORAL
COMMUNITY
End: 2024-05-08 | Stop reason: WASHOUT

## 2024-05-07 RX ORDER — UREA 40 %
CREAM (GRAM) TOPICAL
COMMUNITY
End: 2024-05-08 | Stop reason: WASHOUT

## 2024-05-07 RX ORDER — IBUPROFEN 600 MG/1
600 TABLET ORAL EVERY 8 HOURS PRN
Qty: 60 TABLET | Refills: 11 | Status: SHIPPED | OUTPATIENT
Start: 2024-05-07 | End: 2024-05-08 | Stop reason: SDUPTHER

## 2024-05-07 RX ORDER — BLOOD SUGAR DIAGNOSTIC
STRIP MISCELLANEOUS
COMMUNITY
Start: 2024-01-09

## 2024-05-07 RX ORDER — ACETAMINOPHEN 500 MG
500 TABLET ORAL EVERY 4 HOURS PRN
Qty: 30 TABLET | Refills: 11 | Status: SHIPPED | OUTPATIENT
Start: 2024-05-07 | End: 2024-05-07 | Stop reason: WASHOUT

## 2024-05-07 RX ORDER — LOXAPINE SUCCINATE 50 MG/1
100 TABLET ORAL NIGHTLY
COMMUNITY
Start: 2024-05-06

## 2024-05-07 ASSESSMENT — PAIN SCALES - GENERAL: PAINLEVEL: 0-NO PAIN

## 2024-05-07 NOTE — PROGRESS NOTES
"Subjective   Patient ID: Berna Ricardo is a 23 y.o. female who presents for Follow-up.    HPI      #Med Review  - reviewed all meds patient takes currently    #Migraines  - takes tylenol which helps  - lays in dark, quiet room which helps  - since tylenol is not listed as med that patient takes, her grandmother brings it in for her when she has migraines  - recent admission for SI where pysch meds were adjusted    Review of Systems   All other systems reviewed and are negative.      Objective   /86 (BP Location: Right arm, Patient Position: Sitting)   Pulse (!) 115   Temp 36.1 °C (97 °F) (Temporal)   Ht 1.702 m (5' 7\")   Wt 117 kg (257 lb)   SpO2 97%   BMI 40.25 kg/m²      Physical Exam  Constitutional:       General: She is not in acute distress.  HENT:      Head: Normocephalic and atraumatic.   Eyes:      Conjunctiva/sclera: Conjunctivae normal.   Cardiovascular:      Heart sounds: Normal heart sounds. No murmur heard.  Pulmonary:      Effort: Pulmonary effort is normal.      Breath sounds: Normal breath sounds.   Abdominal:      Palpations: Abdomen is soft.   Musculoskeletal:         General: Normal range of motion.   Neurological:      Mental Status: She is alert and oriented to person, place, and time.      Comments: CN 2-12 grossly intact   Psychiatric:         Mood and Affect: Mood normal.       Assessment/Plan   Diagnoses and all orders for this visit:  Migraine without aura and without status migrainosus, not intractable  Comments:  Stable, controlle w/ tylenol. Will switch to ibuprofren given h/o SI. If sx worsen, will consider starting a triptan.  Type 2 diabetes mellitus without complication, without long-term current use of insulin (Multi)  Comments:  Stable, controlled w/ home pioglitazone. Dexacom w/ low BG, IO BG normal. F/u endo  Orders:  -     POCT Fingerstick Glucose manually resulted      Follow up/Return to the clinic in 3 months    Attending Supervision: seen and discussed with " attending physician, Dr. Gen Prieto MD  Family Medicine, PGY-3

## 2024-05-08 ENCOUNTER — LAB (OUTPATIENT)
Dept: LAB | Facility: LAB | Age: 24
End: 2024-05-08
Payer: MEDICARE

## 2024-05-08 ENCOUNTER — OFFICE VISIT (OUTPATIENT)
Dept: ENDOCRINOLOGY | Facility: CLINIC | Age: 24
End: 2024-05-08
Payer: COMMERCIAL

## 2024-05-08 VITALS
SYSTOLIC BLOOD PRESSURE: 126 MMHG | BODY MASS INDEX: 40.02 KG/M2 | WEIGHT: 255 LBS | HEART RATE: 125 BPM | HEIGHT: 67 IN | DIASTOLIC BLOOD PRESSURE: 94 MMHG

## 2024-05-08 DIAGNOSIS — E22.1 HYPERPROLACTINEMIA (MULTI): ICD-10-CM

## 2024-05-08 DIAGNOSIS — E11.65 TYPE 2 DIABETES MELLITUS WITH HYPERGLYCEMIA, WITHOUT LONG-TERM CURRENT USE OF INSULIN (MULTI): ICD-10-CM

## 2024-05-08 DIAGNOSIS — E11.65 TYPE 2 DIABETES MELLITUS WITH HYPERGLYCEMIA, WITHOUT LONG-TERM CURRENT USE OF INSULIN (MULTI): Primary | ICD-10-CM

## 2024-05-08 DIAGNOSIS — G43.009 MIGRAINE WITHOUT AURA AND WITHOUT STATUS MIGRAINOSUS, NOT INTRACTABLE: ICD-10-CM

## 2024-05-08 LAB
EST. AVERAGE GLUCOSE BLD GHB EST-MCNC: 134 MG/DL
HBA1C MFR BLD: 6.3 %
POC HEMOGLOBIN A1C: 6.3 % (ref 4.2–6.5)
PROLACTIN SERPL-MCNC: 30.8 UG/L (ref 3–20)

## 2024-05-08 PROCEDURE — 83036 HEMOGLOBIN GLYCOSYLATED A1C: CPT

## 2024-05-08 PROCEDURE — 99214 OFFICE O/P EST MOD 30 MIN: CPT | Performed by: PHYSICIAN ASSISTANT

## 2024-05-08 PROCEDURE — 3074F SYST BP LT 130 MM HG: CPT | Performed by: PHYSICIAN ASSISTANT

## 2024-05-08 PROCEDURE — 3080F DIAST BP >= 90 MM HG: CPT | Performed by: PHYSICIAN ASSISTANT

## 2024-05-08 PROCEDURE — 3044F HG A1C LEVEL LT 7.0%: CPT | Performed by: PHYSICIAN ASSISTANT

## 2024-05-08 PROCEDURE — 84146 ASSAY OF PROLACTIN: CPT

## 2024-05-08 PROCEDURE — 95251 CONT GLUC MNTR ANALYSIS I&R: CPT | Performed by: PHYSICIAN ASSISTANT

## 2024-05-08 PROCEDURE — 36415 COLL VENOUS BLD VENIPUNCTURE: CPT

## 2024-05-08 PROCEDURE — 83036 HEMOGLOBIN GLYCOSYLATED A1C: CPT | Performed by: PHYSICIAN ASSISTANT

## 2024-05-08 RX ORDER — BLOOD-GLUCOSE SENSOR
1 EACH MISCELLANEOUS
Qty: 2 EACH | Refills: 11 | Status: SHIPPED | OUTPATIENT
Start: 2024-05-08 | End: 2024-06-07

## 2024-05-08 RX ORDER — PIOGLITAZONEHYDROCHLORIDE 15 MG/1
15 TABLET ORAL DAILY
Qty: 90 TABLET | Refills: 3 | Status: SHIPPED | OUTPATIENT
Start: 2024-05-08 | End: 2025-05-08

## 2024-05-08 RX ORDER — IBUPROFEN 600 MG/1
600 TABLET ORAL EVERY 8 HOURS PRN
Qty: 60 TABLET | Refills: 11 | Status: SHIPPED | OUTPATIENT
Start: 2024-05-08 | End: 2025-05-08

## 2024-05-08 ASSESSMENT — PAIN SCALES - GENERAL: PAINLEVEL: 0-NO PAIN

## 2024-05-08 ASSESSMENT — ENCOUNTER SYMPTOMS
AGITATION: 0
DYSPHORIC MOOD: 0

## 2024-05-08 NOTE — PROGRESS NOTES
"Nahun Ricardo is a 23 y.o. female who presents for follow-up of Type 2 diabetes mellitus. The initial diagnosis of diabetes was made in 2018 . The patient does not have a known family history of diabetes.    Known complications due to diabetes included none    Cardiovascular risk factors include diabetes mellitus. The patient is not on an ACE inhibitor or angiotensin II receptor blocker.  The patient has not been previously hospitalized due to diabetic ketoacidosis.     Current symptoms/problems include none. Her clinical course has been stable.     Current diabetes regimen is as follows: Oral agent (monotherapy): pioglitazone (Actos)     The patient is currently checking the blood glucose 5+ times per day.    Patient is using: continuous glucose monitor - freestyle heather 2        CGM INTERPRETATION:  Average blood sugar 86 mg/dL, glucose management indicator 5.4%    Glucose less than 70 mg/dL equals 4% of time worn  Glucose ranging between 70 to 180 mg/dL represented by 80% of time worn  Glucose ranging greater than 180 mg/dL represented by 16% of time worn    72 hours of data reviewed in order to inform diabetes treatment plan decision making, patient is not currently at risk for recurrent hypoglycemia safety concerns      Hypoglycemia frequency: n/a  Hypoglycemia awareness: Yes     Exercise: never   Meal panning: She is using avoidance of concentrated sweets.    Review of Systems   Psychiatric/Behavioral:  Negative for agitation, dysphoric mood, self-injury and suicidal ideas.         Feeling happy today   All other systems reviewed and are negative.      Objective   BP (!) 126/94 (BP Location: Right arm, Patient Position: Sitting, BP Cuff Size: Large adult)   Pulse (!) 125   Ht 1.702 m (5' 7\")   Wt 116 kg (255 lb)   BMI 39.94 kg/m²   Physical Exam  Constitutional:       Appearance: Normal appearance. She is obese.   HENT:      Head: Normocephalic and atraumatic.      Nose: Nose normal.      " Mouth/Throat:      Mouth: Mucous membranes are moist.      Pharynx: Oropharynx is clear.   Eyes:      Extraocular Movements: Extraocular movements intact.      Conjunctiva/sclera: Conjunctivae normal.   Cardiovascular:      Rate and Rhythm: Normal rate and regular rhythm.      Pulses: Normal pulses.      Heart sounds: Normal heart sounds.   Pulmonary:      Effort: Pulmonary effort is normal.      Breath sounds: Normal breath sounds.   Abdominal:      General: Abdomen is flat.      Palpations: Abdomen is soft.   Skin:     General: Skin is warm and dry.   Neurological:      General: No focal deficit present.      Mental Status: She is alert and oriented to person, place, and time. Mental status is at baseline.   Psychiatric:         Attention and Perception: Attention normal. She perceives auditory hallucinations. She does not perceive visual hallucinations.         Mood and Affect: Mood is depressed. Affect is blunt.         Speech: Speech normal.         Behavior: Behavior normal. Behavior is cooperative.         Thought Content: Thought content includes suicidal ideation.         Cognition and Memory: Cognition and memory normal.         Judgment: Judgment normal.         Lab Review  Glucose (mg/dL)   Date Value   04/26/2024 213 (H)   02/18/2024 78   01/24/2024 82     POC HEMOGLOBIN A1c (%)   Date Value   05/08/2024 6.3     Hemoglobin A1C (%)   Date Value   06/01/2023 5.7 (A)   05/02/2023 5.5   11/16/2022 5.6   10/29/2022 6.1 (A)     Bicarbonate (mmol/L)   Date Value   04/26/2024 23   02/18/2024 22   01/24/2024 23     Urea Nitrogen (mg/dL)   Date Value   04/26/2024 6   02/18/2024 7   01/24/2024 6     Creatinine (mg/dL)   Date Value   04/26/2024 0.76   02/18/2024 0.94   01/24/2024 1.19 (H)       Health Maintenance:   Foot Exam: updated today  Eye Exam: due for update  Lipid Panel: due for update, ordered today  Urine Albumin: dur for update, ordered today    Assessment/Plan   Diagnoses and all orders for this  visit:  Type 2 diabetes mellitus with hyperglycemia, without long-term current use of insulin (Multi)  -     Hemoglobin A1C; Standing  -     POCT glycosylated hemoglobin (Hb A1C) manually resulted  -     blood-glucose sensor (GreenWave RealityStyle Vic 3 Sensor) device; 1 each every 14 (fourteen) days. Use to check glucose, change every 14 days  -     pioglitazone (Actos) 15 mg tablet; Take 1 tablet (15 mg) by mouth once daily.  -     Referral to Endocrinology; Future  Hyperprolactinemia (Multi)  -     Prolactin; Future  -     POCT glycosylated hemoglobin (Hb A1C) manually resulted  -     Referral to Endocrinology; Future  Migraine without aura and without status migrainosus, not intractable  -     ibuprofen 600 mg tablet; Take 1 tablet (600 mg) by mouth every 8 hours if needed for mild pain (1 - 3), headaches or moderate pain (4 - 6) (pain).      Type 2 diabetes mellitus, is at goal. A1C at target <6.5%    RX changes: restart pioglitazone 15mg once daily  Education:  self-monitoring of blood glucose skills - continue CGM use with new vic 3  Follow up: I recommend diabetes care be 4 months.

## 2024-05-08 NOTE — PATIENT INSTRUCTIONS
Type 2 diabetes mellitus with hyperglycemia, without long-term current use of insulin (Multi)  -     Hemoglobin A1C; Standing  -     POCT glycosylated hemoglobin (Hb A1C) manually resulted  -     blood-glucose sensor (Snap FitnessStyle Vic 3 Sensor) device; 1 each every 14 (fourteen) days. Use to check glucose, change every 14 days  -     pioglitazone (Actos) 15 mg tablet; Take 1 tablet (15 mg) by mouth once daily.  -     Referral to Endocrinology; Future  Hyperprolactinemia (Multi)  -     Prolactin; Future  -     POCT glycosylated hemoglobin (Hb A1C) manually resulted  -     Referral to Endocrinology; Future  Migraine without aura and without status migrainosus, not intractable  -     ibuprofen 600 mg tablet; Take 1 tablet (600 mg) by mouth every 8 hours if needed for mild pain (1 - 3), headaches or moderate pain (4 - 6) (pain).      Type 2 diabetes mellitus, is at goal. A1C at target <6.5%    RX changes: restart pioglitazone 15mg once daily  Education:  self-monitoring of blood glucose skills - continue CGM use with new vic 3  Follow up: I recommend diabetes care be 4 months.

## 2024-05-14 PROBLEM — K80.20 CHOLELITHIASIS: Status: RESOLVED | Noted: 2023-08-29 | Resolved: 2024-05-14

## 2024-05-14 PROBLEM — N39.44 NOCTURNAL ENURESIS: Status: RESOLVED | Noted: 2023-08-29 | Resolved: 2024-05-14

## 2024-05-14 PROBLEM — R06.02 SHORTNESS OF BREATH: Status: RESOLVED | Noted: 2020-11-04 | Resolved: 2024-05-14

## 2024-05-14 PROBLEM — E78.5 HYPERLIPIDEMIA LDL GOAL <100: Status: RESOLVED | Noted: 2021-07-14 | Resolved: 2024-05-14

## 2024-05-14 PROBLEM — R03.0 ELEVATED BLOOD PRESSURE READING WITHOUT DIAGNOSIS OF HYPERTENSION: Status: RESOLVED | Noted: 2023-08-29 | Resolved: 2024-05-14

## 2024-05-14 PROBLEM — I10 HYPERTENSION: Status: RESOLVED | Noted: 2023-08-29 | Resolved: 2024-05-14

## 2024-05-14 PROBLEM — R51.9 HEADACHE: Status: RESOLVED | Noted: 2023-08-30 | Resolved: 2024-05-14

## 2024-05-14 PROBLEM — A54.9 GONORRHEA: Status: RESOLVED | Noted: 2023-08-29 | Resolved: 2024-05-14

## 2024-05-14 PROBLEM — T88.7XXA MEDICATION SIDE EFFECT: Status: RESOLVED | Noted: 2023-08-29 | Resolved: 2024-05-14

## 2024-05-14 PROBLEM — G47.19 OTHER HYPERSOMNIA: Status: RESOLVED | Noted: 2023-08-29 | Resolved: 2024-05-14

## 2024-05-14 PROBLEM — N64.3 GALACTORRHEA: Status: RESOLVED | Noted: 2023-08-29 | Resolved: 2024-05-14

## 2024-05-14 PROBLEM — R63.4 ABNORMAL WEIGHT LOSS: Status: RESOLVED | Noted: 2023-08-29 | Resolved: 2024-05-14

## 2024-05-14 PROBLEM — N64.4 BREAST PAIN: Status: RESOLVED | Noted: 2023-08-30 | Resolved: 2024-05-14

## 2024-05-14 PROBLEM — R10.814 ABDOMINAL TENDERNESS OF LEFT LOWER QUADRANT: Status: RESOLVED | Noted: 2019-04-05 | Resolved: 2024-05-14

## 2024-05-14 PROBLEM — R82.71 BACTERIURIA: Status: RESOLVED | Noted: 2023-08-29 | Resolved: 2024-05-14

## 2024-05-14 PROBLEM — R68.89 UNINTENTIONAL WEIGHT CHANGE: Status: RESOLVED | Noted: 2023-08-29 | Resolved: 2024-05-14

## 2024-05-14 PROBLEM — E11.69 DIABETES MELLITUS TYPE 2 IN OBESE: Status: RESOLVED | Noted: 2020-10-31 | Resolved: 2024-05-14

## 2024-05-14 PROBLEM — T65.91XA INGESTION OF SUBSTANCE: Status: RESOLVED | Noted: 2023-08-29 | Resolved: 2024-05-14

## 2024-05-14 PROBLEM — R30.0 BURNING WITH URINATION: Status: RESOLVED | Noted: 2023-08-29 | Resolved: 2024-05-14

## 2024-05-14 PROBLEM — R79.89 LOW TSH LEVEL: Status: RESOLVED | Noted: 2018-10-11 | Resolved: 2024-05-14

## 2024-05-14 PROBLEM — R00.0 TACHYCARDIA: Status: RESOLVED | Noted: 2020-11-04 | Resolved: 2024-05-14

## 2024-05-14 PROBLEM — E11.9 TYPE 2 DIABETES MELLITUS WITHOUT COMPLICATION, WITHOUT LONG-TERM CURRENT USE OF INSULIN (MULTI): Status: ACTIVE | Noted: 2024-05-14

## 2024-05-14 PROBLEM — H53.8 BLURRY VISION: Status: RESOLVED | Noted: 2023-08-29 | Resolved: 2024-05-14

## 2024-05-14 PROBLEM — E11.00 HYPEROSMOLAR HYPERGLYCEMIC STATE (HHS) (MULTI): Status: RESOLVED | Noted: 2020-10-31 | Resolved: 2024-05-14

## 2024-05-14 PROBLEM — E66.9 DIABETES MELLITUS TYPE 2 IN OBESE: Status: RESOLVED | Noted: 2020-10-31 | Resolved: 2024-05-14

## 2024-05-14 PROBLEM — R82.81 STERILE PYURIA: Status: RESOLVED | Noted: 2023-08-29 | Resolved: 2024-05-14

## 2024-05-14 PROBLEM — G47.30 SLEEP APNEA: Status: RESOLVED | Noted: 2023-08-29 | Resolved: 2024-05-14

## 2024-05-14 PROBLEM — R63.8 DECREASED ORAL INTAKE: Status: RESOLVED | Noted: 2023-08-29 | Resolved: 2024-05-14

## 2024-05-14 PROBLEM — R29.818 SUSPECTED SLEEP APNEA: Status: RESOLVED | Noted: 2023-08-29 | Resolved: 2024-05-14

## 2024-05-18 ENCOUNTER — HOSPITAL ENCOUNTER (EMERGENCY)
Facility: HOSPITAL | Age: 24
Discharge: HOME | End: 2024-05-19
Attending: STUDENT IN AN ORGANIZED HEALTH CARE EDUCATION/TRAINING PROGRAM
Payer: COMMERCIAL

## 2024-05-18 DIAGNOSIS — R73.9 HYPERGLYCEMIA: ICD-10-CM

## 2024-05-18 DIAGNOSIS — R51.9 NONINTRACTABLE HEADACHE, UNSPECIFIED CHRONICITY PATTERN, UNSPECIFIED HEADACHE TYPE: Primary | ICD-10-CM

## 2024-05-18 LAB — GLUCOSE BLD MANUAL STRIP-MCNC: 271 MG/DL (ref 74–99)

## 2024-05-18 PROCEDURE — 81025 URINE PREGNANCY TEST: CPT

## 2024-05-18 PROCEDURE — 82947 ASSAY GLUCOSE BLOOD QUANT: CPT | Mod: 59

## 2024-05-18 PROCEDURE — 36415 COLL VENOUS BLD VENIPUNCTURE: CPT

## 2024-05-18 PROCEDURE — 93005 ELECTROCARDIOGRAM TRACING: CPT

## 2024-05-18 PROCEDURE — 96375 TX/PRO/DX INJ NEW DRUG ADDON: CPT

## 2024-05-18 PROCEDURE — 87086 URINE CULTURE/COLONY COUNT: CPT

## 2024-05-18 PROCEDURE — 96361 HYDRATE IV INFUSION ADD-ON: CPT

## 2024-05-18 PROCEDURE — 99285 EMERGENCY DEPT VISIT HI MDM: CPT | Performed by: STUDENT IN AN ORGANIZED HEALTH CARE EDUCATION/TRAINING PROGRAM

## 2024-05-18 PROCEDURE — 82947 ASSAY GLUCOSE BLOOD QUANT: CPT

## 2024-05-18 PROCEDURE — 84132 ASSAY OF SERUM POTASSIUM: CPT | Mod: 59,91

## 2024-05-18 PROCEDURE — 99284 EMERGENCY DEPT VISIT MOD MDM: CPT | Mod: 25

## 2024-05-18 PROCEDURE — 81001 URINALYSIS AUTO W/SCOPE: CPT

## 2024-05-18 PROCEDURE — 84132 ASSAY OF SERUM POTASSIUM: CPT | Mod: 91

## 2024-05-18 PROCEDURE — 85025 COMPLETE CBC W/AUTO DIFF WBC: CPT

## 2024-05-18 PROCEDURE — 80179 DRUG ASSAY SALICYLATE: CPT | Mod: 91

## 2024-05-18 PROCEDURE — 96374 THER/PROPH/DIAG INJ IV PUSH: CPT

## 2024-05-18 RX ORDER — KETOROLAC TROMETHAMINE 15 MG/ML
15 INJECTION, SOLUTION INTRAMUSCULAR; INTRAVENOUS ONCE
Status: COMPLETED | OUTPATIENT
Start: 2024-05-18 | End: 2024-05-19

## 2024-05-18 RX ORDER — METOCLOPRAMIDE HYDROCHLORIDE 5 MG/ML
10 INJECTION INTRAMUSCULAR; INTRAVENOUS ONCE
Status: COMPLETED | OUTPATIENT
Start: 2024-05-18 | End: 2024-05-19

## 2024-05-18 RX ORDER — DIPHENHYDRAMINE HYDROCHLORIDE 50 MG/ML
25 INJECTION INTRAMUSCULAR; INTRAVENOUS ONCE
Status: COMPLETED | OUTPATIENT
Start: 2024-05-18 | End: 2024-05-19

## 2024-05-19 ENCOUNTER — CLINICAL SUPPORT (OUTPATIENT)
Dept: EMERGENCY MEDICINE | Facility: HOSPITAL | Age: 24
End: 2024-05-19
Payer: COMMERCIAL

## 2024-05-19 VITALS
RESPIRATION RATE: 18 BRPM | TEMPERATURE: 98.5 F | DIASTOLIC BLOOD PRESSURE: 62 MMHG | OXYGEN SATURATION: 100 % | SYSTOLIC BLOOD PRESSURE: 94 MMHG | HEART RATE: 93 BPM

## 2024-05-19 LAB
ALBUMIN SERPL BCP-MCNC: 3.6 G/DL (ref 3.4–5)
ALP SERPL-CCNC: 116 U/L (ref 33–110)
ALT SERPL W P-5'-P-CCNC: 6 U/L (ref 7–45)
AMPHETAMINES UR QL SCN: NORMAL
ANION GAP BLDV CALCULATED.4IONS-SCNC: 8 MMOL/L (ref 10–25)
ANION GAP SERPL CALC-SCNC: 13 MMOL/L (ref 10–20)
APAP SERPL-MCNC: <10 UG/ML
APPEARANCE UR: CLEAR
AST SERPL W P-5'-P-CCNC: 15 U/L (ref 9–39)
BARBITURATES UR QL SCN: NORMAL
BASE EXCESS BLDV CALC-SCNC: 1 MMOL/L (ref -2–3)
BASOPHILS # BLD AUTO: 0.07 X10*3/UL (ref 0–0.1)
BASOPHILS NFR BLD AUTO: 0.6 %
BENZODIAZ UR QL SCN: NORMAL
BILIRUB SERPL-MCNC: 0.3 MG/DL (ref 0–1.2)
BILIRUB UR STRIP.AUTO-MCNC: NEGATIVE MG/DL
BODY TEMPERATURE: 37 DEGREES CELSIUS
BUN SERPL-MCNC: 7 MG/DL (ref 6–23)
BZE UR QL SCN: NORMAL
CA-I BLDV-SCNC: 1.24 MMOL/L (ref 1.1–1.33)
CALCIUM SERPL-MCNC: 9.1 MG/DL (ref 8.6–10.6)
CANNABINOIDS UR QL SCN: NORMAL
CHLORIDE BLDV-SCNC: 106 MMOL/L (ref 98–107)
CHLORIDE SERPL-SCNC: 106 MMOL/L (ref 98–107)
CO2 SERPL-SCNC: 22 MMOL/L (ref 21–32)
COLOR UR: COLORLESS
CREAT SERPL-MCNC: 0.82 MG/DL (ref 0.5–1.05)
EGFRCR SERPLBLD CKD-EPI 2021: >90 ML/MIN/1.73M*2
EOSINOPHIL # BLD AUTO: 0.03 X10*3/UL (ref 0–0.7)
EOSINOPHIL NFR BLD AUTO: 0.3 %
ERYTHROCYTE [DISTWIDTH] IN BLOOD BY AUTOMATED COUNT: 13.2 % (ref 11.5–14.5)
ETHANOL SERPL-MCNC: <10 MG/DL
FENTANYL+NORFENTANYL UR QL SCN: NORMAL
GLUCOSE BLDV-MCNC: 194 MG/DL (ref 74–99)
GLUCOSE SERPL-MCNC: 179 MG/DL (ref 74–99)
GLUCOSE UR STRIP.AUTO-MCNC: NORMAL MG/DL
HCO3 BLDV-SCNC: 26 MMOL/L (ref 22–26)
HCT VFR BLD AUTO: 36.5 % (ref 36–46)
HCT VFR BLD EST: 38 % (ref 36–46)
HGB BLD-MCNC: 12.6 G/DL (ref 12–16)
HGB BLDV-MCNC: 12.6 G/DL (ref 12–16)
HOLD SPECIMEN: NORMAL
IMM GRANULOCYTES # BLD AUTO: 0.04 X10*3/UL (ref 0–0.7)
IMM GRANULOCYTES NFR BLD AUTO: 0.4 % (ref 0–0.9)
INHALED O2 CONCENTRATION: 21 %
KETONES UR STRIP.AUTO-MCNC: NEGATIVE MG/DL
LACTATE BLDV-SCNC: 1.5 MMOL/L (ref 0.4–2)
LEUKOCYTE ESTERASE UR QL STRIP.AUTO: ABNORMAL
LYMPHOCYTES # BLD AUTO: 4.75 X10*3/UL (ref 1.2–4.8)
LYMPHOCYTES NFR BLD AUTO: 42.4 %
MCH RBC QN AUTO: 32 PG (ref 26–34)
MCHC RBC AUTO-ENTMCNC: 34.5 G/DL (ref 32–36)
MCV RBC AUTO: 93 FL (ref 80–100)
METHADONE UR QL SCN: NORMAL
MONOCYTES # BLD AUTO: 0.86 X10*3/UL (ref 0.1–1)
MONOCYTES NFR BLD AUTO: 7.7 %
NEUTROPHILS # BLD AUTO: 5.44 X10*3/UL (ref 1.2–7.7)
NEUTROPHILS NFR BLD AUTO: 48.6 %
NITRITE UR QL STRIP.AUTO: NEGATIVE
NRBC BLD-RTO: 0 /100 WBCS (ref 0–0)
OPIATES UR QL SCN: NORMAL
OXYCODONE+OXYMORPHONE UR QL SCN: NORMAL
OXYHGB MFR BLDV: 82.7 % (ref 45–75)
PCO2 BLDV: 42 MM HG (ref 41–51)
PCP UR QL SCN: NORMAL
PH BLDV: 7.4 PH (ref 7.33–7.43)
PH UR STRIP.AUTO: 6 [PH]
PLATELET # BLD AUTO: 349 X10*3/UL (ref 150–450)
PO2 BLDV: 54 MM HG (ref 35–45)
POTASSIUM BLDV-SCNC: 4.4 MMOL/L (ref 3.5–5.3)
POTASSIUM SERPL-SCNC: 4.2 MMOL/L (ref 3.5–5.3)
PREGNANCY TEST URINE, POC: NEGATIVE
PROT SERPL-MCNC: 7 G/DL (ref 6.4–8.2)
PROT UR STRIP.AUTO-MCNC: NEGATIVE MG/DL
RBC # BLD AUTO: 3.94 X10*6/UL (ref 4–5.2)
RBC # UR STRIP.AUTO: NEGATIVE /UL
RBC #/AREA URNS AUTO: NORMAL /HPF
RBC MORPH BLD: NORMAL
SALICYLATES SERPL-MCNC: <3 MG/DL
SAO2 % BLDV: 85 % (ref 45–75)
SARS-COV-2 RNA RESP QL NAA+PROBE: NOT DETECTED
SODIUM BLDV-SCNC: 136 MMOL/L (ref 136–145)
SODIUM SERPL-SCNC: 137 MMOL/L (ref 136–145)
SP GR UR STRIP.AUTO: 1.01
SQUAMOUS #/AREA URNS AUTO: NORMAL /HPF
UROBILINOGEN UR STRIP.AUTO-MCNC: NORMAL MG/DL
WBC # BLD AUTO: 11.2 X10*3/UL (ref 4.4–11.3)
WBC #/AREA URNS AUTO: NORMAL /HPF

## 2024-05-19 PROCEDURE — 87635 SARS-COV-2 COVID-19 AMP PRB: CPT

## 2024-05-19 PROCEDURE — 2500000004 HC RX 250 GENERAL PHARMACY W/ HCPCS (ALT 636 FOR OP/ED): Mod: SE

## 2024-05-19 PROCEDURE — 80307 DRUG TEST PRSMV CHEM ANLYZR: CPT

## 2024-05-19 RX ORDER — ACETAMINOPHEN 500 MG
1000 TABLET ORAL EVERY 6 HOURS PRN
Qty: 100 TABLET | Refills: 0 | Status: SHIPPED | OUTPATIENT
Start: 2024-05-19

## 2024-05-19 RX ORDER — NAPROXEN 500 MG/1
500 TABLET ORAL 2 TIMES DAILY PRN
Qty: 100 TABLET | Refills: 0 | Status: SHIPPED | OUTPATIENT
Start: 2024-05-19

## 2024-05-19 RX ADMIN — METOCLOPRAMIDE 10 MG: 5 INJECTION, SOLUTION INTRAMUSCULAR; INTRAVENOUS at 00:42

## 2024-05-19 RX ADMIN — DIPHENHYDRAMINE HYDROCHLORIDE 25 MG: 50 INJECTION, SOLUTION INTRAMUSCULAR; INTRAVENOUS at 00:38

## 2024-05-19 RX ADMIN — KETOROLAC TROMETHAMINE 15 MG: 15 INJECTION, SOLUTION INTRAMUSCULAR; INTRAVENOUS at 00:42

## 2024-05-19 RX ADMIN — SODIUM CHLORIDE 1000 ML: 9 INJECTION, SOLUTION INTRAVENOUS at 00:40

## 2024-05-19 SDOH — HEALTH STABILITY: MENTAL HEALTH: ARE YOU HAVING THOUGHTS OF KILLING YOURSELF RIGHT NOW?: NO

## 2024-05-19 SDOH — HEALTH STABILITY: MENTAL HEALTH: NON-SPECIFIC ACTIVE SUICIDAL THOUGHTS (PAST 1 MONTH): NO

## 2024-05-19 SDOH — ECONOMIC STABILITY: HOUSING INSECURITY: FEELS SAFE LIVING IN HOME: YES

## 2024-05-19 SDOH — HEALTH STABILITY: MENTAL HEALTH: WISH TO BE DEAD (PAST 1 MONTH): NO

## 2024-05-19 SDOH — HEALTH STABILITY: MENTAL HEALTH: DEPRESSION SYMPTOMS: NO PROBLEMS REPORTED OR OBSERVED.

## 2024-05-19 SDOH — HEALTH STABILITY: MENTAL HEALTH: IN THE PAST FEW WEEKS, HAVE YOU WISHED YOU WERE DEAD?: NO

## 2024-05-19 SDOH — HEALTH STABILITY: MENTAL HEALTH: SUICIDAL BEHAVIOR (LIFETIME): NO

## 2024-05-19 SDOH — HEALTH STABILITY: MENTAL HEALTH: HAVE YOU EVER TRIED TO KILL YOURSELF?: NO

## 2024-05-19 SDOH — HEALTH STABILITY: MENTAL HEALTH: IN THE PAST WEEK, HAVE YOU BEEN HAVING THOUGHTS ABOUT KILLING YOURSELF?: NO

## 2024-05-19 SDOH — HEALTH STABILITY: MENTAL HEALTH: ANXIETY SYMPTOMS: NO PROBLEMS REPORTED OR OBSERVED.

## 2024-05-19 SDOH — HEALTH STABILITY: MENTAL HEALTH: IN THE PAST FEW WEEKS, HAVE YOU FELT THAT YOU OR YOUR FAMILY WOULD BE BETTER OFF IF YOU WERE DEAD?: NO

## 2024-05-19 ASSESSMENT — LIFESTYLE VARIABLES
SUBSTANCE_ABUSE_PAST_12_MONTHS: NO
PRESCIPTION_ABUSE_PAST_12_MONTHS: NO

## 2024-05-19 NOTE — DISCHARGE INSTRUCTIONS
Please take all of your medications as prescribed, return to the ED with any other complaints or worsening symptoms.  Follow-up with your primary care doctor for further management of your blood sugar and headaches.

## 2024-05-19 NOTE — PROGRESS NOTES
EPAT - Social Work Psychiatric Assessment    Arrival Details  Mode of Arrival: Ambulance  Admission Source: Emergency department  Admission Type: Voluntary  EPAT Assessment Start Date: 05/19/24  EPAT Assessment Start Time: 0500  Name of : Kenyetta Murphy M.Ed., FAVIO    History of Present Illness  Admission Reason: Hypoglycemia/Migraine/SI    The patient is a 23 yr old female with a history of schizoaffective disorder, bipolar type and cannabis use disorder. She presents in the ED for hypoglycemia and a migraine. She also told the ED provider she was having thoughts of hurting herself. She said she had a plan but didn't want to talk about it. She also mentioned that her  promised to get her lesly donuts if she stayed out of the hospital/ED. The patient scored low risk for suicide on the C-SSRS when assessed in the ED. The patient was referred to EPAT for psychiatric evaluation.     Patient history was reviewed before EPAT evaluation.     Psychiatric Diagnosis History: schizoaffective disorder, bipolar type    Psychiatric Medication/Treatment History: The patient has a long history of ED visits and has had multiple past inpatient admissions. She is very well known to this writer and EPAT. The patient appears to be doing well for the first time in years. This is her first ED visit within a span of 3 months which is really good for her and it is the first time within a few months she has not been positive for cannabis. The patient reports that her  said she would get her lesly donuts if she stayed out of the hospital. Apparently this has been effective for the patient as she remembers and reports this to ED staff. This writer is proud of patient as she seems to have made tremendous improvement with her mental health since last seen at ED.       SW Readmission Information   Readmission within 30 Days: No    Psychiatric Symptoms  Anxiety Symptoms: No problems reported or observed.  Depression  Symptoms: No problems reported or observed.  Helga Symptoms: No problems reported or observed.    Psychosis Symptoms  Hallucination Type: No problems reported or observed.  Delusion Type: No problems reported or observed.    Additional Symptoms - Adult  Generalized Anxiety Disorder: No problems reported or observed.  Obsessive Compulsive Disorder: No problems reported or observed.  Panic Attack: No problems reported or observed.  Post Traumatic Stress Disorder: No problems reported or observed.  Delirium: No problems reported or observed.  Review of Symptoms Comments: The patient does not have any mental health symptoms. She just has a really bad headache.    Past Psychiatric History/Meds/Treatments  Past Psychiatric History: schizoaffective disorder, bipolar type  Past Psychiatric Meds/Treatments: The patient has a long history of ED visits and has had multiple past inpatient admissions. She is very well known to this writer and EPAT. The patient appears to be doing well for the first time in years. This is her first ED visit within a span of 3 months which is really good for her and it is the first time within a few months she has not been positive for cannabis. The patient reports that her  said she would get her lesly donuts if she stayed out of the hospital. Apparently this has been effective for the patient as she remembers and reports this to ED staff. This writer is proud of patient as she seems to have made tremendous improvement with her mental health since last seen at ED.  Past Violence/Victimization History: Patient has become violent in past    Current Mental Health Contacts  Provider Name/Phone Number: Natalee Prieto MD  Provider Last Appointment Date: 5/8/24    Support System: Extended family, Friends, Community    Living Arrangement: Lives with someone (grandparents)    Home Safety  Feels Safe Living in Home: Yes    Income Information  Employment Status for: Patient  Employment Status:  Unemployed, Disabled  Income Source: Disability  Current/Previous Occupation:  (None)    Miltary Service/Education History  Current or Previous  Service: None    Social/Cultural History  Social History: The patient is a [place] citizen and Joelle Neil is patient's legal guardian  Cultural Requests During Hospitalization: None  Spiritual Requests During Hospitalization: None  Important Activities: Family, Social    Legal  Legal Considerations: Patient/ Family Ability to Make Healthcare Decisions  Assistance with Managing/Advocating Healthcare Needs: Legal Guardian (Grandmother is legal guardian)  Criminal Activity/ Legal Involvement Pertinent to Current Situation/ Hospitalization: None  Legal Concerns: Patient has a legal guardian  Legal Comments: None    Drug Screening  Have you used any substances (canabis, cocaine, heroin, hallucinogens, inhalants, etc.) in the past 12 months?: No  Have you used any prescription drugs other than prescribed in the past 12 months?: No  Is a toxicology screen needed?: Yes    Stage of Change  Stage of Change:  (Patient is negative for drugs/alcohol and does not require substance use treatment)    Psychosocial  Psychosocial (WDL): Within Defined Limits    Orientation  Orientation Level: Oriented X4    General Appearance  Motor Activity: Unremarkable  Speech Pattern: Excessively soft (Patient has a migraine)  General Attitude: Cooperative  Appearance/Hygiene: Unremarkable    Thought Process  Coherency:  (Patient appears to be coherent)  Content: Unremarkable  Delusions:  (None observed or reported)  Perception: Not altered  Hallucination: None  Judgment/Insight: Limited  Confusion: None  Cognition: Appropriate judgement, Appropriate safety awareness, Appropriate attention/concentration, Follows commands, Cognitive delay    Sleep Pattern  Sleep Pattern: Sleeps all night    Risk Factors  Self Harm/Suicidal Ideation Plan: Patient denies  Previous Self Harm/Suicidal Plans:  Patient denies  Risk Factors: 1st psychiatric hospitalization by age 19, Lower IQ, Major mental illness  Description of Thoughts/Ideas Leaving Unit Now: Patient is sleepy due to migraine    Violence Risk Assessment  Assessment of Violence: On admission  Thoughts of Harm to Others: No    Ability to Assess Risk Screen  Risk Screen - Ability to Assess: Able to be screened  Ask Suicide-Screening Questions  1. In the past few weeks, have you wished you were dead?: No  2. In the past few weeks, have you felt that you or your family would be better off if you were dead?: No  3. In the past week, have you been having thoughts about killing yourself?: No  4. Have you ever tried to kill yourself?: No  5. Are you having thoughts of killing yourself right now?: No  Calculated Risk Score: No intervention is necessary  [place] Suicide Severity Rating Scale (Screener/Recent Self-Report)  1. Wish to be Dead (Past 1 Month): No  2. Non-Specific Active Suicidal Thoughts (Past 1 Month): No  6. Suicidal Behavior (Lifetime): No  Calculated C-SSRS Risk Score (Lifetime/Recent): No Risk Indicated  Step 1: Risk Factors  Current & Past Psychiatric Dx: Mood disorder, Psychotic disorder, Alcohol/substance abuse disorders  Presenting Symptoms: Other (Comment) (No psychiatric symptoms reported.)  Family History:  (None reported)  Precipitants/Stressors: Other (Comment) (Patient has a severe migraine)  Change in Treatment: Change in provider or treament (i.e., medications, psychotherapy, milieu) (Patient appears to be on less medications and has been negative for cannabis for a few months. There has been an improvement in mental health observed.)  Access to Lethal Methods : No  Step 2: Protective Factors   Protective Factors Internal: Identifies reasons for living  Protective Factors External: Supportive social network or family or friends, Positive therapeutic relationships  Step 3: Suicidal Ideation Intensity  Most Severe Suicidal Ideation  Identified: Patient denies any SI  How Many Times Have You Had These Thoughts: Less than once a week  When You Have the Thoughts How Long do They Last : Fleeting - few seconds or minutes  Could/Can You Stop Thinking About Killing Yourself or Wanting to Die if You Want to: Does not attempt to control thoughts  Are There Things - Anyone or Anything - That Stopped You From Wanting to Die or Acting on: Does not apply  What Sort of Reasons Did You Have For Thinking About Wanting to Die or Killing Yourself: Does not apply  Total Score: 2  Step 5: Documentation  Risk Level: Low suicide risk    Psychiatric Impression and Plan of Care    The patient is a 23 yr old female with a history of schizoaffective disorder, bipolar type and cannabis use disorder. She presents in the ED for hypoglycemia and a migraine. She mentioned to ED doctor that she was having thoughts of hurting herself and then mentioned that if she stayed out of the hospital she was going to get lesly donuts from her . The patient initially had fallen asleep due to having a severe migraine and was getting medication for her migraine. This writer was able to evaluate the patient later. The patient was calm and cooperative.    The patient currently denies SI/HI/AH/VH. She scores low risk for suicide on the C-SSRS. She denies drug/alcohol use and labs are all negative. Patient does not require any substance use treatment. The patient is feeling better after getting medicine for her migraine and is denying any psychiatric symptoms and is denying any SI.    The patient does not meet criteria for inpatient psychiatric admission and is recommended for discharge  Diagnostic Impression: Schizoaffective Disorder, bipolar type    Specific Resources Provided to Patient: None. Patient has adequete outpatient mental health resources.  CM Notified: N/A    PHP/IOP Recommended: None  Specific Information Provided for PHP/IOP: None  Plan Comments: Discharge  home    Outcome/Disposition  Patient's Perception of Outcome Achieved: Unable to assess  Assessment, Recommendations and Risk Level Reviewed with: Ayo Chaparro MD  Contact Name: Joelle Neil  Contact Number(s): [phone]  Contact Relationship: Legal guardian  EPAT Assessment Completed Date: 05/19/24  EPAT Assessment Completed Time: 0613  Patient Disposition: Home

## 2024-05-19 NOTE — ED PROVIDER NOTES
CC: Hyperglycemia (Pt reports dizziness with headache today)     History provided by: Patient  Limitations to History: None    HPI:  Patient is a 23-year-old female with history of schizoaffective disorder, anxiety, depression, bipolar disorder, chronic constipation, type 2 diabetes on pioglitazone, hyperprolactinemia, migraine who presents with multiple medical complaints.  She states her blood sugar is high, in triage it was 217.  She takes oral medications for diabetes and she said she took it earlier tonight, she is not on any insulin.  She states she also has dysuria but denies any fevers, chills, abdominal pain, nausea, vomiting.  She also endorses lightheadedness and a frontal throbbing headache that began a few hours ago.  She states she was prescribed ibuprofen for migraines and this feels like a migraine however she did not take this tonight.  She denies any shortness of breath, unsteadiness, mental status changes, nausea, vomiting, diarrhea, history of DKA.    External Records Reviewed:  I reviewed prior ED visits, Care Everywhere, discharge summaries and outpatient records as appropriate.   ???????????????????????????????????????????????????????????????  Triage Vitals:  T 37.3 °C (99.2 °F)  HR (!) 136  BP (!) 167/104  RR 20  O2 100 % None (Room air)    Physical Exam  Vitals and nursing note reviewed.   Constitutional:       General: She is not in acute distress.     Appearance: Normal appearance.   HENT:      Head: Normocephalic and atraumatic.   Eyes:      Conjunctiva/sclera: Conjunctivae normal.   Cardiovascular:      Rate and Rhythm: Normal rate and regular rhythm.   Pulmonary:      Effort: Pulmonary effort is normal. No respiratory distress.   Abdominal:      General: Abdomen is flat.      Palpations: Abdomen is soft.   Musculoskeletal:         General: Normal range of motion.      Cervical back: Normal range of motion and neck supple.   Skin:     General: Skin is warm and dry.   Neurological:     "  General: No focal deficit present.      Mental Status: She is alert and oriented to person, place, and time. Mental status is at baseline.      Cranial Nerves: No cranial nerve deficit.      Sensory: No sensory deficit.      Motor: No weakness.      Coordination: Coordination normal.   Psychiatric:         Mood and Affect: Mood normal. Affect is flat.         Speech: Speech is delayed.         Behavior: Behavior is slowed.         Thought Content: Thought content is not paranoid. Thought content includes suicidal ideation. Thought content does not include homicidal ideation.      Comments: tearful        ???????????????????????????????????????????????????????????????  ED Course/Treatment/Medical Decision Making  MDM:  Patient is a 23-year-old female who presents with multiple medical complaints including hyperglycemia, headache.  Vital signs notable for hypertension and tachycardia.  I suspect pain may be contributing to presentation.  Patient has no focal neurologic deficits, does have a flat affect and delayed speech however has known psychiatric conditions that can be contributing.  She denies any HI, auditory or visual hallucinations, however became tearful in the ED stating she is having thoughts of hurting herself and states she has a plan and when I ask about the plan she states \" I do not want to talk about it.\"  She also states she has a  who told her if she stayed out of the hospital she would get [employer].  She states she is compliant with all of her psychiatric medications.  I did obtain venous full panel to rule out DKA.  Patient is mentating appropriately, low suspicion for HHS.  I provided headache cocktail with fluids, Reglan, Benadryl and Toradol.  I believe given SI and tearfulness, patient may benefit from EPAT evaluation after she is medically clear.      ED Course:  ED Course as of 05/19/24 0156   Sat May 18, 2024   2337 EKG reviewed with sinus tachycardia rate 126, CA " interval 142 ms, QRS 74 ms, QTc 448 ms, no acute ST segment elevations or depressions [SA]   Sun May 19, 2024   0009 VBG with normal pH and lactate, no concern for DKA [SA]   0117 CBC, CMP wnl, tox panel wnl, UA pending, HR improved with interventions [SA]   0156 Patient is medically clear [SA]      ED Course User Index  [SA] Sherley Day DO       EKG Interpretation:  See ED Course/Below:    Independent Interpretation of Studies:  I independently interpreted labs/imaging as stated in ED Course or below.    Differential diagnoses considered include but are not limited to: See MDM/Below:    Social Determinants Limiting Care:  Poor health literacy and Mental health issues    Discussion of Management with Other Providers: See MDM/Below:    Disposition:  Patient signed out pending completion of workup and EPAT evaluation, final disposition     LEXIS Cuevas, PGY-2    I reviewed the case with the attending ED physician. The attending ED physician agrees with the plan. Patient and/or patient´s representative was counseled regarding labs, imaging, likely diagnosis, and plan. All questions were answered.    Disclaimer: This note was dictated by speech recognition.  Attempt at proofreading was made to minimize errors.  Errors in transcription may be present.  Please call if questions.    Procedures ? SmartLinks last updated 5/19/2024 1:56 AM        Sherley Day DO  Resident  05/19/24 0156

## 2024-05-19 NOTE — PROGRESS NOTES
Signout Plan   I received Berna Ricardo in signout from The previous provider.  Please see the ED Provider Note for all HPI, PE and MDM up to the time of signout.  This is in addition to the primary record.    In brief Berna Ricardo is an 23 y.o. female presenting for Hyperglycemia (Pt reports dizziness with headache today)       At the time of signout we were awaiting:  Repeat vital signs, evaluation by EPAT.    ED Course and Medical Decision Making   ED Course:  ED Course as of 05/19/24 0550   Sat May 18, 2024   2337 EKG reviewed with sinus tachycardia rate 126, NM interval 142 ms, QRS 74 ms, QTc 448 ms, no acute ST segment elevations or depressions [SA]   Sun May 19, 2024   0009 VBG with normal pH and lactate, no concern for DKA [SA]   0117 CBC, CMP wnl, tox panel wnl, UA pending, HR improved with interventions [SA]   0156 Patient is medically clear [SA]   0241 Drug Screen, Urine  UDS negative [SH]   0303 Sars-CoV-2 PCR  Covid neg [SH]   0330 Urinalysis with Reflex Culture and Microscopic(!)  UA with positive leuk esterase, microscopy negative [SH]      ED Course User Index  [SA] Sherley Day DO  [SH] Ayo Chaparro MD         Diagnoses as of 05/19/24 0550   Nonintractable headache, unspecified chronicity pattern, unspecified headache type   Hyperglycemia       MDM:  Under my care, patient remained stable.  Her migraine resolved, patient states she feels better upon reevaluation.  Vital signs with improved heart rate.  Patient was eval by EPAT, well-known to them, they state that she is no longer suicidal, appears quite well compared to her baseline.  They are recommending discharge.  Patient discharged home in stable condition.  Given prescription for headaches.  Given referral to primary care.  Given return precautions.    Disposition   As a result of the work-up, patient was discharged home.  They were informed of their diagnosis and instructed to come back with any concerns or worsening of  condition and was agreeable to the plan as discussed above.  The patient was given the opportunity to ask questions.  All of the patient's questions were answered.  The patient remained stable under my care.    Procedures   Procedures    Patient discussed with ED attending physician.    Jadiel Chaparro MD  PGY 3 Emergency Medicine

## 2024-05-20 LAB
ATRIAL RATE: 126 BPM
BACTERIA UR CULT: NORMAL
P AXIS: 44 DEGREES
P OFFSET: 209 MS
P ONSET: 147 MS
PR INTERVAL: 142 MS
Q ONSET: 218 MS
QRS COUNT: 21 BEATS
QRS DURATION: 74 MS
QT INTERVAL: 310 MS
QTC CALCULATION(BAZETT): 448 MS
QTC FREDERICIA: 397 MS
R AXIS: 8 DEGREES
T AXIS: 16 DEGREES
T OFFSET: 373 MS
VENTRICULAR RATE: 126 BPM

## 2024-06-07 DIAGNOSIS — N39.0 RECURRENT UTI: ICD-10-CM

## 2024-06-14 ENCOUNTER — CLINICAL SUPPORT (OUTPATIENT)
Dept: EMERGENCY MEDICINE | Facility: HOSPITAL | Age: 24
End: 2024-06-14
Payer: MEDICARE

## 2024-06-14 ENCOUNTER — HOSPITAL ENCOUNTER (EMERGENCY)
Facility: HOSPITAL | Age: 24
Discharge: OTHER INSTITUTION | End: 2024-06-15
Attending: EMERGENCY MEDICINE
Payer: MEDICARE

## 2024-06-14 DIAGNOSIS — R44.0 AUDITORY HALLUCINATIONS: ICD-10-CM

## 2024-06-14 DIAGNOSIS — T14.91XA SUICIDE ATTEMPT (MULTI): Primary | ICD-10-CM

## 2024-06-14 LAB
ALBUMIN SERPL BCP-MCNC: 3.8 G/DL (ref 3.4–5)
ALP SERPL-CCNC: 107 U/L (ref 33–110)
ALT SERPL W P-5'-P-CCNC: 10 U/L (ref 7–45)
AMPHETAMINES UR QL SCN: NORMAL
ANION GAP SERPL CALC-SCNC: 12 MMOL/L (ref 10–20)
APAP SERPL-MCNC: <10 UG/ML
AST SERPL W P-5'-P-CCNC: 17 U/L (ref 9–39)
BARBITURATES UR QL SCN: NORMAL
BASOPHILS # BLD AUTO: 0.06 X10*3/UL (ref 0–0.1)
BASOPHILS NFR BLD AUTO: 0.6 %
BENZODIAZ UR QL SCN: NORMAL
BILIRUB SERPL-MCNC: 0.3 MG/DL (ref 0–1.2)
BUN SERPL-MCNC: 12 MG/DL (ref 6–23)
BZE UR QL SCN: NORMAL
CALCIUM SERPL-MCNC: 9.3 MG/DL (ref 8.6–10.6)
CANNABINOIDS UR QL SCN: NORMAL
CHLORIDE SERPL-SCNC: 103 MMOL/L (ref 98–107)
CO2 SERPL-SCNC: 26 MMOL/L (ref 21–32)
CREAT SERPL-MCNC: 0.84 MG/DL (ref 0.5–1.05)
EGFRCR SERPLBLD CKD-EPI 2021: >90 ML/MIN/1.73M*2
EOSINOPHIL # BLD AUTO: 0.07 X10*3/UL (ref 0–0.7)
EOSINOPHIL NFR BLD AUTO: 0.7 %
ERYTHROCYTE [DISTWIDTH] IN BLOOD BY AUTOMATED COUNT: 13.6 % (ref 11.5–14.5)
ETHANOL SERPL-MCNC: <10 MG/DL
FENTANYL+NORFENTANYL UR QL SCN: NORMAL
GLUCOSE SERPL-MCNC: 111 MG/DL (ref 74–99)
HCG UR QL IA.RAPID: NEGATIVE
HCT VFR BLD AUTO: 37.5 % (ref 36–46)
HGB BLD-MCNC: 12.5 G/DL (ref 12–16)
HOLD SPECIMEN: NORMAL
IMM GRANULOCYTES # BLD AUTO: 0.04 X10*3/UL (ref 0–0.7)
IMM GRANULOCYTES NFR BLD AUTO: 0.4 % (ref 0–0.9)
LYMPHOCYTES # BLD AUTO: 4.41 X10*3/UL (ref 1.2–4.8)
LYMPHOCYTES NFR BLD AUTO: 45.2 %
MCH RBC QN AUTO: 31.3 PG (ref 26–34)
MCHC RBC AUTO-ENTMCNC: 33.3 G/DL (ref 32–36)
MCV RBC AUTO: 94 FL (ref 80–100)
METHADONE UR QL SCN: NORMAL
MONOCYTES # BLD AUTO: 0.78 X10*3/UL (ref 0.1–1)
MONOCYTES NFR BLD AUTO: 8 %
NEUTROPHILS # BLD AUTO: 4.4 X10*3/UL (ref 1.2–7.7)
NEUTROPHILS NFR BLD AUTO: 45.1 %
NRBC BLD-RTO: 0 /100 WBCS (ref 0–0)
OPIATES UR QL SCN: NORMAL
OXYCODONE+OXYMORPHONE UR QL SCN: NORMAL
PCP UR QL SCN: NORMAL
PLATELET # BLD AUTO: 286 X10*3/UL (ref 150–450)
POTASSIUM SERPL-SCNC: 4 MMOL/L (ref 3.5–5.3)
PROT SERPL-MCNC: 6.6 G/DL (ref 6.4–8.2)
RBC # BLD AUTO: 3.99 X10*6/UL (ref 4–5.2)
SALICYLATES SERPL-MCNC: <3 MG/DL
SODIUM SERPL-SCNC: 137 MMOL/L (ref 136–145)
WBC # BLD AUTO: 9.8 X10*3/UL (ref 4.4–11.3)

## 2024-06-14 PROCEDURE — 2500000004 HC RX 250 GENERAL PHARMACY W/ HCPCS (ALT 636 FOR OP/ED): Mod: SE | Performed by: STUDENT IN AN ORGANIZED HEALTH CARE EDUCATION/TRAINING PROGRAM

## 2024-06-14 PROCEDURE — 81025 URINE PREGNANCY TEST: CPT | Performed by: STUDENT IN AN ORGANIZED HEALTH CARE EDUCATION/TRAINING PROGRAM

## 2024-06-14 PROCEDURE — 85025 COMPLETE CBC W/AUTO DIFF WBC: CPT | Performed by: STUDENT IN AN ORGANIZED HEALTH CARE EDUCATION/TRAINING PROGRAM

## 2024-06-14 PROCEDURE — 2500000001 HC RX 250 WO HCPCS SELF ADMINISTERED DRUGS (ALT 637 FOR MEDICARE OP): Mod: SE | Performed by: STUDENT IN AN ORGANIZED HEALTH CARE EDUCATION/TRAINING PROGRAM

## 2024-06-14 PROCEDURE — 93010 ELECTROCARDIOGRAM REPORT: CPT | Performed by: EMERGENCY MEDICINE

## 2024-06-14 PROCEDURE — 93005 ELECTROCARDIOGRAM TRACING: CPT

## 2024-06-14 PROCEDURE — 90471 IMMUNIZATION ADMIN: CPT | Performed by: STUDENT IN AN ORGANIZED HEALTH CARE EDUCATION/TRAINING PROGRAM

## 2024-06-14 PROCEDURE — 90715 TDAP VACCINE 7 YRS/> IM: CPT | Mod: SE | Performed by: STUDENT IN AN ORGANIZED HEALTH CARE EDUCATION/TRAINING PROGRAM

## 2024-06-14 PROCEDURE — 36415 COLL VENOUS BLD VENIPUNCTURE: CPT | Performed by: STUDENT IN AN ORGANIZED HEALTH CARE EDUCATION/TRAINING PROGRAM

## 2024-06-14 PROCEDURE — 80307 DRUG TEST PRSMV CHEM ANLYZR: CPT | Performed by: STUDENT IN AN ORGANIZED HEALTH CARE EDUCATION/TRAINING PROGRAM

## 2024-06-14 PROCEDURE — 99285 EMERGENCY DEPT VISIT HI MDM: CPT | Performed by: EMERGENCY MEDICINE

## 2024-06-14 PROCEDURE — 80053 COMPREHEN METABOLIC PANEL: CPT | Performed by: STUDENT IN AN ORGANIZED HEALTH CARE EDUCATION/TRAINING PROGRAM

## 2024-06-14 PROCEDURE — 80143 DRUG ASSAY ACETAMINOPHEN: CPT | Performed by: STUDENT IN AN ORGANIZED HEALTH CARE EDUCATION/TRAINING PROGRAM

## 2024-06-14 PROCEDURE — 80320 DRUG SCREEN QUANTALCOHOLS: CPT | Performed by: STUDENT IN AN ORGANIZED HEALTH CARE EDUCATION/TRAINING PROGRAM

## 2024-06-14 PROCEDURE — 99285 EMERGENCY DEPT VISIT HI MDM: CPT | Mod: 25

## 2024-06-14 RX ORDER — DIVALPROEX SODIUM 250 MG/1
500 TABLET, DELAYED RELEASE ORAL EVERY 12 HOURS SCHEDULED
Status: DISCONTINUED | OUTPATIENT
Start: 2024-06-14 | End: 2024-06-15 | Stop reason: HOSPADM

## 2024-06-14 RX ORDER — HYDROXYZINE HYDROCHLORIDE 25 MG/1
25 TABLET, FILM COATED ORAL 3 TIMES DAILY
Status: DISCONTINUED | OUTPATIENT
Start: 2024-06-14 | End: 2024-06-15 | Stop reason: HOSPADM

## 2024-06-14 RX ORDER — PRAZOSIN HYDROCHLORIDE 1 MG/1
1 CAPSULE ORAL NIGHTLY
Status: DISCONTINUED | OUTPATIENT
Start: 2024-06-14 | End: 2024-06-15 | Stop reason: HOSPADM

## 2024-06-14 RX ORDER — DEXTROSE 50 % IN WATER (D50W) INTRAVENOUS SYRINGE
12.5
Status: DISCONTINUED | OUTPATIENT
Start: 2024-06-14 | End: 2024-06-15 | Stop reason: HOSPADM

## 2024-06-14 RX ORDER — PIOGLITAZONEHYDROCHLORIDE 15 MG/1
15 TABLET ORAL DAILY
Status: DISCONTINUED | OUTPATIENT
Start: 2024-06-15 | End: 2024-06-15 | Stop reason: HOSPADM

## 2024-06-14 RX ORDER — NITROFURANTOIN 25; 75 MG/1; MG/1
100 CAPSULE ORAL DAILY
Status: DISCONTINUED | OUTPATIENT
Start: 2024-06-15 | End: 2024-06-15 | Stop reason: HOSPADM

## 2024-06-14 RX ORDER — LOXAPINE SUCCINATE 5 MG/1
100 TABLET ORAL NIGHTLY
Status: DISCONTINUED | OUTPATIENT
Start: 2024-06-14 | End: 2024-06-15 | Stop reason: HOSPADM

## 2024-06-14 RX ORDER — MIRTAZAPINE 15 MG/1
15 TABLET, FILM COATED ORAL NIGHTLY
Status: DISCONTINUED | OUTPATIENT
Start: 2024-06-14 | End: 2024-06-15 | Stop reason: HOSPADM

## 2024-06-14 RX ORDER — BACITRACIN ZINC 500 UNIT/G
OINTMENT IN PACKET (EA) TOPICAL 3 TIMES DAILY
Status: DISCONTINUED | OUTPATIENT
Start: 2024-06-14 | End: 2024-06-15 | Stop reason: HOSPADM

## 2024-06-14 RX ORDER — DEXTROSE 50 % IN WATER (D50W) INTRAVENOUS SYRINGE
25
Status: DISCONTINUED | OUTPATIENT
Start: 2024-06-14 | End: 2024-06-15 | Stop reason: HOSPADM

## 2024-06-14 RX ADMIN — HYDROXYZINE HYDROCHLORIDE 25 MG: 25 TABLET, FILM COATED ORAL at 22:09

## 2024-06-14 RX ADMIN — TETANUS TOXOID, REDUCED DIPHTHERIA TOXOID AND ACELLULAR PERTUSSIS VACCINE, ADSORBED 0.5 ML: 5; 2.5; 8; 8; 2.5 SUSPENSION INTRAMUSCULAR at 18:16

## 2024-06-14 RX ADMIN — BACITRACIN 1 APPLICATION: 500 OINTMENT TOPICAL at 18:18

## 2024-06-14 RX ADMIN — DIVALPROEX SODIUM 500 MG: 250 TABLET, DELAYED RELEASE ORAL at 22:09

## 2024-06-14 RX ADMIN — LOXAPINE 100 MG: 5 CAPSULE ORAL at 23:46

## 2024-06-14 RX ADMIN — MIRTAZAPINE 15 MG: 15 TABLET, FILM COATED ORAL at 22:09

## 2024-06-14 RX ADMIN — PRAZOSIN HYDROCHLORIDE 1 MG: 1 CAPSULE ORAL at 23:47

## 2024-06-14 SDOH — HEALTH STABILITY: MENTAL HEALTH: NEEDS EXPRESSED: DENIES

## 2024-06-14 SDOH — HEALTH STABILITY: MENTAL HEALTH: BEHAVIORS/MOOD: CALM

## 2024-06-14 SDOH — HEALTH STABILITY: MENTAL HEALTH: HALLUCINATION: AUDITORY

## 2024-06-14 ASSESSMENT — PAIN - FUNCTIONAL ASSESSMENT: PAIN_FUNCTIONAL_ASSESSMENT: 0-10

## 2024-06-14 ASSESSMENT — PAIN SCALES - GENERAL
PAINLEVEL_OUTOF10: 0 - NO PAIN
PAINLEVEL_OUTOF10: 0 - NO PAIN

## 2024-06-14 ASSESSMENT — COLUMBIA-SUICIDE SEVERITY RATING SCALE - C-SSRS
1. IN THE PAST MONTH, HAVE YOU WISHED YOU WERE DEAD OR WISHED YOU COULD GO TO SLEEP AND NOT WAKE UP?: YES
5. HAVE YOU STARTED TO WORK OUT OR WORKED OUT THE DETAILS OF HOW TO KILL YOURSELF? DO YOU INTEND TO CARRY OUT THIS PLAN?: YES
6. HAVE YOU EVER DONE ANYTHING, STARTED TO DO ANYTHING, OR PREPARED TO DO ANYTHING TO END YOUR LIFE?: YES
2. HAVE YOU ACTUALLY HAD ANY THOUGHTS OF KILLING YOURSELF?: YES
6. HAVE YOU EVER DONE ANYTHING, STARTED TO DO ANYTHING, OR PREPARED TO DO ANYTHING TO END YOUR LIFE?: YES
4. HAVE YOU HAD THESE THOUGHTS AND HAD SOME INTENTION OF ACTING ON THEM?: YES

## 2024-06-14 ASSESSMENT — LIFESTYLE VARIABLES
HAVE YOU EVER FELT YOU SHOULD CUT DOWN ON YOUR DRINKING: NO
EVER FELT BAD OR GUILTY ABOUT YOUR DRINKING: NO
TOTAL SCORE: 0
HAVE PEOPLE ANNOYED YOU BY CRITICIZING YOUR DRINKING: NO
EVER HAD A DRINK FIRST THING IN THE MORNING TO STEADY YOUR NERVES TO GET RID OF A HANGOVER: NO

## 2024-06-14 NOTE — ED TRIAGE NOTES
Pt presents to ED from group home after attempting to cut wrists. Superficial lacs left wrist. Hx schizophrenia; pt reports hearing voices and seeing humans/animals that are telling her to cut herself. Denies HI.
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
## 2024-06-14 NOTE — ED PROVIDER NOTES
Emergency Department Provider Note   [facility] EMERGENCY MEDICINE             History of Present Illness   CC: Psychiatric Evaluation    History provided by: Patient  Limitations to History: None    HPI:  Berna Ricardo is a 23 y.o. female with history of schizoaffective disorder, bipolar disorder, diabetes presenting to the emergency department with thoughts of to harm herself, cutting her wrist.  She states that she has auditory hallucinations other than worsening, states the voices are telling her to harm herself.  She slit her wrist today with a razor blade.  Unsure when her last tetanus shot was.  She is superficial abrasions to the left wrist which are horizontal, linear.  No deep lacerations noted.  Not endorsing any HI at this time, but continues to endorse SI, states the voices are telling her to harm herself.  Also endorses visual hallucinations.  Denies any medical complaints.    Physical Exam   Triage vitals:  T 36.8 °C (98.3 °F)  HR (!) 109  /90  RR 18  O2 96 % None (Room air)    General: Awake, alert, in no acute distress  Eyes: Gaze conjugate.  No scleral icterus or injection  HENT: Normo-cephalic, atraumatic. No stridor.  CV: Radial pulses 2+ bilaterally  Resp: Breathing non-labored, speaking in full sentences.  Clear to auscultation bilaterally  GI: Soft, non-distended, non-tender. No rebound or guarding.  MSK/Extremities: No gross bony deformities. Moving all extremities  Skin: Warm. Appropriate color.  4-5 linear, transverse abrasions overlying the left volar aspect of the forearm, no active bleeding.  Neuro: Alert. Oriented. Face symmetric. Speech is fluent.  Gross strength and sensation intact in b/l UE and LEs  Psych: General: Appropriately groomed and dressed in gown. Appearance: Appears stated age. Attitude: Calm, cooperative. Behavior: Appropriate eye contact. Motor Activity: Subdued.  Speech: Monotone, normal rate and volume. Mood: Euthymic. Affect: Flat.  Thought Process: Organized, linear, Thought Content: Endorses SI, denies HI, endorses AH and VH.  Does not appear internally stimulated. Cognition: Alert, oriented    ED Course & Medical Decision Making   Medical Decision Makin y.o. female presenting to the emergency department after self-harm attempt where she has abrasions to the volar aspect of the left wrist.  On arrival, vital signs notable for mild tachycardia, otherwise within normal limits.  Patient nontoxic-appearing on my evaluation.  She is linear abrasions, do not require any laceration repair.  Will consult EPAT, will update tetanus, will apply bacitracin to her wounds.  Will obtain psych labs and EKG.  Patient denies any other harm attempts or coingestions.    Patient was eval by EPAT who recommended placement.  We did restart her home meds including her psych meds at the request EPAT.  Patient remained stable under my care.  Signed out pending placement by EPAT.    External Records Reviewed: None    Differential diagnoses considered include but are not limited to: Depression, SI, schizoaffective disorder, bipolar disorder    Social Determinants Limiting Care: Mental health disorder    EKG: EKG interpreted by myself. Please see ED Course for full interpretation.    Results: Relevant laboratory and radiographic results were reviewed and independently interpreted by myself.  As necessary, they are commented on in the ED Course.    Chronic Medical Conditions Significantly Affecting Care: None    Patient was discussed with the following consultants/services: JOSEPH     Care Considerations: None    ED Course:  ED Course as of 24   184 CBC and Auto Differential(!)  Normal WBC, HgB, platelets [SH]   1842 Electrocardiogram, 12-lead  EKG obtained at 1815, interpreted by myself demonstrating normal sinus rhythm with mild sinus arrhythmia, ventricular rate 97, normal axis, normal intervals, no ST segment or T wave signs of  "ischemia.   [SH]   1907 hCG, Urine, Qualitative  HCG negative [SH]   1914 Drug Screen, Urine  UDS negative [SH]   1914 Acute Toxicology Panel, Blood  Tox panel negative [SH]   1914 Comprehensive Metabolic Panel(!)  Normal CMP [SH]   2122 Patient discussed with EPAT, they will recommend placement at this time.  Patient's home meds were restarted, psych meds ordered per EPAT recommendations.  Patient has history of diabetes, her blood sugar medication is restarted, and we ordered regularly scheduled point-of-care glucose checks. [SH]      ED Course User Index  [SH] Ayo Chaparro MD         Diagnoses as of 06/14/24 2155   Suicide attempt (Multi)   Auditory hallucinations       Disposition   As a result of the work-up, patient was discharged home.  They were informed of their diagnosis and instructed to come back with any concerns or worsening of condition and was agreeable to the plan as discussed above.  The patient was given the opportunity to ask questions.  All of the patient's questions were answered.  The patient remained stable under my care.    Procedures   Procedures    Patient seen and discussed with ED attending physician.    Ayo Chaparro MD  Emergency Medicine       Ayo Chaparro MD  Resident  06/15/24 1127    ----------------------------------------    This patient was seen by the resident physician. I have seen and examined the patient, agree with the workup, evaluation, management and diagnosis. The care plan has been discussed and I concur.    My assessment reveals the following:    HPI:  Patient is a 24 y/o female with h/o schizoaffective disorder, bipolar disorder, and DM presenting with suicidal thoughts and cutting her wrist. Reports AH telling her to hurt herself. She states to me that she is \"dead.\" Unsure when her last Tdap was. No HI/VH. No physical complaints.     PE:  Vital signs reviewed in nursing triage note, EMR flowsheets, and at patient's bedside  GEN: Patient is awake, alert, " calm, cooperative, and in mild to moderate psych distress.  HEAD: Normocephalic and atraumatic.  EYES: Anicteric sclera.  MOUTH: Mucous membranes moist.  CV: Regular rate and rhythm. (+) s1/s2. No murmurs/rubs/gallops.  PULM: CTAB. No wheezes, rales, or crackles.  GI: Soft, non-tender, non-distended without rebound or guarding.  EXT: No deformities noted.   NEURO: Moves all extremities.   SKIN: Warm, dry. No erythema or ecchymosis. Superficial lacerations over left volar wrist without active bleeding.     Labs Reviewed   CBC WITH AUTO DIFFERENTIAL - Abnormal       Result Value    WBC 9.8      nRBC 0.0      RBC 3.99 (*)     Hemoglobin 12.5      Hematocrit 37.5      MCV 94      MCH 31.3      MCHC 33.3      RDW 13.6      Platelets 286      Neutrophils % 45.1      Immature Granulocytes %, Automated 0.4      Lymphocytes % 45.2      Monocytes % 8.0      Eosinophils % 0.7      Basophils % 0.6      Neutrophils Absolute 4.40      Immature Granulocytes Absolute, Automated 0.04      Lymphocytes Absolute 4.41      Monocytes Absolute 0.78      Eosinophils Absolute 0.07      Basophils Absolute 0.06     COMPREHENSIVE METABOLIC PANEL - Abnormal    Glucose 111 (*)     Sodium 137      Potassium 4.0      Chloride 103      Bicarbonate 26      Anion Gap 12      Urea Nitrogen 12      Creatinine 0.84      eGFR >90      Calcium 9.3      Albumin 3.8      Alkaline Phosphatase 107      Total Protein 6.6      AST 17      Bilirubin, Total 0.3      ALT 10     URINALYSIS WITH REFLEX CULTURE AND MICROSCOPIC - Abnormal    Color, Urine Light-Yellow      Appearance, Urine Clear      Specific Gravity, Urine 1.011      pH, Urine 6.5      Protein, Urine NEGATIVE      Glucose, Urine Normal      Blood, Urine NEGATIVE      Ketones, Urine NEGATIVE      Bilirubin, Urine NEGATIVE      Urobilinogen, Urine Normal      Nitrite, Urine NEGATIVE      Leukocyte Esterase, Urine 250 Zehra/µL (*)    URINE CULTURE - Normal    Urine Culture Normal genitourinary mirna      DRUG SCREEN,URINE - Normal    Amphetamine Screen, Urine Presumptive Negative      Barbiturate Screen, Urine Presumptive Negative      Benzodiazepines Screen, Urine Presumptive Negative      Cannabinoid Screen, Urine Presumptive Negative      Cocaine Metabolite Screen, Urine Presumptive Negative      Fentanyl Screen, Urine Presumptive Negative      Opiate Screen, Urine Presumptive Negative      Oxycodone Screen, Urine Presumptive Negative      PCP Screen, Urine Presumptive Negative      Methadone Screen, Urine Presumptive Negative      Narrative:     Drug screen results are presumptive and should not be used to assess   compliance with prescribed medication. Contact the performing [facility] laboratory   to add-on definitive confirmatory testing if clinically indicated.    Toxicology screening results are reported qualitatively. The concentration must   be greater than or equal to the cutoff to be reported as positive. The concentration   at which the screening test can detect an individual drug or metabolite varies.   The absence of expected drug(s) and/or drug metabolite(s) may indicate non-compliance,   inappropriate timing of specimen collection relative to drug administration, poor drug   absorption, diluted/adulterated urine, or limitations of testing. For medical purposes   only; not valid for forensic use.    Interpretive questions should be directed to the laboratory medical directors.   ACUTE TOXICOLOGY PANEL, BLOOD - Normal    Acetaminophen <10.0      Salicylate  <3      Alcohol <10     HCG, URINE, QUALITATIVE - Normal    HCG, Urine NEGATIVE     URINALYSIS WITH REFLEX CULTURE AND MICROSCOPIC    Narrative:     The following orders were created for panel order Urinalysis with Reflex Culture and Microscopic.  Procedure                               Abnormality         Status                     ---------                               -----------         ------                     Urinalysis with Reflex  C...[[id number]]  Abnormal            Final result               Extra Urine Gray Tube[[id number]]                            Final result                 Please view results for these tests on the individual orders.   EXTRA URINE GRAY TUBE    Extra Tube Hold for add-ons.     MICROSCOPIC ONLY, URINE    WBC, Urine 1-5      RBC, Urine NONE      Squamous Epithelial Cells, Urine 1-9 (SPARSE)           Medical Decision Making:  - Elopement and suicide risk precautions.   - Medical clearance for EPAT  - Labs  - Tdap update  - EPAT consult    EKG: EKG independently reviewed by the attending ED physician, and I and concur with the resident's/advanced practice provider's interpretation. Sinus rhythm at 97 bpm with SA, normal axis, normal intervals, no ST or T wave changes. Similar to old EKG on 5/18/24.    Differential Diagnoses Considered: AH, SI, Suicide attempt    Chronic Medical Conditions Significantly Affecting Care: Schizoaffective disorder, bipolar disorder, and DM     Escalation of Care:  Appropriate for inpatient management    MD Diaz Ennis MD  06/16/24 5169

## 2024-06-15 VITALS
HEIGHT: 67 IN | DIASTOLIC BLOOD PRESSURE: 63 MMHG | OXYGEN SATURATION: 98 % | TEMPERATURE: 97.8 F | WEIGHT: 255 LBS | SYSTOLIC BLOOD PRESSURE: 93 MMHG | HEART RATE: 79 BPM | RESPIRATION RATE: 16 BRPM | BODY MASS INDEX: 40.02 KG/M2

## 2024-06-15 LAB
APPEARANCE UR: CLEAR
BILIRUB UR STRIP.AUTO-MCNC: NEGATIVE MG/DL
COLOR UR: ABNORMAL
GLUCOSE UR STRIP.AUTO-MCNC: NORMAL MG/DL
HOLD SPECIMEN: NORMAL
KETONES UR STRIP.AUTO-MCNC: NEGATIVE MG/DL
LEUKOCYTE ESTERASE UR QL STRIP.AUTO: ABNORMAL
NITRITE UR QL STRIP.AUTO: NEGATIVE
PH UR STRIP.AUTO: 6.5 [PH]
PROT UR STRIP.AUTO-MCNC: NEGATIVE MG/DL
RBC # UR STRIP.AUTO: NEGATIVE /UL
RBC #/AREA URNS AUTO: NORMAL /HPF
SP GR UR STRIP.AUTO: 1.01
SQUAMOUS #/AREA URNS AUTO: NORMAL /HPF
UROBILINOGEN UR STRIP.AUTO-MCNC: NORMAL MG/DL
WBC #/AREA URNS AUTO: NORMAL /HPF

## 2024-06-15 PROCEDURE — 2500000002 HC RX 250 W HCPCS SELF ADMINISTERED DRUGS (ALT 637 FOR MEDICARE OP, ALT 636 FOR OP/ED): Mod: SE,MUE | Performed by: STUDENT IN AN ORGANIZED HEALTH CARE EDUCATION/TRAINING PROGRAM

## 2024-06-15 PROCEDURE — 2500000001 HC RX 250 WO HCPCS SELF ADMINISTERED DRUGS (ALT 637 FOR MEDICARE OP): Mod: SE | Performed by: STUDENT IN AN ORGANIZED HEALTH CARE EDUCATION/TRAINING PROGRAM

## 2024-06-15 PROCEDURE — 87086 URINE CULTURE/COLONY COUNT: CPT | Performed by: STUDENT IN AN ORGANIZED HEALTH CARE EDUCATION/TRAINING PROGRAM

## 2024-06-15 PROCEDURE — 81001 URINALYSIS AUTO W/SCOPE: CPT | Performed by: STUDENT IN AN ORGANIZED HEALTH CARE EDUCATION/TRAINING PROGRAM

## 2024-06-15 RX ADMIN — LINACLOTIDE 72 MCG: 72 CAPSULE, GELATIN COATED ORAL at 06:56

## 2024-06-15 RX ADMIN — DIVALPROEX SODIUM 500 MG: 250 TABLET, DELAYED RELEASE ORAL at 09:11

## 2024-06-15 RX ADMIN — HYDROXYZINE HYDROCHLORIDE 25 MG: 25 TABLET, FILM COATED ORAL at 09:11

## 2024-06-15 RX ADMIN — NITROFURANTOIN MONOHYDRATE/MACROCRYSTALS 100 MG: 25; 75 CAPSULE ORAL at 09:11

## 2024-06-15 RX ADMIN — PIOGLITAZONE 15 MG: 15 TABLET ORAL at 09:11

## 2024-06-15 RX ADMIN — BACITRACIN 1 APPLICATION: 500 OINTMENT TOPICAL at 09:11

## 2024-06-15 SDOH — HEALTH STABILITY: MENTAL HEALTH: HAVE YOU ACTUALLY HAD ANY THOUGHTS OF KILLING YOURSELF?: YES

## 2024-06-15 SDOH — HEALTH STABILITY: MENTAL HEALTH: BEHAVIORS/MOOD: CALM;COOPERATIVE

## 2024-06-15 SDOH — HEALTH STABILITY: MENTAL HEALTH: SUICIDE ASSESSMENT: ADULT (C-SSRS)

## 2024-06-15 SDOH — HEALTH STABILITY: MENTAL HEALTH: RISK OF SUICIDE: HIGH RISK

## 2024-06-15 SDOH — HEALTH STABILITY: MENTAL HEALTH: WAS THIS WITHIN THE PAST THREE MONTHS?: YES

## 2024-06-15 SDOH — HEALTH STABILITY: MENTAL HEALTH: HAVE YOU BEEN THINKING ABOUT HOW YOU MIGHT DO THIS?: YES

## 2024-06-15 SDOH — HEALTH STABILITY: MENTAL HEALTH: HAVE YOU HAD THESE THOUGHTS AND HAD SOME INTENTION OF ACTING ON THEM?: YES

## 2024-06-15 SDOH — HEALTH STABILITY: MENTAL HEALTH: NEEDS EXPRESSED: DENIES

## 2024-06-15 SDOH — HEALTH STABILITY: MENTAL HEALTH: HAVE YOU EVER DONE ANYTHING, STARTED TO DO ANYTHING, OR PREPARED TO DO ANYTHING TO END YOUR LIFE?: YES

## 2024-06-15 SDOH — HEALTH STABILITY: MENTAL HEALTH: HAVE YOU WISHED YOU WERE DEAD OR WISHED YOU COULD GO TO SLEEP AND NOT WAKE UP?: YES

## 2024-06-15 NOTE — SIGNIFICANT EVENT
Application for Emergency Admission      Ready for Transfer?  Is the patient medically cleared for transfer to inpatient psychiatry: Yes  Has the patient been accepted to an inpatient psychiatric hospital: Yes    Application for Emergency Admission  IN ACCORDANCE [place]  The Chief Clinical Officer of: Yesy 6/15/2024 .9:39 AM    Reason for Hospitalization  The undersigned has reason to believe that: Berna Ricardo Is a mentally ill person subject to hospitalization by court order under division B Section 5122.01 of the Revised Code, i.e., this person:    1.Yes  Represents a substantial risk of physical harm to self as manifested by evidence of threats of, or attempts at, suicide or serious self-inflicted bodily harm    2.Yes Represents a substantial risk of physical harm to others as manifested by evidence of recent homicidal or other violent behavior, evidence of recent threats that place another in reasonable fear of violent behavior and serious physical harm, or other evidence of present dangerousness    3.Yes Represents a substantial and immediate risk of serious physical impairment or injury to self as manifested by  evidence that the person is unable to provide for and is not providing for the person's basic physical needs because of the person's mental illness and that appropriate provision for those needs cannot be made  immediately available in the community    4.Yes Would benefit from treatment in a hospital for his mental illness and is in need of such treatment as manifested by evidence of behavior that creates a grave and imminent risk to substantial rights of others or  himself.    5.Yes Would benefit from treatment as manifested by evidence of behavior that indicates all of the following:       (a) The person is unlikely to survive safely in the community without supervision, based on a clinical determination.       (b) The person has a history of lack of compliance with  treatment for mental illness and one of the following applies:      (i) At least twice within the thirty-six months prior to the filing of an affidavit seeking court-ordered treatment of the person under section 5122.111 of the Revised Code, the lack of compliance has been a significant factor in necessitating hospitalization in a hospital or receipt of services in a forensic or other mental health unit of a correctional facility, provided that the thirty-six-month period shall be extended by the length of any hospitalization or incarceration of the person that occurred within the thirty-six-month period.      (ii) Within the forty-eight months prior to the filing of an affidavit seeking court-ordered treatment of the person under section 5122.111 of the Revised Code, the lack of compliance resulted in one or more acts of serious violent behavior toward self or others or threats of, or attempts at, serious physical harm to self or others, provided that the forty-eight-month period shall be extended by the length of any hospitalization or incarceration of the person that occurred within the forty-eight-month period.      (c) The person, as a result of mental illness, is unlikely to voluntarily participate in necessary treatment.       (d) In view of the person's treatment history and current behavior, the person is in need of treatment in order to prevent a relapse or deterioration that would be likely to result in substantial risk of serious harm to the person or others.    (e) Represents a substantial risk of physical harm to self or others if allowed to remain at liberty pending examination.    Therefore, it is requested that said person be admitted to the above named facility.    STATEMENT OF BELIEF    Must be filled out by one of the following: a psychiatrist, licensed physician, licensed clinical psychologist, health or ,  or .  (Statement shall include the circumstances under  which the individual was taken into custody and the reason for the person's belief that hospitalization is necessary. The statement shall also include a reference to efforts made to secure the individual's property at his residence if he was taken into custody there. Every reasonable and appropriate effort should be made to take this person into custody in the least conspicuous manner possible.)    SI and psychosis     David Morse MD 6/15/2024     _____________________________________________________________   Place of Employment: [facility]    STATEMENT OF OBSERVATION BY PSYCHIATRIST, LICENSED PHYSICIAN, OR LICENSED CLINICAL PSYCHOLOGIST, IF APPLICABLE    Place of Observation (e.g., [facility] mental [facility] center, general hospital, office, emergency facility)  (If applicable, please complete)    David Morse MD 6/15/2024    _____________________________________________________________

## 2024-06-15 NOTE — PROGRESS NOTES
Patient was handed off to me by Dr. Chaparro at 2300. For full history, physical, and prior ED course, please see previous provider note prior to patient handoff. This is an addendum to the record.     HOSPITAL COURSE/MEDICAL DECISION MAKING  In short, this is a 23-year-old female presenting to the emergency department for suicidal ideation.  Does have a history of bipolar disorder.  Appears to be internally stimulated and is hearing voices telling her to commit suicide.  EPAT was consulted by previous team and upon their assessment they determined the patient would benefit from inpatient admission.  Currently awaiting facility.  Home psych meds and home diabetes medication ordered here in the ED. Throughout the ED stay, the patient was monitored and re-examined for any changes in stability or symptomatology.     ED Course as of 06/14/24 2354 Fri Jun 14, 2024   1841 CBC and Auto Differential(!)  Normal WBC, HgB, platelets [SH]   1842 Electrocardiogram, 12-lead  EKG obtained at 1815, interpreted by myself demonstrating normal sinus rhythm with mild sinus arrhythmia, ventricular rate 97, normal axis, normal intervals, no ST segment or T wave signs of ischemia.   [SH]   1907 hCG, Urine, Qualitative  HCG negative [SH]   1914 Drug Screen, Urine  UDS negative [SH]   1914 Acute Toxicology Panel, Blood  Tox panel negative [SH]   1914 Comprehensive Metabolic Panel(!)  Normal CMP [SH]   2122 Patient discussed with EPAT, they will recommend placement at this time.  Patient's home meds were restarted, psych meds ordered per EPAT recommendations.  Patient has history of diabetes, her blood sugar medication is restarted, and we ordered regularly scheduled point-of-care glucose checks. [SH]      ED Course User Index  [SH] Ayo Chaparro MD         Diagnoses as of 06/14/24 2354   Suicide attempt (Multi)   Auditory hallucinations       DIAGNOSIS  1.  Suicidal ideation    DISPOSITION  EPAT to place     I reviewed the patient´s case  with Dr. Caro who also saw the patient and agrees with the plan. The diagnosis and plan of care was also discussed with the patient. All the patient's questions were answered. The patient was receptive and agreeable to the plan of care.     Randy Medel MD  Emergency Medicine PGY-3    This note was dictated using dragon dictation.  Please excuse any errors found in the note.

## 2024-06-16 LAB — BACTERIA UR CULT: NORMAL

## 2024-06-20 ENCOUNTER — APPOINTMENT (OUTPATIENT)
Dept: INFECTIOUS DISEASES | Facility: CLINIC | Age: 24
End: 2024-06-20
Payer: COMMERCIAL

## 2024-06-25 ENCOUNTER — HOSPITAL ENCOUNTER (OUTPATIENT)
Facility: HOSPITAL | Age: 24
Setting detail: OBSERVATION
Discharge: HOME | End: 2024-06-26
Attending: EMERGENCY MEDICINE
Payer: COMMERCIAL

## 2024-06-25 DIAGNOSIS — E11.65 TYPE 2 DIABETES MELLITUS WITH HYPERGLYCEMIA, WITHOUT LONG-TERM CURRENT USE OF INSULIN (MULTI): ICD-10-CM

## 2024-06-25 DIAGNOSIS — R82.90 ABNORMAL URINALYSIS: Primary | ICD-10-CM

## 2024-06-25 DIAGNOSIS — R73.9 HYPERGLYCEMIA: ICD-10-CM

## 2024-06-25 LAB
ALBUMIN SERPL BCP-MCNC: 4 G/DL (ref 3.4–5)
ALP SERPL-CCNC: 141 U/L (ref 33–110)
ALT SERPL W P-5'-P-CCNC: 7 U/L (ref 7–45)
ANION GAP BLDV CALCULATED.4IONS-SCNC: 10 MMOL/L (ref 10–25)
ANION GAP SERPL CALC-SCNC: 14 MMOL/L (ref 10–20)
APPEARANCE UR: CLEAR
AST SERPL W P-5'-P-CCNC: 13 U/L (ref 9–39)
BASE EXCESS BLDV CALC-SCNC: 3.7 MMOL/L (ref -2–3)
BASOPHILS # BLD AUTO: 0.08 X10*3/UL (ref 0–0.1)
BASOPHILS NFR BLD AUTO: 0.6 %
BILIRUB SERPL-MCNC: 0.2 MG/DL (ref 0–1.2)
BILIRUB UR STRIP.AUTO-MCNC: NEGATIVE MG/DL
BODY TEMPERATURE: 37 DEGREES CELSIUS
BUN SERPL-MCNC: 14 MG/DL (ref 6–23)
C PEPTIDE SERPL-MCNC: 5 NG/ML (ref 0.7–3.9)
CA-I BLDV-SCNC: 1.24 MMOL/L (ref 1.1–1.33)
CALCIUM SERPL-MCNC: 9.7 MG/DL (ref 8.6–10.6)
CHLORIDE BLDV-SCNC: 99 MMOL/L (ref 98–107)
CHLORIDE SERPL-SCNC: 99 MMOL/L (ref 98–107)
CO2 SERPL-SCNC: 26 MMOL/L (ref 21–32)
COLOR UR: ABNORMAL
CREAT SERPL-MCNC: 1.02 MG/DL (ref 0.5–1.05)
EGFRCR SERPLBLD CKD-EPI 2021: 80 ML/MIN/1.73M*2
EOSINOPHIL # BLD AUTO: 0.2 X10*3/UL (ref 0–0.7)
EOSINOPHIL NFR BLD AUTO: 1.6 %
ERYTHROCYTE [DISTWIDTH] IN BLOOD BY AUTOMATED COUNT: 13.5 % (ref 11.5–14.5)
EST. AVERAGE GLUCOSE BLD GHB EST-MCNC: 151 MG/DL
GLUCOSE BLD MANUAL STRIP-MCNC: 331 MG/DL (ref 74–99)
GLUCOSE BLDV-MCNC: 332 MG/DL (ref 74–99)
GLUCOSE SERPL-MCNC: 311 MG/DL (ref 74–99)
GLUCOSE UR STRIP.AUTO-MCNC: ABNORMAL MG/DL
HBA1C MFR BLD: 6.9 %
HCO3 BLDV-SCNC: 29.7 MMOL/L (ref 22–26)
HCT VFR BLD AUTO: 38 % (ref 36–46)
HCT VFR BLD EST: 42 % (ref 36–46)
HGB BLD-MCNC: 13.4 G/DL (ref 12–16)
HGB BLDV-MCNC: 13.9 G/DL (ref 12–16)
IMM GRANULOCYTES # BLD AUTO: 0.08 X10*3/UL (ref 0–0.7)
IMM GRANULOCYTES NFR BLD AUTO: 0.6 % (ref 0–0.9)
INHALED O2 CONCENTRATION: 21 %
KETONES UR STRIP.AUTO-MCNC: NEGATIVE MG/DL
LACTATE BLDV-SCNC: 2.2 MMOL/L (ref 0.4–2)
LEUKOCYTE ESTERASE UR QL STRIP.AUTO: ABNORMAL
LIPASE SERPL-CCNC: 89 U/L (ref 9–82)
LYMPHOCYTES # BLD AUTO: 5.43 X10*3/UL (ref 1.2–4.8)
LYMPHOCYTES NFR BLD AUTO: 42.2 %
MCH RBC QN AUTO: 31.5 PG (ref 26–34)
MCHC RBC AUTO-ENTMCNC: 35.3 G/DL (ref 32–36)
MCV RBC AUTO: 89 FL (ref 80–100)
MONOCYTES # BLD AUTO: 0.86 X10*3/UL (ref 0.1–1)
MONOCYTES NFR BLD AUTO: 6.7 %
MUCOUS THREADS #/AREA URNS AUTO: ABNORMAL /LPF
NEUTROPHILS # BLD AUTO: 6.21 X10*3/UL (ref 1.2–7.7)
NEUTROPHILS NFR BLD AUTO: 48.3 %
NITRITE UR QL STRIP.AUTO: NEGATIVE
NRBC BLD-RTO: 0 /100 WBCS (ref 0–0)
OXYHGB MFR BLDV: 56.4 % (ref 45–75)
PCO2 BLDV: 49 MM HG (ref 41–51)
PH BLDV: 7.39 PH (ref 7.33–7.43)
PH UR STRIP.AUTO: 6 [PH]
PLATELET # BLD AUTO: 333 X10*3/UL (ref 150–450)
PO2 BLDV: 36 MM HG (ref 35–45)
POTASSIUM BLDV-SCNC: 6 MMOL/L (ref 3.5–5.3)
POTASSIUM SERPL-SCNC: 4.8 MMOL/L (ref 3.5–5.3)
PREGNANCY TEST URINE, POC: NEGATIVE
PROT SERPL-MCNC: 7.8 G/DL (ref 6.4–8.2)
PROT UR STRIP.AUTO-MCNC: NEGATIVE MG/DL
RBC # BLD AUTO: 4.26 X10*6/UL (ref 4–5.2)
RBC # UR STRIP.AUTO: NEGATIVE /UL
RBC #/AREA URNS AUTO: ABNORMAL /HPF
RENAL EPI CELLS #/AREA UR COMP ASSIST: ABNORMAL /HPF
SAO2 % BLDV: 57 % (ref 45–75)
SODIUM BLDV-SCNC: 133 MMOL/L (ref 136–145)
SODIUM SERPL-SCNC: 134 MMOL/L (ref 136–145)
SP GR UR STRIP.AUTO: 1.01
SQUAMOUS #/AREA URNS AUTO: ABNORMAL /HPF
UROBILINOGEN UR STRIP.AUTO-MCNC: NORMAL MG/DL
WBC # BLD AUTO: 12.9 X10*3/UL (ref 4.4–11.3)
WBC #/AREA URNS AUTO: ABNORMAL /HPF

## 2024-06-25 PROCEDURE — 87086 URINE CULTURE/COLONY COUNT: CPT | Performed by: PHYSICIAN ASSISTANT

## 2024-06-25 PROCEDURE — 81001 URINALYSIS AUTO W/SCOPE: CPT | Performed by: PHYSICIAN ASSISTANT

## 2024-06-25 PROCEDURE — 82435 ASSAY OF BLOOD CHLORIDE: CPT | Mod: 59 | Performed by: PHYSICIAN ASSISTANT

## 2024-06-25 PROCEDURE — 99285 EMERGENCY DEPT VISIT HI MDM: CPT

## 2024-06-25 PROCEDURE — 85018 HEMOGLOBIN: CPT | Mod: 59 | Performed by: PHYSICIAN ASSISTANT

## 2024-06-25 PROCEDURE — 81025 URINE PREGNANCY TEST: CPT | Performed by: PHYSICIAN ASSISTANT

## 2024-06-25 PROCEDURE — 84681 ASSAY OF C-PEPTIDE: CPT | Performed by: PHYSICIAN ASSISTANT

## 2024-06-25 PROCEDURE — 96374 THER/PROPH/DIAG INJ IV PUSH: CPT

## 2024-06-25 PROCEDURE — 85025 COMPLETE CBC W/AUTO DIFF WBC: CPT | Performed by: PHYSICIAN ASSISTANT

## 2024-06-25 PROCEDURE — 83690 ASSAY OF LIPASE: CPT | Performed by: PHYSICIAN ASSISTANT

## 2024-06-25 PROCEDURE — 36415 COLL VENOUS BLD VENIPUNCTURE: CPT | Performed by: PHYSICIAN ASSISTANT

## 2024-06-25 PROCEDURE — 2500000004 HC RX 250 GENERAL PHARMACY W/ HCPCS (ALT 636 FOR OP/ED): Mod: SE | Performed by: PHYSICIAN ASSISTANT

## 2024-06-25 PROCEDURE — 96361 HYDRATE IV INFUSION ADD-ON: CPT

## 2024-06-25 PROCEDURE — 82947 ASSAY GLUCOSE BLOOD QUANT: CPT

## 2024-06-25 PROCEDURE — 84132 ASSAY OF SERUM POTASSIUM: CPT | Performed by: PHYSICIAN ASSISTANT

## 2024-06-25 PROCEDURE — 99285 EMERGENCY DEPT VISIT HI MDM: CPT | Performed by: PHYSICIAN ASSISTANT

## 2024-06-25 PROCEDURE — 2500000002 HC RX 250 W HCPCS SELF ADMINISTERED DRUGS (ALT 637 FOR MEDICARE OP, ALT 636 FOR OP/ED): Mod: SE | Performed by: PHYSICIAN ASSISTANT

## 2024-06-25 PROCEDURE — G0378 HOSPITAL OBSERVATION PER HR: HCPCS

## 2024-06-25 PROCEDURE — 83036 HEMOGLOBIN GLYCOSYLATED A1C: CPT | Performed by: PHYSICIAN ASSISTANT

## 2024-06-25 RX ORDER — NITROFURANTOIN 25; 75 MG/1; MG/1
100 CAPSULE ORAL EVERY 12 HOURS SCHEDULED
Status: DISCONTINUED | OUTPATIENT
Start: 2024-06-25 | End: 2024-06-26 | Stop reason: HOSPADM

## 2024-06-25 RX ORDER — DEXTROSE 50 % IN WATER (D50W) INTRAVENOUS SYRINGE
25
Status: DISCONTINUED | OUTPATIENT
Start: 2024-06-25 | End: 2024-06-26 | Stop reason: HOSPADM

## 2024-06-25 RX ORDER — INSULIN GLARGINE 100 [IU]/ML
6 INJECTION, SOLUTION SUBCUTANEOUS EVERY 24 HOURS
Status: DISCONTINUED | OUTPATIENT
Start: 2024-06-25 | End: 2024-06-26 | Stop reason: HOSPADM

## 2024-06-25 RX ORDER — INSULIN LISPRO 100 [IU]/ML
0-5 INJECTION, SOLUTION INTRAVENOUS; SUBCUTANEOUS
Status: DISCONTINUED | OUTPATIENT
Start: 2024-06-26 | End: 2024-06-26 | Stop reason: HOSPADM

## 2024-06-25 RX ORDER — DEXTROSE 50 % IN WATER (D50W) INTRAVENOUS SYRINGE
12.5
Status: DISCONTINUED | OUTPATIENT
Start: 2024-06-25 | End: 2024-06-26 | Stop reason: HOSPADM

## 2024-06-25 RX ORDER — ONDANSETRON HYDROCHLORIDE 2 MG/ML
4 INJECTION, SOLUTION INTRAVENOUS ONCE
Status: COMPLETED | OUTPATIENT
Start: 2024-06-25 | End: 2024-06-25

## 2024-06-25 ASSESSMENT — PAIN - FUNCTIONAL ASSESSMENT: PAIN_FUNCTIONAL_ASSESSMENT: 0-10

## 2024-06-25 ASSESSMENT — PAIN SCALES - GENERAL
PAINLEVEL_OUTOF10: 0 - NO PAIN
PAINLEVEL_OUTOF10: 0 - NO PAIN

## 2024-06-25 NOTE — ED PROVIDER NOTES
HPI   Chief Complaint   Patient presents with    Hyperglycemia       Patient is a 23-year-old female history of migraines, anxiety, bipolar affective disorder, schizophrenia, PTSD, hallucinations, recurrent UTIs, major depressive disorder, type 2 diabetes, IBS, overactive bladder who presents today for evaluation of hyperglycemia.  Patient was recently here 10 days ago for suicidal ideation, she was subsequently placed by [facility] and has been in a psychiatric facility, today she returned to her group home and they noticed that her continuous glucose monitor showed that her sugar was in the 300s.  Currently it is at 350.  Patient does endorse that today she started having increased urinary frequency, increased thirst, dry mouth.  She states she always has vision changes, that is not anything new, no headaches.  Denies any abdominal pain.  Patient does endorse that she vomited a few times today.  Patient states only first discovered that she was diabetic her sugar was in the 1000's but she does not know if she is ever been in diabetic ketoacidosis.  Patient is not on insulin, states she has been on pioglitazone and she has been getting it at the psych facility they have been giving her all of her medications.  Denies any other complaints, denies chest pain shortness of breath fevers.                          Tk Coma Scale Score: 15                     Patient History   Past Medical History:   Diagnosis Date    Abnormal weight gain 12/17/2018    Abnormal weight gain    Anxiety     Blister (nonthermal), unspecified lower leg, initial encounter 05/30/2018    Blister of leg    Crushing injury of unspecified finger(s), initial encounter 06/30/2017    Crushing injury of finger, initial encounter    Depression     Diabetes mellitus (Multi)     Drug-induced hypoglycemia without coma 08/04/2021    Hypoglycemia due to insulin    Migraine, unspecified, not intractable, without status migrainosus 12/27/2016    Headache,  migraine    Other microscopic hematuria 06/01/2018    Hematuria, microscopic    Pain in left hand 06/29/2017    Pain of left hand    Pain in unspecified joint 12/30/2013    Multiple joint pain    Personal history of diseases of the blood and blood-forming organs and certain disorders involving the immune mechanism 06/07/2017    History of anemia    Personal history of diseases of the skin and subcutaneous tissue 06/13/2019    History of acne    Personal history of other complications of pregnancy, childbirth and the puerperium 07/25/2017    History of galactorrhea    Personal history of other diseases of the female genital tract 06/05/2018    History of vaginal bleeding    Personal history of other diseases of the nervous system and sense organs 08/03/2022    History of optic neuritis    Personal history of other endocrine, nutritional and metabolic disease 12/17/2018    History of elevated glucose    Personal history of other specified conditions 06/09/2017    History of nipple discharge    Psychiatric illness     Schizophrenia (Multi)     Urge incontinence 06/01/2018    Urge incontinence of urine     Past Surgical History:   Procedure Laterality Date    OTHER SURGICAL HISTORY  11/11/2019    Cholecystectomy     Family History   Problem Relation Name Age of Onset    Kidney nephrosis Mother      Other (Dialysis Patient) Mother's Sister      Lupus Mother's Sister          Systemic lupus erythematosus    Other (Dialysis Patient) Maternal Grandfather      Other (Cardiovascular disease) Maternal Grandfather      Hypertension Maternal Grandfather       Social History     Tobacco Use    Smoking status: Never    Smokeless tobacco: Never   Vaping Use    Vaping status: Never Used   Substance Use Topics    Alcohol use: Never    Drug use: Never       Physical Exam   ED Triage Vitals   Temp Pulse Resp BP   -- -- -- --      SpO2 Temp src Heart Rate Source Patient Position   -- -- -- --      BP Location FiO2 (%)     -- --        Physical Exam  Vitals and nursing note reviewed.   Constitutional:       General: She is not in acute distress.     Appearance: Normal appearance. She is not toxic-appearing.   HENT:      Head: Normocephalic and atraumatic.      Nose: Nose normal.      Mouth/Throat:      Mouth: Mucous membranes are moist.   Eyes:      Extraocular Movements: Extraocular movements intact.   Cardiovascular:      Rate and Rhythm: Normal rate and regular rhythm.   Pulmonary:      Effort: Pulmonary effort is normal.      Breath sounds: Normal breath sounds.   Abdominal:      General: Abdomen is flat.      Palpations: Abdomen is soft.      Tenderness: There is no abdominal tenderness. There is no guarding.   Musculoskeletal:         General: Normal range of motion.      Cervical back: Normal range of motion and neck supple.   Skin:     General: Skin is warm and dry.   Neurological:      General: No focal deficit present.      Mental Status: She is alert.   Psychiatric:         Mood and Affect: Mood normal.         Thought Content: Thought content normal.       No orders to display     Labs Reviewed   CBC WITH AUTO DIFFERENTIAL - Abnormal       Result Value    WBC 12.9 (*)     nRBC 0.0      RBC 4.26      Hemoglobin 13.4      Hematocrit 38.0      MCV 89      MCH 31.5      MCHC 35.3      RDW 13.5      Platelets 333      Neutrophils % 48.3      Immature Granulocytes %, Automated 0.6      Lymphocytes % 42.2      Monocytes % 6.7      Eosinophils % 1.6      Basophils % 0.6      Neutrophils Absolute 6.21      Immature Granulocytes Absolute, Automated 0.08      Lymphocytes Absolute 5.43 (*)     Monocytes Absolute 0.86      Eosinophils Absolute 0.20      Basophils Absolute 0.08     COMPREHENSIVE METABOLIC PANEL - Abnormal    Glucose 311 (*)     Sodium 134 (*)     Potassium 4.8      Chloride 99      Bicarbonate 26      Anion Gap 14      Urea Nitrogen 14      Creatinine 1.02      eGFR 80      Calcium 9.7      Albumin 4.0      Alkaline Phosphatase  141 (*)     Total Protein 7.8      AST 13      Bilirubin, Total 0.2      ALT 7     BLOOD GAS VENOUS FULL PANEL - Abnormal    POCT pH, Venous 7.39      POCT pCO2, Venous 49      POCT pO2, Venous 36      POCT SO2, Venous 57      POCT Oxy Hemoglobin, Venous 56.4      POCT Hematocrit Calculated, Venous 42.0      POCT Sodium, Venous 133 (*)     POCT Potassium, Venous 6.0 (*)     POCT Chloride, Venous 99      POCT Ionized Calicum, Venous 1.24      POCT Glucose, Venous 332 (*)     POCT Lactate, Venous 2.2 (*)     POCT Base Excess, Venous 3.7 (*)     POCT HCO3 Calculated, Venous 29.7 (*)     POCT Hemoglobin, Venous 13.9      POCT Anion Gap, Venous 10.0      Patient Temperature 37.0      FiO2 21     LIPASE - Abnormal    Lipase 89 (*)     Narrative:     Venipuncture immediately after or during the administration of Metamizole may lead to falsely low results. Testing should be performed immediately prior to Metamizole dosing.   URINALYSIS WITH REFLEX CULTURE AND MICROSCOPIC - Abnormal    Color, Urine Light-Yellow      Appearance, Urine Clear      Specific Gravity, Urine 1.010      pH, Urine 6.0      Protein, Urine NEGATIVE      Glucose, Urine 300 (3+) (*)     Blood, Urine NEGATIVE      Ketones, Urine NEGATIVE      Bilirubin, Urine NEGATIVE      Urobilinogen, Urine Normal      Nitrite, Urine NEGATIVE      Leukocyte Esterase, Urine 250 Zehra/µL (*)    MICROSCOPIC ONLY, URINE - Abnormal    WBC, Urine 6-10 (*)     RBC, Urine 3-5      Squamous Epithelial Cells, Urine 10-25 (FEW)      Renal Epithelial Cells, Urine 1-2 (FEW)      Mucus, Urine FEW     POCT PREGNANCY, URINE - Normal    Preg Test, Ur Negative     URINE CULTURE   URINE CULTURE   URINALYSIS WITH REFLEX CULTURE AND MICROSCOPIC    Narrative:     The following orders were created for panel order Urinalysis with Reflex Culture and Microscopic.  Procedure                               Abnormality         Status                     ---------                                -----------         ------                     Urinalysis with Reflex C...[[id number]]  Abnormal            Final result               Extra Urine Gray Tube[[id number]]                            In process                   Please view results for these tests on the individual orders.   EXTRA URINE GRAY TUBE   BLOOD GAS LACTIC ACID, VENOUS   HEMOGLOBIN A1C   C-PEPTIDE   COMPREHENSIVE METABOLIC PANEL   POCT GLUCOSE METER         ED Course & MDM   ED Course as of 06/25/24 2215 Tue Jun 25, 2024 2025 Ph 7.39 on vbg [MK]      ED Course User Index  [MK] Gypsy Sommer PA-C       Medical Decision Making      MDM: Patient is a 23-year-old female who presents today for hyperglycemia, sugar on her continuous glucose monitor is 350, I did order CBC CMP VBG urinalysis pregnancy test as well as IV fluids and Zofran.   Patient CBC showed a white count of 12.9, normal hemoglobin, CMP showed an alk phos of 141, glucose of 311, normal anion gap and bicarb, VBG is without acidosis, initial lactate was 2.2 however upon redraw it is 1.7 after fluids.  Lipase is mildly elevated at 89.  Patient's urinalysis did show 250 leukocyte esterase with 6-10 white blood cells however this is in the setting of 10-25 squamous epithelial cells and no bacteria, patient does endorse urinary frequency, she does endorse some slight burning with urination but states she has had it for a long time and she takes suppressive therapy in the form of Macrobid.  I did order a urine culture, will restart macrobid.  Given that patient is hyperglycemic without explanation I did reach out to endocrinology, they recommended adding on a hemoglobin A1c and a C-peptide, they recommended starting 6 units of long-acting insulin while patient is in CDU and sliding scale lispro #1.  Patient was ordered an additional bag of fluids.  They plan on seeing the patient tomorrow and making further medication recommendations.  Patient discussed with CDU provider and will be  placed in CDU overnight pending endocrine recommendations in the morning.  CMP was ordered for the morning.        Procedure  Procedures     Gypsy Sommer PA-C  06/25/24 3334

## 2024-06-26 VITALS
TEMPERATURE: 97.8 F | RESPIRATION RATE: 18 BRPM | OXYGEN SATURATION: 97 % | BODY MASS INDEX: 40.02 KG/M2 | DIASTOLIC BLOOD PRESSURE: 62 MMHG | SYSTOLIC BLOOD PRESSURE: 105 MMHG | HEART RATE: 96 BPM | WEIGHT: 255 LBS | HEIGHT: 67 IN

## 2024-06-26 DIAGNOSIS — E11.9 TYPE 2 DIABETES MELLITUS WITHOUT COMPLICATION, WITHOUT LONG-TERM CURRENT USE OF INSULIN (MULTI): Primary | ICD-10-CM

## 2024-06-26 LAB
ALBUMIN SERPL BCP-MCNC: 3.4 G/DL (ref 3.4–5)
ALP SERPL-CCNC: 110 U/L (ref 33–110)
ALT SERPL W P-5'-P-CCNC: 8 U/L (ref 7–45)
ANION GAP SERPL CALC-SCNC: 13 MMOL/L (ref 10–20)
AST SERPL W P-5'-P-CCNC: 23 U/L (ref 9–39)
BILIRUB SERPL-MCNC: 0.2 MG/DL (ref 0–1.2)
BUN SERPL-MCNC: 12 MG/DL (ref 6–23)
CALCIUM SERPL-MCNC: 8.9 MG/DL (ref 8.6–10.6)
CHLORIDE SERPL-SCNC: 103 MMOL/L (ref 98–107)
CO2 SERPL-SCNC: 25 MMOL/L (ref 21–32)
CREAT SERPL-MCNC: 0.85 MG/DL (ref 0.5–1.05)
EGFRCR SERPLBLD CKD-EPI 2021: >90 ML/MIN/1.73M*2
GLUCOSE BLD MANUAL STRIP-MCNC: 159 MG/DL (ref 74–99)
GLUCOSE SERPL-MCNC: 144 MG/DL (ref 74–99)
HOLD SPECIMEN: NORMAL
POTASSIUM SERPL-SCNC: 5.1 MMOL/L (ref 3.5–5.3)
PROT SERPL-MCNC: 6.6 G/DL (ref 6.4–8.2)
SODIUM SERPL-SCNC: 136 MMOL/L (ref 136–145)

## 2024-06-26 PROCEDURE — 82947 ASSAY GLUCOSE BLOOD QUANT: CPT

## 2024-06-26 PROCEDURE — 87086 URINE CULTURE/COLONY COUNT: CPT | Performed by: PHYSICIAN ASSISTANT

## 2024-06-26 PROCEDURE — 36415 COLL VENOUS BLD VENIPUNCTURE: CPT | Performed by: PHYSICIAN ASSISTANT

## 2024-06-26 PROCEDURE — G0378 HOSPITAL OBSERVATION PER HR: HCPCS

## 2024-06-26 PROCEDURE — 2500000002 HC RX 250 W HCPCS SELF ADMINISTERED DRUGS (ALT 637 FOR MEDICARE OP, ALT 636 FOR OP/ED): Mod: SE | Performed by: PHYSICIAN ASSISTANT

## 2024-06-26 PROCEDURE — 99222 1ST HOSP IP/OBS MODERATE 55: CPT

## 2024-06-26 PROCEDURE — 80053 COMPREHEN METABOLIC PANEL: CPT | Performed by: PHYSICIAN ASSISTANT

## 2024-06-26 RX ORDER — NITROFURANTOIN 25; 75 MG/1; MG/1
100 CAPSULE ORAL 2 TIMES DAILY
Qty: 10 CAPSULE | Refills: 0 | Status: SHIPPED | OUTPATIENT
Start: 2024-06-26 | End: 2024-06-26

## 2024-06-26 RX ORDER — NITROFURANTOIN 25; 75 MG/1; MG/1
100 CAPSULE ORAL 2 TIMES DAILY
Qty: 10 CAPSULE | Refills: 0 | Status: SHIPPED | OUTPATIENT
Start: 2024-06-26 | End: 2024-07-01

## 2024-06-26 ASSESSMENT — ENCOUNTER SYMPTOMS
JOINT SWELLING: 0
AGITATION: 0
NUMBNESS: 0
ABDOMINAL PAIN: 0
CONFUSION: 0
FREQUENCY: 0
ACTIVITY CHANGE: 0
DIAPHORESIS: 0
HEADACHES: 0
PALPITATIONS: 0
DIZZINESS: 0
POLYDIPSIA: 1
COUGH: 0
VOMITING: 0
SORE THROAT: 0
DYSURIA: 0
LIGHT-HEADEDNESS: 0
EYE PAIN: 0
POLYPHAGIA: 1
BRUISES/BLEEDS EASILY: 0
CHEST TIGHTNESS: 0
NAUSEA: 1
SHORTNESS OF BREATH: 0
APPETITE CHANGE: 0
FATIGUE: 1
UNEXPECTED WEIGHT CHANGE: 0
EYE REDNESS: 0
DIARRHEA: 0
TROUBLE SWALLOWING: 0
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
## 2024-06-26 NOTE — ED TRIAGE NOTES
Pt came into the ED d/t her blood sugar being high. Pt's most current reading on her monitor was 361. Pt denies any other symptoms at the moment.
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
## 2024-06-26 NOTE — CONSULTS
Inpatient consult to Endocrinology  Consult performed by: Kerline Masterson PA-C  Consult ordered by: Gypsy Sommer PA-C        Reason For Consult  hyperglycemia    History Of Present Illness  Berna Ricardo is a 23 y.o. female with past medical history significant for migraines, anxiety, bipolar affective disorder, schizophrenia, PTSD, hallucinations, recurrent UTIs, major depressive disorder, type 2 diabetes, IBS and overactive bladder admitted to the clinical decision unit for hyperglycemia.  Electronic medical chart reviewed, patient discharged from psychiatric facility today, returning to her group home, staff noticed her continuous glucose monitor reading in the 300s.  Patient states she had increased urinary frequency, increased thirst, dry mouth and a couple episodes of nonbloody emesis today.  Patient reports compliance with Actos and states she received doses while in the psych facility.  She currently denies fevers, chills, headache, dizziness chest pain, shortness of breath, abdominal pain or hematuria.     The acute medical evaluation while in the emergency department consisted of labs.  VBG showed glucose elevated at 332, lactate elevated at 2.2, HCO3 elevated at 29.7, potassium level elevated at 6.0, shows no DKA.  CBC shows slightly elevated leukocytes at 12.9 otherwise no acute anemia or thrombocytopenia.  CMP shows glucose elevated at 311, sodium low at 134, alkaline phosphate elevated at 141 otherwise no significant electrolyte derangement, TERESA or hepatic abnormalities.  Lipase is elevated at 89.  A1c is elevated at 6.9.  C-peptide is elevated at 5.0  Urinalysis positive for leukocyte esterases, 3+ glucose and WBCs between 6-10.  Macrobid initiated.     While in the ED, she received LR 2 L, Zofran 4 mg, Lantus 6 units, Macrobid.    *reviewed CGM history with patient, cgm not connected to clinic, did manual review with Lumi[facility] gustavo. Glucose historically has been in range, and then on June 19th  started having hyperglycemic spikes to 300-400s. May be due to change in antipsychotic medications, but more likely so due to acute infection UTI. Hyperglycemia will likely resolve once UTI resolves. Discussed with pt.       Diabetes History  Outpatient provider for endocrine care Mignon Felipe, date of last visit 5/24  Initial diabetes diagnosis was made in 2018  Known complications due to diabetes include: obesity and hyperglycemia    Home Management  Pioglitazone 15mg daily    Results from Most Recent A1C  POC HEMOGLOBIN A1c   Date/Time Value Ref Range Status   05/08/2024 10:17 AM 6.3 4.2 - 6.5 % Final     Hemoglobin A1C   Date/Time Value Ref Range Status   06/25/2024 08:23 PM 6.9 (H) see below % Final        Diabetes Problem List Entries with Dates  Problem List:  2024-05: Type 2 diabetes mellitus without complication, without long-  term current use of insulin (Multi)  2023-08: Type 2 diabetes mellitus with hyperglycemia (Multi)  2020-10: Diabetes mellitus type 2 in obese  2020-10: Hyperosmolar hyperglycemic state (HHS) (Multi)        Past Medical History  She has a past medical history of Abnormal weight gain (12/17/2018), Anxiety, Blister (nonthermal), unspecified lower leg, initial encounter (05/30/2018), Crushing injury of unspecified finger(s), initial encounter (06/30/2017), Depression, Diabetes mellitus (Multi), Drug-induced hypoglycemia without coma (08/04/2021), Migraine, unspecified, not intractable, without status migrainosus (12/27/2016), Other microscopic hematuria (06/01/2018), Pain in left hand (06/29/2017), Pain in unspecified joint (12/30/2013), Personal history of diseases of the blood and blood-forming organs and certain disorders involving the immune mechanism (06/07/2017), Personal history of diseases of the skin and subcutaneous tissue (06/13/2019), Personal history of other complications of pregnancy, childbirth and the puerperium (07/25/2017), Personal history of other diseases of the  female genital tract (06/05/2018), Personal history of other diseases of the nervous system and sense organs (08/03/2022), Personal history of other endocrine, nutritional and metabolic disease (12/17/2018), Personal history of other specified conditions (06/09/2017), Psychiatric illness, Schizophrenia (Multi), and Urge incontinence (06/01/2018).    Surgical History  She has a past surgical history that includes Other surgical history (11/11/2019).     Social History  She reports that she has never smoked. She has never used smokeless tobacco. She reports that she does not drink alcohol and does not use drugs.    Family History  Family History   Problem Relation Name Age of Onset    Kidney nephrosis Mother      Other (Dialysis Patient) Mother's Sister      Lupus Mother's Sister          Systemic lupus erythematosus    Other (Dialysis Patient) Maternal Grandfather      Other (Cardiovascular disease) Maternal Grandfather      Hypertension Maternal Grandfather          Allergies  Penicillins, Cefixime, Ditropan, Esomeprazole, Hyoscyamine, Hyoscyamine sulfate, Penicillin, Sulfa (sulfonamide antibiotics), and Ciprofloxacin    Review of Systems   Constitutional:  Positive for fatigue. Negative for activity change, appetite change, diaphoresis and unexpected weight change.   HENT:  Negative for congestion, sore throat and trouble swallowing.    Eyes:  Negative for pain, redness and visual disturbance.   Respiratory:  Negative for cough, chest tightness and shortness of breath.    Cardiovascular:  Negative for chest pain, palpitations and leg swelling.   Gastrointestinal:  Positive for nausea. Negative for abdominal pain, diarrhea and vomiting.   Endocrine: Positive for polydipsia, polyphagia and polyuria. Negative for cold intolerance and heat intolerance.   Genitourinary:  Negative for dysuria, frequency and urgency.   Musculoskeletal:  Negative for gait problem and joint swelling.   Skin:  Negative for pallor and rash.  "  Allergic/Immunologic: Negative for immunocompromised state.   Neurological:  Negative for dizziness, light-headedness, numbness and headaches.   Hematological:  Does not bruise/bleed easily.   Psychiatric/Behavioral:  Negative for agitation, behavioral problems and confusion.    All other systems reviewed and are negative.       Physical Exam  Vitals reviewed.   Constitutional:       General: She is not in acute distress.     Appearance: Normal appearance.   HENT:      Head: Normocephalic and atraumatic.      Nose: Nose normal.      Mouth/Throat:      Mouth: Mucous membranes are moist.   Eyes:      Extraocular Movements: Extraocular movements intact.      Conjunctiva/sclera: Conjunctivae normal.      Pupils: Pupils are equal, round, and reactive to light.   Cardiovascular:      Pulses: Normal pulses.   Pulmonary:      Effort: Pulmonary effort is normal. No respiratory distress.   Abdominal:      General: Abdomen is flat. There is no distension.   Musculoskeletal:         General: Normal range of motion.   Skin:     General: Skin is warm and dry.      Findings: No rash.   Neurological:      Mental Status: She is alert and oriented to person, place, and time.   Psychiatric:         Mood and Affect: Mood normal.         Behavior: Behavior normal.          ROS, PMH, FH/SH, surgical history and allergies have been reviewed.    Last Recorded Vitals  Blood pressure 105/62, pulse 96, temperature 36.6 °C (97.8 °F), temperature source Temporal, resp. rate 18, height 1.702 m (5' 7\"), weight 116 kg (255 lb), SpO2 97%.    Relevant Results  Results from last 7 days   Lab Units 06/26/24  0543 06/26/24  0416 06/25/24  2346 06/25/24 2023   POCT GLUCOSE mg/dL  --  159* 331*  --    GLUCOSE mg/dL 144*  --   --  311*     Lab Review  Lab Results   Component Value Date    BILITOT 0.2 06/26/2024    CALCIUM 8.9 06/26/2024    CO2 25 06/26/2024     06/26/2024    CREATININE 0.85 06/26/2024    GLUCOSE 144 (H) 06/26/2024    ALKPHOS 110 " 06/26/2024    K 5.1 06/26/2024    PROT 6.6 06/26/2024     06/26/2024    AST 23 06/26/2024    ALT 8 06/26/2024    BUN 12 06/26/2024    ANIONGAP 13 06/26/2024    MG CANCELED 05/27/2023    PHOS CANCELED 05/27/2023     (H) 06/09/2019    ALBUMIN 3.4 06/26/2024    AMYLASE 56 06/09/2019    LIPASE 89 (H) 06/25/2024    GFRF 64 08/11/2023    GFRMALE CANCELED 05/27/2023     Lab Results   Component Value Date    TRIG 128 09/24/2021    CHOL 162 09/24/2021    HDL 25.2 (A) 09/24/2021     Lab Results   Component Value Date    HGBA1C 6.9 (H) 06/25/2024    HGBA1C 6.3 (H) 05/08/2024    HGBA1C 6.3 05/08/2024     The ASCVD Risk score (Sadia FINK, et al., 2019) failed to calculate for the following reasons:    The 2019 ASCVD risk score is only valid for ages 40 to 79    Scheduled medications  insulin glargine, 6 Units, subcutaneous, q24h  insulin lispro, 0-5 Units, subcutaneous, TID  nitrofurantoin (macrocrystal-monohydrate), 100 mg, oral, q12h DAGO      Continuous medications     PRN medications  PRN medications: dextrose, dextrose, glucagon, glucagon    Assessment/Plan   Principal Problem:    Hyperglycemia  Berna Ricardo is a 23 y.o. female with past medical history significant for migraines, anxiety, bipolar affective disorder, schizophrenia, PTSD, hallucinations, recurrent UTIs, major depressive disorder, type 2 diabetes, IBS and overactive bladder admitted to the clinical decision unit for hyperglycemia.  Electronic medical chart reviewed, patient discharged from psychiatric facility today, returning to her group home, staff noticed her continuous glucose monitor reading in the 300s.  Patient states she had increased urinary frequency, increased thirst, dry mouth and a couple episodes of nonbloody emesis today.  Patient reports compliance with Actos and states she received doses while in the psych facility.  She currently denies fevers, chills, headache, dizziness chest pain, shortness of breath, abdominal pain or  hematuria.     The acute medical evaluation while in the emergency department consisted of labs.  VBG showed glucose elevated at 332, lactate elevated at 2.2, HCO3 elevated at 29.7, potassium level elevated at 6.0, shows no DKA.  CBC shows slightly elevated leukocytes at 12.9 otherwise no acute anemia or thrombocytopenia.  CMP shows glucose elevated at 311, sodium low at 134, alkaline phosphate elevated at 141 otherwise no significant electrolyte derangement, TERESA or hepatic abnormalities.  Lipase is elevated at 89.  A1c is elevated at 6.9.  C-peptide is elevated at 5.0  Urinalysis positive for leukocyte esterases, 3+ glucose and WBCs between 6-10.  Macrobid initiated.       Diabetes History  Outpatient provider for endocrine care Deborah Posada, date of last visit 5/24  Initial diabetes diagnosis was made in 2018  Known complications due to diabetes include: obesity and hyperglycemia    Home Management  Pioglitazone 15mg daily    PLAN  - Goal BG <180  - continue glargine 6u daily while inpt  - continue lispro corrective scale #1 with meals while inpt   = 0u  151-200 = 2u  201-250 = 4u  251-300 = 6u  301-350 = 8u  351-400 = 10u    -Accuchecks (not BMP) TIDAC and QHS- kindly ensure QHS Accucheck is drawn; it is often missed   -Hypoglycemia protocol  -Diabetes Diet- low carb 60 CHO  -Will continue to follow and titrate insulin accordingly      Discharge planning:   *reviewed CGM history with patient, cgm not connected to clinic, did manual review with [facility] gustavo. Glucose historically has been in range, and then on June 19th started having hyperglycemic spikes to 300-400s. May be due to change in antipsychotic medications, but more likely so due to acute infection UTI. Hyperglycemia will likely resolve once UTI resolves. Discussed with pt.     -continue pioglitazone 15mg daily qAM  -fu with deborah posada pa-c in hospital discharge clinic, email sent for scheduling  -enrolled pt in  pharm PP       I spent 60  minutes in the professional and overall care of this patient.      Kerline Masterson PA-C

## 2024-06-27 LAB
BACTERIA UR CULT: ABNORMAL
BACTERIA UR CULT: NORMAL

## 2024-07-01 ENCOUNTER — PATIENT OUTREACH (OUTPATIENT)
Dept: CARE COORDINATION | Facility: CLINIC | Age: 24
End: 2024-07-01
Payer: COMMERCIAL

## 2024-07-01 NOTE — PROGRESS NOTES
Pt. Identified for post discharge services. Initial outreach call to introduce self, available services, and assess needs.  Voicemail message left with my contact information.   LYNN Gonsalez  [facility]    Contact: [phone]

## 2024-07-02 ENCOUNTER — TELEMEDICINE (OUTPATIENT)
Dept: ENDOCRINOLOGY | Facility: CLINIC | Age: 24
End: 2024-07-02
Payer: COMMERCIAL

## 2024-07-02 DIAGNOSIS — E11.65 TYPE 2 DIABETES MELLITUS WITH HYPERGLYCEMIA, WITHOUT LONG-TERM CURRENT USE OF INSULIN (MULTI): Primary | ICD-10-CM

## 2024-07-02 PROCEDURE — 3044F HG A1C LEVEL LT 7.0%: CPT | Performed by: PHYSICIAN ASSISTANT

## 2024-07-02 PROCEDURE — 99214 OFFICE O/P EST MOD 30 MIN: CPT | Performed by: PHYSICIAN ASSISTANT

## 2024-07-02 RX ORDER — ONDANSETRON 4 MG/1
4 TABLET, ORALLY DISINTEGRATING ORAL EVERY 8 HOURS PRN
Qty: 20 TABLET | Refills: 0 | Status: SHIPPED | OUTPATIENT
Start: 2024-07-02 | End: 2024-07-09

## 2024-07-02 RX ORDER — BLOOD-GLUCOSE SENSOR
1 EACH MISCELLANEOUS CONTINUOUS
Qty: 2 EACH | Refills: 11 | Status: SHIPPED | OUTPATIENT
Start: 2024-07-02 | End: 2024-08-01

## 2024-07-02 ASSESSMENT — ENCOUNTER SYMPTOMS
DYSPHORIC MOOD: 0
AGITATION: 0

## 2024-07-02 NOTE — PROGRESS NOTES
Subjective   Berna Ricardo is a 23 y.o. female who presents for follow-up of Type 2 diabetes mellitus. The initial diagnosis of diabetes was made in 2018 . The patient does not have a known family history of diabetes.    Known complications due to diabetes included none    Cardiovascular risk factors include diabetes mellitus. The patient is not on an ACE inhibitor or angiotensin II receptor blocker.  The patient has not been previously hospitalized due to diabetic ketoacidosis.     Current symptoms/problems include none. Her clinical course has been stable.     Current diabetes regimen is as follows: Oral agent (monotherapy): pioglitazone (Actos)     The patient is currently checking the blood glucose 5+ times per day.    Patient is using: continuous glucose monitor and blood glucose - freestyle heather 3, not currently in use until new sensor was started today      Hypoglycemia frequency: n/a  Hypoglycemia awareness: Yes     Exercise: never   Meal panning: She is using avoidance of concentrated sweets.    Review of Systems   Psychiatric/Behavioral:  Negative for agitation, dysphoric mood, self-injury and suicidal ideas.         Feeling happy today   All other systems reviewed and are negative.      Objective   There were no vitals taken for this visit.  Physical Exam  Constitutional:       Appearance: Normal appearance. She is obese.      Comments: Appears well on video chat   HENT:      Head: Normocephalic and atraumatic.      Nose: Nose normal.      Mouth/Throat:      Mouth: Mucous membranes are moist.      Pharynx: Oropharynx is clear.   Eyes:      Extraocular Movements: Extraocular movements intact.      Conjunctiva/sclera: Conjunctivae normal.   Pulmonary:      Effort: Pulmonary effort is normal.   Skin:     General: Skin is warm and dry.   Neurological:      General: No focal deficit present.      Mental Status: She is alert and oriented to person, place, and time. Mental status is at baseline.    Psychiatric:         Attention and Perception: Attention normal. She perceives auditory hallucinations. She does not perceive visual hallucinations.         Mood and Affect: Mood is depressed. Affect is blunt.         Speech: Speech normal.         Behavior: Behavior normal. Behavior is cooperative.         Thought Content: Thought content includes suicidal ideation.         Cognition and Memory: Cognition and memory normal.         Judgment: Judgment normal.         Lab Review  Glucose (mg/dL)   Date Value   06/26/2024 144 (H)   06/25/2024 311 (H)   06/14/2024 111 (H)     POC HEMOGLOBIN A1c (%)   Date Value   05/08/2024 6.3     Hemoglobin A1C (%)   Date Value   06/25/2024 6.9 (H)   05/08/2024 6.3 (H)   06/01/2023 5.7 (A)   05/02/2023 5.5   11/16/2022 5.6   10/29/2022 6.1 (A)     Bicarbonate (mmol/L)   Date Value   06/26/2024 25   06/25/2024 26   06/14/2024 26     Urea Nitrogen (mg/dL)   Date Value   06/26/2024 12   06/25/2024 14   06/14/2024 12     Creatinine (mg/dL)   Date Value   06/26/2024 0.85   06/25/2024 1.02   06/14/2024 0.84       Health Maintenance:   Foot Exam: updated last appointment   Eye Exam: due for update  Lipid Panel: due for update, ordered today  Urine Albumin: dur for update, ordered today    Assessment/Plan   Diagnoses and all orders for this visit:  Type 2 diabetes mellitus with hyperglycemia, without long-term current use of insulin (Multi)  -     dulaglutide (Trulicity) 0.75 mg/0.5 mL pen injector; Inject 0.75 mg under the skin 1 (one) time per week.  -     ondansetron ODT (Zofran-ODT) 4 mg disintegrating tablet; Take 1 tablet (4 mg) by mouth every 8 hours if needed for nausea or vomiting for up to 7 days.      Type 2 diabetes mellitus, is at goal. A1C at target <6.5%    RX changes: continue pioglitazone 15mg once daily, restart trulicity as 0.75mg once weekly  Education:  self-monitoring of blood glucose skills - continue CGM use with new heather 3  You may take zofran 4mg up to 3 times  per day for nausea if you experience this side effect with Trulicity   Follow up: I recommend diabetes care be 2.5 months with Dr. Baca as scheduled

## 2024-07-02 NOTE — PATIENT INSTRUCTIONS
Type 2 diabetes mellitus with hyperglycemia, without long-term current use of insulin (Multi)  -     dulaglutide (Trulicity) 0.75 mg/0.5 mL pen injector; Inject 0.75 mg under the skin 1 (one) time per week.  -     ondansetron ODT (Zofran-ODT) 4 mg disintegrating tablet; Take 1 tablet (4 mg) by mouth every 8 hours if needed for nausea or vomiting for up to 7 days.      Type 2 diabetes mellitus, is at goal. A1C at target <6.5%    RX changes: continue pioglitazone 15mg once daily, restart trulicity as 0.75mg once weekly  Education:  self-monitoring of blood glucose skills - continue CGM use with new heather 3  You may take zofran 4mg up to 3 times per day for nausea if you experience this side effect with Trulicity   Follow up: I recommend diabetes care be 2.5 months with Dr. Baca as scheduled

## 2024-07-05 DIAGNOSIS — E11.9 TYPE 2 DIABETES MELLITUS WITHOUT COMPLICATION, WITHOUT LONG-TERM CURRENT USE OF INSULIN (MULTI): Primary | ICD-10-CM

## 2024-07-15 ENCOUNTER — PATIENT OUTREACH (OUTPATIENT)
Dept: CARE COORDINATION | Facility: CLINIC | Age: 24
End: 2024-07-15
Payer: COMMERCIAL

## 2024-07-15 NOTE — PROGRESS NOTES
Outreach call to patient to support a smooth transition of care from recent admission.    Spoke with caregiver, reviewed updates post discharge.    The caregiver confirmed secured housing, and no resource insecurities at this time.   The pt. Is living in a group home and has resources.   No other follow up requested at this time.     Medications  Medications reviewed with patient/caregiver?: No (7/15/2024  3:41 PM)  Does the patient have all medications ordered at discharge?: Yes (7/15/2024  3:41 PM)    Appointments  Does the patient have a primary care provider?: Yes (7/15/2024  3:41 PM)    Patient Teaching  What is the patient's perception of their health status since discharge?: Returned to baseline/stable (7/15/2024  3:41 PM)        LYNN Gonsalez  [facility]    Contact: [phone]

## 2024-07-26 ENCOUNTER — APPOINTMENT (OUTPATIENT)
Dept: PRIMARY CARE | Facility: CLINIC | Age: 24
End: 2024-07-26
Payer: MEDICARE

## 2024-07-29 ENCOUNTER — APPOINTMENT (OUTPATIENT)
Dept: OPHTHALMOLOGY | Facility: CLINIC | Age: 24
End: 2024-07-29
Payer: COMMERCIAL

## 2024-07-29 DIAGNOSIS — H52.03 HYPERMETROPIA OF BOTH EYES: Primary | ICD-10-CM

## 2024-07-29 DIAGNOSIS — H46.9 OPTIC NEURITIS: ICD-10-CM

## 2024-07-29 DIAGNOSIS — E11.9 TYPE 2 DIABETES MELLITUS WITHOUT COMPLICATION, WITHOUT LONG-TERM CURRENT USE OF INSULIN (MULTI): ICD-10-CM

## 2024-07-29 DIAGNOSIS — H52.223 REGULAR ASTIGMATISM OF BOTH EYES: ICD-10-CM

## 2024-07-29 PROCEDURE — 92014 COMPRE OPH EXAM EST PT 1/>: CPT | Performed by: OPTOMETRIST

## 2024-07-29 PROCEDURE — 92015 DETERMINE REFRACTIVE STATE: CPT | Performed by: OPTOMETRIST

## 2024-07-29 RX ORDER — PROPRANOLOL HYDROCHLORIDE 20 MG/1
TABLET ORAL
COMMUNITY
Start: 2024-06-22

## 2024-07-29 RX ORDER — PALIPERIDONE PALMITATE 234 MG/1.5ML
INJECTION INTRAMUSCULAR
COMMUNITY
Start: 2024-07-16

## 2024-07-29 ASSESSMENT — VISUAL ACUITY
OD_CC+: -1
OS_CC+: -2
OS_CC: 20/50
OD_CC: 20/40
CORRECTION_TYPE: GLASSES
METHOD: SNELLEN - LINEAR

## 2024-07-29 ASSESSMENT — ENCOUNTER SYMPTOMS
PSYCHIATRIC NEGATIVE: 0
CONSTITUTIONAL NEGATIVE: 0
HEMATOLOGIC/LYMPHATIC NEGATIVE: 0
CARDIOVASCULAR NEGATIVE: 0
RESPIRATORY NEGATIVE: 0
GASTROINTESTINAL NEGATIVE: 0
MUSCULOSKELETAL NEGATIVE: 0
EYES NEGATIVE: 0
ENDOCRINE NEGATIVE: 0
ALLERGIC/IMMUNOLOGIC NEGATIVE: 0
NEUROLOGICAL NEGATIVE: 0

## 2024-07-29 ASSESSMENT — REFRACTION_MANIFEST
OD_SPHERE: -1.75
OS_AXIS: 099
OS_SPHERE: -2.00
OD_AXIS: 086
OD_CYLINDER: +1.00
OS_CYLINDER: +1.25

## 2024-07-29 ASSESSMENT — CONF VISUAL FIELD
OD_SUPERIOR_TEMPORAL_RESTRICTION: 0
OD_INFERIOR_TEMPORAL_RESTRICTION: 0
OS_INFERIOR_NASAL_RESTRICTION: 0
OS_NORMAL: 1
OD_SUPERIOR_NASAL_RESTRICTION: 0
METHOD: COUNTING FINGERS
OD_INFERIOR_NASAL_RESTRICTION: 0
OS_INFERIOR_TEMPORAL_RESTRICTION: 0
OS_SUPERIOR_TEMPORAL_RESTRICTION: 0
OS_SUPERIOR_NASAL_RESTRICTION: 0
OD_NORMAL: 1

## 2024-07-29 ASSESSMENT — REFRACTION
OS_CYLINDER: +1.25
OS_AXIS: 099
OD_AXIS: 090
OD_SPHERE: +0.25
OS_SPHERE: +0.25
OD_CYLINDER: +1.25

## 2024-07-29 ASSESSMENT — REFRACTION_WEARINGRX
OD_CYLINDER: SPHER
OS_CYLINDER: SPHER
OS_SPHERE: -0.50
OD_SPHERE: -0.50

## 2024-07-29 ASSESSMENT — SLIT LAMP EXAM - LIDS
COMMENTS: NORMAL
COMMENTS: NORMAL

## 2024-07-29 ASSESSMENT — CUP TO DISC RATIO: OS_RATIO: 0.25

## 2024-07-29 ASSESSMENT — EXTERNAL EXAM - LEFT EYE: OS_EXAM: NORMAL

## 2024-07-29 ASSESSMENT — TONOMETRY
OS_IOP_MMHG: 13
OD_IOP_MMHG: 14
IOP_METHOD: TONOPEN

## 2024-07-29 ASSESSMENT — EXTERNAL EXAM - RIGHT EYE: OD_EXAM: NORMAL

## 2024-07-29 NOTE — PROGRESS NOTES
Assessment/Plan   Diagnoses and all orders for this visit:  Hypermetropia of both eyes  Regular astigmatism of both eyes  New spec rx released today per patient request. Ocular health wnl for age OU. Monitor 1 year or sooner prn. Refraction billed today.    Type 2 diabetes mellitus without complication, without long-term current use of insulin (Multi)  The patient has diabetes without any evidence of retinopathy.  The patient was advised to maintain tight glucose control, tight blood pressure control, and favorable levels of cholesterol, low density lipoprotein, and high density lipoproteins.  Follow up in one year was recommended.    Optic neuritis  + hx of optic neuritis at age 6 per chart. Next visit provider to check pupils prior to dilation.

## 2024-08-01 ENCOUNTER — HOSPITAL ENCOUNTER (OUTPATIENT)
Facility: HOSPITAL | Age: 24
Setting detail: OBSERVATION
Discharge: OTHER INSTITUTION | End: 2024-08-02
Attending: STUDENT IN AN ORGANIZED HEALTH CARE EDUCATION/TRAINING PROGRAM | Admitting: STUDENT IN AN ORGANIZED HEALTH CARE EDUCATION/TRAINING PROGRAM
Payer: MEDICARE

## 2024-08-01 ENCOUNTER — APPOINTMENT (OUTPATIENT)
Dept: INFECTIOUS DISEASES | Facility: CLINIC | Age: 24
End: 2024-08-01
Payer: COMMERCIAL

## 2024-08-01 ENCOUNTER — CLINICAL SUPPORT (OUTPATIENT)
Dept: EMERGENCY MEDICINE | Facility: HOSPITAL | Age: 24
End: 2024-08-01
Payer: MEDICARE

## 2024-08-01 VITALS — WEIGHT: 277 LBS | BODY MASS INDEX: 43.38 KG/M2

## 2024-08-01 DIAGNOSIS — N39.0 RECURRENT UTI: ICD-10-CM

## 2024-08-01 DIAGNOSIS — R45.851 SUICIDAL IDEATION: Primary | ICD-10-CM

## 2024-08-01 LAB
ALBUMIN SERPL BCP-MCNC: 3.6 G/DL (ref 3.4–5)
ALP SERPL-CCNC: 112 U/L (ref 33–110)
ALT SERPL W P-5'-P-CCNC: 9 U/L (ref 7–45)
ANION GAP SERPL CALC-SCNC: 15 MMOL/L (ref 10–20)
APAP SERPL-MCNC: <10 UG/ML
AST SERPL W P-5'-P-CCNC: 16 U/L (ref 9–39)
BASOPHILS # BLD AUTO: 0.06 X10*3/UL (ref 0–0.1)
BASOPHILS NFR BLD AUTO: 0.6 %
BILIRUB SERPL-MCNC: 0.2 MG/DL (ref 0–1.2)
BUN SERPL-MCNC: 9 MG/DL (ref 6–23)
CALCIUM SERPL-MCNC: 9.2 MG/DL (ref 8.6–10.6)
CHLORIDE SERPL-SCNC: 107 MMOL/L (ref 98–107)
CO2 SERPL-SCNC: 22 MMOL/L (ref 21–32)
CREAT SERPL-MCNC: 1.03 MG/DL (ref 0.5–1.05)
EGFRCR SERPLBLD CKD-EPI 2021: 79 ML/MIN/1.73M*2
EOSINOPHIL # BLD AUTO: 0.04 X10*3/UL (ref 0–0.7)
EOSINOPHIL NFR BLD AUTO: 0.4 %
ERYTHROCYTE [DISTWIDTH] IN BLOOD BY AUTOMATED COUNT: 14.1 % (ref 11.5–14.5)
ETHANOL SERPL-MCNC: <10 MG/DL
GLUCOSE BLD MANUAL STRIP-MCNC: 115 MG/DL (ref 74–99)
GLUCOSE SERPL-MCNC: 107 MG/DL (ref 74–99)
HCT VFR BLD AUTO: 38.8 % (ref 36–46)
HGB BLD-MCNC: 12.7 G/DL (ref 12–16)
IMM GRANULOCYTES # BLD AUTO: 0.03 X10*3/UL (ref 0–0.7)
IMM GRANULOCYTES NFR BLD AUTO: 0.3 % (ref 0–0.9)
LYMPHOCYTES # BLD AUTO: 4.27 X10*3/UL (ref 1.2–4.8)
LYMPHOCYTES NFR BLD AUTO: 45 %
MCH RBC QN AUTO: 31 PG (ref 26–34)
MCHC RBC AUTO-ENTMCNC: 32.7 G/DL (ref 32–36)
MCV RBC AUTO: 95 FL (ref 80–100)
MONOCYTES # BLD AUTO: 0.89 X10*3/UL (ref 0.1–1)
MONOCYTES NFR BLD AUTO: 9.4 %
NEUTROPHILS # BLD AUTO: 4.2 X10*3/UL (ref 1.2–7.7)
NEUTROPHILS NFR BLD AUTO: 44.3 %
NRBC BLD-RTO: 0 /100 WBCS (ref 0–0)
PLATELET # BLD AUTO: 341 X10*3/UL (ref 150–450)
POTASSIUM SERPL-SCNC: 3.7 MMOL/L (ref 3.5–5.3)
PROT SERPL-MCNC: 6.6 G/DL (ref 6.4–8.2)
RBC # BLD AUTO: 4.1 X10*6/UL (ref 4–5.2)
SALICYLATES SERPL-MCNC: <3 MG/DL
SODIUM SERPL-SCNC: 140 MMOL/L (ref 136–145)
WBC # BLD AUTO: 9.5 X10*3/UL (ref 4.4–11.3)

## 2024-08-01 PROCEDURE — 87086 URINE CULTURE/COLONY COUNT: CPT | Performed by: STUDENT IN AN ORGANIZED HEALTH CARE EDUCATION/TRAINING PROGRAM

## 2024-08-01 PROCEDURE — 80053 COMPREHEN METABOLIC PANEL: CPT | Performed by: STUDENT IN AN ORGANIZED HEALTH CARE EDUCATION/TRAINING PROGRAM

## 2024-08-01 PROCEDURE — 99285 EMERGENCY DEPT VISIT HI MDM: CPT | Performed by: STUDENT IN AN ORGANIZED HEALTH CARE EDUCATION/TRAINING PROGRAM

## 2024-08-01 PROCEDURE — 99285 EMERGENCY DEPT VISIT HI MDM: CPT

## 2024-08-01 PROCEDURE — 85025 COMPLETE CBC W/AUTO DIFF WBC: CPT | Performed by: EMERGENCY MEDICINE

## 2024-08-01 PROCEDURE — 80143 DRUG ASSAY ACETAMINOPHEN: CPT | Performed by: EMERGENCY MEDICINE

## 2024-08-01 PROCEDURE — 99214 OFFICE O/P EST MOD 30 MIN: CPT | Performed by: INTERNAL MEDICINE

## 2024-08-01 PROCEDURE — 82947 ASSAY GLUCOSE BLOOD QUANT: CPT

## 2024-08-01 PROCEDURE — 1036F TOBACCO NON-USER: CPT | Performed by: INTERNAL MEDICINE

## 2024-08-01 PROCEDURE — 80143 DRUG ASSAY ACETAMINOPHEN: CPT | Performed by: STUDENT IN AN ORGANIZED HEALTH CARE EDUCATION/TRAINING PROGRAM

## 2024-08-01 PROCEDURE — 81001 URINALYSIS AUTO W/SCOPE: CPT | Mod: CCI | Performed by: STUDENT IN AN ORGANIZED HEALTH CARE EDUCATION/TRAINING PROGRAM

## 2024-08-01 PROCEDURE — 85025 COMPLETE CBC W/AUTO DIFF WBC: CPT | Performed by: STUDENT IN AN ORGANIZED HEALTH CARE EDUCATION/TRAINING PROGRAM

## 2024-08-01 PROCEDURE — 93005 ELECTROCARDIOGRAM TRACING: CPT

## 2024-08-01 PROCEDURE — 80053 COMPREHEN METABOLIC PANEL: CPT | Performed by: EMERGENCY MEDICINE

## 2024-08-01 PROCEDURE — 3044F HG A1C LEVEL LT 7.0%: CPT | Performed by: INTERNAL MEDICINE

## 2024-08-01 PROCEDURE — 36415 COLL VENOUS BLD VENIPUNCTURE: CPT | Performed by: EMERGENCY MEDICINE

## 2024-08-01 PROCEDURE — 80307 DRUG TEST PRSMV CHEM ANLYZR: CPT | Performed by: STUDENT IN AN ORGANIZED HEALTH CARE EDUCATION/TRAINING PROGRAM

## 2024-08-01 RX ORDER — NITROFURANTOIN 25; 75 MG/1; MG/1
100 CAPSULE ORAL DAILY
Qty: 30 CAPSULE | Refills: 1 | Status: SHIPPED | OUTPATIENT
Start: 2024-08-01 | End: 2024-10-30

## 2024-08-01 RX ORDER — DOCUSATE SODIUM 100 MG/1
100 CAPSULE, LIQUID FILLED ORAL 2 TIMES DAILY
COMMUNITY

## 2024-08-01 RX ORDER — ONDANSETRON 4 MG/1
4 TABLET, FILM COATED ORAL EVERY 8 HOURS PRN
COMMUNITY

## 2024-08-01 RX ORDER — NITROFURANTOIN 25; 75 MG/1; MG/1
CAPSULE ORAL
Qty: 30 CAPSULE | Refills: 1 | OUTPATIENT
Start: 2024-08-01

## 2024-08-01 ASSESSMENT — PAIN - FUNCTIONAL ASSESSMENT: PAIN_FUNCTIONAL_ASSESSMENT: 0-10

## 2024-08-01 ASSESSMENT — COLUMBIA-SUICIDE SEVERITY RATING SCALE - C-SSRS
4. HAVE YOU HAD THESE THOUGHTS AND HAD SOME INTENTION OF ACTING ON THEM?: YES
6. HAVE YOU EVER DONE ANYTHING, STARTED TO DO ANYTHING, OR PREPARED TO DO ANYTHING TO END YOUR LIFE?: YES
1. IN THE PAST MONTH, HAVE YOU WISHED YOU WERE DEAD OR WISHED YOU COULD GO TO SLEEP AND NOT WAKE UP?: YES
2. HAVE YOU ACTUALLY HAD ANY THOUGHTS OF KILLING YOURSELF?: YES
5. HAVE YOU STARTED TO WORK OUT OR WORKED OUT THE DETAILS OF HOW TO KILL YOURSELF? DO YOU INTEND TO CARRY OUT THIS PLAN?: YES

## 2024-08-01 ASSESSMENT — PAIN SCALES - GENERAL: PAINLEVEL_OUTOF10: 7

## 2024-08-01 ASSESSMENT — ENCOUNTER SYMPTOMS
EYES NEGATIVE: 1
MUSCULOSKELETAL NEGATIVE: 1
ALLERGIC/IMMUNOLOGIC NEGATIVE: 1
NEUROLOGICAL NEGATIVE: 1
ENDOCRINE NEGATIVE: 1
CARDIOVASCULAR NEGATIVE: 1
RESPIRATORY NEGATIVE: 1
GASTROINTESTINAL NEGATIVE: 1
PSYCHIATRIC NEGATIVE: 1
HEMATOLOGIC/LYMPHATIC NEGATIVE: 1
CONSTITUTIONAL NEGATIVE: 1

## 2024-08-01 NOTE — PROGRESS NOTES
Infectious Diseases Clinic Follow-up:    Reason for Visit:   Chief Complaint   Patient presents with    Follow-up       History of Current Issue  Berna Ricardo is a 24 y.o. year old female     Initially saw her in 12/2022 for recurrent UTIs, full details of consult as below:     INITIAL ID CONSULT NOTE in 2022     23 yo F, group home resident, psych issues who as referred by PCP for recurrent UTIs. She is here with grandmother who was the primary historian has pt has limited ability to give a coherent history.     Per grandmother and chat review, had recurrent UTIs as a child and was on Bactrim ppx from age [age]. She was reportedly taken off when UTIs improved. However in the last 2 years, UTIs have returned. Primary syxs appear to be painful urination and frequency.      Last UTI was in October 2022. Denies has UTI now. Denies any history of renal stones. Records are not available to confirm this. One UCX from 6/2021 grew E. coli (S=Keflex, Macrobid, R= Bactrim). Apparently no imaging has been done recently to rule out structural abnormalities. She has been treated multiple times with Bactrim.     Endorses a hx of allergies to cipro and sulfa drugs (rash).       PAST MEDICAL HISTORY:  Past Medical History:   Diagnosis Date    Abnormal weight gain 12/17/2018    Abnormal weight gain    Anxiety     Blister (nonthermal), unspecified lower leg, initial encounter 05/30/2018    Blister of leg    Crushing injury of unspecified finger(s), initial encounter 06/30/2017    Crushing injury of finger, initial encounter    Depression     Diabetes mellitus (Multi)     Drug-induced hypoglycemia without coma 08/04/2021    Hypoglycemia due to insulin    Migraine, unspecified, not intractable, without status migrainosus 12/27/2016    Headache, migraine    Other microscopic hematuria 06/01/2018    Hematuria, microscopic    Pain in left hand 06/29/2017    Pain of left hand    Pain in unspecified joint 12/30/2013    Multiple joint  pain    Personal history of diseases of the blood and blood-forming organs and certain disorders involving the immune mechanism 06/07/2017    History of anemia    Personal history of diseases of the skin and subcutaneous tissue 06/13/2019    History of acne    Personal history of other complications of pregnancy, childbirth and the puerperium 07/25/2017    History of galactorrhea    Personal history of other diseases of the female genital tract 06/05/2018    History of vaginal bleeding    Personal history of other diseases of the nervous system and sense organs 08/03/2022    History of optic neuritis    Personal history of other endocrine, nutritional and metabolic disease 12/17/2018    History of elevated glucose    Personal history of other specified conditions 06/09/2017    History of nipple discharge    Psychiatric illness     Schizophrenia (Multi)     Urge incontinence 06/01/2018    Urge incontinence of urine       PAST SURGICAL HISTORY:  Past Surgical History:   Procedure Laterality Date    OTHER SURGICAL HISTORY  11/11/2019    Cholecystectomy       ALLERGIES:    Allergies   Allergen Reactions    Penicillins Hives and Rash    Cefixime Hives    Ditropan Unknown     Reaction unknown    Esomeprazole Unknown    Hyoscyamine Unknown     Unknown reaction, documented from hospital stay per grandmother.    Hyoscyamine Sulfate Unknown    Penicillin Hives    Sulfa (Sulfonamide Antibiotics) Unknown    Ciprofloxacin Hives and Rash       MEDICATIONS:      Current Outpatient Medications:     acetaminophen (Tylenol) 500 mg tablet, Take 2 tablets (1,000 mg) by mouth every 6 hours if needed for mild pain (1 - 3) or fever (temp greater than 38.0 C)., Disp: 100 tablet, Rfl: 0    blood-glucose sensor (FreeStyle Vic 3 Sensor) device, 1 each continuously. Use to check glucose, change every 14 days, Disp: 2 each, Rfl: 11    divalproex (Depakote) 500 mg EC tablet, Take 1 tablet (500 mg) by mouth every 12 hours. Do not crush, chew,  or split., Disp: 60 tablet, Rfl: 0    dulaglutide (Trulicity) 0.75 mg/0.5 mL pen injector, Inject 0.75 mg under the skin 1 (one) time per week., Disp: 12 each, Rfl: 3    hydrOXYzine HCL (Atarax) 25 mg tablet, Take 2 tablets (50 mg) by mouth 3 times a day., Disp: , Rfl:     ibuprofen 600 mg tablet, Take 1 tablet (600 mg) by mouth every 8 hours if needed for mild pain (1 - 3), headaches or moderate pain (4 - 6) (pain)., Disp: 60 tablet, Rfl: 11    Invega Sustenna 234 mg/1.5 mL syringe, , Disp: , Rfl:     linaCLOtide (Linzess) 72 mcg capsule, Take 1 capsule (72 mcg) by mouth once daily in the morning. Take before meals. Do not crush or chew., Disp: 30 capsule, Rfl: 0    loxapine (Loxitane) 50 mg capsule, Take 2 capsules (100 mg) by mouth once daily at bedtime., Disp: , Rfl:     mirtazapine (Remeron) 15 mg tablet, 2 tablets (30 mg)., Disp: , Rfl:     nitrofurantoin, macrocrystal-monohydrate, (Macrobid) 100 mg capsule, Take 1 capsule (100 mg) by mouth once daily. With food, Disp: 30 capsule, Rfl: 1    OneTouch Ultra Test strip, , Disp: , Rfl:     pioglitazone (Actos) 15 mg tablet, Take 1 tablet (15 mg) by mouth once daily., Disp: 90 tablet, Rfl: 3    prazosin (Minipress) 2 mg capsule, Take 1 capsule (2 mg) by mouth once daily at bedtime. (Patient taking differently: Take 1 mg by mouth once daily at bedtime. Take 2 capsules at Bedtime.), Disp: 30 capsule, Rfl: 0    propranolol (Inderal) 20 mg tablet, TAKE 1 TAB TWICE A DAY FOR TACHICARDIA, Disp: , Rfl:     docusate sodium (Colace) 100 mg capsule, Take 1 capsule (100 mg) by mouth 2 times a day., Disp: , Rfl:     naproxen (Naprosyn) 500 mg tablet, Take 1 tablet (500 mg) by mouth 2 times a day as needed for mild pain (1 - 3). Take with food (Patient not taking: Reported on 8/1/2024), Disp: 100 tablet, Rfl: 0    ondansetron (Zofran) 4 mg tablet, Take 1 tablet (4 mg) by mouth every 8 hours if needed for nausea or vomiting., Disp: , Rfl:     REVIEW OF SYSTEMS:    Review of  Systems   Constitutional: Negative.    HENT: Negative.     Eyes: Negative.    Respiratory: Negative.     Cardiovascular: Negative.    Gastrointestinal: Negative.    Endocrine: Negative.    Genitourinary: Negative.    Musculoskeletal: Negative.    Skin: Negative.    Allergic/Immunologic: Negative.    Neurological: Negative.    Hematological: Negative.    Psychiatric/Behavioral: Negative.         PHYSICAL EXAMINATION:       Visit Vitals  Wt 126 kg (277 lb)   BMI 43.38 kg/m²   OB Status Implant   Smoking Status Never   BSA 2.44 m²        EXAM:   Physical Exam  Vitals reviewed.   Constitutional:       Appearance: Normal appearance.   HENT:      Head: Normocephalic and atraumatic.      Mouth/Throat:      Mouth: Mucous membranes are moist.      Pharynx: Oropharynx is clear.   Cardiovascular:      Rate and Rhythm: Normal rate and regular rhythm.   Pulmonary:      Effort: Pulmonary effort is normal.   Abdominal:      General: Abdomen is flat. Bowel sounds are normal.      Palpations: Abdomen is soft.   Musculoskeletal:         General: Normal range of motion.      Cervical back: Normal range of motion.   Skin:     General: Skin is warm.   Neurological:      General: No focal deficit present.      Mental Status: She is alert and oriented to person, place, and time.   Psychiatric:         Mood and Affect: Mood normal.         Behavior: Behavior normal.         Thought Content: Thought content normal.         Judgment: Judgment normal.          DATA:     Microbiology:   No results found for the last 90 days.       ASSESSMENT / RECOMMENDATIONS:  21 yo F, group home resident, psych issues who as referred by PCP for recurrent UTIs. She is here with grandmother who was the primary historian has pt has limited ability to give a coherent history.     Per grandmother and chat review, had recurrent UTIs as a child and was on Bactrim ppx from age [age]. She was reportedly taken off when UTIs improved. However in the last 2 years, UTIs  have returned. Primary syxs appear to be painful urination and frequency.      Last UTI was in October 2022. Denies has UTI now. Denies any history of renal stones. Records are not available to confirm this. One UCX from 6/2021 grew E. coli (S=Keflex, Macrobid, R= Bactrim). Apparently no imaging has been done recently to rule out structural abnormalities. She has been treated multiple times with Bactrim.     Endorses a hx of allergies to cipro and sulfa drugs (rash).      UPDATE (10/12/2023):  Doing well on macrobid.  No new UTIs since last visit.    UPDATE 08/01/2024  Was hospitalized twice in the interim, one of which was for a presumed UTI in June; however, the culture reports showed contamination. She received antibiotics. She was restarted on Macrobid but ran out for the last week. No new symptoms.     PLAN:  Macrobid 100 mg daily  RTC in 6 mo     Pt and grandma are in agreement with plan          I spent 30 minutes in the professional and overall care of this patient.      Celso Thomas MD MPH

## 2024-08-02 VITALS
RESPIRATION RATE: 16 BRPM | DIASTOLIC BLOOD PRESSURE: 91 MMHG | HEART RATE: 116 BPM | TEMPERATURE: 98.4 F | SYSTOLIC BLOOD PRESSURE: 132 MMHG | HEIGHT: 67 IN | OXYGEN SATURATION: 97 % | WEIGHT: 277 LBS | BODY MASS INDEX: 43.47 KG/M2

## 2024-08-02 PROBLEM — F29 PSYCHOSIS, UNSPECIFIED PSYCHOSIS TYPE (MULTI): Status: ACTIVE | Noted: 2024-08-02

## 2024-08-02 LAB
AMPHETAMINES UR QL SCN: NORMAL
APPEARANCE UR: CLEAR
BARBITURATES UR QL SCN: NORMAL
BENZODIAZ UR QL SCN: NORMAL
BILIRUB UR STRIP.AUTO-MCNC: NEGATIVE MG/DL
BZE UR QL SCN: NORMAL
CANNABINOIDS UR QL SCN: NORMAL
COLOR UR: YELLOW
FENTANYL+NORFENTANYL UR QL SCN: NORMAL
GLUCOSE BLD MANUAL STRIP-MCNC: 99 MG/DL (ref 74–99)
GLUCOSE UR STRIP.AUTO-MCNC: NORMAL MG/DL
HOLD SPECIMEN: NORMAL
KETONES UR STRIP.AUTO-MCNC: ABNORMAL MG/DL
LEUKOCYTE ESTERASE UR QL STRIP.AUTO: ABNORMAL
METHADONE UR QL SCN: NORMAL
MUCOUS THREADS #/AREA URNS AUTO: NORMAL /LPF
NITRITE UR QL STRIP.AUTO: NEGATIVE
OPIATES UR QL SCN: NORMAL
OXYCODONE+OXYMORPHONE UR QL SCN: NORMAL
PCP UR QL SCN: NORMAL
PH UR STRIP.AUTO: 6 [PH]
PROT UR STRIP.AUTO-MCNC: ABNORMAL MG/DL
RBC # UR STRIP.AUTO: NEGATIVE /UL
RBC #/AREA URNS AUTO: NORMAL /HPF
SP GR UR STRIP.AUTO: 1.02
SQUAMOUS #/AREA URNS AUTO: NORMAL /HPF
UROBILINOGEN UR STRIP.AUTO-MCNC: ABNORMAL MG/DL
WBC #/AREA URNS AUTO: NORMAL /HPF

## 2024-08-02 PROCEDURE — G0378 HOSPITAL OBSERVATION PER HR: HCPCS

## 2024-08-02 PROCEDURE — 2500000001 HC RX 250 WO HCPCS SELF ADMINISTERED DRUGS (ALT 637 FOR MEDICARE OP): Performed by: STUDENT IN AN ORGANIZED HEALTH CARE EDUCATION/TRAINING PROGRAM

## 2024-08-02 PROCEDURE — 82947 ASSAY GLUCOSE BLOOD QUANT: CPT

## 2024-08-02 RX ORDER — INSULIN LISPRO 100 [IU]/ML
0-5 INJECTION, SOLUTION INTRAVENOUS; SUBCUTANEOUS
Status: DISCONTINUED | OUTPATIENT
Start: 2024-08-02 | End: 2024-08-02 | Stop reason: HOSPADM

## 2024-08-02 RX ORDER — PRAZOSIN HYDROCHLORIDE 2 MG/1
2 CAPSULE ORAL NIGHTLY
Status: DISCONTINUED | OUTPATIENT
Start: 2024-08-02 | End: 2024-08-02 | Stop reason: HOSPADM

## 2024-08-02 RX ORDER — DIVALPROEX SODIUM 250 MG/1
500 TABLET, DELAYED RELEASE ORAL EVERY 12 HOURS SCHEDULED
Status: DISCONTINUED | OUTPATIENT
Start: 2024-08-02 | End: 2024-08-02 | Stop reason: HOSPADM

## 2024-08-02 RX ORDER — DEXTROSE 50 % IN WATER (D50W) INTRAVENOUS SYRINGE
12.5
Status: DISCONTINUED | OUTPATIENT
Start: 2024-08-02 | End: 2024-08-02 | Stop reason: HOSPADM

## 2024-08-02 RX ORDER — DEXTROSE 50 % IN WATER (D50W) INTRAVENOUS SYRINGE
25
Status: DISCONTINUED | OUTPATIENT
Start: 2024-08-02 | End: 2024-08-02 | Stop reason: HOSPADM

## 2024-08-02 SDOH — HEALTH STABILITY: MENTAL HEALTH: WISH TO BE DEAD (PAST 1 MONTH): YES

## 2024-08-02 SDOH — HEALTH STABILITY: MENTAL HEALTH: HAVE YOU EVER TRIED TO KILL YOURSELF?: NO

## 2024-08-02 SDOH — HEALTH STABILITY: MENTAL HEALTH: BEHAVIORS/MOOD: ANXIOUS;COOPERATIVE

## 2024-08-02 SDOH — HEALTH STABILITY: MENTAL HEALTH: NON-SPECIFIC ACTIVE SUICIDAL THOUGHTS (PAST 1 MONTH): YES

## 2024-08-02 SDOH — ECONOMIC STABILITY: HOUSING INSECURITY: FEELS SAFE LIVING IN HOME: NO

## 2024-08-02 SDOH — HEALTH STABILITY: MENTAL HEALTH: ACTIVE SUICIDAL IDEATION WITH SOME INTENT TO ACT, WITHOUT SPECIFIC PLAN (PAST 1 MONTH): YES

## 2024-08-02 SDOH — HEALTH STABILITY: MENTAL HEALTH: DEPRESSION SYMPTOMS: NO PROBLEMS REPORTED OR OBSERVED.

## 2024-08-02 SDOH — HEALTH STABILITY: MENTAL HEALTH: ACTIVE SUICIDAL IDEATION WITH SPECIFIC PLAN AND INTENT (PAST 1 MONTH): NO

## 2024-08-02 SDOH — HEALTH STABILITY: MENTAL HEALTH: IN THE PAST FEW WEEKS, HAVE YOU WISHED YOU WERE DEAD?: NO

## 2024-08-02 SDOH — HEALTH STABILITY: MENTAL HEALTH: IN THE PAST FEW WEEKS, HAVE YOU FELT THAT YOU OR YOUR FAMILY WOULD BE BETTER OFF IF YOU WERE DEAD?: NO

## 2024-08-02 SDOH — HEALTH STABILITY: MENTAL HEALTH: IN THE PAST WEEK, HAVE YOU BEEN HAVING THOUGHTS ABOUT KILLING YOURSELF?: NO

## 2024-08-02 SDOH — HEALTH STABILITY: MENTAL HEALTH: ARE YOU HAVING THOUGHTS OF KILLING YOURSELF RIGHT NOW?: YES

## 2024-08-02 SDOH — HEALTH STABILITY: MENTAL HEALTH: ANXIETY SYMPTOMS: NO PROBLEMS REPORTED OR OBSERVED.

## 2024-08-02 ASSESSMENT — LIFESTYLE VARIABLES
SUBSTANCE_ABUSE_PAST_12_MONTHS: YES
PRESCIPTION_ABUSE_PAST_12_MONTHS: NO

## 2024-08-02 NOTE — ED TRIAGE NOTES
"Pt reports having low blood sugar - \"reader says one thing (53) and my finger prick says another (124)\" - pt is complaining of headache and dizziness. Pt admitted during triage she has thought of wanting to kill herself with a plan of cutting herself or overdosing.   "

## 2024-08-02 NOTE — SIGNIFICANT EVENT
Application for Emergency Admission      Ready for Transfer?  Is the patient medically cleared for transfer to inpatient psychiatry: Yes  Has the patient been accepted to an inpatient psychiatric hospital: Yes    Application for Emergency Admission  IN ACCORDANCE WITH [place]122[place]  The Chief Clinical Officer of: [facility] 8/2/2024 .9:16 AM    Reason for Hospitalization  The undersigned has reason to believe that: Berna Ricardo Is a mentally ill person subject to hospitalization by court order under division B Section 5122.01 of the Revised Code, i.e., this person:    1.Yes  Represents a substantial risk of physical harm to self as manifested by evidence of threats of, or attempts at, suicide or serious self-inflicted bodily harm    2.No Represents a substantial risk of physical harm to others as manifested by evidence of recent homicidal or other violent behavior, evidence of recent threats that place another in reasonable fear of violent behavior and serious physical harm, or other evidence of present dangerousness    3.Yes Represents a substantial and immediate risk of serious physical impairment or injury to self as manifested by  evidence that the person is unable to provide for and is not providing for the person's basic physical needs because of the person's mental illness and that appropriate provision for those needs cannot be made  immediately available in the community    4.Yes Would benefit from treatment in a hospital for his mental illness and is in need of such treatment as manifested by evidence of behavior that creates a grave and imminent risk to substantial rights of others or  himself.    5.No Would benefit from treatment as manifested by evidence of behavior that indicates all of the following:       (a) The person is unlikely to survive safely in the community without supervision, based on a clinical determination.       (b) The person has a history of lack of compliance  with treatment for mental illness and one of the following applies:      (i) At least twice within the thirty-six months prior to the filing of an affidavit seeking court-ordered treatment of the person under section 5122.111 of the Revised Code, the lack of compliance has been a significant factor in necessitating hospitalization in a hospital or receipt of services in a forensic or other mental health unit of a correctional facility, provided that the thirty-six-month period shall be extended by the length of any hospitalization or incarceration of the person that occurred within the thirty-six-month period.      (ii) Within the forty-eight months prior to the filing of an affidavit seeking court-ordered treatment of the person under section 5122.111 of the Revised Code, the lack of compliance resulted in one or more acts of serious violent behavior toward self or others or threats of, or attempts at, serious physical harm to self or others, provided that the forty-eight-month period shall be extended by the length of any hospitalization or incarceration of the person that occurred within the forty-eight-month period.      (c) The person, as a result of mental illness, is unlikely to voluntarily participate in necessary treatment.       (d) In view of the person's treatment history and current behavior, the person is in need of treatment in order to prevent a relapse or deterioration that would be likely to result in substantial risk of serious harm to the person or others.    (e) Represents a substantial risk of physical harm to self or others if allowed to remain at liberty pending examination.    Therefore, it is requested that said person be admitted to the above named facility.    STATEMENT OF BELIEF    Must be filled out by one of the following: a psychiatrist, licensed physician, licensed clinical psychologist, health or ,  or .  (Statement shall include the circumstances  under which the individual was taken into custody and the reason for the person's belief that hospitalization is necessary. The statement shall also include a reference to efforts made to secure the individual's property at his residence if he was taken into custody there. Every reasonable and appropriate effort should be made to take this person into custody in the least conspicuous manner possible.)    Patient with evidence of auditory and visual hallucinations concerning for decompensated schizophrenia and endorsement of suicidal ideations with nonspecific plan.  Concern for patient's psychosis exacerbating suicidality.    Daniel Rivera MD 8/2/2024     _____________________________________________________________   Place of Employment: [facility]    STATEMENT OF OBSERVATION BY PSYCHIATRIST, LICENSED PHYSICIAN, OR LICENSED CLINICAL PSYCHOLOGIST, IF APPLICABLE    Place of Observation (e.g., [facility] mental health center, general hospital, office, emergency facility)  (If applicable, please complete)    Daniel Rivera MD 8/2/2024    _____________________________________________________________

## 2024-08-02 NOTE — ED PROVIDER NOTES
CC: Dizziness, Headache, and Suicidal     HPI:   Patient is a 24-year-old female with past medical history of migraines, anxiety, bipolar affective disorder, schizophrenia, PTSD, recurrent UTIs, hallucinations, type 2 diabetes, IBS presenting due to concerns of hypoglycemia and auditory and visual hallucinations.  Patient reports that she was checking her blood sugar earlier today and noticed it was 50 and was able to drink juice and eat cookies and did not have any syncopal episodes.  Patient denies any drug or alcohol use but does report increasing thoughts of suicidality without a clear plan.  Patient reports that she wants to either overdose on medications but does not know which medications or cut herself with a reported weapon.  Patient does not have access to firearms at this time.  Patient also reports she has visions of animals and humans that are scaring her and feel like they are attacking her.  Patient has a flat affect and does intermittently require redirection during conversation.  She does report auditory hallucinations and said there are a lot of voices in her head but not specifically telling her anything in particular.  Patient is from a group home and denies any recent fevers or chills, chest pain or shortness of breath, weakness or numbness in her upper or lower extremities.    Limitations to History: mental health  Additional History Obtained from: patient alone    PMHx/PSHx:  Per HPI.   - has a past medical history of Abnormal weight gain (12/17/2018), Anxiety, Blister (nonthermal), unspecified lower leg, initial encounter (05/30/2018), Crushing injury of unspecified finger(s), initial encounter (06/30/2017), Depression, Diabetes mellitus (Multi), Drug-induced hypoglycemia without coma (08/04/2021), Migraine, unspecified, not intractable, without status migrainosus (12/27/2016), Other microscopic hematuria (06/01/2018), Pain in left hand (06/29/2017), Pain in unspecified joint (12/30/2013),  Personal history of diseases of the blood and blood-forming organs and certain disorders involving the immune mechanism (06/07/2017), Personal history of diseases of the skin and subcutaneous tissue (06/13/2019), Personal history of other complications of pregnancy, childbirth and the puerperium (07/25/2017), Personal history of other diseases of the female genital tract (06/05/2018), Personal history of other diseases of the nervous system and sense organs (08/03/2022), Personal history of other endocrine, nutritional and metabolic disease (12/17/2018), Personal history of other specified conditions (06/09/2017), Psychiatric illness, Schizophrenia (Multi), and Urge incontinence (06/01/2018).  - has a past surgical history that includes Other surgical history (11/11/2019).    Social History:  - Tobacco:  reports that she has never smoked. She has never used smokeless tobacco.   - Alcohol:  reports no history of alcohol use.   - Drugs:  reports no history of drug use.     Medications: Reviewed in EMR.     Allergies:  Penicillins, Cefixime, Ditropan, Esomeprazole, Hyoscyamine, Hyoscyamine sulfate, Penicillin, Sulfa (sulfonamide antibiotics), and Ciprofloxacin    ???????????????????????????????????????????????????????????????  Triage Vitals:  T 36.7 °C (98.1 °F)  HR (!) 124  /85  RR 18  O2 98 % None (Room air)    Physical Exam  Vitals and nursing note reviewed.   Constitutional:       General: She is not in acute distress.     Appearance: She is well-developed. She is obese.   HENT:      Head: Normocephalic and atraumatic.      Mouth/Throat:      Mouth: Mucous membranes are dry.      Pharynx: Oropharynx is clear.   Eyes:      Conjunctiva/sclera: Conjunctivae normal.   Cardiovascular:      Rate and Rhythm: Normal rate and regular rhythm.      Heart sounds: No murmur heard.  Pulmonary:      Effort: Pulmonary effort is normal. No respiratory distress.      Breath sounds: Normal breath sounds. No wheezing,  rhonchi or rales.   Abdominal:      General: There is no distension.      Palpations: Abdomen is soft.      Tenderness: There is no abdominal tenderness. There is no guarding or rebound.   Musculoskeletal:         General: No swelling or tenderness.      Cervical back: Neck supple.   Skin:     General: Skin is warm and dry.      Capillary Refill: Capillary refill takes less than 2 seconds.   Neurological:      Mental Status: She is alert and oriented to person, place, and time.      Sensory: No sensory deficit.      Motor: No weakness.      Gait: Gait normal.   Psychiatric:      Comments: Reports thoughts of wanting to harm herself but does not have a specific plan (reports that she wants to OD does not know what medication wants to her herself and does not know the method).  Denies homicidal ideation.  Does report voices in her head that are not telling her any specific commands.  Does report visual hallucinations of both animals and people trying to attack her. Flat affect       ?????????????????????????????????????????????  ED Course  ED Course as of 08/02/24 0633   Fri Aug 02, 2024   0619 Personally discussed currently available facts and concerns about the case with [place] with [facility], who advises recommend admission, placement pending   [SS]      ED Course User Index  [SS] Steffen Simerlink, MD         Diagnoses as of 08/02/24 0633   Suicidal ideation       Medical Decision Making:  Patient is a 24-year-old with multiple chronic medical conditions including diabetes, bipolar affective disorder, schizophrenia, GERD presenting due to concerns for hypoglycemia as well as multiple psychiatric complaints.  Based on the patient's history and physical examination basic lab work was obtained.  Initial blood glucose was 115 and on formal lab work, patient does not appear to be in DKA with normal bicarb and electrolytes. Patient is not intoxicated and urinalysis does not show any signs of acute infection.  Patient was  evaluated by [facility] and they recommend placement.  Patient is medically cleared for psychiatric placement and remained hemodynamically stable throughout my shift.  She did not require any medication at this time.  Patient was handed off to oncoming ED provider pending placement    External records reviewed: recent inpatient, clinic, and prior ED notes  Diagnostic imaging independently reviewed/interpreted by me (as reflected in MDM) includes: none  Social Determinants Affecting Care: Mental health issues  Discussion of management with other providers: attending, JOSEPH  Prescription Drug Consideration: none  Escalation of Care: psychiatric placement    Impression:   Decompensated Schizophrenia  Auditory Hallucinations  Visual Hallucinations    Disposition: Handoff      Procedures ? Smart[employer] last updated 8/2/2024 2:25 AM     Sahra Sandoval  PGY-2 Emergency Medicine  [facility]     Sahra Sandoval MD  Resident  08/02/24 0638

## 2024-08-02 NOTE — PROGRESS NOTES
"EPAT - Social Work Psychiatric Assessment    Arrival Details  Mode of Arrival: Ambulance  Admission Source: Home  Admission Type: Involuntary  EPAT Assessment Start Date: 08/02/24  EPAT Assessment Start Time: 0500  Name of : Aubrie AARON    History of Present Illness    Admission Reason: Evaluation    HPI:   Patient is a 24 year old female, history of ANAI, Schizoaffective Disorder Bipolar Type, cannabis use disorder. Provider note and chart history reviewed. Patient was sleeping comfortably on approach, in deep sleep difficult to awaken. When asked why patient was in ED, she replied, \"I came here for my diabetes\", then fell back to sleep. After several minutes of encouraging patient to wake up she opened her eyes and replied, \"Oh and now I feel suicidal\", no plans.  The following information retrieved from chart history. Patient has multiple visits to ED most recent on 6/14/2024. Patient was evaluated for MH, stabilized and referred back to providers in community and returned to group home.Patient has multiple ED admissions for MH instability. Patient resides in a group home with 24 hour supervision. Patient is engaged in MH services through [facility]. Chart history reflect multiple SI complaints, never acted on SI. Attempted to gain collateral information from grandmother Joelle [phone] unsuccessful, phone number not in service. Contacted legal guardian Joelle [phone] , reports she is out of town and referred me to contact group [place] [phone], attempted to contact , no answers left message.    SW Readmission Information   Readmission within 30 Days: No    Psychiatric Symptoms  Anxiety Symptoms: No problems reported or observed.  Depression Symptoms: No problems reported or observed.  Helga Symptoms: No problems reported or observed.    Psychosis Symptoms  Hallucination Type: No problems reported or observed.  Delusion Type: No problems reported or " observed.    Additional Symptoms - Adult  Generalized Anxiety Disorder: No problems reported or observed.  Obsessive Compulsive Disorder: No problems reported or observed.  Panic Attack: No problems reported or observed.  Post Traumatic Stress Disorder: No problems reported or observed.  Delirium: No problems reported or observed.    Past Psychiatric History/Meds/Treatments  Past Psychiatric History: ANAI Schizoaffective Diasorder Bipolar Type,Cannabis Use Disorder  Past Psychiatric Meds/Treatments: Depakote 500 myra BID, hydroxyzine 25 mg PO daily, mirtazapine 15 mg at bedtime, loxapine 100mg at bedtime,  Past Violence/Victimization History: denies    Current Mental Health Contacts   Name/Phone Number: [facility] ACT Team  Provider Last Appointment Date: 7/29/2024    Support System: Immediate family, Friends, Community    Living Arrangement: Other (Comment) (resides in group home)    Home Safety  Feels Safe Living in Home: No  Home Safety : patient resides in group home    Income Information  Employment Status for: Patient  Employment Status: Disabled  Income Source: Disability    [employer] Service/Education History  Current or Previous  Service: None  Education Level: Less than high school  History of Learning Problems: Yes  History of School Behavior Problems: No    Social/Cultural History    Social History:   Patient was raised in [place] by grandmother no siblings. Mother dies in a car accident when patient was age 13, father not consistent in her life. Patient has a HS diploma and some college courses in art therapy. Patient identified her grandmother and 2 friends as source of support. patient enjoys artwork such as painting and sketching.  Important Activities: Hobbies, Family, Social    Legal  Legal Considerations: Patient/ Family Ability to Make Healthcare Decisions  Legal Concerns: none    Drug Screening  Have you used any substances (canabis, cocaine, heroin, hallucinogens,  inhalants, etc.) in the past 12 months?: Yes  Have you used any prescription drugs other than prescribed in the past 12 months?: No  Is a toxicology screen needed?: Yes    Stage of Change  Stage of Change: Precontemplation  Age of First Substance Use: 15    Psychosocial  Psychosocial (WDL): Within Defined Limits  Behaviors/Mood: Calm, Cooperative, Labile, Sleeping    Orientation  Orientation Level: Oriented X4    General Appearance  Motor Activity: Unremarkable  Speech Pattern: Other (Comment) (unremarkable)  General Attitude: Cooperative, Other (Comment) (sleeping difficulty remaining awake)    Thought Process  Coherency: Other (Comment) (unremarkable)  Content: Unremarkable  Delusions:  (denies)  Perception: Not altered  Hallucination: None  Judgment/Insight: Limited  Confusion: None  Cognition: Impulsive    Sleep Pattern  Sleep Pattern: Sleeps all night    Risk Factors  Self Harm/Suicidal Ideation Plan: SI no plan  Previous Self Harm/Suicidal Plans: yes  Risk Factors: Lower socioeconomic status, Major mental illness, Substance abuse    Violence Risk Assessment  Assessment of Violence: None noted  Thoughts of Harm to Others: No    Ability to Assess Risk Screen  Risk Screen - Ability to Assess: Able to be screened  Ask Suicide-Screening Questions  1. In the past few weeks, have you wished you were dead?: No  2. In the past few weeks, have you felt that you or your family would be better off if you were dead?: No  3. In the past week, have you been having thoughts about killing yourself?: No  4. Have you ever tried to kill yourself?: No  5. Are you having thoughts of killing yourself right now?: Yes  Calculated Risk Score: Imminent Risk  Roosevelt Suicide Severity Rating Scale (Screener/Recent Self-Report)  1. Wish to be Dead (Past 1 Month): Yes  2. Non-Specific Active Suicidal Thoughts (Past 1 Month): Yes  3. Active Suicidal Ideation with any Methods (Not Plan) Without Intent to Act (Past 1 Month): Yes  4. Active  "Suicidal Ideation with Some Intent to Act, Without Specific Plan (Past 1 Month): Yes  5. Active Suicidal Ideation with Specific Plan and Intent (Past 1 Month): No  Calculated C-SSRS Risk Score (Lifetime/Recent): High Risk  Step 1: Risk Factors  Current & Past Psychiatric Dx: Mood disorder, Psychotic disorder, PTSD, Alcohol/substance abuse disorders  Presenting Symptoms: Anhedonia  Family History:  (denies)  Precipitants/Stressors: Inadequate social supports  Access to Lethal Methods : No  Step 2: Protective Factors   Protective Factors Internal: Identifies reasons for living  Protective Factors External: Positive therapeutic relationships, Supportive social network or family or friends  Step 3: Suicidal Ideation Intensity  How Many Times Have You Had These Thoughts: Less than once a week  When You Have the Thoughts How Long do They Last : Fleeting - few seconds or minutes  Could/Can You Stop Thinking About Killing Yourself or Wanting to Die if You Want to: Does not attempt to control thoughts  Are There Things - Anyone or Anything - That Stopped You From Wanting to Die or Acting on: Does not apply  What Sort of Reasons Did You Have For Thinking About Wanting to Die or Killing Yourself: Does not apply  Total Score: 2  Step 5: Documentation  Risk Level: Moderate suicide risk    Psychiatric Impression and Plan of Care    Assessment and Plan:   Patient is a 24 year old female presents to ED initially  for diabetes concerns, once in ED patient endorsed SI,no plan. Patient has a history of Schizoaffective Disorder Bipolar Type, ANAI, PTSD. Patient has frequent ED visits with similar symptoms, most recent in June of 2024 for SI,AH,VH. Patient reported, \"I am suicidal\", then turned over and went back to sleep. C-SSRS suggest patient is at moderate risk. Patient refused to participate in completing assessment. Chart history reflect patient thas had past SI,AH,VH. Patient is residing in a group home. Patient is engaged in " services with [facility] and their ACT Team for support in the community. Patient displays no signs of distress as she is sleeping peacefully. Based on patient endorsing current SI, past SI's, past AH,VH patient is recommended for HLOC at this time. Discussed plan of care with Dr Simerlink MD who concurs.    ANAI  Schizoaffective Disorder Bipolar Type  PTSD    Outcome/Disposition  Assessment, Recommendations and Risk Level Reviewed with: Dr Simerlink MD  EPAT Assessment Completed Date: 08/02/24  EPAT Assessment Completed Time: 0628

## 2024-08-03 LAB — BACTERIA UR CULT: NORMAL

## 2024-09-06 ENCOUNTER — APPOINTMENT (OUTPATIENT)
Dept: PRIMARY CARE | Facility: CLINIC | Age: 24
End: 2024-09-06
Payer: COMMERCIAL

## 2024-09-06 VITALS
SYSTOLIC BLOOD PRESSURE: 106 MMHG | HEIGHT: 67 IN | WEIGHT: 275 LBS | BODY MASS INDEX: 43.16 KG/M2 | TEMPERATURE: 98.5 F | HEART RATE: 108 BPM | DIASTOLIC BLOOD PRESSURE: 77 MMHG | OXYGEN SATURATION: 98 %

## 2024-09-06 DIAGNOSIS — R00.0 TACHYCARDIA: Primary | ICD-10-CM

## 2024-09-06 DIAGNOSIS — G43.009 MIGRAINE WITHOUT AURA AND WITHOUT STATUS MIGRAINOSUS, NOT INTRACTABLE: ICD-10-CM

## 2024-09-06 DIAGNOSIS — F25.0 SCHIZOAFFECTIVE DISORDER, BIPOLAR TYPE (MULTI): ICD-10-CM

## 2024-09-06 DIAGNOSIS — E11.9 TYPE 2 DIABETES MELLITUS WITHOUT COMPLICATION, WITHOUT LONG-TERM CURRENT USE OF INSULIN (MULTI): ICD-10-CM

## 2024-09-06 DIAGNOSIS — I47.9 TACHYCARDIA, PAROXYSMAL (MULTI): ICD-10-CM

## 2024-09-06 PROBLEM — B37.2 CANDIDIASIS OF SKIN: Status: ACTIVE | Noted: 2023-12-22

## 2024-09-06 PROBLEM — N89.8 VAGINAL DISCHARGE: Status: ACTIVE | Noted: 2023-12-22

## 2024-09-06 PROBLEM — L03.90 CELLULITIS OF SKIN: Status: ACTIVE | Noted: 2023-12-22

## 2024-09-06 PROCEDURE — 3008F BODY MASS INDEX DOCD: CPT

## 2024-09-06 PROCEDURE — 3078F DIAST BP <80 MM HG: CPT

## 2024-09-06 PROCEDURE — 3074F SYST BP LT 130 MM HG: CPT

## 2024-09-06 PROCEDURE — 99213 OFFICE O/P EST LOW 20 MIN: CPT

## 2024-09-06 PROCEDURE — 3044F HG A1C LEVEL LT 7.0%: CPT

## 2024-09-06 RX ORDER — METFORMIN HYDROCHLORIDE 500 MG/1
500 TABLET ORAL
Qty: 200 TABLET | Refills: 3 | Status: SHIPPED | OUTPATIENT
Start: 2024-09-06 | End: 2025-10-11

## 2024-09-06 RX ORDER — ACETAMINOPHEN 500 MG
1 TABLET ORAL AS NEEDED
Qty: 1 KIT | Refills: 0 | Status: SHIPPED | OUTPATIENT
Start: 2024-09-06

## 2024-09-06 RX ORDER — PALIPERIDONE 6 MG/1
TABLET, EXTENDED RELEASE ORAL
COMMUNITY
Start: 2024-08-14

## 2024-09-06 RX ORDER — IBUPROFEN 600 MG/1
600 TABLET ORAL EVERY 8 HOURS PRN
Qty: 60 TABLET | Refills: 11 | Status: SHIPPED | OUTPATIENT
Start: 2024-09-06 | End: 2025-09-06

## 2024-09-06 RX ORDER — HYDROXYZINE PAMOATE 50 MG/1
50 CAPSULE ORAL 3 TIMES DAILY
COMMUNITY

## 2024-09-06 RX ORDER — ACETAMINOPHEN 500 MG
1 TABLET ORAL AS NEEDED
Qty: 1 KIT | Refills: 0 | Status: SHIPPED | OUTPATIENT
Start: 2024-09-06 | End: 2024-09-06

## 2024-09-06 RX ORDER — INSULIN LISPRO 100 [IU]/ML
INJECTION, SOLUTION INTRAVENOUS; SUBCUTANEOUS
COMMUNITY
Start: 2024-08-19 | End: 2024-09-06 | Stop reason: ALTCHOICE

## 2024-09-06 ASSESSMENT — ENCOUNTER SYMPTOMS: DEPRESSION: 1

## 2024-09-06 ASSESSMENT — PATIENT HEALTH QUESTIONNAIRE - PHQ9: 1. LITTLE INTEREST OR PLEASURE IN DOING THINGS: NEARLY EVERY DAY

## 2024-09-06 ASSESSMENT — PAIN SCALES - GENERAL: PAINLEVEL: 4

## 2024-09-06 NOTE — PATIENT INSTRUCTIONS
"Please call [phone] ([phone]) to schedule referral appointment or procedure.    #Tachycardia  - denies CP, SOB, palpitations, dizziness, lightheadedness   -Not taking Propranolol 10mg BID currently  PLAN  :: prazosin may cause tachycardia?  - order echo, holter monitor  - order BP cuff to monitor HR at home    #DM2  - A1c 6.9 June 2024  -Pioglitazone 15mg every day  - reports hypoglyemia on pioglitazone   - uses freestyle heather 3 to monitor Blood glucose  -Novolog 100U  - Not using sliding scale  PLAN  - stop insulin  - stop the pioglitazone 15mg every day  - start metformin 500mg BID   - follow up with endocrinology    #lump back  - left lower back roughly size of a quarter  - not sure when first noticed  - describes \"pokey\" pain. 5/10   - has not tried anything for pain relief  - denies any fevers, chills  PLAN  :: likely 2/2 repeat injections  - monitor  "

## 2024-09-06 NOTE — PROGRESS NOTES
"Subjective   Patient ID: Berna Ricardo is a 24 y.o. female who presents for Follow-up (' There is a lump on my back' per pt).    HPI    Hx migraines, anxiety, bipolar affective disorder, schizophrenia, PTSD, recurrent UTIs, hallucinations, type 2 diabetes, IBS     Lives at [facility]     #schizophrenia  - follows [facility]  - prazosin 2mg, Depakote 500mg BID, pioglitazone 15mg, paliperidone 234mg/1.5mL monthly, mirtazapine  15mg nightly, hydrozyzine 50mg  PLAN  - continue at [facility]    #Tachycardia  - denies CP, SOB, palpitations, dizziness, lightheadedness   -Not taking Propranolol 10mg BID currently  PLAN  :: prazosin may cause tachycardia however it is reported in less than 1% of patients  - echo, holter monitor  - order BP cuff to monitor HR at home    #DM2  - A1c 6.9 June 2024  -Pioglitazone 15mg every day  - reports hypoglyemia on pioglitazone   - uses freestyle heather 3 to monitor Blood glucose  -Novolog 100U  - Not using sliding scale  PLAN  - stop insulin  - stop the pioglitazone 15mg every day  - metformin 500mg BID   - follow up with endocrinology    #lump back  - left lower back roughly size of a quarter  - not sure when first noticed  - describes \"pokey\" pain. 5/10   - has not tried anything for pain relief  - denies any fevers, chills  PLAN  :: likely 2/2 repeat injections  - monitor    Review of Systems  As per HPI   Previous history  Past Medical History:   Diagnosis Date    Abnormal weight gain 12/17/2018    Abnormal weight gain    Anxiety     Blister (nonthermal), unspecified lower leg, initial encounter 05/30/2018    Blister of leg    Crushing injury of unspecified finger(s), initial encounter 06/30/2017    Crushing injury of finger, initial encounter    Depression     Diabetes mellitus (Multi)     Drug-induced hypoglycemia without coma 08/04/2021    Hypoglycemia due to insulin    Migraine, unspecified, not intractable, without status migrainosus 12/27/2016    Headache, " migraine    Other microscopic hematuria 06/01/2018    Hematuria, microscopic    Pain in left hand 06/29/2017    Pain of left hand    Pain in unspecified joint 12/30/2013    Multiple joint pain    Personal history of diseases of the blood and blood-forming organs and certain disorders involving the immune mechanism 06/07/2017    History of anemia    Personal history of diseases of the skin and subcutaneous tissue 06/13/2019    History of acne    Personal history of other complications of pregnancy, childbirth and the puerperium 07/25/2017    History of galactorrhea    Personal history of other diseases of the female genital tract 06/05/2018    History of vaginal bleeding    Personal history of other diseases of the nervous system and sense organs 08/03/2022    History of optic neuritis    Personal history of other endocrine, nutritional and metabolic disease 12/17/2018    History of elevated glucose    Personal history of other specified conditions 06/09/2017    History of nipple discharge    Psychiatric illness     Schizophrenia (Multi)     Urge incontinence 06/01/2018    Urge incontinence of urine     Past Surgical History:   Procedure Laterality Date    OTHER SURGICAL HISTORY  11/11/2019    Cholecystectomy     Social History     Tobacco Use    Smoking status: Former     Types: Cigarettes    Smokeless tobacco: Current   Vaping Use    Vaping status: Never Used   Substance Use Topics    Alcohol use: Not Currently    Drug use: Not Currently     Types: Marijuana     Family History   Problem Relation Name Age of Onset    Kidney nephrosis Mother      Other (Dialysis Patient) Mother's Sister      Lupus Mother's Sister          Systemic lupus erythematosus    Other (Dialysis Patient) Maternal Grandfather      Other (Cardiovascular disease) Maternal Grandfather      Hypertension Maternal Grandfather       Allergies   Allergen Reactions    Penicillins Hives and Rash    Cefixime Hives    Dextromethorphan Unknown    Ditropan  Unknown     Reaction unknown    Esomeprazole Unknown    Hyoscyamine Unknown     Unknown reaction, documented from hospital stay per grandmother.    Hyoscyamine Sulfate Unknown    Na Mg Fl-Na Cori-Nacl-Ha-Na Hyp Unknown    Penicillin Hives    Sulfa (Sulfonamide Antibiotics) Unknown    Ciprofloxacin Hives and Rash     Current Outpatient Medications   Medication Instructions    blood pressure monitor (Blood Pressure Kit) kit 1 each, miscellaneous, As needed    divalproex (DEPAKOTE) 500 mg, oral, Every 12 hours scheduled, Do not crush, chew, or split.    docusate sodium (COLACE) 100 mg, oral, 2 times daily    hydrOXYzine pamoate (VISTARIL) 50 mg, oral, 3 times daily    ibuprofen 600 mg, oral, Every 8 hours PRN    Invega Sustenna 234 mg/1.5 mL syringe     linaCLOtide (LINZESS) 72 mcg, oral, Daily before breakfast, Do not crush or chew.    metFORMIN (GLUCOPHAGE) 500 mg, oral, 2 times daily (morning and late afternoon)    mirtazapine (Remeron) 15 mg tablet 2 tablets (30 mg).    nitrofurantoin, macrocrystal-monohydrate, (Macrobid) 100 mg capsule 100 mg, oral, Daily, With food    OneTouch Ultra Test strip     paliperidone (Invega) 6 mg 24 hr tablet     prazosin (MINIPRESS) 2 mg, oral, Nightly    propranolol (Inderal) 20 mg tablet TAKE 1 TAB TWICE A DAY FOR TACHICARDIA       Objective       Physical Exam  Constitutional:       Appearance: She is obese.   HENT:      Head: Normocephalic and atraumatic.   Eyes:      General: No scleral icterus.     Conjunctiva/sclera: Conjunctivae normal.   Cardiovascular:      Rate and Rhythm: Normal rate and regular rhythm.      Heart sounds: No murmur heard.     No friction rub. No gallop.   Pulmonary:      Effort: Pulmonary effort is normal. No respiratory distress.      Breath sounds: Normal breath sounds.   Abdominal:      Palpations: Abdomen is soft.   Skin:     General: Skin is warm and dry.   Neurological:      General: No focal deficit present.      Mental Status: She is alert and  oriented to person, place, and time.   Psychiatric:         Mood and Affect: Mood normal.       Assessment/Plan   Berna Ricardo is a 24 y.o. female who presents for the concerns below:    Problem List Items Addressed This Visit       Schizoaffective disorder, bipolar type (Multi)    Migraines (Chronic)    Relevant Medications    ibuprofen 600 mg tablet    Tachycardia - Primary     :: prazosin may cause tachycardia however it is reported in less than 1% of patients  - obtain echo, holter monitor  - order BP cuff to monitor HR at home         Relevant Medications    blood pressure monitor (Blood Pressure Kit) kit    Other Relevant Orders    Transthoracic Echo (TTE) Complete    Holter or Event Cardiac Monitor    Type 2 diabetes mellitus without complication, without long-term current use of insulin (Multi)    Relevant Medications    metFORMIN (Glucophage) 500 mg tablet     Other Visit Diagnoses       Tachycardia, paroxysmal (Multi)        Relevant Orders    Transthoracic Echo (TTE) Complete            Return in : 1 month    Discussed with Dr. James      Portions of this note were generated using digital voice recognition software, and may contain grammatical errors       David Lezama, DO  Family Medicine PGY-3

## 2024-09-09 NOTE — ASSESSMENT & PLAN NOTE
:: A1c currently at goal  - stop insulin  - stop the pioglitazone 15mg every day  - metformin 500mg BID   - follow up with endocrinology

## 2024-09-09 NOTE — ASSESSMENT & PLAN NOTE
:: prazosin may cause tachycardia however it is reported in less than 1% of patients  - obtain echo, holter monitor  - order BP cuff to monitor HR at home

## 2024-09-13 NOTE — PROGRESS NOTES
I reviewed the resident/fellow's documentation and discussed the patient with the resident/fellow. I agree with the resident/fellow's medical decision making as documented in the note.    Goldie James MD

## 2024-09-25 ENCOUNTER — APPOINTMENT (OUTPATIENT)
Dept: ENDOCRINOLOGY | Facility: CLINIC | Age: 24
End: 2024-09-25
Payer: MEDICARE

## 2024-09-25 VITALS
HEIGHT: 68 IN | DIASTOLIC BLOOD PRESSURE: 86 MMHG | WEIGHT: 272 LBS | SYSTOLIC BLOOD PRESSURE: 114 MMHG | BODY MASS INDEX: 41.22 KG/M2 | HEART RATE: 109 BPM

## 2024-09-25 DIAGNOSIS — E22.1 HYPERPROLACTINEMIA (MULTI): ICD-10-CM

## 2024-09-25 DIAGNOSIS — E11.65 TYPE 2 DIABETES MELLITUS WITH HYPERGLYCEMIA, WITHOUT LONG-TERM CURRENT USE OF INSULIN: ICD-10-CM

## 2024-09-25 LAB — POC HEMOGLOBIN A1C: 6.4 % (ref 4.2–6.5)

## 2024-09-25 PROCEDURE — 3008F BODY MASS INDEX DOCD: CPT | Performed by: STUDENT IN AN ORGANIZED HEALTH CARE EDUCATION/TRAINING PROGRAM

## 2024-09-25 PROCEDURE — 3044F HG A1C LEVEL LT 7.0%: CPT | Performed by: STUDENT IN AN ORGANIZED HEALTH CARE EDUCATION/TRAINING PROGRAM

## 2024-09-25 PROCEDURE — 3074F SYST BP LT 130 MM HG: CPT | Performed by: STUDENT IN AN ORGANIZED HEALTH CARE EDUCATION/TRAINING PROGRAM

## 2024-09-25 PROCEDURE — 83036 HEMOGLOBIN GLYCOSYLATED A1C: CPT | Performed by: STUDENT IN AN ORGANIZED HEALTH CARE EDUCATION/TRAINING PROGRAM

## 2024-09-25 PROCEDURE — 99214 OFFICE O/P EST MOD 30 MIN: CPT | Performed by: STUDENT IN AN ORGANIZED HEALTH CARE EDUCATION/TRAINING PROGRAM

## 2024-09-25 PROCEDURE — 3079F DIAST BP 80-89 MM HG: CPT | Performed by: STUDENT IN AN ORGANIZED HEALTH CARE EDUCATION/TRAINING PROGRAM

## 2024-09-25 RX ORDER — PEN NEEDLE, DIABETIC 30 GX3/16"
NEEDLE, DISPOSABLE MISCELLANEOUS
Qty: 100 EACH | Refills: 3 | Status: SHIPPED | OUTPATIENT
Start: 2024-09-25

## 2024-09-25 RX ORDER — INSULIN LISPRO 100 [IU]/ML
INJECTION, SOLUTION INTRAVENOUS; SUBCUTANEOUS
Qty: 15 ML | Refills: 3 | Status: SHIPPED | OUTPATIENT
Start: 2024-09-25

## 2024-09-25 RX ORDER — METFORMIN HYDROCHLORIDE 750 MG/1
TABLET, EXTENDED RELEASE ORAL
Qty: 180 TABLET | Refills: 2 | Status: SHIPPED | OUTPATIENT
Start: 2024-09-25

## 2024-09-25 RX ORDER — BLOOD-GLUCOSE CONTROL, NORMAL
EACH MISCELLANEOUS
Qty: 100 EACH | Refills: 3 | Status: SHIPPED | OUTPATIENT
Start: 2024-09-25

## 2024-09-25 RX ORDER — BLOOD-GLUCOSE SENSOR
EACH MISCELLANEOUS
Qty: 6 EACH | Refills: 3 | Status: SHIPPED | OUTPATIENT
Start: 2024-09-25

## 2024-09-25 RX ORDER — BLOOD SUGAR DIAGNOSTIC
STRIP MISCELLANEOUS
Qty: 100 STRIP | Refills: 3 | Status: SHIPPED | OUTPATIENT
Start: 2024-09-25
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
## 2024-09-27 ENCOUNTER — HOSPITAL ENCOUNTER (OUTPATIENT)
Dept: CARDIOLOGY | Facility: HOSPITAL | Age: 24
Discharge: HOME | End: 2024-09-27
Payer: COMMERCIAL

## 2024-09-27 DIAGNOSIS — R00.0 TACHYCARDIA: ICD-10-CM

## 2024-09-27 DIAGNOSIS — I47.9 TACHYCARDIA, PAROXYSMAL (MULTI): ICD-10-CM

## 2024-09-27 DIAGNOSIS — R00.0 TACHYCARDIA: Primary | ICD-10-CM

## 2024-09-27 PROCEDURE — 93242 EXT ECG>48HR<7D RECORDING: CPT

## 2024-09-27 PROCEDURE — 93306 TTE W/DOPPLER COMPLETE: CPT

## 2024-09-30 LAB
AORTIC VALVE PEAK VELOCITY: 1.46 M/S
AV PEAK GRADIENT: 8.5 MMHG
AVA (PEAK VEL): 1.9 CM2
EJECTION FRACTION APICAL 4 CHAMBER: 65.5
EJECTION FRACTION: 63 %
LEFT ATRIUM VOLUME AREA LENGTH INDEX BSA: 15.7 ML/M2
LEFT VENTRICLE INTERNAL DIMENSION DIASTOLE: 4.7 CM (ref 3.5–6)
LEFT VENTRICULAR OUTFLOW TRACT DIAMETER: 1.9 CM
RIGHT VENTRICLE FREE WALL PEAK S': 13.1 CM/S
TRICUSPID ANNULAR PLANE SYSTOLIC EXCURSION: 1.3 CM

## 2024-10-16 ENCOUNTER — HOSPITAL ENCOUNTER (EMERGENCY)
Facility: HOSPITAL | Age: 24
Discharge: HOME | End: 2024-10-16
Attending: STUDENT IN AN ORGANIZED HEALTH CARE EDUCATION/TRAINING PROGRAM
Payer: COMMERCIAL

## 2024-10-16 ENCOUNTER — CLINICAL SUPPORT (OUTPATIENT)
Dept: EMERGENCY MEDICINE | Facility: HOSPITAL | Age: 24
End: 2024-10-16
Payer: COMMERCIAL

## 2024-10-16 VITALS
DIASTOLIC BLOOD PRESSURE: 80 MMHG | RESPIRATION RATE: 16 BRPM | HEART RATE: 78 BPM | SYSTOLIC BLOOD PRESSURE: 141 MMHG | WEIGHT: 266 LBS | TEMPERATURE: 98.3 F | OXYGEN SATURATION: 99 % | HEIGHT: 67 IN | BODY MASS INDEX: 41.75 KG/M2

## 2024-10-16 DIAGNOSIS — R73.9 HYPERGLYCEMIA: Primary | ICD-10-CM

## 2024-10-16 LAB
ALBUMIN SERPL BCP-MCNC: 4.1 G/DL (ref 3.4–5)
ALP SERPL-CCNC: 104 U/L (ref 33–110)
ALT SERPL W P-5'-P-CCNC: 7 U/L (ref 7–45)
ANION GAP BLDV CALCULATED.4IONS-SCNC: 12 MMOL/L (ref 10–25)
ANION GAP BLDV CALCULATED.4IONS-SCNC: 17 MMOL/L (ref 10–25)
ANION GAP SERPL CALC-SCNC: 15 MMOL/L
AST SERPL W P-5'-P-CCNC: 30 U/L (ref 9–39)
BASE EXCESS BLDV CALC-SCNC: -0.3 MMOL/L (ref -2–3)
BASE EXCESS BLDV CALC-SCNC: -2.3 MMOL/L (ref -2–3)
BASOPHILS # BLD AUTO: 0.05 X10*3/UL (ref 0–0.1)
BASOPHILS NFR BLD AUTO: 0.7 %
BILIRUB SERPL-MCNC: 0.4 MG/DL (ref 0–1.2)
BODY TEMPERATURE: 37 DEGREES CELSIUS
BODY TEMPERATURE: 37 DEGREES CELSIUS
BUN SERPL-MCNC: 8 MG/DL (ref 6–23)
CA-I BLDV-SCNC: 1.15 MMOL/L (ref 1.1–1.33)
CA-I BLDV-SCNC: 1.2 MMOL/L (ref 1.1–1.33)
CALCIUM SERPL-MCNC: 9.4 MG/DL (ref 8.6–10.6)
CHLORIDE BLDV-SCNC: 100 MMOL/L (ref 98–107)
CHLORIDE BLDV-SCNC: 103 MMOL/L (ref 98–107)
CHLORIDE SERPL-SCNC: 101 MMOL/L (ref 98–107)
CO2 SERPL-SCNC: 25 MMOL/L (ref 21–32)
CREAT SERPL-MCNC: 0.84 MG/DL (ref 0.5–1.05)
EGFRCR SERPLBLD CKD-EPI 2021: >90 ML/MIN/1.73M*2
EOSINOPHIL # BLD AUTO: 0.05 X10*3/UL (ref 0–0.7)
EOSINOPHIL NFR BLD AUTO: 0.7 %
ERYTHROCYTE [DISTWIDTH] IN BLOOD BY AUTOMATED COUNT: 14 % (ref 11.5–14.5)
GLUCOSE BLD MANUAL STRIP-MCNC: 290 MG/DL (ref 74–99)
GLUCOSE BLD MANUAL STRIP-MCNC: 298 MG/DL (ref 74–99)
GLUCOSE BLDV-MCNC: 279 MG/DL (ref 74–99)
GLUCOSE BLDV-MCNC: 338 MG/DL (ref 74–99)
GLUCOSE SERPL-MCNC: 216 MG/DL (ref 74–99)
HCO3 BLDV-SCNC: 16.9 MMOL/L (ref 22–26)
HCO3 BLDV-SCNC: 25.9 MMOL/L (ref 22–26)
HCT VFR BLD AUTO: 42.8 % (ref 36–46)
HCT VFR BLD EST: 42 % (ref 36–46)
HCT VFR BLD EST: 44 % (ref 36–46)
HGB BLD-MCNC: 13.9 G/DL (ref 12–16)
HGB BLDV-MCNC: 14.1 G/DL (ref 12–16)
HGB BLDV-MCNC: 14.5 G/DL (ref 12–16)
IMM GRANULOCYTES # BLD AUTO: 0.04 X10*3/UL (ref 0–0.7)
IMM GRANULOCYTES NFR BLD AUTO: 0.5 % (ref 0–0.9)
INHALED O2 CONCENTRATION: 21 %
INHALED O2 CONCENTRATION: 21 %
INR PPP: 0.9 (ref 0.9–1.1)
LACTATE BLDV-SCNC: 1.7 MMOL/L (ref 0.4–2)
LACTATE BLDV-SCNC: 1.8 MMOL/L (ref 0.4–2)
LYMPHOCYTES # BLD AUTO: 3.31 X10*3/UL (ref 1.2–4.8)
LYMPHOCYTES NFR BLD AUTO: 43.6 %
MCH RBC QN AUTO: 30.8 PG (ref 26–34)
MCHC RBC AUTO-ENTMCNC: 32.5 G/DL (ref 32–36)
MCV RBC AUTO: 95 FL (ref 80–100)
MONOCYTES # BLD AUTO: 0.57 X10*3/UL (ref 0.1–1)
MONOCYTES NFR BLD AUTO: 7.5 %
NEUTROPHILS # BLD AUTO: 3.58 X10*3/UL (ref 1.2–7.7)
NEUTROPHILS NFR BLD AUTO: 47 %
NRBC BLD-RTO: 0 /100 WBCS (ref 0–0)
OXYHGB MFR BLDV: 57.3 % (ref 45–75)
OXYHGB MFR BLDV: 91 % (ref 45–75)
PCO2 BLDV: 18 MM HG (ref 41–51)
PCO2 BLDV: 47 MM HG (ref 41–51)
PH BLDV: 7.35 PH (ref 7.33–7.43)
PH BLDV: 7.58 PH (ref 7.33–7.43)
PLATELET # BLD AUTO: NORMAL 10*3/UL
PO2 BLDV: 37 MM HG (ref 35–45)
PO2 BLDV: 59 MM HG (ref 35–45)
POTASSIUM BLDV-SCNC: 8.4 MMOL/L (ref 3.5–5.3)
POTASSIUM BLDV-SCNC: 8.8 MMOL/L (ref 3.5–5.3)
POTASSIUM SERPL-SCNC: 4.4 MMOL/L (ref 3.5–5.3)
POTASSIUM SERPL-SCNC: 6 MMOL/L (ref 3.5–5.3)
POTASSIUM SERPL-SCNC: 6 MMOL/L (ref 3.5–5.3)
PROT SERPL-MCNC: 7.5 G/DL (ref 6.4–8.2)
PROTHROMBIN TIME: 10.6 SECONDS (ref 9.8–12.8)
RBC # BLD AUTO: 4.51 X10*6/UL (ref 4–5.2)
SAO2 % BLDV: 58 % (ref 45–75)
SAO2 % BLDV: 93 % (ref 45–75)
SODIUM BLDV-SCNC: 128 MMOL/L (ref 136–145)
SODIUM BLDV-SCNC: 129 MMOL/L (ref 136–145)
SODIUM SERPL-SCNC: 135 MMOL/L (ref 136–145)
WBC # BLD AUTO: 7.6 X10*3/UL (ref 4.4–11.3)

## 2024-10-16 PROCEDURE — 82435 ASSAY OF BLOOD CHLORIDE: CPT | Mod: 91

## 2024-10-16 PROCEDURE — 99285 EMERGENCY DEPT VISIT HI MDM: CPT | Performed by: STUDENT IN AN ORGANIZED HEALTH CARE EDUCATION/TRAINING PROGRAM

## 2024-10-16 PROCEDURE — 82435 ASSAY OF BLOOD CHLORIDE: CPT | Performed by: EMERGENCY MEDICINE

## 2024-10-16 PROCEDURE — 2500000002 HC RX 250 W HCPCS SELF ADMINISTERED DRUGS (ALT 637 FOR MEDICARE OP, ALT 636 FOR OP/ED): Mod: MUE

## 2024-10-16 PROCEDURE — 93005 ELECTROCARDIOGRAM TRACING: CPT

## 2024-10-16 PROCEDURE — 36415 COLL VENOUS BLD VENIPUNCTURE: CPT | Performed by: EMERGENCY MEDICINE

## 2024-10-16 PROCEDURE — 84132 ASSAY OF SERUM POTASSIUM: CPT | Mod: 91,MUE

## 2024-10-16 PROCEDURE — 85610 PROTHROMBIN TIME: CPT | Performed by: EMERGENCY MEDICINE

## 2024-10-16 PROCEDURE — 85610 PROTHROMBIN TIME: CPT | Performed by: STUDENT IN AN ORGANIZED HEALTH CARE EDUCATION/TRAINING PROGRAM

## 2024-10-16 PROCEDURE — 85025 COMPLETE CBC W/AUTO DIFF WBC: CPT | Performed by: STUDENT IN AN ORGANIZED HEALTH CARE EDUCATION/TRAINING PROGRAM

## 2024-10-16 PROCEDURE — 84132 ASSAY OF SERUM POTASSIUM: CPT | Performed by: EMERGENCY MEDICINE

## 2024-10-16 PROCEDURE — 82947 ASSAY GLUCOSE BLOOD QUANT: CPT | Mod: 91

## 2024-10-16 PROCEDURE — 85025 COMPLETE CBC W/AUTO DIFF WBC: CPT | Performed by: EMERGENCY MEDICINE

## 2024-10-16 PROCEDURE — 82805 BLOOD GASES W/O2 SATURATION: CPT | Performed by: EMERGENCY MEDICINE

## 2024-10-16 PROCEDURE — 82947 ASSAY GLUCOSE BLOOD QUANT: CPT

## 2024-10-16 PROCEDURE — 99283 EMERGENCY DEPT VISIT LOW MDM: CPT

## 2024-10-16 PROCEDURE — 82330 ASSAY OF CALCIUM: CPT | Mod: 91 | Performed by: STUDENT IN AN ORGANIZED HEALTH CARE EDUCATION/TRAINING PROGRAM

## 2024-10-16 RX ORDER — INSULIN LISPRO 100 [IU]/ML
5 INJECTION, SOLUTION INTRAVENOUS; SUBCUTANEOUS ONCE
Status: COMPLETED | OUTPATIENT
Start: 2024-10-16 | End: 2024-10-16

## 2024-10-16 RX ORDER — METFORMIN HYDROCHLORIDE 500 MG/1
750 TABLET ORAL ONCE
Status: COMPLETED | OUTPATIENT
Start: 2024-10-16 | End: 2024-10-16

## 2024-10-16 ASSESSMENT — LIFESTYLE VARIABLES
TOTAL SCORE: 0
HAVE YOU EVER FELT YOU SHOULD CUT DOWN ON YOUR DRINKING: NO
EVER HAD A DRINK FIRST THING IN THE MORNING TO STEADY YOUR NERVES TO GET RID OF A HANGOVER: NO
HAVE PEOPLE ANNOYED YOU BY CRITICIZING YOUR DRINKING: NO
EVER FELT BAD OR GUILTY ABOUT YOUR DRINKING: NO

## 2024-10-16 ASSESSMENT — PAIN SCALES - GENERAL: PAINLEVEL_OUTOF10: 0 - NO PAIN

## 2024-10-16 ASSESSMENT — PAIN - FUNCTIONAL ASSESSMENT: PAIN_FUNCTIONAL_ASSESSMENT: 0-10

## 2024-10-16 NOTE — ED PROVIDER NOTES
CC: Hyperglycemia     HPI:  Patient is a 24-year-old female with a past medical history of T2DM, schizophrenia, and PTSD who presents to the ED for hyperglycemia. Patient was noted to be sent from her group home. Her blood glucose at group home was noted be greater than 400.  Patient notes that she has been taking her metformin as prescribed.  Patient notes that she has not been receiving her sliding scale.  Patient denies having any symptoms.  She feels at her baseline.  Denied fevers, chills, nausea, vomiting, chest pain, difficulty breathing, headache, trauma, falls, abdominal pain, neck pain, back pain, dysuria,    Limitations to history: None  Independent historian(s): Patient  Records Reviewed: Recent available ED and inpatient notes reviewed in EMR.    PMHx/PSHx:  Per HPI.   - has a past medical history of Abnormal weight gain (12/17/2018), Anxiety, Blister (nonthermal), unspecified lower leg, initial encounter (05/30/2018), Crushing injury of unspecified finger(s), initial encounter (06/30/2017), Depression, Diabetes mellitus (Multi), Drug-induced hypoglycemia without coma (08/04/2021), Migraine, unspecified, not intractable, without status migrainosus (12/27/2016), Other microscopic hematuria (06/01/2018), Pain in left hand (06/29/2017), Pain in unspecified joint (12/30/2013), Personal history of diseases of the blood and blood-forming organs and certain disorders involving the immune mechanism (06/07/2017), Personal history of diseases of the skin and subcutaneous tissue (06/13/2019), Personal history of other complications of pregnancy, childbirth and the puerperium (07/25/2017), Personal history of other diseases of the female genital tract (06/05/2018), Personal history of other diseases of the nervous system and sense organs (08/03/2022), Personal history of other endocrine, nutritional and metabolic disease (12/17/2018), Personal history of other specified conditions (06/09/2017), Psychiatric illness,  Schizophrenia, and Urge incontinence (06/01/2018).  - has a past surgical history that includes Other surgical history (11/11/2019).    Medications:  Reviewed in EMR. See EMR for complete list of medications and doses.    Allergies:  Penicillins, Cefixime, Dextromethorphan, Ditropan, Esomeprazole, Hyoscyamine, Hyoscyamine sulfate, Na mg fl-na daryl-nacl-ha-na hyp, Penicillin, Sulfa (sulfonamide antibiotics), and Ciprofloxacin    Social History:  - Tobacco:  reports that she has quit smoking. Her smoking use included cigarettes. She has never been exposed to tobacco smoke. She uses smokeless tobacco.   - Alcohol:  reports that she does not currently use alcohol.   - Illicit Drugs:  reports that she does not currently use drugs after having used the following drugs: Marijuana.     ROS:  Per HPI.       ???????????????????????????????????????????????????????????????  Triage Vitals:  T 36.8 °C (98.3 °F)  HR (!) 123  /82  RR 14  O2 97 %      Physical Exam  Vitals and nursing note reviewed.   Constitutional:       General: She is not in acute distress.  HENT:      Head: Normocephalic and atraumatic.      Nose: Nose normal.      Mouth/Throat:      Mouth: Mucous membranes are dry.   Eyes:      Conjunctiva/sclera: Conjunctivae normal.   Cardiovascular:      Rate and Rhythm: Normal rate and regular rhythm.      Pulses: Normal pulses.   Pulmonary:      Effort: Pulmonary effort is normal. No respiratory distress.      Breath sounds: Normal breath sounds.   Abdominal:      Palpations: Abdomen is soft.      Tenderness: There is no abdominal tenderness.   Skin:     General: Skin is warm.   Neurological:      General: No focal deficit present.      Mental Status: She is alert and oriented to person, place, and time.      Cranial Nerves: No cranial nerve deficit.      Sensory: No sensory deficit.      Motor: No weakness.      Gait: Gait normal.       ???????????????????????????????????????????????????????????????  Labs:    Labs Reviewed   BLOOD GAS VENOUS FULL PANEL - Abnormal       Result Value    POCT pH, Venous 7.35      POCT pCO2, Venous 47      POCT pO2, Venous 37      POCT SO2, Venous 58      POCT Oxy Hemoglobin, Venous 57.3      POCT Hematocrit Calculated, Venous 44.0      POCT Sodium, Venous 129 (*)     POCT Potassium, Venous 8.8 (*)     POCT Chloride, Venous 100      POCT Ionized Calicum, Venous 1.20      POCT Glucose, Venous 338 (*)     POCT Lactate, Venous 1.8      POCT Base Excess, Venous -0.3      POCT HCO3 Calculated, Venous 25.9      POCT Hemoglobin, Venous 14.5      POCT Anion Gap, Venous 12.0      Patient Temperature 37.0      FiO2 21     BLOOD GAS VENOUS FULL PANEL - Abnormal    POCT pH, Venous 7.58 (*)     POCT pCO2, Venous 18 (*)     POCT pO2, Venous 59 (*)     POCT SO2, Venous 93 (*)     POCT Oxy Hemoglobin, Venous 91.0 (*)     POCT Hematocrit Calculated, Venous 42.0      POCT Sodium, Venous 128 (*)     POCT Potassium, Venous 8.4 (*)     POCT Chloride, Venous 103      POCT Ionized Calicum, Venous 1.15      POCT Glucose, Venous 279 (*)     POCT Lactate, Venous 1.7      POCT Base Excess, Venous -2.3 (*)     POCT HCO3 Calculated, Venous 16.9 (*)     POCT Hemoglobin, Venous 14.1      POCT Anion Gap, Venous 17.0      Patient Temperature 37.0      FiO2 21     POTASSIUM - Abnormal    Potassium 6.0 (*)    COMPREHENSIVE METABOLIC PANEL - Abnormal    Glucose 216 (*)     Sodium 135 (*)     Potassium 6.0 (*)     Chloride 101      Bicarbonate 25      Anion Gap 15      Urea Nitrogen 8      Creatinine 0.84      eGFR >90      Calcium 9.4      Albumin 4.1      Alkaline Phosphatase 104      Total Protein 7.5      AST 30      Bilirubin, Total 0.4      ALT 7     POCT GLUCOSE - Abnormal    POCT Glucose 298 (*)    POCT GLUCOSE - Abnormal    POCT Glucose 290 (*)    PROTIME-INR - Normal    Protime 10.6      INR 0.9     CBC WITH AUTO DIFFERENTIAL    WBC 7.6      nRBC 0.0      RBC 4.51      Hemoglobin 13.9      Hematocrit 42.8      MCV  95      MCH 30.8      MCHC 32.5      RDW 14.0      Platelets        Neutrophils % 47.0      Immature Granulocytes %, Automated 0.5      Lymphocytes % 43.6      Monocytes % 7.5      Eosinophils % 0.7      Basophils % 0.7      Neutrophils Absolute 3.58      Immature Granulocytes Absolute, Automated 0.04      Lymphocytes Absolute 3.31      Monocytes Absolute 0.57      Eosinophils Absolute 0.05      Basophils Absolute 0.05     POTASSIUM        Imaging:   No orders to display        EKG:  Rate is 116, sinus rhythm, normal axis, no interval prolongation, no st elevation or depression.  When compared to EKG on 5/18/2024 review of EKG does not show any signs of STEMI, complete heart block, asystole, V-fib.    MDM:  Patient is a 24-year-old female with a past medical history of T2DM, schizophrenia, and PTSD who presents to the ED for hyperglycemia.  Patient initially presented tachycardic but was noted to have improvement of heart rate with hydration.  Physical exam findings significant for dry mucous membranes.  Patient's tachycardia likely secondary to dehydration.  Low clinical concern for PE, acute infectious process, acute metabolic process, acute traumatic process, and acute neurologic process.  Local concern for HHS.  Initial point-of-care glucose noted to be 298.  Venous full panel significant for alkalosis and a normal lactate.  Glucose was 279.  Potassium was initially noted to be hemolyzed but was normal on repeat.  CBC without leukocytosis or anemia.  Patient administered metformin and lispro.  Patient tolerated p.o. and ambulated emergency department.  Upon review of patient's medication records.  Her lispro was noted to be dispensed on 9/25/2024.  Discussed with patient importance of receiving her whole hyperglycemic regimen.  Discussed ED findings, plan for outpatient primary care and endocrine follow-up, and strict return precautions for any new or worsening symptoms including but limited to changes in mental  status and nausea/vomiting.  She has noted to have a primary care follow-up scheduled for 10/25/2024.  Patient stated understanding and agreed with the plan.  All questions were answered.  Patient discharged in stable condition.    ED Course:  Diagnoses as of 10/18/24 0114   Hyperglycemia       Social Determinants Limiting Care:  None identified    Disposition:  Discharge    Sky Lares MD   Emergency Medicine PGY-3  [facility]    Comment: Please note this report has been produced using speech recognition software and may contain errors related to that system including errors in grammar, punctuation, and spelling as well as words and phrases that may be inappropriate.  If there are any questions or concerns please feel free to contact the dictating provider for clarification.    Procedures ? [employer] last updated 10/16/2024 7:15 PM        Sky Lares MD  Resident  10/18/24 0129       Rokcy Gaitan MD  10/18/24 0429

## 2024-10-16 NOTE — ED TRIAGE NOTES
Pt BIB EMS from home when pt checked her BG and saw it was 400+. Pt states that she has not changed any of her daily habits or is missing any home medications. Pt endorses polydipsia and N/V with no other complaints. PT is AOX3.

## 2024-10-17 LAB
ATRIAL RATE: 116 BPM
P AXIS: 43 DEGREES
P OFFSET: 206 MS
P ONSET: 154 MS
PR INTERVAL: 128 MS
Q ONSET: 218 MS
QRS COUNT: 19 BEATS
QRS DURATION: 74 MS
QT INTERVAL: 310 MS
QTC CALCULATION(BAZETT): 430 MS
QTC FREDERICIA: 386 MS
R AXIS: 6 DEGREES
T AXIS: 14 DEGREES
T OFFSET: 373 MS
VENTRICULAR RATE: 116 BPM

## 2024-10-17 NOTE — DISCHARGE INSTRUCTIONS
Please continue to take your insulin as prescribed as a sliding scale. You are not in DKA. Your labs were otherwise normal and you are safe to return home at this time.

## 2024-10-20 NOTE — PROGRESS NOTES
Subjective   Patient ID: Berna Ricardo is a 24 y.o. female who presents for follow up. Last OV with me was on Sept 2023.    HPI  Ms. Ricardo is a 23 y/o AAF with h/o DM, Schizophrenia, Bipolar d/o, ANAI, and PTSD who was in clinic on March 2022 for initial consultation regarding her nausea, lack of appetite, weight loss and generalized abd pain for  2 months.      Pt has seen Peds GI in past for her IBS-C and it seems to be under control with Linzess 145 mcg daily. Pt admits to she takes it every other day, because it will give her diarrhea if given daily. She has tried Miralax in the past without success.      Pt is usually accompanied by her grandmother, Joelle, who is pt's legal guardian and POAShaye Solis provides most of her medical history during each visit. Pt does live in a group home.       Since early Dec 2021, pt c/o nausea with intermittent episodes of vomiting ( but no coffee ground emesis or hematemesis), lack of appetite and a 30 lb weight loss secondary to lack of appetite. Pt also admits to some generalized abd pain (mainly localized in epigastric region, that is sharp in nature), after eating any food. Pt denied any d/c, blood in stool, or other changes in bowel habits.      Pt had similar presentation back in 2019, at which  she did undergo a lap ami at that time because she had sludge in gallbladder.     Per grandmother, pt has no family h/o IBD or gastrointestinal cancers.     Pt did undergo a Colonoscopy back in Aug 2014 for her constipation and it was a normal study. Colon biopsies of TI, Right colon, TV colon and Left colon) were all negative.      Back in early 20233, since pt was not eating and she was dehydrated, her grandmother brought her to the ER.   Labs obtained including pregnancy test, renal function, LFTs,lipase and TFTs were normal.   CT abd/p obtained on Jan 2022 reveal some wall thickening of TV colon, but could be from non distention. Correlate with pt's symptoms. No other  "acute abnormalities.      Pt did undergo an EGD and Colonoscopy.  EGD was done first on 3/7/22 and it showed normal esophagus, gastric polyp, normal duodenum. Biopsy of gastric polyp showed fundic gland polyp and no dysplasia.  Colonoscopy was completed on 5/2/22 and it was a poor prep, 1 polyp removed from transverse colon (TA), and hemorrhoids seen. We recommended for her to repeat colonoscopy within the next year.      Back in June 2022,she was doing better in regards to her nausea, abd pain and weight loss. Her weight has stabilized.   Her main concern was her constipation. Pt was treated by peds GI in the past for constipation.   She failed Miralax and senna in the past. She was taking Linzess 145 mcg QOD which worked great for her, because daily dose was \"too strong\" and causing diarrhea.      Pt went back to our clinic on June 2023 for followup and for refill of Linzess. Her grandmother was wondering if there was a smaller dose she can take. Pt's grandmother also stated that she moved to a different nursing home and was not receiving her LInzess for \"awhile\". Pt was also recently discharged from hospital from UTI ,with possible pyelonephritis. Pt is currently on daily Macrobid secondary to her h/o recurrent UTIs.     Pt 's last OV was on Sept 2023.   She did undergo repeat colonoscopy on 9/13/23 and it was a good prep. Normal study. No specimens collected. Secondary to a family h/o colon ca (pt's aunt) and pt had a polyp in the past, it was recommended she repeat surveillance in 5 yrs (2028).   Per grandmother, Joelle, as long as the pt received her daily dose of Linzess at the group home, she does well. Pt's grandmother is concerned she is not always receiving all her meds \"as instructed\", so she will talk to the nursing staff about it this week. Otherwise, they are content with the Linzess 72 mcg daily dose.     Pt is back today for follow up. Pt doing well overall in regards to her GI sx as long as she " consistently takes Linzess.  She is following with her PCP regarding her elev BP and HR this week.  She will follow up with him this Friday to discuss Holter monitor results from last week.      Review of Systems   Constitutional:  Negative for appetite change and unexpected weight change.   HENT:  Negative for sore throat and trouble swallowing.    Eyes:  Negative for redness.   Respiratory:  Negative for cough and choking.    Cardiovascular:  Negative for chest pain.   Gastrointestinal:  Negative for abdominal pain, constipation, diarrhea, nausea and vomiting.   Genitourinary:  Negative for dysuria.   Musculoskeletal:  Negative for joint swelling.   Skin:  Negative for pallor and rash.   Neurological:  Negative for weakness.   Hematological:  Does not bruise/bleed easily.   Psychiatric/Behavioral:  Negative for agitation, confusion and dysphoric mood.          Objective   Visit Vitals  BP (!) 126/91   Pulse (!) 125   Temp 35.2 °C (95.4 °F)   Wt 118 kg (260 lb 12.8 oz)   SpO2 96%   BMI 40.85 kg/m²   OB Status Implant   Smoking Status Former   BSA 2.36 m²      Physical Exam  Vitals reviewed.   Constitutional:       General: She is not in acute distress.     Appearance: She is obese. She is not ill-appearing.   HENT:      Head: Normocephalic and atraumatic.   Eyes:      General: No scleral icterus.     Pupils: Pupils are equal, round, and reactive to light.   Cardiovascular:      Rate and Rhythm: Tachycardia present.   Pulmonary:      Effort: Pulmonary effort is normal.      Breath sounds: Normal breath sounds.   Abdominal:      General: Bowel sounds are normal. There is no distension.      Palpations: Abdomen is soft.      Tenderness: There is no abdominal tenderness.   Musculoskeletal:         General: No swelling or deformity.   Skin:     General: Skin is warm.      Coloration: Skin is not jaundiced.      Findings: No rash.   Neurological:      General: No focal deficit present.      Mental Status: She is alert.  Mental status is at baseline.   Psychiatric:         Mood and Affect: Mood normal.         Behavior: Behavior normal.         Thought Content: Thought content normal.           Assessment/Plan       1) h/o nausea and chronic abd pain -  Currently asymptomatic.  s/p EGD March 2022 - normal esophagus, gastric polyp ( fundic gland polyp), normal duodenum.  s/p colonoscopy May 2022 - poor prep, 1 small TA polyp removed, non bleeding hemorrhoids seen.   Colonoscopy then repeated on 9/13/22 (good prep) revealing normal study. no specimens collected. Repeat surveillance in 5 yrs (2028) secondary to family h/o colon ca.      2) h/o chronic constipation -  Under good control.  Continue Linzess 72 mcg daily.     Follow up in 6mons to 1 yr, or sooner if needed

## 2024-10-21 ENCOUNTER — OFFICE VISIT (OUTPATIENT)
Dept: GASTROENTEROLOGY | Facility: HOSPITAL | Age: 24
End: 2024-10-21
Payer: MEDICARE

## 2024-10-21 VITALS
WEIGHT: 260.8 LBS | DIASTOLIC BLOOD PRESSURE: 91 MMHG | SYSTOLIC BLOOD PRESSURE: 126 MMHG | OXYGEN SATURATION: 96 % | BODY MASS INDEX: 40.85 KG/M2 | TEMPERATURE: 95.4 F | HEART RATE: 125 BPM

## 2024-10-21 DIAGNOSIS — K59.00 CONSTIPATION, UNSPECIFIED CONSTIPATION TYPE: ICD-10-CM

## 2024-10-21 PROCEDURE — 1036F TOBACCO NON-USER: CPT | Performed by: PHYSICIAN ASSISTANT

## 2024-10-21 PROCEDURE — 3044F HG A1C LEVEL LT 7.0%: CPT | Performed by: PHYSICIAN ASSISTANT

## 2024-10-21 PROCEDURE — 99213 OFFICE O/P EST LOW 20 MIN: CPT | Performed by: PHYSICIAN ASSISTANT

## 2024-10-21 PROCEDURE — 3074F SYST BP LT 130 MM HG: CPT | Performed by: PHYSICIAN ASSISTANT

## 2024-10-21 PROCEDURE — 3080F DIAST BP >= 90 MM HG: CPT | Performed by: PHYSICIAN ASSISTANT

## 2024-10-21 SDOH — ECONOMIC STABILITY: FOOD INSECURITY: WITHIN THE PAST 12 MONTHS, YOU WORRIED THAT YOUR FOOD WOULD RUN OUT BEFORE YOU GOT MONEY TO BUY MORE.: NEVER TRUE

## 2024-10-21 SDOH — ECONOMIC STABILITY: FOOD INSECURITY: WITHIN THE PAST 12 MONTHS, THE FOOD YOU BOUGHT JUST DIDN'T LAST AND YOU DIDN'T HAVE MONEY TO GET MORE.: NEVER TRUE

## 2024-10-21 ASSESSMENT — ENCOUNTER SYMPTOMS
WEAKNESS: 0
NAUSEA: 0
JOINT SWELLING: 0
AGITATION: 0
DYSPHORIC MOOD: 0
VOMITING: 0
SORE THROAT: 0
CONSTIPATION: 0
BRUISES/BLEEDS EASILY: 0
CONFUSION: 0
DYSURIA: 0
UNEXPECTED WEIGHT CHANGE: 0
CHOKING: 0
TROUBLE SWALLOWING: 0
DIARRHEA: 0
ABDOMINAL PAIN: 0
APPETITE CHANGE: 0
COUGH: 0
EYE REDNESS: 0

## 2024-10-21 ASSESSMENT — LIFESTYLE VARIABLES
HOW OFTEN DO YOU HAVE A DRINK CONTAINING ALCOHOL: NEVER
AUDIT-C TOTAL SCORE: 0
HOW OFTEN DO YOU HAVE SIX OR MORE DRINKS ON ONE OCCASION: NEVER
HOW MANY STANDARD DRINKS CONTAINING ALCOHOL DO YOU HAVE ON A TYPICAL DAY: PATIENT DOES NOT DRINK
SKIP TO QUESTIONS 9-10: 1

## 2024-10-21 ASSESSMENT — PAIN SCALES - GENERAL: PAINLEVEL_OUTOF10: 3

## 2024-10-21 ASSESSMENT — PATIENT HEALTH QUESTIONNAIRE - PHQ9
SUM OF ALL RESPONSES TO PHQ9 QUESTIONS 1 & 2: 6
2. FEELING DOWN, DEPRESSED OR HOPELESS: NEARLY EVERY DAY
10. IF YOU CHECKED OFF ANY PROBLEMS, HOW DIFFICULT HAVE THESE PROBLEMS MADE IT FOR YOU TO DO YOUR WORK, TAKE CARE OF THINGS AT HOME, OR GET ALONG WITH OTHER PEOPLE: SOMEWHAT DIFFICULT
1. LITTLE INTEREST OR PLEASURE IN DOING THINGS: NEARLY EVERY DAY

## 2024-10-25 ENCOUNTER — APPOINTMENT (OUTPATIENT)
Dept: PRIMARY CARE | Facility: CLINIC | Age: 24
End: 2024-10-25
Payer: MEDICARE

## 2024-10-28 ENCOUNTER — OFFICE VISIT (OUTPATIENT)
Dept: PRIMARY CARE | Facility: CLINIC | Age: 24
End: 2024-10-28
Payer: COMMERCIAL

## 2024-10-28 VITALS
TEMPERATURE: 97.7 F | HEART RATE: 125 BPM | BODY MASS INDEX: 41.23 KG/M2 | DIASTOLIC BLOOD PRESSURE: 88 MMHG | RESPIRATION RATE: 18 BRPM | WEIGHT: 262.7 LBS | OXYGEN SATURATION: 94 % | HEIGHT: 67 IN | SYSTOLIC BLOOD PRESSURE: 132 MMHG

## 2024-10-28 DIAGNOSIS — I10 STAGE 1 HYPERTENSION: Primary | ICD-10-CM

## 2024-10-28 PROCEDURE — 1036F TOBACCO NON-USER: CPT | Performed by: FAMILY MEDICINE

## 2024-10-28 PROCEDURE — 3044F HG A1C LEVEL LT 7.0%: CPT | Performed by: FAMILY MEDICINE

## 2024-10-28 PROCEDURE — 4010F ACE/ARB THERAPY RXD/TAKEN: CPT | Performed by: FAMILY MEDICINE

## 2024-10-28 PROCEDURE — 99213 OFFICE O/P EST LOW 20 MIN: CPT | Performed by: FAMILY MEDICINE

## 2024-10-28 PROCEDURE — 3075F SYST BP GE 130 - 139MM HG: CPT | Performed by: FAMILY MEDICINE

## 2024-10-28 PROCEDURE — 3079F DIAST BP 80-89 MM HG: CPT | Performed by: FAMILY MEDICINE

## 2024-10-28 PROCEDURE — 3008F BODY MASS INDEX DOCD: CPT | Performed by: FAMILY MEDICINE

## 2024-10-28 RX ORDER — LISINOPRIL 20 MG/1
20 TABLET ORAL DAILY
Qty: 90 TABLET | Refills: 3 | Status: SHIPPED | OUTPATIENT
Start: 2024-10-28 | End: 2025-10-28

## 2024-10-28 ASSESSMENT — PAIN SCALES - GENERAL: PAINLEVEL_OUTOF10: 0-NO PAIN

## 2024-10-28 ASSESSMENT — ENCOUNTER SYMPTOMS: CONSTITUTIONAL NEGATIVE: 1

## 2024-11-07 ENCOUNTER — TELEPHONE (OUTPATIENT)
Dept: INFECTIOUS DISEASES | Facility: CLINIC | Age: 24
End: 2024-11-07
Payer: COMMERCIAL

## 2024-11-07 DIAGNOSIS — N39.0 RECURRENT UTI: Primary | ICD-10-CM

## 2024-11-07 NOTE — TELEPHONE ENCOUNTER
Please refill Macrobid 100 mg daily. Patient request. Patient has been out of medication for 1 week. Local [facility] pharmacy verified .

## 2024-11-07 NOTE — TELEPHONE ENCOUNTER
[employer] patient of      Please refill Macrobid 100 mg daily. Patient request. Patient has been out of medication for 1 week. Local [facility] pharmacy verified .

## 2024-11-08 RX ORDER — NITROFURANTOIN 25; 75 MG/1; MG/1
100 CAPSULE ORAL 2 TIMES DAILY
Qty: 60 CAPSULE | Refills: 0 | Status: SHIPPED | OUTPATIENT
Start: 2024-11-08 | End: 2024-12-08

## 2024-11-08 NOTE — TELEPHONE ENCOUNTER
30 day refill sent to pharmacy. Next follow-up with Dr. Thomas will be on 12/5/24, patient can get a longer term prescription at that visit.

## 2024-11-11 ENCOUNTER — APPOINTMENT (OUTPATIENT)
Dept: ENDOCRINOLOGY | Facility: CLINIC | Age: 24
End: 2024-11-11
Payer: MEDICARE

## 2024-12-05 ENCOUNTER — APPOINTMENT (OUTPATIENT)
Dept: INFECTIOUS DISEASES | Facility: CLINIC | Age: 24
End: 2024-12-05
Payer: MEDICARE

## 2024-12-05 VITALS
WEIGHT: 257 LBS | DIASTOLIC BLOOD PRESSURE: 84 MMHG | SYSTOLIC BLOOD PRESSURE: 111 MMHG | BODY MASS INDEX: 40.34 KG/M2 | HEIGHT: 67 IN

## 2024-12-05 DIAGNOSIS — N39.0 RECURRENT UTI: Primary | ICD-10-CM

## 2024-12-05 PROCEDURE — 4010F ACE/ARB THERAPY RXD/TAKEN: CPT | Performed by: INTERNAL MEDICINE

## 2024-12-05 PROCEDURE — 3074F SYST BP LT 130 MM HG: CPT | Performed by: INTERNAL MEDICINE

## 2024-12-05 PROCEDURE — 3044F HG A1C LEVEL LT 7.0%: CPT | Performed by: INTERNAL MEDICINE

## 2024-12-05 PROCEDURE — 3008F BODY MASS INDEX DOCD: CPT | Performed by: INTERNAL MEDICINE

## 2024-12-05 PROCEDURE — 3079F DIAST BP 80-89 MM HG: CPT | Performed by: INTERNAL MEDICINE

## 2024-12-05 PROCEDURE — 99214 OFFICE O/P EST MOD 30 MIN: CPT | Performed by: INTERNAL MEDICINE

## 2024-12-05 PROCEDURE — G2211 COMPLEX E/M VISIT ADD ON: HCPCS | Performed by: INTERNAL MEDICINE

## 2024-12-05 RX ORDER — HALOPERIDOL 10 MG/1
10 TABLET ORAL 2 TIMES DAILY
COMMUNITY

## 2024-12-05 ASSESSMENT — ENCOUNTER SYMPTOMS
RESPIRATORY NEGATIVE: 1
NEUROLOGICAL NEGATIVE: 1
EYES NEGATIVE: 1
MUSCULOSKELETAL NEGATIVE: 1
CONSTITUTIONAL NEGATIVE: 1
ALLERGIC/IMMUNOLOGIC NEGATIVE: 1
HEMATOLOGIC/LYMPHATIC NEGATIVE: 1
GASTROINTESTINAL NEGATIVE: 1
CARDIOVASCULAR NEGATIVE: 1
PSYCHIATRIC NEGATIVE: 1
ENDOCRINE NEGATIVE: 1

## 2024-12-05 ASSESSMENT — PAIN SCALES - GENERAL: PAINLEVEL_OUTOF10: 6

## 2024-12-05 NOTE — PROGRESS NOTES
Infectious Diseases Clinic Follow-up:    Reason for Visit:   Chief Complaint   Patient presents with    Follow-up     Recurrent UTIs       History of Current Issue  Berna Ricardo is a 24 y.o. year old female     Here for routine follow up. Doing no recent UTI        Initially saw her in 12/2022 for recurrent UTIs, full details of consult as below:     INITIAL ID CONSULT NOTE in 2022     21 yo F, group home resident, psych issues who as referred by PCP for recurrent UTIs. She is here with grandmother who was the primary historian has pt has limited ability to give a coherent history.     Per grandmother and chat review, had recurrent UTIs as a child and was on Bactrim ppx from age [age]. She was reportedly taken off when UTIs improved. However in the last 2 years, UTIs have returned. Primary syxs appear to be painful urination and frequency.      Last UTI was in October 2022. Denies has UTI now. Denies any history of renal stones. Records are not available to confirm this. One UCX from 6/2021 grew E. coli (S=Keflex, Macrobid, R=Bactrim). Apparently no imaging has been done recently to rule out structural abnormalities. She has been treated multiple times with Bactrim.     Endorses a hx of allergies to cipro and sulfa drugs (rash).     PAST MEDICAL HISTORY:  Past Medical History:   Diagnosis Date    Abnormal weight gain 12/17/2018    Abnormal weight gain    Anxiety     Blister (nonthermal), unspecified lower leg, initial encounter 05/30/2018    Blister of leg    Crushing injury of unspecified finger(s), initial encounter 06/30/2017    Crushing injury of finger, initial encounter    Depression     Diabetes mellitus (Multi)     Drug-induced hypoglycemia without coma 08/04/2021    Hypoglycemia due to insulin    Migraine, unspecified, not intractable, without status migrainosus 12/27/2016    Headache, migraine    Other microscopic hematuria 06/01/2018    Hematuria, microscopic    Pain in left hand 06/29/2017    Pain of  left hand    Pain in unspecified joint 12/30/2013    Multiple joint pain    Personal history of diseases of the blood and blood-forming organs and certain disorders involving the immune mechanism 06/07/2017    History of anemia    Personal history of diseases of the skin and subcutaneous tissue 06/13/2019    History of acne    Personal history of other complications of pregnancy, childbirth and the puerperium 07/25/2017    History of galactorrhea    Personal history of other diseases of the female genital tract 06/05/2018    History of vaginal bleeding    Personal history of other diseases of the nervous system and sense organs 08/03/2022    History of optic neuritis    Personal history of other endocrine, nutritional and metabolic disease 12/17/2018    History of elevated glucose    Personal history of other specified conditions 06/09/2017    History of nipple discharge    Psychiatric illness     Schizophrenia     Urge incontinence 06/01/2018    Urge incontinence of urine       PAST SURGICAL HISTORY:  Past Surgical History:   Procedure Laterality Date    OTHER SURGICAL HISTORY  11/11/2019    Cholecystectomy       ALLERGIES:    Allergies   Allergen Reactions    Penicillins Hives and Rash    Cefixime Hives    Dextromethorphan Unknown    Ditropan Unknown     Reaction unknown    Esomeprazole Unknown    Hyoscyamine Unknown     Unknown reaction, documented from hospital stay per grandmother.    Hyoscyamine Sulfate Unknown    Na Mg Fl-Na Cori-Nacl-Ha-Na Hyp Unknown    Penicillin Hives    Sulfa (Sulfonamide Antibiotics) Unknown    Ciprofloxacin Hives and Rash       MEDICATIONS:      Current Outpatient Medications:     blood pressure monitor (Blood Pressure Kit) kit, 1 each if needed (tachycardia, palpitations)., Disp: 1 kit, Rfl: 0    divalproex (Depakote) 500 mg EC tablet, Take 1 tablet (500 mg) by mouth every 12 hours. Do not crush, chew, or split., Disp: 60 tablet, Rfl: 0    FreeStyle Vic 3 Sensor device, Change every  "14 days, Disp: 6 each, Rfl: 3    haloperidol (Haldol) 10 mg tablet, Take 1 tablet (10 mg) by mouth 2 times a day., Disp: , Rfl:     hydrOXYzine pamoate (Vistaril) 50 mg capsule, Take 1 capsule (50 mg) by mouth 3 times a day., Disp: , Rfl:     ibuprofen 600 mg tablet, Take 1 tablet (600 mg) by mouth every 8 hours if needed for mild pain (1 - 3), headaches or moderate pain (4 - 6) (pain)., Disp: 60 tablet, Rfl: 11    insulin lispro (HumaLOG) 100 unit/mL injection, Sliding scale with meals as needed up to 20 units daily, Disp: 15 mL, Rfl: 3    Invega Sustenna 234 mg/1.5 mL syringe, , Disp: , Rfl:     lancets (OneTouch UltraSoft 2 Lancet) 30 gauge misc, For daily BG check, Disp: 100 each, Rfl: 3    linaCLOtide (Linzess) 72 mcg capsule, Take 1 capsule (72 mcg) by mouth once daily in the morning. Take before meals. Do not crush or chew., Disp: 30 capsule, Rfl: 6    lisinopril 20 mg tablet, Take 1 tablet (20 mg) by mouth once daily., Disp: 90 tablet, Rfl: 3    LOXAPINE SUCCINATE ORAL, Take 50 mg by mouth 2 times a day. 2 tabs twice a day, Disp: , Rfl:     metFORMIN XR (Glucophage-XR) 750 mg 24 hr tablet, Twice a day with meals, Disp: 180 tablet, Rfl: 2    mirtazapine (Remeron) 15 mg tablet, 2 tablets (30 mg)., Disp: , Rfl:     nitrofurantoin, macrocrystal-monohydrate, (Macrobid) 100 mg capsule, Take 1 capsule (100 mg) by mouth 2 times a day., Disp: 60 capsule, Rfl: 0    OneTouch Ultra Test strip, For daily BG check, Disp: 100 strip, Rfl: 3    paliperidone (Invega) 6 mg 24 hr tablet, , Disp: , Rfl:     pen needle, diabetic (BD Ultra-Fine Celine Pen Needle) 32 gauge x 5/32\" needle, For insulin use up to 3x/day, Disp: 100 each, Rfl: 3    prazosin (Minipress) 2 mg capsule, Take 1 capsule (2 mg) by mouth once daily at bedtime., Disp: 30 capsule, Rfl: 0    Current Facility-Administered Medications:     perflutren lipid microspheres (Definity) injection 0.5-10 mL of dilution, 0.5-10 mL of dilution, intravenous, Once, Goldie James, " "MD    REVIEW OF SYSTEMS:    Review of Systems   Constitutional: Negative.    HENT: Negative.     Eyes: Negative.    Respiratory: Negative.     Cardiovascular: Negative.    Gastrointestinal: Negative.    Endocrine: Negative.    Genitourinary: Negative.    Musculoskeletal: Negative.    Skin: Negative.    Allergic/Immunologic: Negative.    Neurological: Negative.    Hematological: Negative.    Psychiatric/Behavioral: Negative.         PHYSICAL EXAMINATION:       Visit Vitals  /84   Ht 1.702 m (5' 7\")   Wt 117 kg (257 lb)   BMI 40.25 kg/m²   OB Status Implant   Smoking Status Former   BSA 2.35 m²        EXAM:    Physical Exam  Vitals reviewed.   Constitutional:       Appearance: Normal appearance.   HENT:      Head: Normocephalic and atraumatic.      Mouth/Throat:      Mouth: Mucous membranes are moist.      Pharynx: Oropharynx is clear.   Cardiovascular:      Rate and Rhythm: Normal rate and regular rhythm.   Pulmonary:      Effort: Pulmonary effort is normal.   Abdominal:      General: Abdomen is flat. Bowel sounds are normal.      Palpations: Abdomen is soft.   Musculoskeletal:         General: Normal range of motion.      Cervical back: Normal range of motion.   Skin:     General: Skin is warm.   Neurological:      General: No focal deficit present.      Mental Status: She is alert and oriented to person, place, and time.   Psychiatric:         Mood and Affect: Mood normal.         Behavior: Behavior normal.         Thought Content: Thought content normal.         Judgment: Judgment normal.            ASSESSMENT / RECOMMENDATIONS:  23 yo F, group home resident, psych issues who as referred by PCP for recurrent UTIs. She is here with grandmother who was the primary historian has pt has limited ability to give a coherent history.     Per grandmother and chat review, had recurrent UTIs as a child and was on Bactrim ppx from age [age]. She was reportedly taken off when UTIs improved. However in the last 2 years, " UTIs have returned. Primary syxs appear to be painful urination and frequency.      Last UTI was in October 2022. Denies has UTI now. Denies any history of renal stones. Records are not available to confirm this. One UCX from 6/2021 grew E. coli (S=Keflex, Macrobid, R= Bactrim). Apparently no imaging has been done recently to rule out structural abnormalities. She has been treated multiple times with Bactrim.     Endorses a hx of allergies to cipro and sulfa drugs (rash).      UPDATE (10/12/2023):  Doing well on macrobid.  No new UTIs since last visit.     UPDATE 08/01/2024  Was hospitalized twice in the interim, one of which was for a presumed UTI in June; however, the culture reports showed contamination. She received antibiotics. She was restarted on Macrobid but ran out for the last week. No new symptoms.    UPDATE 12/05/2024  Doing well, no recent UTIs     PLAN:  1. Macrobid 100 mg daily    2. Can go up to 100 mg Po QID x 7 days in the even t of a new UTI and then return to daily dosing after clinical resolution    3. RTC in 6 mo     Pt and grandma are in agreement with plan        I spent 30 minutes in the professional and overall care of this patient.       Celso Thomas MD MPH

## 2024-12-05 NOTE — LETTER
12/05/24    David Lezama DO  [address]  [facility] [address]      Dear Dr. David Lezama DO,    I am writing to confirm that your patient, Genesis Ricardo, received care in my office on 12/05/24. I have enclosed a summary of the care provided to Genesis for your reference.    Please contact me with any questions you may have regarding the visit.    Sincerely,         Celso Thomas MD MPH  [address]  [address] [id number]    CC: No Recipients

## 2024-12-27 ENCOUNTER — APPOINTMENT (OUTPATIENT)
Dept: PRIMARY CARE | Facility: CLINIC | Age: 24
End: 2024-12-27
Payer: COMMERCIAL

## 2024-12-27 VITALS
HEART RATE: 128 BPM | BODY MASS INDEX: 40.34 KG/M2 | DIASTOLIC BLOOD PRESSURE: 75 MMHG | TEMPERATURE: 96.3 F | WEIGHT: 257 LBS | SYSTOLIC BLOOD PRESSURE: 119 MMHG | OXYGEN SATURATION: 96 % | HEIGHT: 67 IN

## 2024-12-27 DIAGNOSIS — I47.9 TACHYCARDIA, PAROXYSMAL (MULTI): ICD-10-CM

## 2024-12-27 DIAGNOSIS — I10 STAGE 1 HYPERTENSION: Primary | ICD-10-CM

## 2024-12-27 PROCEDURE — 99213 OFFICE O/P EST LOW 20 MIN: CPT | Mod: GE

## 2024-12-27 PROCEDURE — 3078F DIAST BP <80 MM HG: CPT

## 2024-12-27 PROCEDURE — 4010F ACE/ARB THERAPY RXD/TAKEN: CPT

## 2024-12-27 PROCEDURE — 99213 OFFICE O/P EST LOW 20 MIN: CPT

## 2024-12-27 PROCEDURE — 3008F BODY MASS INDEX DOCD: CPT

## 2024-12-27 PROCEDURE — 3074F SYST BP LT 130 MM HG: CPT

## 2024-12-27 RX ORDER — METOPROLOL SUCCINATE 25 MG/1
25 TABLET, EXTENDED RELEASE ORAL DAILY
Qty: 30 TABLET | Refills: 5 | Status: SHIPPED | OUTPATIENT
Start: 2024-12-27 | End: 2025-06-25

## 2024-12-27 ASSESSMENT — PAIN SCALES - GENERAL: PAINLEVEL_OUTOF10: 0-NO PAIN

## 2024-12-27 NOTE — PROGRESS NOTES
"Subjective   Patient ID: Berna Ricardo \"Amanda" is a 24 y.o. female who presents for Follow-up and Hypertension.    Hx HTN, recurrent UTIs (macrobid 100mg every day) , bipolar disease, and violent behavior including suicidal attempts, migraines, schizophrenia, PTSD, IBS     #HTN  #Tachycardia  - /75  -lisinopril 20mg daily  - denies CP, SOB, palpitations, dizziness, lightheadedness   -Not taking Propranolol 10mg BID currently  - echo Sept 2024 unremarkable  -holter monitor Sept 2024 shows asymptomatic sinus tach    #DM2  - A1c 6.4 Sept 2024  -Increase metformin 750mg BID with meals   -Sliding scale with meals as needed for BG >200 starting at 2,3,4      Review of Systems  As per HPI  Previous history  Past Medical History:   Diagnosis Date    Abnormal weight gain 12/17/2018    Abnormal weight gain    Anxiety     Blister (nonthermal), unspecified lower leg, initial encounter 05/30/2018    Blister of leg    Crushing injury of unspecified finger(s), initial encounter 06/30/2017    Crushing injury of finger, initial encounter    Depression     Diabetes mellitus (Multi)     Drug-induced hypoglycemia without coma 08/04/2021    Hypoglycemia due to insulin    Migraine, unspecified, not intractable, without status migrainosus 12/27/2016    Headache, migraine    Other microscopic hematuria 06/01/2018    Hematuria, microscopic    Pain in left hand 06/29/2017    Pain of left hand    Pain in unspecified joint 12/30/2013    Multiple joint pain    Personal history of diseases of the blood and blood-forming organs and certain disorders involving the immune mechanism 06/07/2017    History of anemia    Personal history of diseases of the skin and subcutaneous tissue 06/13/2019    History of acne    Personal history of other complications of pregnancy, childbirth and the puerperium 07/25/2017    History of galactorrhea    Personal history of other diseases of the female genital tract 06/05/2018    History of vaginal bleeding "    Personal history of other diseases of the nervous system and sense organs 08/03/2022    History of optic neuritis    Personal history of other endocrine, nutritional and metabolic disease 12/17/2018    History of elevated glucose    Personal history of other specified conditions 06/09/2017    History of nipple discharge    Psychiatric illness     Schizophrenia     Urge incontinence 06/01/2018    Urge incontinence of urine     Past Surgical History:   Procedure Laterality Date    OTHER SURGICAL HISTORY  11/11/2019    Cholecystectomy     Social History     Tobacco Use    Smoking status: Former     Types: Cigarettes     Passive exposure: Never    Smokeless tobacco: Never   Vaping Use    Vaping status: Never Used   Substance Use Topics    Alcohol use: Not Currently    Drug use: Not Currently     Types: Marijuana     Family History   Problem Relation Name Age of Onset    Kidney nephrosis Mother      Other (Dialysis Patient) Mother's Sister      Lupus Mother's Sister          Systemic lupus erythematosus    Other (Dialysis Patient) Maternal Grandfather      Other (Cardiovascular disease) Maternal Grandfather      Hypertension Maternal Grandfather       Allergies   Allergen Reactions    Penicillins Hives and Rash    Cefixime Hives    Dextromethorphan Unknown    Ditropan Unknown     Reaction unknown    Esomeprazole Unknown    Hyoscyamine Unknown     Unknown reaction, documented from hospital stay per grandmother.    Hyoscyamine Sulfate Unknown    Na Mg Fl-Na Cori-Nacl-Ha-Na Hyp Unknown    Penicillin Hives    Sulfa (Sulfonamide Antibiotics) Unknown    Ciprofloxacin Hives and Rash     Current Outpatient Medications   Medication Instructions    blood pressure monitor (Blood Pressure Kit) kit 1 each, miscellaneous, As needed    divalproex (DEPAKOTE) 500 mg, oral, Every 12 hours scheduled, Do not crush, chew, or split.    FreeStyle Vic 3 Sensor device Change every 14 days    haloperidol (HALDOL) 10 mg, 2 times daily     "hydrOXYzine pamoate (VISTARIL) 50 mg, 3 times daily    ibuprofen 600 mg, oral, Every 8 hours PRN    insulin lispro (HumaLOG) 100 unit/mL injection Sliding scale with meals as needed up to 20 units daily    Invega Sustenna 234 mg/1.5 mL syringe     lancets (OneTouch UltraSoft 2 Lancet) 30 gauge misc For daily BG check    linaCLOtide (LINZESS) 72 mcg, oral, Daily before breakfast, Do not crush or chew.    lisinopril 20 mg, oral, Daily    LOXAPINE SUCCINATE ORAL 50 mg, 2 times daily    metFORMIN XR (Glucophage-XR) 750 mg 24 hr tablet Twice a day with meals    metoprolol succinate XL (TOPROL-XL) 25 mg, oral, Daily, Do not crush or chew.    mirtazapine (Remeron) 15 mg tablet 2 tablets (30 mg).    OneTouch Ultra Test strip For daily BG check    paliperidone (Invega) 6 mg 24 hr tablet     pen needle, diabetic (BD Ultra-Fine Celine Pen Needle) 32 gauge x 5/32\" needle For insulin use up to 3x/day    prazosin (MINIPRESS) 2 mg, oral, Nightly       Objective       Physical Exam  Cardiovascular:      Rate and Rhythm: Regular rhythm. Tachycardia present.   Pulmonary:      Effort: Pulmonary effort is normal. No respiratory distress.      Breath sounds: No wheezing.   Skin:     General: Skin is warm.   Neurological:      Mental Status: She is alert.   Psychiatric:         Mood and Affect: Mood normal.       Assessment/Plan   LaRomana Ricardo \"Genesis\" is a 24 y.o. female with history of HTN, recurrent UTIs (macrobid 100mg every day) , bipolar disease, and violent behavior including suicidal attempts, migraines, schizophrenia, PTSD, IBS  who presents for the concerns below:    Problem List Items Addressed This Visit    None  Visit Diagnoses       Stage 1 hypertension    -  Primary    Relevant Medications    metoprolol succinate XL (Toprol-XL) 25 mg 24 hr tablet    Tachycardia, paroxysmal (Multi)        Relevant Medications    metoprolol succinate XL (Toprol-XL) 25 mg 24 hr tablet    Other Relevant Orders    Referral to Cardiology      "     # HTN\  #Tachycardia    - BP: 119/75    - Current medication: Lisinopril 20 mg daily    - Not taking Propranolol 10 mg BID    - Denies: CP, SOB, palpitations, dizziness, lightheadedness    - Workup:      - Echo (Sept 2024): Unremarkable      - Holter monitor (Sept 2024): Asymptomatic sinus tachycardia    PLAN:    -Start metoprolol 25 mg daily.  Stop lisinopril 20 mg daily  - Refer to cardiology for further evaluation.        Return in : 3 months    Discussed with co-signer of note Dr. Landa      Portions of this note were generated using digital voice recognition software, and may contain grammatical errors       David Lezama, DO  Family Medicine PGY-3

## 2025-01-02 NOTE — PROGRESS NOTES
I reviewed the resident/fellow's documentation and discussed the patient with the resident/fellow. I agree with the resident/fellow's medical decision making as documented in the note.    Silver Landa MD

## 2025-01-09 ENCOUNTER — HOSPITAL ENCOUNTER (EMERGENCY)
Facility: HOSPITAL | Age: 25
Discharge: HOME | End: 2025-01-09
Payer: COMMERCIAL

## 2025-01-09 VITALS
WEIGHT: 250 LBS | DIASTOLIC BLOOD PRESSURE: 80 MMHG | RESPIRATION RATE: 15 BRPM | HEART RATE: 96 BPM | BODY MASS INDEX: 39.24 KG/M2 | SYSTOLIC BLOOD PRESSURE: 137 MMHG | TEMPERATURE: 98.8 F | HEIGHT: 67 IN | OXYGEN SATURATION: 95 %

## 2025-01-09 DIAGNOSIS — N39.0 UTI (URINARY TRACT INFECTION), UNCOMPLICATED: ICD-10-CM

## 2025-01-09 DIAGNOSIS — K52.9 GASTROENTERITIS: Primary | ICD-10-CM

## 2025-01-09 LAB
ALBUMIN SERPL BCP-MCNC: 4.3 G/DL (ref 3.4–5)
ALP SERPL-CCNC: 92 U/L (ref 33–110)
ALT SERPL W P-5'-P-CCNC: 7 U/L (ref 7–45)
ANION GAP BLDV CALCULATED.4IONS-SCNC: 9 MMOL/L (ref 10–25)
ANION GAP SERPL CALC-SCNC: 17 MMOL/L (ref 10–20)
APPEARANCE UR: ABNORMAL
AST SERPL W P-5'-P-CCNC: 13 U/L (ref 9–39)
BASE EXCESS BLDV CALC-SCNC: 3.5 MMOL/L (ref -2–3)
BASOPHILS # BLD AUTO: 0.05 X10*3/UL (ref 0–0.1)
BASOPHILS NFR BLD AUTO: 0.5 %
BILIRUB SERPL-MCNC: 0.5 MG/DL (ref 0–1.2)
BILIRUB UR STRIP.AUTO-MCNC: NEGATIVE MG/DL
BODY TEMPERATURE: 37 DEGREES CELSIUS
BUN SERPL-MCNC: 12 MG/DL (ref 6–23)
CA-I BLDV-SCNC: 1.27 MMOL/L (ref 1.1–1.33)
CALCIUM SERPL-MCNC: 10 MG/DL (ref 8.6–10.6)
CHLORIDE BLDV-SCNC: 101 MMOL/L (ref 98–107)
CHLORIDE SERPL-SCNC: 102 MMOL/L (ref 98–107)
CO2 SERPL-SCNC: 22 MMOL/L (ref 21–32)
COLOR UR: YELLOW
CREAT SERPL-MCNC: 1.15 MG/DL (ref 0.5–1.05)
EGFRCR SERPLBLD CKD-EPI 2021: 69 ML/MIN/1.73M*2
EOSINOPHIL # BLD AUTO: 0.1 X10*3/UL (ref 0–0.7)
EOSINOPHIL NFR BLD AUTO: 0.9 %
ERYTHROCYTE [DISTWIDTH] IN BLOOD BY AUTOMATED COUNT: 13.4 % (ref 11.5–14.5)
FLUAV RNA RESP QL NAA+PROBE: NOT DETECTED
FLUBV RNA RESP QL NAA+PROBE: NOT DETECTED
GLUCOSE BLDV-MCNC: 150 MG/DL (ref 74–99)
GLUCOSE SERPL-MCNC: 132 MG/DL (ref 74–99)
GLUCOSE UR STRIP.AUTO-MCNC: NORMAL MG/DL
HCO3 BLDV-SCNC: 30.1 MMOL/L (ref 22–26)
HCT VFR BLD AUTO: 42.1 % (ref 36–46)
HCT VFR BLD EST: 45 % (ref 36–46)
HGB BLD-MCNC: 14.3 G/DL (ref 12–16)
HGB BLDV-MCNC: 15 G/DL (ref 12–16)
IMM GRANULOCYTES # BLD AUTO: 0.14 X10*3/UL (ref 0–0.7)
IMM GRANULOCYTES NFR BLD AUTO: 1.3 % (ref 0–0.9)
INHALED O2 CONCENTRATION: 21 %
KETONES UR STRIP.AUTO-MCNC: ABNORMAL MG/DL
LACTATE BLDV-SCNC: 1.7 MMOL/L (ref 0.4–2)
LEUKOCYTE ESTERASE UR QL STRIP.AUTO: ABNORMAL
LIPASE SERPL-CCNC: 15 U/L (ref 9–82)
LYMPHOCYTES # BLD AUTO: 2.15 X10*3/UL (ref 1.2–4.8)
LYMPHOCYTES NFR BLD AUTO: 19.7 %
MCH RBC QN AUTO: 30.5 PG (ref 26–34)
MCHC RBC AUTO-ENTMCNC: 34 G/DL (ref 32–36)
MCV RBC AUTO: 90 FL (ref 80–100)
MONOCYTES # BLD AUTO: 1.11 X10*3/UL (ref 0.1–1)
MONOCYTES NFR BLD AUTO: 10.2 %
MUCOUS THREADS #/AREA URNS AUTO: ABNORMAL /LPF
NEUTROPHILS # BLD AUTO: 7.35 X10*3/UL (ref 1.2–7.7)
NEUTROPHILS NFR BLD AUTO: 67.4 %
NITRITE UR QL STRIP.AUTO: NEGATIVE
NRBC BLD-RTO: 0 /100 WBCS (ref 0–0)
OXYHGB MFR BLDV: 29.6 % (ref 45–75)
PCO2 BLDV: 52 MM HG (ref 41–51)
PH BLDV: 7.37 PH (ref 7.33–7.43)
PH UR STRIP.AUTO: 6.5 [PH]
PLATELET # BLD AUTO: 373 X10*3/UL (ref 150–450)
PO2 BLDV: 25 MM HG (ref 35–45)
POTASSIUM BLDV-SCNC: 4.5 MMOL/L (ref 3.5–5.3)
POTASSIUM SERPL-SCNC: 4.3 MMOL/L (ref 3.5–5.3)
PREGNANCY TEST URINE, POC: NEGATIVE
PROT SERPL-MCNC: 8.6 G/DL (ref 6.4–8.2)
PROT UR STRIP.AUTO-MCNC: ABNORMAL MG/DL
RBC # BLD AUTO: 4.69 X10*6/UL (ref 4–5.2)
RBC # UR STRIP.AUTO: ABNORMAL /UL
RBC #/AREA URNS AUTO: ABNORMAL /HPF
RENAL EPI CELLS #/AREA UR COMP ASSIST: ABNORMAL /HPF
RSV RNA RESP QL NAA+PROBE: NOT DETECTED
SAO2 % BLDV: 30 % (ref 45–75)
SARS-COV-2 RNA RESP QL NAA+PROBE: NOT DETECTED
SODIUM BLDV-SCNC: 136 MMOL/L (ref 136–145)
SODIUM SERPL-SCNC: 137 MMOL/L (ref 136–145)
SP GR UR STRIP.AUTO: 1.05
SQUAMOUS #/AREA URNS AUTO: ABNORMAL /HPF
UROBILINOGEN UR STRIP.AUTO-MCNC: ABNORMAL MG/DL
WBC # BLD AUTO: 10.9 X10*3/UL (ref 4.4–11.3)
WBC #/AREA URNS AUTO: ABNORMAL /HPF

## 2025-01-09 PROCEDURE — 83690 ASSAY OF LIPASE: CPT | Performed by: PHYSICIAN ASSISTANT

## 2025-01-09 PROCEDURE — 81001 URINALYSIS AUTO W/SCOPE: CPT | Performed by: PHYSICIAN ASSISTANT

## 2025-01-09 PROCEDURE — 96361 HYDRATE IV INFUSION ADD-ON: CPT

## 2025-01-09 PROCEDURE — 96375 TX/PRO/DX INJ NEW DRUG ADDON: CPT

## 2025-01-09 PROCEDURE — 87086 URINE CULTURE/COLONY COUNT: CPT | Performed by: PHYSICIAN ASSISTANT

## 2025-01-09 PROCEDURE — 36415 COLL VENOUS BLD VENIPUNCTURE: CPT | Performed by: PHYSICIAN ASSISTANT

## 2025-01-09 PROCEDURE — 87637 SARSCOV2&INF A&B&RSV AMP PRB: CPT | Performed by: PHYSICIAN ASSISTANT

## 2025-01-09 PROCEDURE — 84132 ASSAY OF SERUM POTASSIUM: CPT | Performed by: PHYSICIAN ASSISTANT

## 2025-01-09 PROCEDURE — 84075 ASSAY ALKALINE PHOSPHATASE: CPT | Performed by: PHYSICIAN ASSISTANT

## 2025-01-09 PROCEDURE — 96374 THER/PROPH/DIAG INJ IV PUSH: CPT

## 2025-01-09 PROCEDURE — 81025 URINE PREGNANCY TEST: CPT | Performed by: PHYSICIAN ASSISTANT

## 2025-01-09 PROCEDURE — 99284 EMERGENCY DEPT VISIT MOD MDM: CPT | Performed by: PHYSICIAN ASSISTANT

## 2025-01-09 PROCEDURE — 99284 EMERGENCY DEPT VISIT MOD MDM: CPT | Mod: 25

## 2025-01-09 PROCEDURE — 2500000004 HC RX 250 GENERAL PHARMACY W/ HCPCS (ALT 636 FOR OP/ED): Performed by: PHYSICIAN ASSISTANT

## 2025-01-09 PROCEDURE — 85025 COMPLETE CBC W/AUTO DIFF WBC: CPT | Performed by: PHYSICIAN ASSISTANT

## 2025-01-09 PROCEDURE — 82435 ASSAY OF BLOOD CHLORIDE: CPT | Performed by: PHYSICIAN ASSISTANT

## 2025-01-09 PROCEDURE — 2500000002 HC RX 250 W HCPCS SELF ADMINISTERED DRUGS (ALT 637 FOR MEDICARE OP, ALT 636 FOR OP/ED)

## 2025-01-09 PROCEDURE — 2500000005 HC RX 250 GENERAL PHARMACY W/O HCPCS: Performed by: PHYSICIAN ASSISTANT

## 2025-01-09 PROCEDURE — 82435 ASSAY OF BLOOD CHLORIDE: CPT | Mod: 59 | Performed by: PHYSICIAN ASSISTANT

## 2025-01-09 RX ORDER — LIDOCAINE HYDROCHLORIDE 20 MG/ML
15 SOLUTION OROPHARYNGEAL ONCE
Status: COMPLETED | OUTPATIENT
Start: 2025-01-09 | End: 2025-01-09

## 2025-01-09 RX ORDER — ONDANSETRON 4 MG/1
4 TABLET, ORALLY DISINTEGRATING ORAL EVERY 8 HOURS PRN
Qty: 21 TABLET | Refills: 0 | Status: SHIPPED | OUTPATIENT
Start: 2025-01-09

## 2025-01-09 RX ORDER — KETOROLAC TROMETHAMINE 15 MG/ML
15 INJECTION, SOLUTION INTRAMUSCULAR; INTRAVENOUS ONCE
Status: COMPLETED | OUTPATIENT
Start: 2025-01-09 | End: 2025-01-09

## 2025-01-09 RX ORDER — NITROFURANTOIN 25; 75 MG/1; MG/1
CAPSULE ORAL
Status: COMPLETED
Start: 2025-01-09 | End: 2025-01-09

## 2025-01-09 RX ORDER — NITROFURANTOIN 25; 75 MG/1; MG/1
100 CAPSULE ORAL EVERY 12 HOURS SCHEDULED
Status: DISCONTINUED | OUTPATIENT
Start: 2025-01-09 | End: 2025-01-09 | Stop reason: HOSPADM

## 2025-01-09 RX ORDER — FAMOTIDINE 10 MG/ML
20 INJECTION INTRAVENOUS ONCE
Status: COMPLETED | OUTPATIENT
Start: 2025-01-09 | End: 2025-01-09

## 2025-01-09 RX ORDER — NITROFURANTOIN 25; 75 MG/1; MG/1
100 CAPSULE ORAL 2 TIMES DAILY
Qty: 14 CAPSULE | Refills: 0 | Status: SHIPPED | OUTPATIENT
Start: 2025-01-09 | End: 2025-01-16

## 2025-01-09 RX ORDER — ONDANSETRON HYDROCHLORIDE 2 MG/ML
4 INJECTION, SOLUTION INTRAVENOUS ONCE
Status: COMPLETED | OUTPATIENT
Start: 2025-01-09 | End: 2025-01-09

## 2025-01-09 RX ADMIN — KETOROLAC TROMETHAMINE 15 MG: 15 INJECTION, SOLUTION INTRAMUSCULAR; INTRAVENOUS at 13:36

## 2025-01-09 RX ADMIN — NITROFURANTOIN MONOHYDRATE/MACROCRYSTALS 100 MG: 75; 25 CAPSULE ORAL at 18:03

## 2025-01-09 RX ADMIN — FAMOTIDINE 20 MG: 10 INJECTION INTRAVENOUS at 16:33

## 2025-01-09 RX ADMIN — SODIUM CHLORIDE, POTASSIUM CHLORIDE, SODIUM LACTATE AND CALCIUM CHLORIDE 1000 ML: 600; 310; 30; 20 INJECTION, SOLUTION INTRAVENOUS at 13:36

## 2025-01-09 RX ADMIN — NITROFURANTOIN 100 MG: 25; 75 CAPSULE ORAL at 18:03

## 2025-01-09 RX ADMIN — LIDOCAINE HYDROCHLORIDE 15 ML: 20 SOLUTION ORAL at 16:33

## 2025-01-09 RX ADMIN — ONDANSETRON 4 MG: 2 INJECTION INTRAMUSCULAR; INTRAVENOUS at 13:36

## 2025-01-09 ASSESSMENT — COLUMBIA-SUICIDE SEVERITY RATING SCALE - C-SSRS
1. IN THE PAST MONTH, HAVE YOU WISHED YOU WERE DEAD OR WISHED YOU COULD GO TO SLEEP AND NOT WAKE UP?: NO
6. HAVE YOU EVER DONE ANYTHING, STARTED TO DO ANYTHING, OR PREPARED TO DO ANYTHING TO END YOUR LIFE?: NO
2. HAVE YOU ACTUALLY HAD ANY THOUGHTS OF KILLING YOURSELF?: NO

## 2025-01-09 ASSESSMENT — PAIN SCALES - GENERAL
PAINLEVEL_OUTOF10: 0 - NO PAIN
PAINLEVEL_OUTOF10: 0 - NO PAIN

## 2025-01-09 ASSESSMENT — PAIN - FUNCTIONAL ASSESSMENT
PAIN_FUNCTIONAL_ASSESSMENT: 0-10
PAIN_FUNCTIONAL_ASSESSMENT: 0-10

## 2025-01-09 NOTE — ED PROVIDER NOTES
"Emergency Department Encounter  [facility] EMERGENCY MEDICINE    Patient: Berna Ricardo  MRN: [id number]  : 2000  Date of Evaluation: 2025  ED Provider: Keerthi Gillespie PA-C        History of Present Illness     This is a 24-year-old female with past medical history of diabetes on metformin who presents to the ED with nausea, vomiting, diarrhea as well as epigastric abdominal pain and subjective fevers and chills that began yesterday.  She states that she has not been able to tolerate p.o. intake at home over the past 2 days.  She also endorses some mild nasal congestion and rhinorrhea.  She denies any cough, sore throat, known sick contacts, chest pain, shortness of breath, urinary symptoms or other complaints at this time.  She states her blood glucose earlier today was 204.  She states otherwise has been in her normal state of health.  She did not taken thing at home for her symptoms.      History provided by:  Patient   used: No             Visit Vitals  /80   Pulse 96   Temp 37.1 °C (98.8 °F) (Temporal)   Resp 15   Ht 1.702 m (5' 7\")   Wt 113 kg (250 lb)   SpO2 95%   BMI 39.16 kg/m²   OB Status Implant   Smoking Status Former   BSA 2.31 m²          Physical Exam       Triage vitals:  T 37.1 °C (98.8 °F)  HR (!) 118  /77  RR 16  O2 96 % None (Room air)    Physical Exam     Physical exam:   General: Vitals noted, no distress. Afebrile.   EENT:  Hearing grossly intact. Normal phonation. MMM. Airway patient. PERRL. EOMI.   Neck: No midline tenderness or paraspinal tenderness. FROM.   Cardiac: Tachycardic, regular rhythm. Normal S1 and S2.  No murmurs, gallops, rubs.   Pulmonary: Good air exchange. Lungs clear bilaterally. No wheezes, rhonchi, rales. No accessory muscle use.   Abdomen: Soft, nonsurgical. TTP epigastric region. No peritoneal signs.   Back: No CVA tenderness. No midline tenderness or paraspinal tenderness. No obvious deformity or step " off.   Extremities: No peripheral edema.  Full range of motion. Moves all extremities freely. No tenderness throughout extremities.   Skin: No rash. Warm and Dry.   Neuro: No focal neurologic deficits. CN 2-12 grossly intact. Sensation equal bilaterally. No weakness.       Results       Labs Reviewed   CBC WITH AUTO DIFFERENTIAL - Abnormal       Result Value    WBC 10.9      nRBC 0.0      RBC 4.69      Hemoglobin 14.3      Hematocrit 42.1      MCV 90      MCH 30.5      MCHC 34.0      RDW 13.4      Platelets 373      Neutrophils % 67.4      Immature Granulocytes %, Automated 1.3 (*)     Lymphocytes % 19.7      Monocytes % 10.2      Eosinophils % 0.9      Basophils % 0.5      Neutrophils Absolute 7.35      Immature Granulocytes Absolute, Automated 0.14      Lymphocytes Absolute 2.15      Monocytes Absolute 1.11 (*)     Eosinophils Absolute 0.10      Basophils Absolute 0.05     COMPREHENSIVE METABOLIC PANEL - Abnormal    Glucose 132 (*)     Sodium 137      Potassium 4.3      Chloride 102      Bicarbonate 22      Anion Gap 17      Urea Nitrogen 12      Creatinine 1.15 (*)     eGFR 69      Calcium 10.0      Albumin 4.3      Alkaline Phosphatase 92      Total Protein 8.6 (*)     AST 13      Bilirubin, Total 0.5      ALT 7     BLOOD GAS VENOUS FULL PANEL - Abnormal    POCT pH, Venous 7.37      POCT pCO2, Venous 52 (*)     POCT pO2, Venous 25 (*)     POCT SO2, Venous 30 (*)     POCT Oxy Hemoglobin, Venous 29.6 (*)     POCT Hematocrit Calculated, Venous 45.0      POCT Sodium, Venous 136      POCT Potassium, Venous 4.5      POCT Chloride, Venous 101      POCT Ionized Calicum, Venous 1.27      POCT Glucose, Venous 150 (*)     POCT Lactate, Venous 1.7      POCT Base Excess, Venous 3.5 (*)     POCT HCO3 Calculated, Venous 30.1 (*)     POCT Hemoglobin, Venous 15.0      POCT Anion Gap, Venous 9.0 (*)     Patient Temperature 37.0      FiO2 21     URINALYSIS WITH REFLEX CULTURE AND MICROSCOPIC - Abnormal    Color, Urine Yellow       Appearance, Urine Turbid (*)     Specific Gravity, Urine 1.049 (*)     pH, Urine 6.5      Protein, Urine 100 (2+) (*)     Glucose, Urine Normal      Blood, Urine 0.06 (1+) (*)     Ketones, Urine 10 (1+) (*)     Bilirubin, Urine NEGATIVE      Urobilinogen, Urine 3 (1+) (*)     Nitrite, Urine NEGATIVE      Leukocyte Esterase, Urine 75 Zehra/uL (*)    MICROSCOPIC ONLY, URINE - Abnormal    WBC, Urine 11-20 (*)     RBC, Urine 6-10 (*)     Squamous Epithelial Cells, Urine 26-50 (1+)      Renal Epithelial Cells, Urine 3-5 (1+)      Mucus, Urine 4+     LIPASE - Normal    Lipase 15      Narrative:     Venipuncture immediately after or during the administration of Metamizole may lead to falsely low results. Testing should be performed immediately prior to Metamizole dosing.   SARS-COV-2 AND INFLUENZA A/B PCR - Normal    Flu A Result Not Detected      Flu B Result Not Detected      Coronavirus 2019, PCR Not Detected      Narrative:     This assay has received FDA Emergency Use Authorization (EUA) and  is only authorized for the duration of time that circumstances exist to justify the authorization of the emergency use of in vitro diagnostic tests for the detection of SARS-CoV-2 virus and/or diagnosis of COVID-19 infection under section 564(b)(1) of the Act, 21 U.S.C. 360bbb-3(b)(1). Testing for SARS-CoV-2 is only recommended for patients who meet current clinical and/or epidemiological criteria as defined by federal, state, or local public health directives. This assay is an in vitro diagnostic nucleic acid amplification test for the qualitative detection of SARS-CoV-2, Influenza A, and Influenza B from nasopharyngeal specimens and has been validated for use at [facility]. Negative results do not preclude COVID-19 infections or Influenza A/B infections, and should not be used as the sole basis for diagnosis, treatment, or other management decisions. If Influenza A/B and RSV PCR results are negative,  testing for Parainfluenza virus, Adenovirus and Metapneumovirus is routinely performed for [facility] pediatric oncology and intensive care inpatients, and is available on other patients by placing an add-on request.    RSV PCR - Normal    RSV PCR Not Detected      Narrative:     This assay is an FDA-cleared, in vitro diagnostic nucleic acid amplification test for the detection of RSV from nasopharyngeal specimens, and has been validated for use at [facility]. Negative results do not preclude RSV infections, and should not be used as the sole basis for diagnosis, treatment, or other management decisions. If Influenza A/B and RSV PCR results are negative, testing for Parainfluenza virus, Adenovirus and Metapneumovirus is routinely performed for pediatric oncology and intensive care inpatients at [facility], and is available on other patients by placing an add-on request.       POCT PREGNANCY, URINE - Normal    Preg Test, Ur Negative     URINE CULTURE   LIPASE   URINALYSIS WITH REFLEX CULTURE AND MICROSCOPIC    Narrative:     The following orders were created for panel order Urinalysis with Reflex Culture and Microscopic.  Procedure                               Abnormality         Status                     ---------                               -----------         ------                     Urinalysis with Reflex C...[[id number]]  Abnormal            Final result               Extra Urine Gray Tube[[id number]]                            In process                   Please view results for these tests on the individual orders.   EXTRA URINE GRAY TUBE       No orders to display         Medical Decision Making & ED Course         ED Course & MDM     Medical Decision Making  This is a 24-year-old female with past medical history of diabetes who presents to the ED with nausea vomiting, diarrhea, fevers and chills as well as URI symptoms for the past 2 days.  Vitals that show she is tachycardic 118 bpm.  Vitals  otherwise grossly unremarkable.  On examination lungs clear to auscultation.  No audible cardiac murmurs.  Posterior oropharynx without erythema, edema, or exudate.  Uvula midline.  Normal phonation.  Tolerating own secretions.  She did have some mild tenderness palpation to epigastric region.  Abdomen otherwise soft and nontender.  IV established laboratory studies obtained.  Patient medicated Toradol, Zofran and 1 L of IV fluids.  VBG obtained and showed patient's glucose 150.  Lactate normal at 1.5.  Patient is not acidotic and her pH is 7.37 so patient not in DKA at this time.  Viral swabs ordered.  On my reassessment patient's nausea and vomiting had improved, however she is still endorsing some epigastric abdominal pain.  Repeat vitals obtained within normal limits.  Laboratory studies overall grossly unremarkable, specifically normal WBC at 10.9.  Negative viral swabs.  CMP showed normal LFTs.  Creatinine very slightly elevated 1.15, however patient did receive IV fluids today.  Lipase normal at 15.  At this time patient states that she has now been experiencing urinary symptoms, specifically urinary frequency, urgency and dysuria.  She states that she has had frequent UTIs in the past and this feels very similar.  UA ordered.  UA was concerning for UTI.  Patient was given a dose of Macrobid here in the ED for this.  On my reassessment again the patient was feeling improved.  She was able tolerate p.o. intake.  She felt comfortable being discharged home.  She was written prescriptions for Zofran to treat for her nausea and vomiting with expected gastroenteritis as a source of her nausea and vomiting as well as a prescription for Macrobid for her UTI.  She was advised about with her primary care provider.  She was given signs and symptoms to return to the ED with and was discharged from the ED in stable condition.  She had no further questions at time of discharge.    Amount and/or Complexity of Data  Reviewed  Labs: ordered.    Risk  Prescription drug management.         Diagnoses as of 01/09/25 2103   Gastroenteritis   UTI (urinary tract infection), uncomplicated          Social Determinants of Health which Significantly Impact Care: Illiteracy and/or low-level literacy     Independent Result Review and Interpretation: Relevant laboratory and radiographic results were reviewed and independently interpreted by myself.  As necessary, they are commented on in the ED Course.    The patient was discussed with the following consultants/services: None        Disposition   As a result of the work-up, the patient was discharged home.  she was informed of her diagnosis and instructed to come back with any concerns or worsening of condition.  she and was agreeable to the plan as discussed above.  she was given the opportunity to ask questions.  All of the patient's questions were answered.    Procedures     Patient was seen independently    Procedures    Keerthi Gillespie PA-C  Emergency Medicine      Keerthi Gillespie PA-C  01/09/25 2103

## 2025-01-10 LAB
BACTERIA UR CULT: NORMAL
HOLD SPECIMEN: NORMAL

## 2025-01-14 ENCOUNTER — TELEPHONE (OUTPATIENT)
Dept: INFECTIOUS DISEASES | Facility: HOSPITAL | Age: 25
End: 2025-01-14
Payer: COMMERCIAL

## 2025-01-14 DIAGNOSIS — N39.0 UTI (URINARY TRACT INFECTION), UNCOMPLICATED: ICD-10-CM

## 2025-01-14 RX ORDER — NITROFURANTOIN 25; 75 MG/1; MG/1
100 CAPSULE ORAL 2 TIMES DAILY
Qty: 60 CAPSULE | Refills: 5 | Status: SHIPPED | OUTPATIENT
Start: 2025-01-14

## 2025-02-17 ENCOUNTER — OFFICE VISIT (OUTPATIENT)
Dept: CARDIOLOGY | Facility: CLINIC | Age: 25
End: 2025-02-17
Payer: COMMERCIAL

## 2025-02-17 VITALS
DIASTOLIC BLOOD PRESSURE: 78 MMHG | TEMPERATURE: 95.9 F | BODY MASS INDEX: 39.15 KG/M2 | WEIGHT: 249.4 LBS | HEART RATE: 104 BPM | OXYGEN SATURATION: 98 % | HEIGHT: 67 IN | SYSTOLIC BLOOD PRESSURE: 111 MMHG

## 2025-02-17 DIAGNOSIS — I47.9 TACHYCARDIA, PAROXYSMAL (MULTI): ICD-10-CM

## 2025-02-17 DIAGNOSIS — R00.0 TACHYCARDIA: Primary | ICD-10-CM

## 2025-02-17 PROCEDURE — 99214 OFFICE O/P EST MOD 30 MIN: CPT | Performed by: REGISTERED NURSE

## 2025-02-17 PROCEDURE — 93005 ELECTROCARDIOGRAM TRACING: CPT | Performed by: REGISTERED NURSE

## 2025-02-17 RX ORDER — METOPROLOL SUCCINATE 25 MG/1
25 TABLET, EXTENDED RELEASE ORAL DAILY
Start: 2025-02-17 | End: 2026-02-17

## 2025-02-17 SDOH — ECONOMIC STABILITY: FOOD INSECURITY: WITHIN THE PAST 12 MONTHS, THE FOOD YOU BOUGHT JUST DIDN'T LAST AND YOU DIDN'T HAVE MONEY TO GET MORE.: NEVER TRUE

## 2025-02-17 SDOH — ECONOMIC STABILITY: FOOD INSECURITY: WITHIN THE PAST 12 MONTHS, YOU WORRIED THAT YOUR FOOD WOULD RUN OUT BEFORE YOU GOT MONEY TO BUY MORE.: NEVER TRUE

## 2025-02-17 ASSESSMENT — ENCOUNTER SYMPTOMS
SYNCOPE: 0
CLAUDICATION: 0
IRREGULAR HEARTBEAT: 0
ORTHOPNEA: 0
NEAR-SYNCOPE: 0
HEADACHES: 0
LIGHT-HEADEDNESS: 0
DEPRESSION: 0
LOSS OF SENSATION IN FEET: 0
PALPITATIONS: 0
PND: 0
OCCASIONAL FEELINGS OF UNSTEADINESS: 0
DYSPNEA ON EXERTION: 0

## 2025-02-17 ASSESSMENT — PATIENT HEALTH QUESTIONNAIRE - PHQ9
1. LITTLE INTEREST OR PLEASURE IN DOING THINGS: NOT AT ALL
2. FEELING DOWN, DEPRESSED OR HOPELESS: NOT AT ALL
SUM OF ALL RESPONSES TO PHQ9 QUESTIONS 1 & 2: 0

## 2025-02-17 ASSESSMENT — LIFESTYLE VARIABLES: HOW OFTEN DO YOU HAVE A DRINK CONTAINING ALCOHOL: NEVER

## 2025-02-17 ASSESSMENT — PAIN SCALES - GENERAL: PAINLEVEL_OUTOF10: 0-NO PAIN

## 2025-02-17 NOTE — PROGRESS NOTES
"Cardiology Clinic Note    Reason for Visit: Establish Care.  Referring Clinician: Syeda     History of Present Illness: Berna Ricardo \"Amnada" is a 24 y.o. female who presents for Establish Care. She has a hx of ?HTN, recurrent UTIs, bipolar/schizoprenia/PTSD, IBS. Here for further evaluation of tachycardia.   Initial workup included holter-Avg , <1% SVE. Echo unremarkable. She had been placed on lisinopril 20mg for HTN but d/c'd by PCP and placed on metoprolol XL 25mg.   She lives at a group home, but her grandmother is legal guardian is accompanying her today.   She takes her BP in the am and evening but is using a wrist cuff.   Inadequate daily fluid intake and is not physically active.    She denies any accompanying symptoms with tachycardia.     Past Medical History:  She has a past medical history of Abnormal weight gain (12/17/2018), Anxiety, Blister (nonthermal), unspecified lower leg, initial encounter (05/30/2018), Crushing injury of unspecified finger(s), initial encounter (06/30/2017), Depression, Diabetes mellitus (Multi), Drug-induced hypoglycemia without coma (08/04/2021), Migraine, unspecified, not intractable, without status migrainosus (12/27/2016), Other microscopic hematuria (06/01/2018), Pain in left hand (06/29/2017), Pain in unspecified joint (12/30/2013), Personal history of diseases of the blood and blood-forming organs and certain disorders involving the immune mechanism (06/07/2017), Personal history of diseases of the skin and subcutaneous tissue (06/13/2019), Personal history of other complications of pregnancy, childbirth and the puerperium (07/25/2017), Personal history of other diseases of the female genital tract (06/05/2018), Personal history of other diseases of the nervous system and sense organs (08/03/2022), Personal history of other endocrine, nutritional and metabolic disease (12/17/2018), Personal history of other specified conditions (06/09/2017), Psychiatric " illness, Schizophrenia, and Urge incontinence (06/01/2018).    Past Surgical History:  She has a past surgical history that includes Other surgical history (11/11/2019).    Social History:  She reports that she has quit smoking. Her smoking use included cigarettes. She has never been exposed to tobacco smoke. She has never used smokeless tobacco. She reports that she does not currently use alcohol. She reports that she does not currently use drugs after having used the following drugs: Marijuana.    Family History:  Family History   Problem Relation Name Age of Onset    Kidney nephrosis Mother      Other (Dialysis Patient) Mother's Sister      Lupus Mother's Sister          Systemic lupus erythematosus    Other (Dialysis Patient) Maternal Grandfather      Other (Cardiovascular disease) Maternal Grandfather      Hypertension Maternal Grandfather         Allergies:  Penicillins, Cefixime, Dextromethorphan, Ditropan, Esomeprazole, Hyoscyamine, Hyoscyamine sulfate, Na mg fl-na daryl-nacl-ha-na hyp, Penicillin, Sulfa (sulfonamide antibiotics), and Ciprofloxacin    Outpatient Medications:  Current Outpatient Medications   Medication Instructions    blood pressure monitor (Blood Pressure Kit) kit 1 each, miscellaneous, As needed    divalproex (DEPAKOTE) 500 mg, oral, Every 12 hours scheduled, Do not crush, chew, or split.    FreeStyle Vic 3 Sensor device Change every 14 days    haloperidol (HALDOL) 10 mg, 2 times daily    hydrOXYzine pamoate (VISTARIL) 50 mg, 3 times daily    ibuprofen 600 mg, oral, Every 8 hours PRN    insulin lispro (HumaLOG) 100 unit/mL injection Sliding scale with meals as needed up to 20 units daily    Invega Sustenna 234 mg/1.5 mL syringe     lancets (OneTouch UltraSoft 2 Lancet) 30 gauge misc For daily BG check    linaCLOtide (LINZESS) 72 mcg, oral, Daily before breakfast, Do not crush or chew.    LOXAPINE SUCCINATE ORAL 50 mg, 2 times daily    metFORMIN XR (Glucophage-XR) 750 mg 24 hr tablet Twice  "a day with meals    metoprolol succinate XL (TOPROL-XL) 25 mg, oral, Daily, Do not crush or chew.    mirtazapine (Remeron) 15 mg tablet 2 tablets (30 mg).    nitrofurantoin, macrocrystal-monohydrate, (Macrobid) 100 mg capsule 100 mg, oral, 2 times daily    ondansetron ODT (ZOFRAN-ODT) 4 mg, oral, Every 8 hours PRN    OneTouch Ultra Test strip For daily BG check    paliperidone (Invega) 6 mg 24 hr tablet     pen needle, diabetic (BD Ultra-Fine Celine Pen Needle) 32 gauge x 5/32\" needle For insulin use up to 3x/day    prazosin (MINIPRESS) 2 mg, oral, Nightly       Review of Systems:  Review of Systems   Constitutional: Negative for malaise/fatigue.   Cardiovascular:  Negative for chest pain, claudication, cyanosis, dyspnea on exertion, irregular heartbeat, leg swelling, near-syncope, orthopnea, palpitations, paroxysmal nocturnal dyspnea and syncope.   Neurological:  Negative for headaches and light-headedness.       Last Recorded Vitals:  Vitals:    02/17/25 1036   BP: 111/78   BP Location: Left arm   Patient Position: Sitting   BP Cuff Size: Large adult   Pulse: 104   Temp: 35.5 °C (95.9 °F)   TempSrc: Skin   SpO2: 98%   Weight: 113 kg (249 lb 6.4 oz)   Height: 1.702 m (5' 7\")       Physical Examination:  GENERAL: NAD, central adiposity   HEENT: no JVD  CV: RRR without m/r/g  PULM: CTAB  EXT: non-edematous bilateral lower extremities    Laboratory Studies:  Lab Results   Component Value Date    GLUCOSE 132 (H) 01/09/2025    CALCIUM 10.0 01/09/2025     01/09/2025    K 4.3 01/09/2025    CO2 22 01/09/2025     01/09/2025    BUN 12 01/09/2025    CREATININE 1.15 (H) 01/09/2025     Lab Results   Component Value Date    ALT 7 01/09/2025    AST 13 01/09/2025     (H) 06/09/2019    ALKPHOS 92 01/09/2025    BILITOT 0.5 01/09/2025         Lab Results   Component Value Date    CHOL 162 09/24/2021    CHOL 160 05/27/2021    CHOL 206 (H) 03/03/2021     Lab Results   Component Value Date    HDL 25.2 (A) 09/24/2021    " "HDL 29.0 (A) 05/27/2021    HDL 30.7 (A) 03/03/2021     No results found for: \"LDLCALC\"  Lab Results   Component Value Date    TRIG 128 09/24/2021    TRIG 156 (H) 05/27/2021    TRIG 222 (H) 03/03/2021     No components found for: \"CHOLHDL\"  Lab Results   Component Value Date    HGBA1C 6.4 09/25/2024     No components found for: \"UACR\"    Cardiology Tests:  ECG:  ECG 12 lead 10/16/2024      Echo:  Transthoracic Echo (TTE) Complete 09/27/2024      Cath:No results found for this or any previous visit from the past 1095 days.    Stress Test:No results found for this or any previous visit from the past 1095 days.      Cardiac Imaging:No results found for this or any previous visit from the past 1095 days.      The ASCVD Risk score (Sadia DK, et al., 2019) failed to calculate for the following reasons:    The 2019 ASCVD risk score is only valid for ages 40 to 79     Assessment and Plan:  Problem List Items Addressed This Visit       Tachycardia - Primary     Event monitor/echo unremarkable. Encouraged at least 100 ounces fluid/day, increasing physical activity. Advised to get an arm BP cuff, check in the afternoon. Will follow up in 1 month after making conscious effort of increasing fluid intake. Will consider increasing metoprolol at next visit.          Relevant Medications    metoprolol succinate XL (Toprol-XL) 25 mg 24 hr tablet    Other Relevant Orders    ECG 12 lead (Clinic Performed)     Other Visit Diagnoses       Tachycardia, paroxysmal (Multi)        Relevant Medications    metoprolol succinate XL (Toprol-XL) 25 mg 24 hr tablet            Follow up in 1 month     Kristine Shepherd DNP, APRN-CNP  Clinician,  [facility]  Lead Clinician, [facility]      "

## 2025-02-17 NOTE — ASSESSMENT & PLAN NOTE
Event monitor/echo unremarkable. Encouraged at least 100 ounces fluid/day, increasing physical activity. Advised to get an arm BP cuff, check in the afternoon. Will follow up in 1 month after making conscious effort of increasing fluid intake. Will consider increasing metoprolol at next visit, also recheck CMP, TSH, Mag.

## 2025-03-17 ENCOUNTER — OFFICE VISIT (OUTPATIENT)
Dept: CARDIOLOGY | Facility: CLINIC | Age: 25
End: 2025-03-17
Payer: COMMERCIAL

## 2025-03-17 VITALS
WEIGHT: 239.9 LBS | RESPIRATION RATE: 16 BRPM | DIASTOLIC BLOOD PRESSURE: 78 MMHG | TEMPERATURE: 97.7 F | HEIGHT: 67 IN | HEART RATE: 82 BPM | OXYGEN SATURATION: 99 % | BODY MASS INDEX: 37.65 KG/M2 | SYSTOLIC BLOOD PRESSURE: 110 MMHG

## 2025-03-17 DIAGNOSIS — E11.9 TYPE 2 DIABETES MELLITUS WITHOUT COMPLICATION, WITHOUT LONG-TERM CURRENT USE OF INSULIN (MULTI): ICD-10-CM

## 2025-03-17 DIAGNOSIS — E11.65 TYPE 2 DIABETES MELLITUS WITH HYPERGLYCEMIA, WITHOUT LONG-TERM CURRENT USE OF INSULIN: ICD-10-CM

## 2025-03-17 DIAGNOSIS — R00.0 TACHYCARDIA: Primary | ICD-10-CM

## 2025-03-17 PROCEDURE — 99213 OFFICE O/P EST LOW 20 MIN: CPT | Performed by: REGISTERED NURSE

## 2025-03-17 PROCEDURE — 3008F BODY MASS INDEX DOCD: CPT | Performed by: REGISTERED NURSE

## 2025-03-17 PROCEDURE — 3074F SYST BP LT 130 MM HG: CPT | Performed by: REGISTERED NURSE

## 2025-03-17 PROCEDURE — 3078F DIAST BP <80 MM HG: CPT | Performed by: REGISTERED NURSE

## 2025-03-17 RX ORDER — METOPROLOL SUCCINATE 25 MG/1
25 TABLET, EXTENDED RELEASE ORAL DAILY
Qty: 30 TABLET | Refills: 11 | Status: SHIPPED | OUTPATIENT
Start: 2025-03-17 | End: 2025-03-17

## 2025-03-17 RX ORDER — METOPROLOL SUCCINATE 25 MG/1
25 TABLET, EXTENDED RELEASE ORAL DAILY
Qty: 30 TABLET | Refills: 11 | Status: SHIPPED | OUTPATIENT
Start: 2025-03-17 | End: 2026-03-17

## 2025-03-17 ASSESSMENT — PAIN SCALES - GENERAL: PAINLEVEL_OUTOF10: 0-NO PAIN

## 2025-03-18 ASSESSMENT — ENCOUNTER SYMPTOMS
NEAR-SYNCOPE: 0
DYSPNEA ON EXERTION: 0
PALPITATIONS: 0
CLAUDICATION: 0
LIGHT-HEADEDNESS: 0
HEADACHES: 0
IRREGULAR HEARTBEAT: 0
ORTHOPNEA: 0
SYNCOPE: 0
PND: 0

## 2025-03-18 NOTE — ASSESSMENT & PLAN NOTE
Event monitor/echo unremarkable. Encouraged at least 100 ounces fluid/day, increasing physical activity. Recheck labs to rule out dehydration, metabolic disturbances, thyroid dysfunction.   Pending labs, will increase metoprolol XL 25mg to 50mg if indicated.

## 2025-03-18 NOTE — PROGRESS NOTES
"Cardiology Clinic Note    Reason for Visit: Follow-up.  Referring Clinician: No ref. provider found     History of Present Illness: Berna Ricardo \"Amanda" is a 24 y.o. female who presents for Follow-up. She has a hx of ?HTN (hypertensive at group home but in most all clinic visits, normotensive), DM2, recurrent UTIs, bipolar/schizoprenia/PTSD, IBS. Here for follow up of tachycardia.   Initial workup included holter-Avg , <1% SVE. Echo unremarkable. She had been placed on lisinopril 20mg for HTN but d/c'd by PCP and placed on metoprolol XL 25mg. At last visit, I encouraged her to increase fluids to at least 100 ounces per day, she is drinking about 36 or so ounces.   She lives at a group home, but her grandmother is legal guardian is accompanying her today.   Her group home has been taking her blood pressures and heart rates daily at 3pm. She is typically hypertensive and tachycardic (above 110bpm).   Inadequate daily fluid intake but has been increasing water intake since last visit and is not physically active but has lost 10 lbs since last visit.   She denies any accompanying symptoms with tachycardia. She feels well overall.  She has been out of her metoprolol for the last few weeks.     Past Medical History:  She has a past medical history of Abnormal weight gain (12/17/2018), Anxiety, Blister (nonthermal), unspecified lower leg, initial encounter (05/30/2018), Crushing injury of unspecified finger(s), initial encounter (06/30/2017), Depression, Diabetes mellitus (Multi), Drug-induced hypoglycemia without coma (08/04/2021), Migraine, unspecified, not intractable, without status migrainosus (12/27/2016), Other microscopic hematuria (06/01/2018), Pain in left hand (06/29/2017), Pain in unspecified joint (12/30/2013), Personal history of diseases of the blood and blood-forming organs and certain disorders involving the immune mechanism (06/07/2017), Personal history of diseases of the skin and subcutaneous " tissue (06/13/2019), Personal history of other complications of pregnancy, childbirth and the puerperium (07/25/2017), Personal history of other diseases of the female genital tract (06/05/2018), Personal history of other diseases of the nervous system and sense organs (08/03/2022), Personal history of other endocrine, nutritional and metabolic disease (12/17/2018), Personal history of other specified conditions (06/09/2017), Psychiatric illness, Schizophrenia, and Urge incontinence (06/01/2018).    Past Surgical History:  She has a past surgical history that includes Other surgical history (11/11/2019).    Social History:  She reports that she has quit smoking. Her smoking use included cigarettes. She has never been exposed to tobacco smoke. She has never used smokeless tobacco. She reports that she does not currently use alcohol. She reports that she does not currently use drugs after having used the following drugs: Marijuana.    Family History:  Family History   Problem Relation Name Age of Onset    Kidney nephrosis Mother      Other (Dialysis Patient) Mother's Sister      Lupus Mother's Sister          Systemic lupus erythematosus    Other (Dialysis Patient) Maternal Grandfather      Other (Cardiovascular disease) Maternal Grandfather      Hypertension Maternal Grandfather         Allergies:  Penicillins, Cefixime, Dextromethorphan, Ditropan, Esomeprazole, Hyoscyamine, Hyoscyamine sulfate, Na mg fl-na daryl-nacl-ha-na hyp, Penicillin, Sulfa (sulfonamide antibiotics), and Ciprofloxacin    Outpatient Medications:  Current Outpatient Medications   Medication Instructions    blood pressure monitor (Blood Pressure Kit) kit 1 each, miscellaneous, As needed    divalproex (DEPAKOTE) 500 mg, oral, Every 12 hours scheduled, Do not crush, chew, or split.    FreeStyle Vic 3 Sensor device Change every 14 days    haloperidol (HALDOL) 10 mg, 2 times daily    hydrOXYzine pamoate (VISTARIL) 50 mg, 3 times daily    ibuprofen 600  "mg, oral, Every 8 hours PRN    insulin lispro (HumaLOG) 100 unit/mL injection Sliding scale with meals as needed up to 20 units daily    Invega Sustenna 234 mg/1.5 mL syringe     lancets (OneTouch UltraSoft 2 Lancet) 30 gauge misc For daily BG check    linaCLOtide (LINZESS) 72 mcg, oral, Daily before breakfast, Do not crush or chew.    LOXAPINE SUCCINATE ORAL 50 mg, 2 times daily    metFORMIN XR (Glucophage-XR) 750 mg 24 hr tablet Twice a day with meals    metoprolol succinate XL (TOPROL-XL) 25 mg, oral, Daily, Do not crush or chew.    mirtazapine (Remeron) 15 mg tablet 2 tablets (30 mg).    nitrofurantoin, macrocrystal-monohydrate, (Macrobid) 100 mg capsule 100 mg, oral, 2 times daily    ondansetron ODT (ZOFRAN-ODT) 4 mg, oral, Every 8 hours PRN    OneTouch Ultra Test strip For daily BG check    paliperidone (Invega) 6 mg 24 hr tablet     pen needle, diabetic (BD Ultra-Fine Celine Pen Needle) 32 gauge x 5/32\" needle For insulin use up to 3x/day    prazosin (MINIPRESS) 2 mg, oral, Nightly       Review of Systems:  Review of Systems   Constitutional: Negative for malaise/fatigue.   Cardiovascular:  Negative for chest pain, claudication, cyanosis, dyspnea on exertion, irregular heartbeat, leg swelling, near-syncope, orthopnea, palpitations, paroxysmal nocturnal dyspnea and syncope.   Neurological:  Negative for headaches and light-headedness.       Last Recorded Vitals:  Vitals:    03/17/25 1130   BP: 110/78   Pulse: 82   Resp: 16   Temp: 36.5 °C (97.7 °F)   TempSrc: Temporal   SpO2: 99%   Weight: 109 kg (239 lb 14.4 oz)   Height: 1.702 m (5' 7\")       Physical Examination:  GENERAL: NAD, central adiposity   HEENT: no JVD  CV: RRR without m/r/g  PULM: CTAB  EXT: non-edematous bilateral lower extremities    Laboratory Studies:  Lab Results   Component Value Date    GLUCOSE 132 (H) 01/09/2025    CALCIUM 10.0 01/09/2025     01/09/2025    K 4.3 01/09/2025    CO2 22 01/09/2025     01/09/2025    BUN 12 01/09/2025 " "   CREATININE 1.15 (H) 01/09/2025     Lab Results   Component Value Date    ALT 7 01/09/2025    AST 13 01/09/2025     (H) 06/09/2019    ALKPHOS 92 01/09/2025    BILITOT 0.5 01/09/2025         Lab Results   Component Value Date    CHOL 162 09/24/2021    CHOL 160 05/27/2021    CHOL 206 (H) 03/03/2021     Lab Results   Component Value Date    HDL 25.2 (A) 09/24/2021    HDL 29.0 (A) 05/27/2021    HDL 30.7 (A) 03/03/2021     No results found for: \"LDLCALC\"  Lab Results   Component Value Date    TRIG 128 09/24/2021    TRIG 156 (H) 05/27/2021    TRIG 222 (H) 03/03/2021     No components found for: \"CHOLHDL\"  Lab Results   Component Value Date    HGBA1C 6.4 09/25/2024     No components found for: \"UACR\"    Cardiology Tests:  ECG:  ECG 12 lead 10/16/2024      Echo:  Transthoracic Echo (TTE) Complete 09/27/2024      Cath:No results found for this or any previous visit from the past 1095 days.    Stress Test:No results found for this or any previous visit from the past 1095 days.      Cardiac Imaging:No results found for this or any previous visit from the past 1095 days.      The ASCVD Risk score (Sadia DK, et al., 2019) failed to calculate for the following reasons:    The 2019 ASCVD risk score is only valid for ages 40 to 79     Assessment and Plan:  Problem List Items Addressed This Visit       Tachycardia - Primary     Event monitor/echo unremarkable. Encouraged at least 100 ounces fluid/day, increasing physical activity. Recheck labs to rule out dehydration, metabolic disturbances, thyroid dysfunction.   Pending labs, will increase metoprolol XL 25mg to 50mg if indicated.             Relevant Medications    metoprolol succinate XL (Toprol-XL) 25 mg 24 hr tablet    Other Relevant Orders    Urinalysis with Reflex Microscopic    Comprehensive Metabolic Panel    TSH with reflex to Free T4 if abnormal    Magnesium    Type 2 diabetes mellitus with hyperglycemia (Multi)    Type 2 diabetes mellitus without complication, " without long-term current use of insulin (Multi)     On metformin, last A1c 6.9. Will recheck with labs.          Relevant Orders    Hemoglobin A1C       Follow up via phone pending labs.      Kristine Shepherd DNP, APRN-CNP  Clinician,  [facility]  Lead Clinician, [facility]

## 2025-04-01 ENCOUNTER — OFFICE VISIT (OUTPATIENT)
Facility: HOSPITAL | Age: 25
End: 2025-04-01
Payer: COMMERCIAL

## 2025-04-01 VITALS
HEART RATE: 105 BPM | OXYGEN SATURATION: 96 % | SYSTOLIC BLOOD PRESSURE: 128 MMHG | WEIGHT: 240 LBS | HEIGHT: 67 IN | TEMPERATURE: 97.8 F | BODY MASS INDEX: 37.67 KG/M2 | DIASTOLIC BLOOD PRESSURE: 82 MMHG

## 2025-04-01 DIAGNOSIS — R00.0 TACHYCARDIA: Primary | ICD-10-CM

## 2025-04-01 PROCEDURE — 3079F DIAST BP 80-89 MM HG: CPT

## 2025-04-01 PROCEDURE — 99213 OFFICE O/P EST LOW 20 MIN: CPT | Mod: GE

## 2025-04-01 PROCEDURE — 1036F TOBACCO NON-USER: CPT

## 2025-04-01 PROCEDURE — 3074F SYST BP LT 130 MM HG: CPT

## 2025-04-01 PROCEDURE — 3008F BODY MASS INDEX DOCD: CPT

## 2025-04-01 PROCEDURE — 99213 OFFICE O/P EST LOW 20 MIN: CPT

## 2025-04-01 ASSESSMENT — ENCOUNTER SYMPTOMS
OCCASIONAL FEELINGS OF UNSTEADINESS: 0
DEPRESSION: 1
LOSS OF SENSATION IN FEET: 0

## 2025-04-01 ASSESSMENT — PATIENT HEALTH QUESTIONNAIRE - PHQ9
1. LITTLE INTEREST OR PLEASURE IN DOING THINGS: NOT AT ALL
SUM OF ALL RESPONSES TO PHQ9 QUESTIONS 1 AND 2: 0
2. FEELING DOWN, DEPRESSED OR HOPELESS: NOT AT ALL

## 2025-04-01 ASSESSMENT — PAIN SCALES - GENERAL: PAINLEVEL_OUTOF10: 0-NO PAIN

## 2025-04-01 NOTE — PROGRESS NOTES
"Subjective   Patient ID: Berna Ricardo \"Amanda" is a 24 y.o. female who presents for Follow-up.    HTN, recurrent UTIs (macrobid 100mg every day) , bipolar disease, and violent behavior including suicidal attempts, migraines, schizophrenia, PTSD, IBS, DM2       #ED visit Jan 2025  -Presented with urinary frequency/urgency/dysuria (similar to prior UTIs).  -UA positive for UTI. Treated with Macrobid in ED with symptom improvement.  -Also had nausea/vomiting (gastroenteritis) treated with Zofran.    #Tachycardia  -/76 on metoprolol succinate 25mg daily.  -Reports one episode of CP with breathing.  -Denies SOB/palpitations/dizziness.  -TSH 1.48 (Feb 2024).  -Echo (9/2024): Unremarkable.  -Holter (9/2024): Asymptomatic sinus tachycardia.  -Cardiology recommends:     Increase fluids/activity     Consider metoprolol increase to 50mg daily    Review of Systems  As per HPI   Previous history  Past Medical History:   Diagnosis Date    Abnormal weight gain 12/17/2018    Abnormal weight gain    Anxiety     Blister (nonthermal), unspecified lower leg, initial encounter 05/30/2018    Blister of leg    Crushing injury of unspecified finger(s), initial encounter 06/30/2017    Crushing injury of finger, initial encounter    Depression     Diabetes mellitus (Multi)     Drug-induced hypoglycemia without coma 08/04/2021    Hypoglycemia due to insulin    Migraine, unspecified, not intractable, without status migrainosus 12/27/2016    Headache, migraine    Other microscopic hematuria 06/01/2018    Hematuria, microscopic    Pain in left hand 06/29/2017    Pain of left hand    Pain in unspecified joint 12/30/2013    Multiple joint pain    Personal history of diseases of the blood and blood-forming organs and certain disorders involving the immune mechanism 06/07/2017    History of anemia    Personal history of diseases of the skin and subcutaneous tissue 06/13/2019    History of acne    Personal history of other complications of " pregnancy, childbirth and the puerperium 07/25/2017    History of galactorrhea    Personal history of other diseases of the female genital tract 06/05/2018    History of vaginal bleeding    Personal history of other diseases of the nervous system and sense organs 08/03/2022    History of optic neuritis    Personal history of other endocrine, nutritional and metabolic disease 12/17/2018    History of elevated glucose    Personal history of other specified conditions 06/09/2017    History of nipple discharge    Psychiatric illness     Schizophrenia     Urge incontinence 06/01/2018    Urge incontinence of urine     Past Surgical History:   Procedure Laterality Date    OTHER SURGICAL HISTORY  11/11/2019    Cholecystectomy     Social History     Tobacco Use    Smoking status: Former     Types: Cigarettes     Passive exposure: Never    Smokeless tobacco: Never   Vaping Use    Vaping status: Never Used   Substance Use Topics    Alcohol use: Not Currently    Drug use: Not Currently     Types: Marijuana     Family History   Problem Relation Name Age of Onset    Kidney nephrosis Mother      Other (Dialysis Patient) Mother's Sister      Lupus Mother's Sister          Systemic lupus erythematosus    Other (Dialysis Patient) Maternal Grandfather      Other (Cardiovascular disease) Maternal Grandfather      Hypertension Maternal Grandfather       Allergies   Allergen Reactions    Penicillins Hives and Rash    Cefixime Hives    Dextromethorphan Unknown    Ditropan Unknown     Reaction unknown    Esomeprazole Unknown    Hyoscyamine Unknown     Unknown reaction, documented from hospital stay per grandmother.    Hyoscyamine Sulfate Unknown    Na Mg Fl-Na Cori-Nacl-Ha-Na Hyp Unknown    Penicillin Hives    Sulfa (Sulfonamide Antibiotics) Unknown    Ciprofloxacin Hives and Rash     Current Outpatient Medications   Medication Instructions    blood pressure monitor (Blood Pressure Kit) kit 1 each, miscellaneous, As needed    divalproex  "(DEPAKOTE) 500 mg, oral, Every 12 hours scheduled, Do not crush, chew, or split.    FreeStyle Vic 3 Sensor device Change every 14 days    haloperidol (HALDOL) 10 mg, 2 times daily    hydrOXYzine pamoate (VISTARIL) 50 mg, 3 times daily    ibuprofen 600 mg, oral, Every 8 hours PRN    insulin lispro (HumaLOG) 100 unit/mL injection Sliding scale with meals as needed up to 20 units daily    Invega Sustenna 234 mg/1.5 mL syringe     lancets (OneTouch UltraSoft 2 Lancet) 30 gauge misc For daily BG check    linaCLOtide (LINZESS) 72 mcg, oral, Daily before breakfast, Do not crush or chew.    LOXAPINE SUCCINATE ORAL 50 mg, 2 times daily    metFORMIN XR (Glucophage-XR) 750 mg 24 hr tablet Twice a day with meals    metoprolol succinate XL (TOPROL-XL) 25 mg, oral, Daily, Do not crush or chew.    mirtazapine (Remeron) 15 mg tablet 2 tablets (30 mg).    nitrofurantoin, macrocrystal-monohydrate, (Macrobid) 100 mg capsule 100 mg, oral, 2 times daily    ondansetron ODT (ZOFRAN-ODT) 4 mg, oral, Every 8 hours PRN    OneTouch Ultra Test strip For daily BG check    paliperidone (Invega) 6 mg 24 hr tablet     pen needle, diabetic (BD Ultra-Fine Celine Pen Needle) 32 gauge x 5/32\" needle For insulin use up to 3x/day    prazosin (MINIPRESS) 2 mg, oral, Nightly       Objective     Vitals:    04/01/25 1104   BP: 128/82   Pulse: 105   Temp: 36.6 °C (97.8 °F)   SpO2: 96%     Physical Exam  Neurological:      Mental Status: She is alert.   Psychiatric:         Mood and Affect: Mood normal.       Assessment/Plan   LaRomana Ricardo \"Genesis\" is a 24 y.o. female with history of HTN, recurrent UTIs (macrobid 100mg every day) , bipolar disease, and violent behavior including suicidal attempts, migraines, schizophrenia, PTSD, IBS, DM2 who presents for the concerns below:    #ED visit Jan 2025  :: Resolved UTI and gastroenteritis.  PLAN  -Monitor symptoms.    #HTN/Tachycardia  :: MSK likely cause of CP.  PLAN  -Continue metoprolol 25mg daily.  -Follow up " cardiology.  -Consider chest wall stretches.    Problem List Items Addressed This Visit       Tachycardia - Primary     Return in : 2 weeks for annual wellness visit    Discussed with co-signer of note Dr. Campos      Portions of this note were generated using digital voice recognition software, and may contain grammatical errors       David Lezama, DO  Family Medicine PGY-3

## 2025-04-02 LAB
ALBUMIN SERPL-MCNC: 4.5 G/DL (ref 3.6–5.1)
ALP SERPL-CCNC: 100 U/L (ref 31–125)
ALT SERPL-CCNC: 9 U/L (ref 6–29)
ANION GAP SERPL CALCULATED.4IONS-SCNC: 11 MMOL/L (CALC) (ref 7–17)
AST SERPL-CCNC: 14 U/L (ref 10–30)
BILIRUB SERPL-MCNC: 0.4 MG/DL (ref 0.2–1.2)
BUN SERPL-MCNC: 8 MG/DL (ref 7–25)
CALCIUM SERPL-MCNC: 10 MG/DL (ref 8.6–10.2)
CHLORIDE SERPL-SCNC: 106 MMOL/L (ref 98–110)
CO2 SERPL-SCNC: 23 MMOL/L (ref 20–32)
CREAT SERPL-MCNC: 0.85 MG/DL (ref 0.5–0.96)
EGFRCR SERPLBLD CKD-EPI 2021: 99 ML/MIN/1.73M2
EST. AVERAGE GLUCOSE BLD GHB EST-MCNC: 123 MG/DL
EST. AVERAGE GLUCOSE BLD GHB EST-SCNC: 6.8 MMOL/L
GLUCOSE SERPL-MCNC: 78 MG/DL (ref 65–99)
HBA1C MFR BLD: 5.9 % OF TOTAL HGB
MAGNESIUM SERPL-MCNC: 2.1 MG/DL (ref 1.5–2.5)
POTASSIUM SERPL-SCNC: 4.8 MMOL/L (ref 3.5–5.3)
PROT SERPL-MCNC: 7.6 G/DL (ref 6.1–8.1)
SODIUM SERPL-SCNC: 140 MMOL/L (ref 135–146)
TSH SERPL-ACNC: 2.57 MIU/L

## 2025-04-07 ENCOUNTER — OFFICE VISIT (OUTPATIENT)
Dept: CARDIOLOGY | Facility: CLINIC | Age: 25
End: 2025-04-07
Payer: COMMERCIAL

## 2025-04-07 VITALS
TEMPERATURE: 96.4 F | WEIGHT: 239.1 LBS | HEART RATE: 114 BPM | SYSTOLIC BLOOD PRESSURE: 90 MMHG | HEIGHT: 67 IN | OXYGEN SATURATION: 96 % | DIASTOLIC BLOOD PRESSURE: 64 MMHG | BODY MASS INDEX: 37.53 KG/M2

## 2025-04-07 DIAGNOSIS — R00.0 TACHYCARDIA: ICD-10-CM

## 2025-04-07 DIAGNOSIS — N39.0 RECURRENT UTI: Primary | ICD-10-CM

## 2025-04-07 PROCEDURE — 99213 OFFICE O/P EST LOW 20 MIN: CPT | Performed by: REGISTERED NURSE

## 2025-04-07 PROCEDURE — 3008F BODY MASS INDEX DOCD: CPT | Performed by: REGISTERED NURSE

## 2025-04-07 PROCEDURE — 1036F TOBACCO NON-USER: CPT | Performed by: REGISTERED NURSE

## 2025-04-07 PROCEDURE — 3078F DIAST BP <80 MM HG: CPT | Performed by: REGISTERED NURSE

## 2025-04-07 PROCEDURE — 3074F SYST BP LT 130 MM HG: CPT | Performed by: REGISTERED NURSE

## 2025-04-07 RX ORDER — METOPROLOL TARTRATE 25 MG/1
25 TABLET, FILM COATED ORAL 2 TIMES DAILY
Qty: 60 TABLET | Refills: 11 | Status: SHIPPED | OUTPATIENT
Start: 2025-04-07 | End: 2026-04-07

## 2025-04-07 SDOH — ECONOMIC STABILITY: FOOD INSECURITY: WITHIN THE PAST 12 MONTHS, YOU WORRIED THAT YOUR FOOD WOULD RUN OUT BEFORE YOU GOT MONEY TO BUY MORE.: NEVER TRUE

## 2025-04-07 SDOH — ECONOMIC STABILITY: FOOD INSECURITY: WITHIN THE PAST 12 MONTHS, THE FOOD YOU BOUGHT JUST DIDN'T LAST AND YOU DIDN'T HAVE MONEY TO GET MORE.: NEVER TRUE

## 2025-04-07 ASSESSMENT — LIFESTYLE VARIABLES: HOW OFTEN DO YOU HAVE A DRINK CONTAINING ALCOHOL: NEVER

## 2025-04-07 ASSESSMENT — ENCOUNTER SYMPTOMS
PND: 0
ORTHOPNEA: 0
CLAUDICATION: 0
SYNCOPE: 0
IRREGULAR HEARTBEAT: 0
LIGHT-HEADEDNESS: 0
OCCASIONAL FEELINGS OF UNSTEADINESS: 0
NEAR-SYNCOPE: 0
DYSPNEA ON EXERTION: 0
PALPITATIONS: 0
LOSS OF SENSATION IN FEET: 0
HEADACHES: 0
DEPRESSION: 0

## 2025-04-07 ASSESSMENT — PAIN SCALES - GENERAL: PAINLEVEL_OUTOF10: 2

## 2025-04-07 ASSESSMENT — PATIENT HEALTH QUESTIONNAIRE - PHQ9
10. IF YOU CHECKED OFF ANY PROBLEMS, HOW DIFFICULT HAVE THESE PROBLEMS MADE IT FOR YOU TO DO YOUR WORK, TAKE CARE OF THINGS AT HOME, OR GET ALONG WITH OTHER PEOPLE: NOT DIFFICULT AT ALL
SUM OF ALL RESPONSES TO PHQ9 QUESTIONS 1 & 2: 2
1. LITTLE INTEREST OR PLEASURE IN DOING THINGS: SEVERAL DAYS
2. FEELING DOWN, DEPRESSED OR HOPELESS: SEVERAL DAYS

## 2025-04-07 NOTE — ASSESSMENT & PLAN NOTE
Event monitor/echo unremarkable. Encouraged at least 100 ounces fluid/day, increasing physical activity. Labs unremarkable. Will increase metoprolol to 50mg, but divide dosing to BID with short-acting formula. Follow up in 6 months.

## 2025-04-07 NOTE — PROGRESS NOTES
"Cardiology Clinic Note    Reason for Visit: Follow-up.  Referring Clinician: No ref. provider found     History of Present Illness: Berna Ricardo \"Amanda" is a 24 y.o. female who presents for Follow-up. She has a hx of ?HTN (hypertensive at group home but in most all clinic visits, normotensive), DM2, recurrent UTIs, bipolar/schizoprenia/PTSD, IBS. Here for follow up of tachycardia.   Initial workup included holter-Avg , <1% SVE. Echo unremarkable. She had been placed on lisinopril 20mg for HTN but d/c'd by PCP and placed on metoprolol XL 25mg. At last visit, I encouraged her to increase fluids to at least 100 ounces per day, she is drinking about 36 or so ounces.   She lives at a group home, but her grandmother is legal guardian is accompanying her today.   Her group home has been taking her blood pressures and heart rates daily at 3pm. She is no longer hypertensive, after getting a Omron arm cuff, previously using a wrist cuff.    Inadequate daily fluid intake but she has improved this considerably.   She denies any accompanying symptoms with tachycardia. She feels well overall.      Past Medical History:  She has a past medical history of Abnormal weight gain (12/17/2018), Anxiety, Blister (nonthermal), unspecified lower leg, initial encounter (05/30/2018), Crushing injury of unspecified finger(s), initial encounter (06/30/2017), Depression, Diabetes mellitus (Multi), Drug-induced hypoglycemia without coma (08/04/2021), Migraine, unspecified, not intractable, without status migrainosus (12/27/2016), Other microscopic hematuria (06/01/2018), Pain in left hand (06/29/2017), Pain in unspecified joint (12/30/2013), Personal history of diseases of the blood and blood-forming organs and certain disorders involving the immune mechanism (06/07/2017), Personal history of diseases of the skin and subcutaneous tissue (06/13/2019), Personal history of other complications of pregnancy, childbirth and the puerperium " (07/25/2017), Personal history of other diseases of the female genital tract (06/05/2018), Personal history of other diseases of the nervous system and sense organs (08/03/2022), Personal history of other endocrine, nutritional and metabolic disease (12/17/2018), Personal history of other specified conditions (06/09/2017), Psychiatric illness, Schizophrenia, and Urge incontinence (06/01/2018).    Past Surgical History:  She has a past surgical history that includes Other surgical history (11/11/2019).    Social History:  She reports that she has quit smoking. Her smoking use included cigarettes. She has never been exposed to tobacco smoke. She has never used smokeless tobacco. She reports that she does not currently use alcohol. She reports that she does not currently use drugs after having used the following drugs: Marijuana.    Family History:  Family History   Problem Relation Name Age of Onset    Kidney nephrosis Mother      Other (Dialysis Patient) Mother's Sister      Lupus Mother's Sister          Systemic lupus erythematosus    Other (Dialysis Patient) Maternal Grandfather      Other (Cardiovascular disease) Maternal Grandfather      Hypertension Maternal Grandfather         Allergies:  Penicillins, Cefixime, Dextromethorphan, Ditropan, Esomeprazole, Hyoscyamine, Hyoscyamine sulfate, Na mg fl-na daryl-nacl-ha-na hyp, Penicillin, Sulfa (sulfonamide antibiotics), and Ciprofloxacin    Outpatient Medications:  Current Outpatient Medications   Medication Instructions    blood pressure monitor (Blood Pressure Kit) kit 1 each, miscellaneous, As needed    divalproex (DEPAKOTE) 500 mg, oral, Every 12 hours scheduled, Do not crush, chew, or split.    FreeStyle Vic 3 Sensor device Change every 14 days    haloperidol (HALDOL) 10 mg, 2 times daily    hydrOXYzine pamoate (VISTARIL) 50 mg, 3 times daily    ibuprofen 600 mg, oral, Every 8 hours PRN    insulin lispro (HumaLOG) 100 unit/mL injection Sliding scale with meals  "as needed up to 20 units daily    Invega Sustenna 234 mg/1.5 mL syringe     lancets (OneTouch UltraSoft 2 Lancet) 30 gauge misc For daily BG check    linaCLOtide (LINZESS) 72 mcg, oral, Daily before breakfast, Do not crush or chew.    LOXAPINE SUCCINATE ORAL 50 mg, 2 times daily    metFORMIN XR (Glucophage-XR) 750 mg 24 hr tablet Twice a day with meals    mirtazapine (Remeron) 15 mg tablet 2 tablets (30 mg).    nitrofurantoin, macrocrystal-monohydrate, (Macrobid) 100 mg capsule 100 mg, oral, 2 times daily    ondansetron ODT (ZOFRAN-ODT) 4 mg, oral, Every 8 hours PRN    OneTouch Ultra Test strip For daily BG check    paliperidone (Invega) 6 mg 24 hr tablet     pen needle, diabetic (BD Ultra-Fine Celine Pen Needle) 32 gauge x 5/32\" needle For insulin use up to 3x/day    prazosin (MINIPRESS) 2 mg, oral, Nightly       Review of Systems:  Review of Systems   Constitutional: Negative for malaise/fatigue.   Cardiovascular:  Negative for chest pain, claudication, cyanosis, dyspnea on exertion, irregular heartbeat, leg swelling, near-syncope, orthopnea, palpitations, paroxysmal nocturnal dyspnea and syncope.   Neurological:  Negative for headaches and light-headedness.       Last Recorded Vitals:  Vitals:    04/07/25 0917   BP: 90/64   BP Location: Left arm   Patient Position: Sitting   BP Cuff Size: Large adult   Pulse: (!) 114   Temp: 35.8 °C (96.4 °F)   TempSrc: Skin   SpO2: 96%   Weight: 108 kg (239 lb 1.6 oz)   Height: 1.702 m (5' 7\")       Physical Examination:  GENERAL: NAD, central adiposity   HEENT: no JVD  CV: RRR without m/r/g  PULM: CTAB  EXT: non-edematous bilateral lower extremities    Laboratory Studies:  Lab Results   Component Value Date    GLUCOSE 78 04/01/2025    CALCIUM 10.0 04/01/2025     04/01/2025    K 4.8 04/01/2025    CO2 23 04/01/2025     04/01/2025    BUN 8 04/01/2025    CREATININE 0.85 04/01/2025     Lab Results   Component Value Date    ALT 9 04/01/2025    AST 14 04/01/2025     " "(H) 06/09/2019    ALKPHOS 100 04/01/2025    BILITOT 0.4 04/01/2025         Lab Results   Component Value Date    CHOL 162 09/24/2021    CHOL 160 05/27/2021    CHOL 206 (H) 03/03/2021     Lab Results   Component Value Date    HDL 25.2 (A) 09/24/2021    HDL 29.0 (A) 05/27/2021    HDL 30.7 (A) 03/03/2021     No results found for: \"LDLCALC\"  Lab Results   Component Value Date    TRIG 128 09/24/2021    TRIG 156 (H) 05/27/2021    TRIG 222 (H) 03/03/2021     No components found for: \"CHOLHDL\"  Lab Results   Component Value Date    HGBA1C 5.9 (H) 04/01/2025     No components found for: \"UACR\"    Cardiology Tests:  ECG:  ECG 12 lead 10/16/2024      Echo:  Transthoracic Echo (TTE) Complete 09/27/2024      Cath:No results found for this or any previous visit from the past 1095 days.    Stress Test:No results found for this or any previous visit from the past 1095 days.      Cardiac Imaging:No results found for this or any previous visit from the past 1095 days.      The ASCVD Risk score (Sadia DK, et al., 2019) failed to calculate for the following reasons:    The 2019 ASCVD risk score is only valid for ages 40 to 79     Assessment and Plan:  Problem List Items Addressed This Visit       Recurrent UTI - Primary    Relevant Orders    Urinalysis with Reflex Culture and Microscopic    Tachycardia     Event monitor/echo unremarkable. Encouraged at least 100 ounces fluid/day, increasing physical activity. Labs unremarkable. Will increase metoprolol to 50mg, but divide dosing to BID with short-acting formula. Follow up in 6 months.                   Follow up in 6 months     Kristine Shepherd DNP, APRN-CNP  Clinician,  [facility]  Lead Clinician, [facility]      "

## 2025-04-15 ENCOUNTER — OFFICE VISIT (OUTPATIENT)
Facility: HOSPITAL | Age: 25
End: 2025-04-15
Payer: COMMERCIAL

## 2025-04-15 VITALS
OXYGEN SATURATION: 97 % | BODY MASS INDEX: 37.65 KG/M2 | SYSTOLIC BLOOD PRESSURE: 116 MMHG | DIASTOLIC BLOOD PRESSURE: 80 MMHG | HEIGHT: 67 IN | HEART RATE: 95 BPM | WEIGHT: 239.9 LBS | TEMPERATURE: 96 F

## 2025-04-15 DIAGNOSIS — E78.5 HYPERLIPIDEMIA, UNSPECIFIED HYPERLIPIDEMIA TYPE: Primary | ICD-10-CM

## 2025-04-15 ASSESSMENT — PATIENT HEALTH QUESTIONNAIRE - PHQ9
2. FEELING DOWN, DEPRESSED OR HOPELESS: NOT AT ALL
1. LITTLE INTEREST OR PLEASURE IN DOING THINGS: NOT AT ALL
SUM OF ALL RESPONSES TO PHQ9 QUESTIONS 1 AND 2: 0

## 2025-04-15 ASSESSMENT — ENCOUNTER SYMPTOMS
DEPRESSION: 1
LOSS OF SENSATION IN FEET: 0
OCCASIONAL FEELINGS OF UNSTEADINESS: 0

## 2025-04-15 ASSESSMENT — PAIN SCALES - GENERAL: PAINLEVEL_OUTOF10: 0-NO PAIN
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
## 2025-04-16 LAB
CHOLEST SERPL-MCNC: 144 MG/DL
CHOLEST/HDLC SERPL: 4.1 (CALC)
HDLC SERPL-MCNC: 35 MG/DL
LDLC SERPL CALC-MCNC: 86 MG/DL (CALC)
NONHDLC SERPL-MCNC: 109 MG/DL (CALC)
TRIGL SERPL-MCNC: 130 MG/DL

## 2025-04-21 ENCOUNTER — APPOINTMENT (OUTPATIENT)
Dept: CARDIOLOGY | Facility: CLINIC | Age: 25
End: 2025-04-21
Payer: COMMERCIAL

## 2025-04-25 ENCOUNTER — APPOINTMENT (OUTPATIENT)
Dept: ENDOCRINOLOGY | Facility: CLINIC | Age: 25
End: 2025-04-25
Payer: MEDICARE

## 2025-04-25 ENCOUNTER — TELEPHONE (OUTPATIENT)
Dept: GASTROENTEROLOGY | Facility: HOSPITAL | Age: 25
End: 2025-04-25

## 2025-04-25 DIAGNOSIS — E11.649 DIABETIC HYPOGLYCEMIA (MULTI): Primary | ICD-10-CM

## 2025-04-25 DIAGNOSIS — E11.65 TYPE 2 DIABETES MELLITUS WITH HYPERGLYCEMIA, WITHOUT LONG-TERM CURRENT USE OF INSULIN: ICD-10-CM

## 2025-04-25 DIAGNOSIS — K59.00 CONSTIPATION, UNSPECIFIED CONSTIPATION TYPE: ICD-10-CM

## 2025-04-25 PROCEDURE — G2211 COMPLEX E/M VISIT ADD ON: HCPCS | Performed by: STUDENT IN AN ORGANIZED HEALTH CARE EDUCATION/TRAINING PROGRAM

## 2025-04-25 PROCEDURE — 99213 OFFICE O/P EST LOW 20 MIN: CPT | Performed by: STUDENT IN AN ORGANIZED HEALTH CARE EDUCATION/TRAINING PROGRAM

## 2025-04-25 RX ORDER — METFORMIN HYDROCHLORIDE 500 MG/1
TABLET, EXTENDED RELEASE ORAL
Qty: 180 TABLET | Refills: 11 | Status: SHIPPED | OUTPATIENT
Start: 2025-04-25

## 2025-04-25 RX ORDER — BLOOD-GLUCOSE SENSOR
EACH MISCELLANEOUS
Qty: 6 EACH | Refills: 3 | Status: SHIPPED | OUTPATIENT
Start: 2025-04-25
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
## 2025-05-01 ENCOUNTER — TELEPHONE (OUTPATIENT)
Dept: ENDOCRINOLOGY | Facility: CLINIC | Age: 25
End: 2025-05-01
Payer: COMMERCIAL

## 2025-05-01 NOTE — TELEPHONE ENCOUNTER
A nurse(Rafael) from [facility] would like a call to discuss pts metformin dosage. Best contact is [phone]. Thanks     Provider alerted. New order: we are reducing metformin from 750mg BID with meals to 1000mg with breakfast only.    Spoke with Nurse Rafael, gave verbal order, and informed nurse script was sent to pharmacy for . Nurse voiced no further concerns at this time.

## 2025-06-03 ENCOUNTER — HOSPITAL ENCOUNTER (EMERGENCY)
Facility: HOSPITAL | Age: 25
Discharge: HOME | End: 2025-06-03
Payer: MEDICARE

## 2025-06-03 ENCOUNTER — CLINICAL SUPPORT (OUTPATIENT)
Dept: EMERGENCY MEDICINE | Facility: HOSPITAL | Age: 25
End: 2025-06-03
Payer: MEDICARE

## 2025-06-03 VITALS
SYSTOLIC BLOOD PRESSURE: 102 MMHG | BODY MASS INDEX: 37.51 KG/M2 | HEART RATE: 70 BPM | HEIGHT: 67 IN | RESPIRATION RATE: 16 BRPM | OXYGEN SATURATION: 96 % | DIASTOLIC BLOOD PRESSURE: 72 MMHG | WEIGHT: 239 LBS | TEMPERATURE: 97.7 F

## 2025-06-03 DIAGNOSIS — B34.9 VIRAL ILLNESS: ICD-10-CM

## 2025-06-03 DIAGNOSIS — R53.81 MALAISE AND FATIGUE: ICD-10-CM

## 2025-06-03 DIAGNOSIS — K11.5 SALIVARY GLAND STONE: Primary | ICD-10-CM

## 2025-06-03 DIAGNOSIS — R53.83 MALAISE AND FATIGUE: ICD-10-CM

## 2025-06-03 LAB
ALBUMIN SERPL BCP-MCNC: 4.2 G/DL (ref 3.4–5)
ALP SERPL-CCNC: 76 U/L (ref 33–110)
ALT SERPL W P-5'-P-CCNC: 5 U/L (ref 7–45)
ANION GAP SERPL CALC-SCNC: 13 MMOL/L (ref 10–20)
APPEARANCE UR: ABNORMAL
AST SERPL W P-5'-P-CCNC: 12 U/L (ref 9–39)
ATRIAL RATE: 88 BPM
BASOPHILS # BLD AUTO: 0.06 X10*3/UL (ref 0–0.1)
BASOPHILS NFR BLD AUTO: 0.7 %
BILIRUB SERPL-MCNC: 0.5 MG/DL (ref 0–1.2)
BILIRUB UR STRIP.AUTO-MCNC: ABNORMAL MG/DL
BUN SERPL-MCNC: 10 MG/DL (ref 6–23)
CALCIUM SERPL-MCNC: 9.9 MG/DL (ref 8.6–10.6)
CHLORIDE SERPL-SCNC: 99 MMOL/L (ref 98–107)
CO2 SERPL-SCNC: 27 MMOL/L (ref 21–32)
COLOR UR: YELLOW
CREAT SERPL-MCNC: 1.06 MG/DL (ref 0.5–1.05)
EGFRCR SERPLBLD CKD-EPI 2021: 76 ML/MIN/1.73M*2
EOSINOPHIL # BLD AUTO: 0.02 X10*3/UL (ref 0–0.7)
EOSINOPHIL NFR BLD AUTO: 0.2 %
ERYTHROCYTE [DISTWIDTH] IN BLOOD BY AUTOMATED COUNT: 14.6 % (ref 11.5–14.5)
GLUCOSE BLD MANUAL STRIP-MCNC: 92 MG/DL (ref 74–99)
GLUCOSE SERPL-MCNC: 86 MG/DL (ref 74–99)
GLUCOSE UR STRIP.AUTO-MCNC: NORMAL MG/DL
HCT VFR BLD AUTO: 41.3 % (ref 36–46)
HGB BLD-MCNC: 13.9 G/DL (ref 12–16)
IMM GRANULOCYTES # BLD AUTO: 0.09 X10*3/UL (ref 0–0.7)
IMM GRANULOCYTES NFR BLD AUTO: 1 % (ref 0–0.9)
KETONES UR STRIP.AUTO-MCNC: ABNORMAL MG/DL
LEUKOCYTE ESTERASE UR QL STRIP.AUTO: ABNORMAL
LYMPHOCYTES # BLD AUTO: 3.21 X10*3/UL (ref 1.2–4.8)
LYMPHOCYTES NFR BLD AUTO: 35.6 %
MAGNESIUM SERPL-MCNC: 2.18 MG/DL (ref 1.6–2.4)
MCH RBC QN AUTO: 30.8 PG (ref 26–34)
MCHC RBC AUTO-ENTMCNC: 33.7 G/DL (ref 32–36)
MCV RBC AUTO: 92 FL (ref 80–100)
MONOCYTES # BLD AUTO: 0.63 X10*3/UL (ref 0.1–1)
MONOCYTES NFR BLD AUTO: 7 %
MUCOUS THREADS #/AREA URNS AUTO: ABNORMAL /LPF
NEUTROPHILS # BLD AUTO: 5 X10*3/UL (ref 1.2–7.7)
NEUTROPHILS NFR BLD AUTO: 55.5 %
NITRITE UR QL STRIP.AUTO: NEGATIVE
NRBC BLD-RTO: 0 /100 WBCS (ref 0–0)
P AXIS: 56 DEGREES
P OFFSET: 208 MS
P ONSET: 161 MS
PH UR STRIP.AUTO: 6 [PH]
PLATELET # BLD AUTO: 387 X10*3/UL (ref 150–450)
POTASSIUM SERPL-SCNC: 4.3 MMOL/L (ref 3.5–5.3)
PR INTERVAL: 128 MS
PROT SERPL-MCNC: 8.7 G/DL (ref 6.4–8.2)
PROT UR STRIP.AUTO-MCNC: ABNORMAL MG/DL
Q ONSET: 225 MS
QRS COUNT: 15 BEATS
QRS DURATION: 70 MS
QT INTERVAL: 354 MS
QTC CALCULATION(BAZETT): 428 MS
QTC FREDERICIA: 402 MS
R AXIS: 58 DEGREES
RBC # BLD AUTO: 4.51 X10*6/UL (ref 4–5.2)
RBC # UR STRIP.AUTO: NEGATIVE MG/DL
RBC #/AREA URNS AUTO: ABNORMAL /HPF
SODIUM SERPL-SCNC: 135 MMOL/L (ref 136–145)
SP GR UR STRIP.AUTO: 1.03
SQUAMOUS #/AREA URNS AUTO: ABNORMAL /HPF
T AXIS: 21 DEGREES
T OFFSET: 402 MS
UROBILINOGEN UR STRIP.AUTO-MCNC: ABNORMAL MG/DL
VENTRICULAR RATE: 88 BPM
WBC # BLD AUTO: 9 X10*3/UL (ref 4.4–11.3)
WBC #/AREA URNS AUTO: ABNORMAL /HPF

## 2025-06-03 PROCEDURE — 36415 COLL VENOUS BLD VENIPUNCTURE: CPT | Performed by: NURSE PRACTITIONER

## 2025-06-03 PROCEDURE — 80053 COMPREHEN METABOLIC PANEL: CPT | Performed by: NURSE PRACTITIONER

## 2025-06-03 PROCEDURE — 2500000004 HC RX 250 GENERAL PHARMACY W/ HCPCS (ALT 636 FOR OP/ED): Performed by: NURSE PRACTITIONER

## 2025-06-03 PROCEDURE — 85025 COMPLETE CBC W/AUTO DIFF WBC: CPT | Performed by: NURSE PRACTITIONER

## 2025-06-03 PROCEDURE — 93010 ELECTROCARDIOGRAM REPORT: CPT | Performed by: NURSE PRACTITIONER

## 2025-06-03 PROCEDURE — 82947 ASSAY GLUCOSE BLOOD QUANT: CPT

## 2025-06-03 PROCEDURE — 96360 HYDRATION IV INFUSION INIT: CPT

## 2025-06-03 PROCEDURE — 99284 EMERGENCY DEPT VISIT MOD MDM: CPT | Mod: 25

## 2025-06-03 PROCEDURE — 99284 EMERGENCY DEPT VISIT MOD MDM: CPT | Performed by: NURSE PRACTITIONER

## 2025-06-03 PROCEDURE — 93005 ELECTROCARDIOGRAM TRACING: CPT

## 2025-06-03 PROCEDURE — 81001 URINALYSIS AUTO W/SCOPE: CPT | Performed by: NURSE PRACTITIONER

## 2025-06-03 PROCEDURE — 83735 ASSAY OF MAGNESIUM: CPT | Performed by: NURSE PRACTITIONER

## 2025-06-03 RX ADMIN — SODIUM CHLORIDE, SODIUM LACTATE, POTASSIUM CHLORIDE, AND CALCIUM CHLORIDE 1000 ML: 600; 310; 30; 20 INJECTION, SOLUTION INTRAVENOUS at 11:29

## 2025-06-03 ASSESSMENT — COLUMBIA-SUICIDE SEVERITY RATING SCALE - C-SSRS
2. HAVE YOU ACTUALLY HAD ANY THOUGHTS OF KILLING YOURSELF?: NO
1. IN THE PAST MONTH, HAVE YOU WISHED YOU WERE DEAD OR WISHED YOU COULD GO TO SLEEP AND NOT WAKE UP?: NO
6. HAVE YOU EVER DONE ANYTHING, STARTED TO DO ANYTHING, OR PREPARED TO DO ANYTHING TO END YOUR LIFE?: NO
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
## 2025-06-03 NOTE — ED TRIAGE NOTES
Pt reports going to urgent care and reports possible blocked salivary gland and maybe dehydrated. Symptoms for over a week. Reports pain. No SOB or difficulty breathing. Pt reports dizzy when standing. Reports chest hurts when breaths. Blood sugar 92 on arrival.

## 2025-06-03 NOTE — DISCHARGE INSTRUCTIONS
DISCHARGE PLAN:  -- Encourage drinking plenty of fluid to maintain hydration  -- Encourage eating a well-balanced diet  -- Encourage getting plenty of rest  -- Recommend sucking on sugar-free sour candy to see if it helps with potential salivary stone  -- Check MyChart for urine culture results  -- Follow-up with your primary care provider in 1 week    Return to  the nearest emergency department with worsening symptoms or new concerns arise

## 2025-06-13 ENCOUNTER — OFFICE VISIT (OUTPATIENT)
Facility: HOSPITAL | Age: 25
End: 2025-06-13
Payer: MEDICARE

## 2025-06-13 VITALS
SYSTOLIC BLOOD PRESSURE: 104 MMHG | HEART RATE: 98 BPM | WEIGHT: 227.4 LBS | BODY MASS INDEX: 35.69 KG/M2 | OXYGEN SATURATION: 98 % | DIASTOLIC BLOOD PRESSURE: 72 MMHG | TEMPERATURE: 97.9 F | HEIGHT: 67 IN

## 2025-06-13 DIAGNOSIS — R59.9 REACTIVE LYMPHADENOPATHY: ICD-10-CM

## 2025-06-13 DIAGNOSIS — F17.211 CIGARETTE NICOTINE DEPENDENCE IN REMISSION: Primary | ICD-10-CM

## 2025-06-13 ASSESSMENT — PAIN SCALES - GENERAL: PAINLEVEL_OUTOF10: 4

## 2025-06-13 NOTE — ASSESSMENT & PLAN NOTE
- Exam no longer shows salivary stone/mass however, she still has submandibular lymphadenopathy likely reactive   - Will repeat labs given TERESA in the ED and to ensure no continued infectious process  - Pt to continue conservative management   - Pt to fu with ID and her psychiatrist

## 2025-06-13 NOTE — PROGRESS NOTES
"Patient ID: Berna Ricardo \"Amanda" is a 24 y.o. female who presents for Follow-up (Er follow up).    Subjective     HPI  Per ED note: patient presented to the ED with possible salivary stones. She stated over the week prior to presentation she had been experiencing a tender and swollen area on the left lower side of her face, rates discomfort 4/10, pain only with touching the affected area, otherwise no pain at rest or with eating. She was seen at an urgent care prior to the ED visit.  Patient states she has no appetite though also does not like the food that is offered at the group home. EKG, CBC wnl and CMP showed mild TERESA. Exam did reveal left submandibular tenderness with mild edema, relatively firm mobile left submandibular salivary stone vs mobile lump and dry oral mucosa. Findings were thought to be due to either a salivary stone vs reactive lymphadenopathy given her flulike sxs. She was discharged with supportive care after receiving IVF.     Review of Systems   All other systems reviewed and are negative.    Medical History[1]  Surgical History[2]  Family History[3]  Penicillins, Cefixime, Dextromethorphan, Ditropan, Esomeprazole, Hyoscyamine, Hyoscyamine sulfate, Na mg fl-na daryl-nacl-ha-na hyp, Penicillin, Sulfa (sulfonamide antibiotics), and Ciprofloxacin   Social History     Tobacco Use    Smoking status: Former     Types: Cigarettes     Passive exposure: Never    Smokeless tobacco: Never   Substance Use Topics    Alcohol use: Not Currently       Medications Ordered Prior to Encounter[4]     Objective   Vitals: /72 (BP Location: Right arm, Patient Position: Sitting, BP Cuff Size: Adult)   Pulse 98   Temp 36.6 °C (97.9 °F) (Temporal)   Ht 1.702 m (5' 7\")   Wt 103 kg (227 lb 6.4 oz)   SpO2 98%   BMI 35.62 kg/m²      Physical Exam  Vitals reviewed.   Constitutional:       General: She is not in acute distress.     Appearance: Normal appearance.   HENT:      Head: Atraumatic.      Mouth/Throat: "      Mouth: Mucous membranes are moist.      Pharynx: Oropharynx is clear.   Eyes:      Extraocular Movements: Extraocular movements intact.      Conjunctiva/sclera: Conjunctivae normal.      Pupils: Pupils are equal, round, and reactive to light.   Neck:     Cardiovascular:      Rate and Rhythm: Normal rate and regular rhythm.      Pulses: Normal pulses.      Heart sounds: Normal heart sounds.   Pulmonary:      Effort: Pulmonary effort is normal.      Breath sounds: Normal breath sounds.   Abdominal:      General: Bowel sounds are normal.      Palpations: There is no mass.      Tenderness: There is no abdominal tenderness.   Skin:     General: Skin is warm and dry.      Capillary Refill: Capillary refill takes less than 2 seconds.   Neurological:      Mental Status: She is alert.         Assessment/Plan   Problem List Items Addressed This Visit          Symptoms and Signs    Reactive lymphadenopathy    - Exam no longer shows salivary stone/mass however, she still has submandibular lymphadenopathy likely reactive   - Will repeat labs given TERESA in the ED and to ensure no continued infectious process  - Pt to continue conservative management   - Pt to fu with ID and her psychiatrist          Relevant Orders    CBC and Auto Differential    Renal function panel    Magnesium     Other Visit Diagnoses         Cigarette nicotine dependence in remission    -  Primary            Patient seen and d/w attending Dr. James    RTC in 1 months    H. Kiersten Montesinos MD   Family Medicine, PGY2        [1]   Past Medical History:  Diagnosis Date    Abnormal weight gain 12/17/2018    Abnormal weight gain    Anxiety     Blister (nonthermal), unspecified lower leg, initial encounter 05/30/2018    Blister of leg    Crushing injury of unspecified finger(s), initial encounter 06/30/2017    Crushing injury of finger, initial encounter    Depression     Diabetes mellitus (Multi)     Drug-induced hypoglycemia without coma 08/04/2021     Hypoglycemia due to insulin    Migraine, unspecified, not intractable, without status migrainosus 12/27/2016    Headache, migraine    Other microscopic hematuria 06/01/2018    Hematuria, microscopic    Pain in left hand 06/29/2017    Pain of left hand    Pain in unspecified joint 12/30/2013    Multiple joint pain    Personal history of diseases of the blood and blood-forming organs and certain disorders involving the immune mechanism 06/07/2017    History of anemia    Personal history of diseases of the skin and subcutaneous tissue 06/13/2019    History of acne    Personal history of other complications of pregnancy, childbirth and the puerperium 07/25/2017    History of galactorrhea    Personal history of other diseases of the female genital tract 06/05/2018    History of vaginal bleeding    Personal history of other diseases of the nervous system and sense organs 08/03/2022    History of optic neuritis    Personal history of other endocrine, nutritional and metabolic disease 12/17/2018    History of elevated glucose    Personal history of other specified conditions 06/09/2017    History of nipple discharge    Psychiatric illness     Schizophrenia     Urge incontinence 06/01/2018    Urge incontinence of urine   [2]   Past Surgical History:  Procedure Laterality Date    OTHER SURGICAL HISTORY  11/11/2019    Cholecystectomy   [3]   Family History  Problem Relation Name Age of Onset    Kidney nephrosis Mother      Other (Dialysis Patient) Mother's Sister      Lupus Mother's Sister          Systemic lupus erythematosus    Other (Dialysis Patient) Maternal Grandfather      Other (Cardiovascular disease) Maternal Grandfather      Hypertension Maternal Grandfather     [4]   Current Outpatient Medications on File Prior to Visit   Medication Sig Dispense Refill    blood pressure monitor (Blood Pressure Kit) kit 1 each if needed (tachycardia, palpitations). 1 kit 0    blood-glucose sensor (FreeStyle Vic 3 Plus Sensor)  device Change every 15 days 6 each 3    divalproex (Depakote) 500 mg EC tablet Take 1 tablet (500 mg) by mouth every 12 hours. Do not crush, chew, or split. 60 tablet 0    FreeStyle Vic 3 Sensor device Change every 14 days 6 each 3    haloperidol (Haldol) 10 mg tablet Take 1.5 tablets (15 mg) by mouth 2 times a day.      hydrOXYzine pamoate (Vistaril) 50 mg capsule Take 1 capsule (50 mg) by mouth 3 times a day. (Patient taking differently: Take 1 capsule (50 mg) by mouth 3 times a day as needed.)      ibuprofen 600 mg tablet Take 1 tablet (600 mg) by mouth every 8 hours if needed for mild pain (1 - 3), headaches or moderate pain (4 - 6) (pain). 60 tablet 11    insulin lispro (HumaLOG) 100 unit/mL injection Sliding scale with meals as needed up to 20 units daily 15 mL 3    Invega Sustenna 234 mg/1.5 mL syringe       lancets (OneTouch UltraSoft 2 Lancet) 30 gauge misc For daily BG check 100 each 3    linaCLOtide (Linzess) 72 mcg capsule Take 1 capsule (72 mcg) by mouth once daily in the morning. Take before meals. Do not crush or chew. 30 capsule 5    LOXAPINE SUCCINATE ORAL Take 50 mg by mouth 2 times a day. 2 tabs twice a day (Patient not taking: Reported on 4/25/2025)      metFORMIN  mg 24 hr tablet Take 1000mg with breakfast only.  Do not crush, chew, or split. 180 tablet 11    metoprolol tartrate (Lopressor) 25 mg tablet Take 1 tablet (25 mg) by mouth 2 times a day. 60 tablet 11    mirtazapine (Remeron) 15 mg tablet 2 tablets (30 mg). (Patient taking differently: 3 tablets (45 mg).)      nitrofurantoin, macrocrystal-monohydrate, (Macrobid) 100 mg capsule Take 1 capsule (100 mg) by mouth 2 times a day. 60 capsule 5    ondansetron ODT (Zofran-ODT) 4 mg disintegrating tablet Dissolve 1 tablet (4 mg) in the mouth every 8 hours if needed for nausea or vomiting. 21 tablet 0    OneTouch Ultra Test strip For daily BG check 100 strip 3    paliperidone (Invega) 6 mg 24 hr tablet       pen needle, diabetic (BD  "Ultra-Fine Celine Pen Needle) 32 gauge x 5/32\" needle For insulin use up to 3x/day 100 each 3    prazosin (Minipress) 2 mg capsule Take 1 capsule (2 mg) by mouth once daily at bedtime. 30 capsule 0     Current Facility-Administered Medications on File Prior to Visit   Medication Dose Route Frequency Provider Last Rate Last Admin    perflutren lipid microspheres (Definity) injection 0.5-10 mL of dilution  0.5-10 mL of dilution intravenous Once Goldie James MD         "

## 2025-06-15 LAB
ALBUMIN SERPL-MCNC: 3.7 G/DL (ref 3.6–5.1)
BASOPHILS # BLD AUTO: 41 CELLS/UL (ref 0–200)
BASOPHILS NFR BLD AUTO: 0.7 %
BUN SERPL-MCNC: 4 MG/DL (ref 7–25)
BUN/CREAT SERPL: 4 (CALC) (ref 6–22)
CALCIUM SERPL-MCNC: 10 MG/DL (ref 8.6–10.2)
CHLORIDE SERPL-SCNC: 105 MMOL/L (ref 98–110)
CO2 SERPL-SCNC: 29 MMOL/L (ref 20–32)
CREAT SERPL-MCNC: 0.92 MG/DL (ref 0.5–0.96)
EGFRCR SERPLBLD CKD-EPI 2021: 90 ML/MIN/1.73M2
EOSINOPHIL # BLD AUTO: 148 CELLS/UL (ref 15–500)
EOSINOPHIL NFR BLD AUTO: 2.5 %
ERYTHROCYTE [DISTWIDTH] IN BLOOD BY AUTOMATED COUNT: 15.4 % (ref 11–15)
GLUCOSE SERPL-MCNC: 104 MG/DL (ref 65–99)
HCT VFR BLD AUTO: 39.1 % (ref 35–45)
HGB BLD-MCNC: 12.8 G/DL (ref 11.7–15.5)
LYMPHOCYTES # BLD AUTO: 2820 CELLS/UL (ref 850–3900)
LYMPHOCYTES NFR BLD AUTO: 47.8 %
MAGNESIUM SERPL-MCNC: 2 MG/DL (ref 1.5–2.5)
MCH RBC QN AUTO: 31.2 PG (ref 27–33)
MCHC RBC AUTO-ENTMCNC: 32.7 G/DL (ref 32–36)
MCV RBC AUTO: 95.4 FL (ref 80–100)
MONOCYTES # BLD AUTO: 507 CELLS/UL (ref 200–950)
MONOCYTES NFR BLD AUTO: 8.6 %
NEUTROPHILS # BLD AUTO: 2384 CELLS/UL (ref 1500–7800)
NEUTROPHILS NFR BLD AUTO: 40.4 %
PHOSPHATE SERPL-MCNC: 3.7 MG/DL (ref 2.5–4.5)
PLATELET # BLD AUTO: 314 THOUSAND/UL (ref 140–400)
PMV BLD REES-ECKER: 9.7 FL (ref 7.5–12.5)
POTASSIUM SERPL-SCNC: 4.7 MMOL/L (ref 3.5–5.3)
RBC # BLD AUTO: 4.1 MILLION/UL (ref 3.8–5.1)
SODIUM SERPL-SCNC: 140 MMOL/L (ref 135–146)
WBC # BLD AUTO: 5.9 THOUSAND/UL (ref 3.8–10.8)

## 2025-06-23 ENCOUNTER — PATIENT OUTREACH (OUTPATIENT)
Dept: CARE COORDINATION | Facility: CLINIC | Age: 25
End: 2025-06-23
Payer: MEDICARE

## 2025-06-23 NOTE — PROGRESS NOTES
Outreach call to patient to support a smooth transition of care from recent ED visit. Mailbox full could not leave voicemail.   Nikole Grimaldo RN [facility]  [phone]

## 2025-07-01 ASSESSMENT — ENCOUNTER SYMPTOMS
UNEXPECTED WEIGHT CHANGE: 0
DYSPHORIC MOOD: 0
JOINT SWELLING: 0
CONSTIPATION: 0
TROUBLE SWALLOWING: 0
COUGH: 0
DYSURIA: 0
APPETITE CHANGE: 0
VOMITING: 0
CONFUSION: 0
ABDOMINAL PAIN: 0
SORE THROAT: 0
WEAKNESS: 0
BRUISES/BLEEDS EASILY: 0
AGITATION: 0
DIARRHEA: 0
CHOKING: 0
NAUSEA: 0
EYE REDNESS: 0

## 2025-07-02 ENCOUNTER — OFFICE VISIT (OUTPATIENT)
Dept: GASTROENTEROLOGY | Facility: HOSPITAL | Age: 25
End: 2025-07-02
Payer: MEDICARE

## 2025-07-02 VITALS
HEART RATE: 89 BPM | BODY MASS INDEX: 35.08 KG/M2 | OXYGEN SATURATION: 97 % | TEMPERATURE: 97.5 F | SYSTOLIC BLOOD PRESSURE: 99 MMHG | WEIGHT: 224 LBS | DIASTOLIC BLOOD PRESSURE: 68 MMHG

## 2025-07-02 DIAGNOSIS — R19.7 DIARRHEA, UNSPECIFIED TYPE: Primary | ICD-10-CM

## 2025-07-02 DIAGNOSIS — K59.00 CONSTIPATION, UNSPECIFIED CONSTIPATION TYPE: ICD-10-CM

## 2025-07-02 PROCEDURE — 99212 OFFICE O/P EST SF 10 MIN: CPT | Performed by: PHYSICIAN ASSISTANT

## 2025-07-02 PROCEDURE — 99213 OFFICE O/P EST LOW 20 MIN: CPT | Performed by: PHYSICIAN ASSISTANT

## 2025-07-02 PROCEDURE — 3078F DIAST BP <80 MM HG: CPT | Performed by: PHYSICIAN ASSISTANT

## 2025-07-02 PROCEDURE — 1036F TOBACCO NON-USER: CPT | Performed by: PHYSICIAN ASSISTANT

## 2025-07-02 PROCEDURE — 3074F SYST BP LT 130 MM HG: CPT | Performed by: PHYSICIAN ASSISTANT

## 2025-07-02 ASSESSMENT — ENCOUNTER SYMPTOMS
LOSS OF SENSATION IN FEET: 0
DEPRESSION: 1
OCCASIONAL FEELINGS OF UNSTEADINESS: 0

## 2025-07-02 ASSESSMENT — PAIN SCALES - GENERAL: PAINLEVEL_OUTOF10: 0-NO PAIN

## 2025-07-02 NOTE — PROGRESS NOTES
"Subjective   Patient ID: Berna Ricardo \"Genesis\" is a 24 y.o. female who presents for follow up. Last OV with me was on Oct 2024.    HPI  Ms. Ricardo is a 23 y/o AAF with h/o DM, Schizophrenia, Bipolar d/o, ANAI, and PTSD who was in clinic on March 2022 for initial consultation regarding her nausea, lack of appetite, weight loss and generalized abd pain for  2 months.      Pt has seen Peds GI in past for her IBS-C and it seems to be under control with Linzess 145 mcg daily. Pt admits to she takes it every other day, because it will give her diarrhea if given daily. She has tried Miralax in the past without success.      Pt is usually accompanied by her grandmother, Joelle, who is pt's legal guardian and POFARHAD Solis provides most of her medical history during each visit. Pt does live in a group home.       Since early Dec 2021, pt c/o nausea with intermittent episodes of vomiting ( but no coffee ground emesis or hematemesis), lack of appetite and a 30 lb weight loss secondary to lack of appetite. Pt also admits to some generalized abd pain (mainly localized in epigastric region, that is sharp in nature), after eating any food. Pt denied any d/c, blood in stool, or other changes in bowel habits.      Pt had similar presentation back in 2019, at which  she did undergo a lap ami at that time because she had sludge in gallbladder.     Per grandmother, pt has no family h/o IBD or gastrointestinal cancers.     Pt did undergo a Colonoscopy back in Aug 2014 for her constipation and it was a normal study. Colon biopsies of TI, Right colon, TV colon and Left colon) were all negative.      Back in early 20233, since pt was not eating and she was dehydrated, her grandmother brought her to the ER.   Labs obtained including pregnancy test, renal function, LFTs,lipase and TFTs were normal.   CT abd/p obtained on Jan 2022 reveal some wall thickening of TV colon, but could be from non distention. Correlate with pt's symptoms. No " "other acute abnormalities.      Pt did undergo an EGD and Colonoscopy.  EGD was done first on 3/7/22 and it showed normal esophagus, gastric polyp, normal duodenum. Biopsy of gastric polyp showed fundic gland polyp and no dysplasia.  Colonoscopy was completed on 5/2/22 and it was a poor prep, 1 polyp removed from transverse colon (TA), and hemorrhoids seen. We recommended for her to repeat colonoscopy within the next year.      Back in June 2022,she was doing better in regards to her nausea, abd pain and weight loss. Her weight has stabilized.   Her main concern was her constipation. Pt was treated by peds GI in the past for constipation.   She failed Miralax and senna in the past. She was taking Linzess 145 mcg QOD which worked great for her, because daily dose was \"too strong\" and causing diarrhea.      Pt went back to our clinic on June 2023 for followup and for refill of Linzess. Her grandmother was wondering if there was a smaller dose she can take. Pt's grandmother also stated that she moved to a different nursing home and was not receiving her LInzess for \"awhile\". Pt was also recently discharged from hospital from UTI ,with possible pyelonephritis. Pt is currently on daily Macrobid secondary to her h/o recurrent UTIs.     She did undergo repeat colonoscopy on 9/13/23 and it was a good prep. Normal study. No specimens collected. Secondary to a family h/o colon ca (pt's aunt) and pt had a polyp in the past, it was recommended she repeat surveillance in 5 yrs (2028).   Per grandmother, Joelle, as long as the pt received her daily dose of Linzess at the group home, she does well. Pt's grandmother is concerned she is not always receiving all her meds \"as instructed\", so she will talk to the nursing staff about it this week. Otherwise, they are content with the Linzess 72 mcg daily dose.     Pt's last follow up was on Oct 2024. She is doing well overall in regards to her GI sx as long as she consistently takes " "Linzess.      Pt is back today for follow up with her grandmother. Linzess works well but at times it does give her diarrhea and some abdominal pain. I offered to try a different constipation med, but her grandmother feels that this med does a \"good job for the most part\"      Review of Systems   Constitutional:  Negative for appetite change and unexpected weight change.   HENT:  Negative for sore throat and trouble swallowing.    Eyes:  Negative for redness.   Respiratory:  Negative for cough and choking.    Cardiovascular:  Negative for chest pain.   Gastrointestinal:  Negative for abdominal pain, constipation, diarrhea, nausea and vomiting.   Genitourinary:  Negative for dysuria.   Musculoskeletal:  Negative for joint swelling.   Skin:  Negative for pallor and rash.   Neurological:  Negative for weakness.   Hematological:  Does not bruise/bleed easily.   Psychiatric/Behavioral:  Negative for agitation, confusion and dysphoric mood.          Objective   Visit Vitals  BP 99/68   Pulse 89   Temp 36.4 °C (97.5 °F)   Wt 102 kg (224 lb)   SpO2 97% Comment: ra   BMI 35.08 kg/m²   OB Status Implant   Smoking Status Former   BSA 2.2 m²      Physical Exam  Vitals reviewed.   Constitutional:       General: She is not in acute distress.     Appearance: She is obese. She is not ill-appearing.   HENT:      Head: Normocephalic and atraumatic.   Eyes:      General: No scleral icterus.     Pupils: Pupils are equal, round, and reactive to light.   Cardiovascular:      Rate and Rhythm: Normal rate and regular rhythm.   Pulmonary:      Effort: Pulmonary effort is normal.      Breath sounds: Normal breath sounds.   Abdominal:      General: Bowel sounds are normal. There is no distension.      Palpations: Abdomen is soft.      Tenderness: There is no abdominal tenderness.   Musculoskeletal:         General: No swelling or deformity.   Skin:     General: Skin is warm.      Coloration: Skin is not jaundiced.      Findings: No rash. "   Neurological:      General: No focal deficit present.      Mental Status: She is alert. Mental status is at baseline.   Psychiatric:         Mood and Affect: Mood normal.         Behavior: Behavior normal.         Thought Content: Thought content normal.           Assessment/Plan     1) h/o nausea and chronic abd pain -  Currently stable  s/p EGD March 2022 - normal esophagus, gastric polyp ( fundic gland polyp), normal duodenum.  s/p colonoscopy May 2022 - poor prep, 1 small TA polyp removed, non bleeding hemorrhoids seen.   Colonoscopy then repeated on 9/13/22 (good prep) revealing normal study. no specimens collected. Repeat surveillance in 5 yrs (2028) secondary to family h/o colon ca.      2) h/o chronic constipation and alternating loose stools   Continue Linzess 72 mcg daily. Pt may hold off it's use for 1-2 days if having excess loose stools. Pt can also use Fibercon 625mg tablet prn during these episodes.     Follow up in 6mons to 1 yr, or sooner if needed

## 2025-07-14 ENCOUNTER — APPOINTMENT (OUTPATIENT)
Dept: INFECTIOUS DISEASES | Facility: CLINIC | Age: 25
End: 2025-07-14
Payer: MEDICARE

## 2025-07-14 DIAGNOSIS — N39.0 UTI (URINARY TRACT INFECTION), UNCOMPLICATED: Primary | ICD-10-CM

## 2025-07-14 PROCEDURE — 99213 OFFICE O/P EST LOW 20 MIN: CPT | Performed by: INTERNAL MEDICINE

## 2025-07-14 PROCEDURE — G2211 COMPLEX E/M VISIT ADD ON: HCPCS | Performed by: INTERNAL MEDICINE

## 2025-07-14 ASSESSMENT — ENCOUNTER SYMPTOMS
CARDIOVASCULAR NEGATIVE: 1
ALLERGIC/IMMUNOLOGIC NEGATIVE: 1
EYES NEGATIVE: 1
PSYCHIATRIC NEGATIVE: 1
NEUROLOGICAL NEGATIVE: 1
CONSTITUTIONAL NEGATIVE: 1
RESPIRATORY NEGATIVE: 1
ENDOCRINE NEGATIVE: 1
HEMATOLOGIC/LYMPHATIC NEGATIVE: 1
MUSCULOSKELETAL NEGATIVE: 1
GASTROINTESTINAL NEGATIVE: 1

## 2025-07-14 NOTE — PROGRESS NOTES
Infectious Diseases Clinic Follow-up:    Reason for Visit: No chief complaint on file.      History of Current Issue  Berna Ricardo is a 24 y.o. year old female        This a telephone routine follow up. Spoke with her grandmother who is responsible for her care     Initially saw her in 12/2022 for recurrent UTIs, full details of consult as below:     INITIAL ID CONSULT NOTE in 2022     21 yo F, group home resident, psych issues who as referred by PCP for recurrent UTIs. She is here with grandmother who was the primary historian has pt has limited ability to give a coherent history.     Per grandmother and chat review, had recurrent UTIs as a child and was on Bactrim ppx from age [age]. She was reportedly taken off when UTIs improved. However in the last 2 years, UTIs have returned. Primary syxs appear to be painful urination and frequency.      Last UTI was in October 2022. Denies has UTI now. Denies any history of renal stones. Records are not available to confirm this. One UCX from 6/2021 grew E. coli (S=Keflex, Macrobid, R=Bactrim). Apparently no imaging has been done recently to rule out structural abnormalities. She has been treated multiple times with Bactrim.     Endorses a hx of allergies to cipro and sulfa drugs (rash).     PAST MEDICAL HISTORY:  Medical History[1]    PAST SURGICAL HISTORY:  Surgical History[2]    ALLERGIES:    Allergies[3]    MEDICATIONS:    Current Medications[4]    REVIEW OF SYSTEMS:    Review of Systems   Constitutional: Negative.    HENT: Negative.     Eyes: Negative.    Respiratory: Negative.     Cardiovascular: Negative.    Gastrointestinal: Negative.    Endocrine: Negative.    Genitourinary: Negative.    Musculoskeletal: Negative.    Skin: Negative.    Allergic/Immunologic: Negative.    Neurological: Negative.    Hematological: Negative.    Psychiatric/Behavioral: Negative.         PHYSICAL EXAMINATION:       Visit Vitals  OB Status Implant   Smoking Status Former        EXAM:    N/A      DATA:    Microbiology:      ASSESSMENT / RECOMMENDATIONS:  23 yo F, group home resident, psych issues who as referred by PCP for recurrent UTIs. She is here with grandmother who was the primary historian has pt has limited ability to give a coherent history.     Per grandmother and chat review, had recurrent UTIs as a child and was on Bactrim ppx from age [age]. She was reportedly taken off when UTIs improved. However in the last 2 years, UTIs have returned. Primary syxs appear to be painful urination and frequency.      Last UTI was in October 2022. Denies has UTI now. Denies any history of renal stones. Records are not available to confirm this. One UCX from 6/2021 grew E. coli (S=Keflex, Macrobid, R= Bactrim). Apparently no imaging has been done recently to rule out structural abnormalities. She has been treated multiple times with Bactrim.     Endorses a hx of allergies to cipro and sulfa drugs (rash).      UPDATE (10/12/2023):  Doing well on macrobid.  No new UTIs since last visit.     UPDATE 08/01/2024  Was hospitalized twice in the interim, one of which was for a presumed UTI in June; however, the culture reports showed contamination. She received antibiotics. She was restarted on Macrobid but ran out for the last week. No new symptoms.     UPDATE 12/05/2024  Doing well, no recent UTIs    UPDATE 7/14/2025  Patient is doing well. Had a UTI in June 2025, managed within the [facility] system. UA at that time was positive for bacteria, but urine culture showed mixed organisms interpreted as contamination. Unclear which antibiotics were administered, but per family report, patient improved clinically. Currently back on prophylactic Bactrim and doing well at the care facility.    PLAN:  1. Bactrim DS daily     2. RTC in 6 mo     Pt's grandma are in agreement with plan       I spent 30 minutes in the professional and overall care of this patient.      Celso Thomas MD MPH                         [1]   Past  Medical History:  Diagnosis Date    Abnormal weight gain 12/17/2018    Abnormal weight gain    Anxiety     Blister (nonthermal), unspecified lower leg, initial encounter 05/30/2018    Blister of leg    Crushing injury of unspecified finger(s), initial encounter 06/30/2017    Crushing injury of finger, initial encounter    Depression     Diabetes mellitus (Multi)     Drug-induced hypoglycemia without coma 08/04/2021    Hypoglycemia due to insulin    Migraine, unspecified, not intractable, without status migrainosus 12/27/2016    Headache, migraine    Other microscopic hematuria 06/01/2018    Hematuria, microscopic    Pain in left hand 06/29/2017    Pain of left hand    Pain in unspecified joint 12/30/2013    Multiple joint pain    Personal history of diseases of the blood and blood-forming organs and certain disorders involving the immune mechanism 06/07/2017    History of anemia    Personal history of diseases of the skin and subcutaneous tissue 06/13/2019    History of acne    Personal history of other complications of pregnancy, childbirth and the puerperium 07/25/2017    History of galactorrhea    Personal history of other diseases of the female genital tract 06/05/2018    History of vaginal bleeding    Personal history of other diseases of the nervous system and sense organs 08/03/2022    History of optic neuritis    Personal history of other endocrine, nutritional and metabolic disease 12/17/2018    History of elevated glucose    Personal history of other specified conditions 06/09/2017    History of nipple discharge    Psychiatric illness     Schizophrenia     Urge incontinence 06/01/2018    Urge incontinence of urine   [2]   Past Surgical History:  Procedure Laterality Date    OTHER SURGICAL HISTORY  11/11/2019    Cholecystectomy   [3]   Allergies  Allergen Reactions    Penicillins Hives and Rash    Cefixime Hives    Dextromethorphan Unknown    Ditropan Unknown     Reaction unknown    Esomeprazole Unknown     Hyoscyamine Unknown     Unknown reaction, documented from hospital stay per grandmother.    Hyoscyamine Sulfate Unknown    Na Mg Fl-Na Cori-Nacl-Ha-Na Hyp Unknown    Penicillin Hives    Sulfa (Sulfonamide Antibiotics) Unknown    Ciprofloxacin Hives and Rash   [4]   Current Outpatient Medications:     blood pressure monitor (Blood Pressure Kit) kit, 1 each if needed (tachycardia, palpitations)., Disp: 1 kit, Rfl: 0    blood-glucose sensor (FreeStyle Vic 3 Plus Sensor) device, Change every 15 days, Disp: 6 each, Rfl: 3    calcium polycarbophiL (Fibercon) 625 mg tablet, Take 1 tablet (625 mg) by mouth once daily as needed for diarrhea., Disp: 30 tablet, Rfl: 1    divalproex (Depakote) 500 mg EC tablet, Take 1 tablet (500 mg) by mouth every 12 hours. Do not crush, chew, or split., Disp: 60 tablet, Rfl: 0    FreeStyle Vic 3 Sensor device, Change every 14 days, Disp: 6 each, Rfl: 3    haloperidol (Haldol) 10 mg tablet, Take 1.5 tablets (15 mg) by mouth 2 times a day., Disp: , Rfl:     hydrOXYzine pamoate (Vistaril) 50 mg capsule, Take 1 capsule (50 mg) by mouth 3 times a day. (Patient taking differently: Take 1 capsule (50 mg) by mouth 3 times a day as needed.), Disp: , Rfl:     ibuprofen 600 mg tablet, Take 1 tablet (600 mg) by mouth every 8 hours if needed for mild pain (1 - 3), headaches or moderate pain (4 - 6) (pain)., Disp: 60 tablet, Rfl: 11    insulin lispro (HumaLOG) 100 unit/mL injection, Sliding scale with meals as needed up to 20 units daily, Disp: 15 mL, Rfl: 3    Invega Sustenna 234 mg/1.5 mL syringe, , Disp: , Rfl:     lancets (OneTouch UltraSoft 2 Lancet) 30 gauge misc, For daily BG check, Disp: 100 each, Rfl: 3    linaCLOtide (Linzess) 72 mcg capsule, Take 1 capsule (72 mcg) by mouth once daily in the morning. Take before meals. Do not crush or chew., Disp: 90 capsule, Rfl: 1    LOXAPINE SUCCINATE ORAL, Take 50 mg by mouth 2 times a day. 2 tabs twice a day, Disp: , Rfl:     metFORMIN  mg 24 hr  "tablet, Take 1000mg with breakfast only.  Do not crush, chew, or split., Disp: 180 tablet, Rfl: 11    metoprolol tartrate (Lopressor) 25 mg tablet, Take 1 tablet (25 mg) by mouth 2 times a day., Disp: 60 tablet, Rfl: 11    mirtazapine (Remeron) 15 mg tablet, 2 tablets (30 mg). (Patient taking differently: 3 tablets (45 mg).), Disp: , Rfl:     nitrofurantoin, macrocrystal-monohydrate, (Macrobid) 100 mg capsule, Take 1 capsule (100 mg) by mouth 2 times a day., Disp: 60 capsule, Rfl: 5    ondansetron ODT (Zofran-ODT) 4 mg disintegrating tablet, Dissolve 1 tablet (4 mg) in the mouth every 8 hours if needed for nausea or vomiting., Disp: 21 tablet, Rfl: 0    OneTouch Ultra Test strip, For daily BG check, Disp: 100 strip, Rfl: 3    paliperidone (Invega) 6 mg 24 hr tablet, , Disp: , Rfl:     pen needle, diabetic (BD Ultra-Fine Celine Pen Needle) 32 gauge x 5/32\" needle, For insulin use up to 3x/day, Disp: 100 each, Rfl: 3    prazosin (Minipress) 2 mg capsule, Take 1 capsule (2 mg) by mouth once daily at bedtime., Disp: 30 capsule, Rfl: 0    Current Facility-Administered Medications:     perflutren lipid microspheres (Definity) injection 0.5-10 mL of dilution, 0.5-10 mL of dilution, intravenous, Once, Goldie James MD    "

## 2025-08-01 ENCOUNTER — APPOINTMENT (OUTPATIENT)
Facility: HOSPITAL | Age: 25
End: 2025-08-01
Payer: MEDICARE

## 2025-08-01 VITALS
TEMPERATURE: 97 F | HEART RATE: 97 BPM | HEIGHT: 67 IN | WEIGHT: 252 LBS | OXYGEN SATURATION: 98 % | DIASTOLIC BLOOD PRESSURE: 69 MMHG | BODY MASS INDEX: 39.55 KG/M2 | SYSTOLIC BLOOD PRESSURE: 99 MMHG

## 2025-08-01 DIAGNOSIS — G43.009 MIGRAINE WITHOUT AURA AND WITHOUT STATUS MIGRAINOSUS, NOT INTRACTABLE: Primary | ICD-10-CM

## 2025-08-01 DIAGNOSIS — N39.0 RECURRENT UTI: ICD-10-CM

## 2025-08-01 RX ORDER — TRAZODONE HYDROCHLORIDE 50 MG/1
TABLET ORAL
COMMUNITY
Start: 2025-07-22

## 2025-08-01 RX ORDER — HYDROXYZINE HYDROCHLORIDE 50 MG/1
TABLET, FILM COATED ORAL
COMMUNITY
Start: 2025-07-22

## 2025-08-01 RX ORDER — BENZTROPINE MESYLATE 1 MG/1
TABLET ORAL
COMMUNITY
Start: 2025-07-22

## 2025-08-01 ASSESSMENT — PAIN SCALES - GENERAL: PAINLEVEL_OUTOF10: 0-NO PAIN

## 2025-08-01 NOTE — PROGRESS NOTES
"Patient ID: Berna Ricardo \"Amanda" is a 25 y.o. female with PMH of  HTN, recurrent UTIs (macrobid 100mg every day) , bipolar disease, and violent behavior including suicidal attempts, migraines, schizophrenia, PTSD, IBS, DM2  who presents for Follow-up.    Subjective     HPI  Patient doing well since last since in OV. She was seen in the ED for HA on 6/20. She reports HA <1 monthly and takes Ibuprofen with improvement. Grandmother reports migraine HA in the family including mom, sister, and grandma. The patient's describes her headaches mainly located at her bilateral temporal bones as well as sensitivity light and sometimes to sounds. She does aura, nausea, vomiting, or intractable HA.     Review of Systems   All other systems reviewed and are negative.    Medical History[1]  Surgical History[2]  Family History[3]  Penicillins, Cefixime, Dextromethorphan, Ditropan, Esomeprazole, Hyoscyamine, Hyoscyamine sulfate, Na mg fl-na daryl-nacl-ha-na hyp, Penicillin, Sulfa (sulfonamide antibiotics), and Ciprofloxacin   Social History     Tobacco Use    Smoking status: Former     Types: Cigarettes     Passive exposure: Never    Smokeless tobacco: Never   Substance Use Topics    Alcohol use: Not Currently       Medications Ordered Prior to Encounter[4]     Objective   Vitals: BP 99/69 (BP Location: Left arm, Patient Position: Sitting)   Pulse 97   Temp 36.1 °C (97 °F) (Temporal)   Ht 1.702 m (5' 7\")   Wt 114 kg (252 lb)   SpO2 98%   BMI 39.47 kg/m²      Physical Exam  Vitals reviewed.   Constitutional:       General: She is not in acute distress.     Appearance: Normal appearance.   HENT:      Head: Atraumatic.      Mouth/Throat:      Mouth: Mucous membranes are moist.      Pharynx: Oropharynx is clear.     Eyes:      Extraocular Movements: Extraocular movements intact.      Conjunctiva/sclera: Conjunctivae normal.      Pupils: Pupils are equal, round, and reactive to light.       Cardiovascular:      Rate and Rhythm: " Normal rate and regular rhythm.      Pulses: Normal pulses.      Heart sounds: Normal heart sounds.   Pulmonary:      Effort: Pulmonary effort is normal.      Breath sounds: Normal breath sounds.   Abdominal:      General: Bowel sounds are normal.      Palpations: There is no mass.      Tenderness: There is no abdominal tenderness.     Skin:     General: Skin is warm and dry.      Capillary Refill: Capillary refill takes less than 2 seconds.     Neurological:      Mental Status: She is alert.         Assessment/Plan   Problem List Items Addressed This Visit          Genitourinary and Reproductive    Recurrent UTI    - Pt follows with ID since 2022 for recurrent UTI  - Most recent UTI in June 2025, managed within the [facility] system. UA + bacteria and Ucx showed mixed organisms interpreted as contamination. Unclear which antibiotics were administered, but per family report, patient improved clinically.   - She was continued on on prophylactic Macrobid, previously on Bactrium (discont 2024)            Neuro    Migraines - Primary (Chronic)    - Pt with <1 migraine monthly, therefore does not meet criteria for prophylaxis   - Migraine HA well controlled with Ibuprofen 600 mg q8hrs prn           Patient d/w attending Dr. James    RTC in 3 month fu     H. Kiersten Montesinos MD   Family Medicine, PGY3       [1]   Past Medical History:  Diagnosis Date    Abnormal weight gain 12/17/2018    Abnormal weight gain    Anxiety     Blister (nonthermal), unspecified lower leg, initial encounter 05/30/2018    Blister of leg    Crushing injury of unspecified finger(s), initial encounter 06/30/2017    Crushing injury of finger, initial encounter    Depression     Diabetes mellitus (Multi)     Drug-induced hypoglycemia without coma 08/04/2021    Hypoglycemia due to insulin    Migraine, unspecified, not intractable, without status migrainosus 12/27/2016    Headache, migraine    Other microscopic hematuria 06/01/2018    Hematuria, microscopic     Pain in left hand 06/29/2017    Pain of left hand    Pain in unspecified joint 12/30/2013    Multiple joint pain    Personal history of diseases of the blood and blood-forming organs and certain disorders involving the immune mechanism 06/07/2017    History of anemia    Personal history of diseases of the skin and subcutaneous tissue 06/13/2019    History of acne    Personal history of other complications of pregnancy, childbirth and the puerperium 07/25/2017    History of galactorrhea    Personal history of other diseases of the female genital tract 06/05/2018    History of vaginal bleeding    Personal history of other diseases of the nervous system and sense organs 08/03/2022    History of optic neuritis    Personal history of other endocrine, nutritional and metabolic disease 12/17/2018    History of elevated glucose    Personal history of other specified conditions 06/09/2017    History of nipple discharge    Psychiatric illness     Schizophrenia     Urge incontinence 06/01/2018    Urge incontinence of urine   [2]   Past Surgical History:  Procedure Laterality Date    OTHER SURGICAL HISTORY  11/11/2019    Cholecystectomy   [3]   Family History  Problem Relation Name Age of Onset    Kidney nephrosis Mother      Other (Dialysis Patient) Mother's Sister      Lupus Mother's Sister          Systemic lupus erythematosus    Other (Dialysis Patient) Maternal Grandfather      Other (Cardiovascular disease) Maternal Grandfather      Hypertension Maternal Grandfather     [4]   Current Outpatient Medications on File Prior to Visit   Medication Sig Dispense Refill    benztropine (Cogentin) 1 mg tablet       hydrOXYzine HCL (Atarax) 50 mg tablet       traZODone (Desyrel) 50 mg tablet       blood pressure monitor (Blood Pressure Kit) kit 1 each if needed (tachycardia, palpitations). 1 kit 0    blood-glucose sensor (FreeStyle Vic 3 Plus Sensor) device Change every 15 days 6 each 3    calcium polycarbophiL (Fibercon) 625 mg  tablet Take 1 tablet (625 mg) by mouth once daily as needed for diarrhea. 30 tablet 1    divalproex (Depakote) 500 mg EC tablet Take 1 tablet (500 mg) by mouth every 12 hours. Do not crush, chew, or split. 60 tablet 0    FreeStyle Vic 3 Sensor device Change every 14 days 6 each 3    haloperidol (Haldol) 10 mg tablet Take 1.5 tablets (15 mg) by mouth 2 times a day.      hydrOXYzine pamoate (Vistaril) 50 mg capsule Take 1 capsule (50 mg) by mouth 3 times a day. (Patient taking differently: Take 1 capsule (50 mg) by mouth 3 times a day as needed.)      ibuprofen 600 mg tablet Take 1 tablet (600 mg) by mouth every 8 hours if needed for mild pain (1 - 3), headaches or moderate pain (4 - 6) (pain). 60 tablet 11    insulin lispro (HumaLOG) 100 unit/mL injection Sliding scale with meals as needed up to 20 units daily 15 mL 3    Invega Sustenna 234 mg/1.5 mL syringe       lancets (OneTouch UltraSoft 2 Lancet) 30 gauge misc For daily BG check 100 each 3    linaCLOtide (Linzess) 72 mcg capsule Take 1 capsule (72 mcg) by mouth once daily in the morning. Take before meals. Do not crush or chew. 90 capsule 1    LOXAPINE SUCCINATE ORAL Take 50 mg by mouth 2 times a day. 2 tabs twice a day      metFORMIN  mg 24 hr tablet Take 1000mg with breakfast only.  Do not crush, chew, or split. 180 tablet 11    metoprolol tartrate (Lopressor) 25 mg tablet Take 1 tablet (25 mg) by mouth 2 times a day. 60 tablet 11    mirtazapine (Remeron) 15 mg tablet 2 tablets (30 mg). (Patient taking differently: 3 tablets (45 mg).)      nitrofurantoin, macrocrystal-monohydrate, (Macrobid) 100 mg capsule Take 1 capsule (100 mg) by mouth 2 times a day. 60 capsule 5    ondansetron ODT (Zofran-ODT) 4 mg disintegrating tablet Dissolve 1 tablet (4 mg) in the mouth every 8 hours if needed for nausea or vomiting. 21 tablet 0    OneTouch Ultra Test strip For daily BG check 100 strip 3    paliperidone (Invega) 6 mg 24 hr tablet       pen needle, diabetic (BD  "Ultra-Fine Celine Pen Needle) 32 gauge x 5/32\" needle For insulin use up to 3x/day 100 each 3    prazosin (Minipress) 2 mg capsule Take 1 capsule (2 mg) by mouth once daily at bedtime. 30 capsule 0     Current Facility-Administered Medications on File Prior to Visit   Medication Dose Route Frequency Provider Last Rate Last Admin    perflutren lipid microspheres (Definity) injection 0.5-10 mL of dilution  0.5-10 mL of dilution intravenous Once Goldie James MD         "

## 2025-08-02 NOTE — ASSESSMENT & PLAN NOTE
- Pt with <1 migraine monthly, therefore does not meet criteria for prophylaxis   - Migraine HA well controlled with Ibuprofen 600 mg q8hrs prn

## 2025-08-02 NOTE — ASSESSMENT & PLAN NOTE
- Pt follows with ID since 2022 for recurrent UTI  - Most recent UTI in June 2025, managed within the [facility] system. UA + bacteria and Ucx showed mixed organisms interpreted as contamination. Unclear which antibiotics were administered, but per family report, patient improved clinically.   - She was continued on on prophylactic Macrobid, previously on Bactrium (discont 2024)

## 2025-08-08 PROBLEM — G35 MULTIPLE SCLEROSIS (MULTI): Status: ACTIVE | Noted: 2025-08-08

## 2025-08-08 PROBLEM — E66.01 OBESITY, MORBID (MULTI): Status: ACTIVE | Noted: 2025-08-08

## 2025-08-14 DIAGNOSIS — N39.0 UTI (URINARY TRACT INFECTION), UNCOMPLICATED: Primary | ICD-10-CM

## 2025-08-14 RX ORDER — NITROFURANTOIN 25; 75 MG/1; MG/1
100 CAPSULE ORAL DAILY
Qty: 90 CAPSULE | Refills: 1 | Status: ON HOLD | OUTPATIENT
Start: 2025-08-14 | End: 2025-11-12

## 2025-08-15 ENCOUNTER — HOSPITAL ENCOUNTER (OUTPATIENT)
Facility: HOSPITAL | Age: 25
Setting detail: OBSERVATION
Discharge: PSYCHIATRIC OR SUBSTANCE USE FACILITY | End: 2025-08-16
Attending: EMERGENCY MEDICINE | Admitting: EMERGENCY MEDICINE
Payer: MEDICARE

## 2025-08-15 ENCOUNTER — CLINICAL SUPPORT (OUTPATIENT)
Dept: EMERGENCY MEDICINE | Facility: HOSPITAL | Age: 25
End: 2025-08-15
Payer: MEDICARE

## 2025-08-15 DIAGNOSIS — R45.851 SUICIDAL IDEATION: Primary | ICD-10-CM

## 2025-08-15 DIAGNOSIS — R44.0 AUDITORY HALLUCINATIONS: ICD-10-CM

## 2025-08-15 LAB
ALBUMIN SERPL BCP-MCNC: 3.8 G/DL (ref 3.4–5)
ALP SERPL-CCNC: 81 U/L (ref 33–110)
ALT SERPL W P-5'-P-CCNC: 3 U/L (ref 7–45)
AMPHETAMINES UR QL SCN: NORMAL
ANION GAP SERPL CALC-SCNC: 14 MMOL/L (ref 10–20)
APAP SERPL-MCNC: <10 UG/ML (ref ?–30)
AST SERPL W P-5'-P-CCNC: 14 U/L (ref 9–39)
BARBITURATES UR QL SCN: NORMAL
BASOPHILS # BLD AUTO: 0.08 X10*3/UL (ref 0–0.1)
BASOPHILS NFR BLD AUTO: 0.8 %
BENZODIAZ UR QL SCN: NORMAL
BILIRUB SERPL-MCNC: 0.3 MG/DL (ref 0–1.2)
BUN SERPL-MCNC: 9 MG/DL (ref 6–23)
BZE UR QL SCN: NORMAL
CALCIUM SERPL-MCNC: 9.4 MG/DL (ref 8.6–10.6)
CANNABINOIDS UR QL SCN: NORMAL
CHLORIDE SERPL-SCNC: 105 MMOL/L (ref 98–107)
CO2 SERPL-SCNC: 22 MMOL/L (ref 21–32)
CREAT SERPL-MCNC: 0.82 MG/DL (ref 0.5–1.05)
EGFRCR SERPLBLD CKD-EPI 2021: >90 ML/MIN/1.73M*2
EOSINOPHIL # BLD AUTO: 0.1 X10*3/UL (ref 0–0.7)
EOSINOPHIL NFR BLD AUTO: 1.1 %
ERYTHROCYTE [DISTWIDTH] IN BLOOD BY AUTOMATED COUNT: 13.2 % (ref 11.5–14.5)
ETHANOL SERPL-MCNC: <10 MG/DL
FENTANYL+NORFENTANYL UR QL SCN: NORMAL
GLUCOSE SERPL-MCNC: 101 MG/DL (ref 74–99)
HCT VFR BLD AUTO: 36.6 % (ref 36–46)
HGB BLD-MCNC: 12.9 G/DL (ref 12–16)
IMM GRANULOCYTES # BLD AUTO: 0.03 X10*3/UL (ref 0–0.7)
IMM GRANULOCYTES NFR BLD AUTO: 0.3 % (ref 0–0.9)
LYMPHOCYTES # BLD AUTO: 4.98 X10*3/UL (ref 1.2–4.8)
LYMPHOCYTES NFR BLD AUTO: 52.5 %
MCH RBC QN AUTO: 32.1 PG (ref 26–34)
MCHC RBC AUTO-ENTMCNC: 35.2 G/DL (ref 32–36)
MCV RBC AUTO: 91 FL (ref 80–100)
METHADONE UR QL SCN: NORMAL
MONOCYTES # BLD AUTO: 0.62 X10*3/UL (ref 0.1–1)
MONOCYTES NFR BLD AUTO: 6.5 %
NEUTROPHILS # BLD AUTO: 3.67 X10*3/UL (ref 1.2–7.7)
NEUTROPHILS NFR BLD AUTO: 38.8 %
NRBC BLD-RTO: 0 /100 WBCS (ref 0–0)
OPIATES UR QL SCN: NORMAL
OXYCODONE+OXYMORPHONE UR QL SCN: NORMAL
PCP UR QL SCN: NORMAL
PLATELET # BLD AUTO: 266 X10*3/UL (ref 150–450)
POTASSIUM SERPL-SCNC: 4.4 MMOL/L (ref 3.5–5.3)
PREGNANCY TEST URINE, POC: NEGATIVE
PROT SERPL-MCNC: 7.3 G/DL (ref 6.4–8.2)
RBC # BLD AUTO: 4.02 X10*6/UL (ref 4–5.2)
SALICYLATES SERPL-MCNC: <3 MG/DL (ref ?–20)
SODIUM SERPL-SCNC: 137 MMOL/L (ref 136–145)
WBC # BLD AUTO: 9.5 X10*3/UL (ref 4.4–11.3)

## 2025-08-15 PROCEDURE — 80307 DRUG TEST PRSMV CHEM ANLYZR: CPT | Performed by: EMERGENCY MEDICINE

## 2025-08-15 PROCEDURE — 99285 EMERGENCY DEPT VISIT HI MDM: CPT | Performed by: EMERGENCY MEDICINE

## 2025-08-15 PROCEDURE — 93010 ELECTROCARDIOGRAM REPORT: CPT

## 2025-08-15 PROCEDURE — 36415 COLL VENOUS BLD VENIPUNCTURE: CPT | Performed by: EMERGENCY MEDICINE

## 2025-08-15 PROCEDURE — 81025 URINE PREGNANCY TEST: CPT | Performed by: EMERGENCY MEDICINE

## 2025-08-15 PROCEDURE — 85025 COMPLETE CBC W/AUTO DIFF WBC: CPT | Performed by: EMERGENCY MEDICINE

## 2025-08-15 PROCEDURE — 80053 COMPREHEN METABOLIC PANEL: CPT | Performed by: EMERGENCY MEDICINE

## 2025-08-15 PROCEDURE — 93005 ELECTROCARDIOGRAM TRACING: CPT

## 2025-08-15 PROCEDURE — 80320 DRUG SCREEN QUANTALCOHOLS: CPT | Performed by: EMERGENCY MEDICINE

## 2025-08-15 PROCEDURE — 2500000002 HC RX 250 W HCPCS SELF ADMINISTERED DRUGS (ALT 637 FOR MEDICARE OP, ALT 636 FOR OP/ED): Performed by: EMERGENCY MEDICINE

## 2025-08-15 RX ORDER — NITROFURANTOIN 25; 75 MG/1; MG/1
100 CAPSULE ORAL EVERY 12 HOURS SCHEDULED
Status: DISCONTINUED | OUTPATIENT
Start: 2025-08-15 | End: 2025-08-16 | Stop reason: HOSPADM

## 2025-08-15 RX ADMIN — NITROFURANTOIN MONOHYDRATE/MACROCRYSTALLINE 100 MG: 25; 75 CAPSULE ORAL at 23:22

## 2025-08-15 SDOH — HEALTH STABILITY: MENTAL HEALTH: HAVE YOU EVER DONE ANYTHING, STARTED TO DO ANYTHING, OR PREPARED TO DO ANYTHING TO END YOUR LIFE?: NO

## 2025-08-15 SDOH — HEALTH STABILITY: MENTAL HEALTH: ACTIVE SUICIDAL IDEATION WITH SOME INTENT TO ACT, WITHOUT SPECIFIC PLAN (PAST 1 MONTH): NO

## 2025-08-15 SDOH — HEALTH STABILITY: MENTAL HEALTH: IN THE PAST FEW WEEKS, HAVE YOU FELT THAT YOU OR YOUR FAMILY WOULD BE BETTER OFF IF YOU WERE DEAD?: YES

## 2025-08-15 SDOH — HEALTH STABILITY: MENTAL HEALTH: NON-SPECIFIC ACTIVE SUICIDAL THOUGHTS (PAST 1 MONTH): YES

## 2025-08-15 SDOH — HEALTH STABILITY: MENTAL HEALTH
SUICIDAL BEHAVIOR (DESCRIPTION): PER PT MOM, PT HAS TRIED TO JUMP INFRONT OF AN RTA BUS, OVERDOSED ON TYLENOL, AND TRIED TO JUMP OFF A 8 STORY BALCONY

## 2025-08-15 SDOH — HEALTH STABILITY: MENTAL HEALTH: SUICIDAL BEHAVIOR (LIFETIME): YES

## 2025-08-15 SDOH — HEALTH STABILITY: MENTAL HEALTH: BEHAVIORAL HEALTH(WDL): EXCEPTIONS TO WDL

## 2025-08-15 SDOH — HEALTH STABILITY: MENTAL HEALTH
HAVE YOU STARTED TO WORK OUT OR WORKED OUT THE DETAILS OF HOW TO KILL YOURSELF? DO YOU INTENT TO CARRY OUT THIS PLAN?: NO

## 2025-08-15 SDOH — HEALTH STABILITY: MENTAL HEALTH: ACTIVE SUICIDAL IDEATION WITH SPECIFIC PLAN AND INTENT (PAST 1 MONTH): YES

## 2025-08-15 SDOH — HEALTH STABILITY: MENTAL HEALTH: WISH TO BE DEAD (PAST 1 MONTH): YES

## 2025-08-15 SDOH — HEALTH STABILITY: MENTAL HEALTH: BEHAVIORS/MOOD: CALM;COOPERATIVE;SAD

## 2025-08-15 SDOH — HEALTH STABILITY: MENTAL HEALTH: SUICIDAL BEHAVIOR (3 MONTHS): YES

## 2025-08-15 SDOH — HEALTH STABILITY: MENTAL HEALTH: WHEN DID YOU TRY TO KILL YOURSELF?: MULTIPLE TIMES, PT MOM UNSURE OF TIMELINES

## 2025-08-15 SDOH — HEALTH STABILITY: MENTAL HEALTH: HAVE YOU HAD THESE THOUGHTS AND HAD SOME INTENTION OF ACTING ON THEM?: NO

## 2025-08-15 SDOH — HEALTH STABILITY: MENTAL HEALTH: HAVE YOU EVER TRIED TO KILL YOURSELF?: YES

## 2025-08-15 SDOH — HEALTH STABILITY: MENTAL HEALTH
HOW DID YOU TRY TO KILL YOURSELF?: PER PT MOM, PT HAS TRIED TO JUMP INFRONT OF AN RTA BUS, OVERDOSED ON TYLENOL, AND TRIED TO JUMP OFF A 8 STORY BALCONY

## 2025-08-15 SDOH — HEALTH STABILITY: MENTAL HEALTH: DEPRESSION SYMPTOMS: FEELINGS OF HOPELESSESS;FEELINGS OF WORTHLESSNESS;FEELINGS OF HELPLESSNESS

## 2025-08-15 SDOH — HEALTH STABILITY: MENTAL HEALTH: SUICIDE ASSESSMENT: ADULT (C-SSRS)

## 2025-08-15 SDOH — HEALTH STABILITY: MENTAL HEALTH: HALLUCINATION: AUDITORY

## 2025-08-15 SDOH — HEALTH STABILITY: MENTAL HEALTH: IN THE PAST WEEK, HAVE YOU BEEN HAVING THOUGHTS ABOUT KILLING YOURSELF?: YES

## 2025-08-15 SDOH — HEALTH STABILITY: MENTAL HEALTH: HAVE YOU ACTUALLY HAD ANY THOUGHTS OF KILLING YOURSELF?: YES

## 2025-08-15 SDOH — HEALTH STABILITY: MENTAL HEALTH: ANXIETY SYMPTOMS: GENERALIZED

## 2025-08-15 SDOH — ECONOMIC STABILITY: GENERAL

## 2025-08-15 SDOH — HEALTH STABILITY: MENTAL HEALTH: FOR HIGH RISK PATIENTS: 1:1 PATIENT OBSERVER AT ALL TIMES

## 2025-08-15 SDOH — HEALTH STABILITY: MENTAL HEALTH
OTHER SUICIDE PRECAUTIONS INCLUDE: PATIENT PLACED IN AN EASILY OBSERVABLE ROOM WITH DOOR/CURTAIN REMAINING OPEN;PATIENT PLACED IN GOWN (SNAPS OR PAPER GOWNS PREFERRED) AND WANDED;REMAINING RISKS IDENTIFIED AND MITIGATED;PATIENT PLACED IN PSYCH SAFE ROOM (IF AVAILABLE);PROVIDER NOTIFIED;FAMILY/VISITOR ADVISED TO MAINTAIN CONTROL OF OWN PERSONAL BELONGINGS IN ROOM;FREQUENT ROUNDING WITH IRREGULAR CHECKS AT MINIMUM OF EVERY 15 MINUTES TO ASSESS PSYCH SAFETY PERFORMED;HOME MEDICATION LIST COLLECTED AND SHARED WITH PROVIDER;PERSONAL BELONGINGS SECURED;TREATMENT PLAN BASED ON RISK FACTORS DEVELOPED (ED ONLY - IF PATIENT IN ED MORE THAN 8 HOURS);HOURLY BEHAVIORAL ASSESSMENT PERFORMED

## 2025-08-15 SDOH — HEALTH STABILITY: MENTAL HEALTH: IN THE PAST FEW WEEKS, HAVE YOU WISHED YOU WERE DEAD?: YES

## 2025-08-15 SDOH — HEALTH STABILITY: MENTAL HEALTH: HAVE YOU WISHED YOU WERE DEAD OR WISHED YOU COULD GO TO SLEEP AND NOT WAKE UP?: YES

## 2025-08-15 SDOH — ECONOMIC STABILITY: HOUSING INSECURITY: FEELS SAFE LIVING IN HOME: YES

## 2025-08-15 SDOH — HEALTH STABILITY: MENTAL HEALTH: HAVE YOU BEEN THINKING ABOUT HOW YOU MIGHT DO THIS?: YES

## 2025-08-15 SDOH — HEALTH STABILITY: MENTAL HEALTH: DESCRIBE YOUR THOUGHTS OF KILLING YOURSELF RIGHT NOW:: PT REPORTED THAT THEY COULD TAKE PILLS, HAS A PLAN IN MIND

## 2025-08-15 SDOH — HEALTH STABILITY: MENTAL HEALTH: ARE YOU HAVING THOUGHTS OF KILLING YOURSELF RIGHT NOW?: YES

## 2025-08-15 ASSESSMENT — LIFESTYLE VARIABLES
PRESCIPTION_ABUSE_PAST_12_MONTHS: NO
EVER HAD A DRINK FIRST THING IN THE MORNING TO STEADY YOUR NERVES TO GET RID OF A HANGOVER: NO
SUBSTANCE_ABUSE_PAST_12_MONTHS: NO
HAVE YOU EVER FELT YOU SHOULD CUT DOWN ON YOUR DRINKING: NO
HAVE PEOPLE ANNOYED YOU BY CRITICIZING YOUR DRINKING: NO
EVER FELT BAD OR GUILTY ABOUT YOUR DRINKING: NO
TOTAL SCORE: 0

## 2025-08-15 ASSESSMENT — PAIN - FUNCTIONAL ASSESSMENT: PAIN_FUNCTIONAL_ASSESSMENT: 0-10

## 2025-08-15 ASSESSMENT — PAIN SCALES - GENERAL
PAINLEVEL_OUTOF10: 0 - NO PAIN
PAINLEVEL_OUTOF10: 0 - NO PAIN

## 2025-08-16 ENCOUNTER — HOSPITAL ENCOUNTER (INPATIENT)
Facility: HOSPITAL | Age: 25
End: 2025-08-16
Attending: PSYCHIATRY & NEUROLOGY | Admitting: PSYCHIATRY & NEUROLOGY
Payer: MEDICARE

## 2025-08-16 VITALS
HEIGHT: 65 IN | HEART RATE: 68 BPM | DIASTOLIC BLOOD PRESSURE: 75 MMHG | WEIGHT: 252 LBS | TEMPERATURE: 97.3 F | SYSTOLIC BLOOD PRESSURE: 117 MMHG | RESPIRATION RATE: 17 BRPM | OXYGEN SATURATION: 97 % | BODY MASS INDEX: 41.99 KG/M2

## 2025-08-16 DIAGNOSIS — F25.0 SCHIZOAFFECTIVE DISORDER, BIPOLAR TYPE (MULTI): Primary | ICD-10-CM

## 2025-08-16 DIAGNOSIS — N39.0 UTI (URINARY TRACT INFECTION), UNCOMPLICATED: ICD-10-CM

## 2025-08-16 PROBLEM — F23 ACUTE PSYCHOSIS (MULTI): Status: ACTIVE | Noted: 2025-08-16

## 2025-08-16 PROBLEM — F29 PSYCHOSIS, ATYPICAL: Status: ACTIVE | Noted: 2025-08-16

## 2025-08-16 PROBLEM — K59.04 CHRONIC IDIOPATHIC CONSTIPATION: Status: ACTIVE | Noted: 2023-08-30

## 2025-08-16 PROBLEM — F20.9 SCHIZOPHRENIA, CHRONIC CONDITION (MULTI): Chronic | Status: ACTIVE | Noted: 2025-08-16

## 2025-08-16 PROBLEM — R44.3 HALLUCINATIONS: Status: ACTIVE | Noted: 2025-08-16

## 2025-08-16 PROBLEM — Z72.0 VAPES NICOTINE CONTAINING SUBSTANCE: Chronic | Status: ACTIVE | Noted: 2025-08-16

## 2025-08-16 LAB
ATRIAL RATE: 84 BPM
GLUCOSE BLD MANUAL STRIP-MCNC: 107 MG/DL (ref 74–99)
GLUCOSE BLD MANUAL STRIP-MCNC: 116 MG/DL (ref 74–99)
GLUCOSE BLD MANUAL STRIP-MCNC: 88 MG/DL (ref 74–99)
GLUCOSE BLD MANUAL STRIP-MCNC: 93 MG/DL (ref 74–99)
P AXIS: 32 DEGREES
P OFFSET: 203 MS
P ONSET: 155 MS
PR INTERVAL: 126 MS
Q ONSET: 218 MS
QRS COUNT: 14 BEATS
QRS DURATION: 74 MS
QT INTERVAL: 352 MS
QTC CALCULATION(BAZETT): 415 MS
QTC FREDERICIA: 393 MS
R AXIS: 17 DEGREES
T AXIS: 1 DEGREES
T OFFSET: 394 MS
VENTRICULAR RATE: 84 BPM

## 2025-08-16 PROCEDURE — 99223 1ST HOSP IP/OBS HIGH 75: CPT | Performed by: PSYCHIATRY & NEUROLOGY

## 2025-08-16 PROCEDURE — 99222 1ST HOSP IP/OBS MODERATE 55: CPT | Performed by: INTERNAL MEDICINE

## 2025-08-16 PROCEDURE — 82947 ASSAY GLUCOSE BLOOD QUANT: CPT

## 2025-08-16 PROCEDURE — 2500000002 HC RX 250 W HCPCS SELF ADMINISTERED DRUGS (ALT 637 FOR MEDICARE OP, ALT 636 FOR OP/ED): Performed by: PSYCHIATRY & NEUROLOGY

## 2025-08-16 PROCEDURE — G0378 HOSPITAL OBSERVATION PER HR: HCPCS

## 2025-08-16 PROCEDURE — 2500000001 HC RX 250 WO HCPCS SELF ADMINISTERED DRUGS (ALT 637 FOR MEDICARE OP): Performed by: PSYCHIATRY & NEUROLOGY

## 2025-08-16 PROCEDURE — 1240000001 HC SEMI-PRIVATE BH ROOM DAILY

## 2025-08-16 RX ORDER — MIRTAZAPINE 15 MG/1
45 TABLET, FILM COATED ORAL NIGHTLY
Status: DISCONTINUED | OUTPATIENT
Start: 2025-08-16 | End: 2025-08-19

## 2025-08-16 RX ORDER — POLYETHYLENE GLYCOL 3350 17 G/17G
17 POWDER, FOR SOLUTION ORAL DAILY PRN
Status: DISCONTINUED | OUTPATIENT
Start: 2025-08-16 | End: 2025-08-22 | Stop reason: HOSPADM

## 2025-08-16 RX ORDER — DIPHENHYDRAMINE HYDROCHLORIDE 50 MG/ML
50 INJECTION, SOLUTION INTRAMUSCULAR; INTRAVENOUS ONCE AS NEEDED
Status: DISCONTINUED | OUTPATIENT
Start: 2025-08-16 | End: 2025-08-22 | Stop reason: HOSPADM

## 2025-08-16 RX ORDER — METOPROLOL TARTRATE 25 MG/1
25 TABLET, FILM COATED ORAL 2 TIMES DAILY
Status: DISCONTINUED | OUTPATIENT
Start: 2025-08-16 | End: 2025-08-22 | Stop reason: HOSPADM

## 2025-08-16 RX ORDER — HALOPERIDOL 5 MG/1
15 TABLET ORAL 2 TIMES DAILY
Status: DISCONTINUED | OUTPATIENT
Start: 2025-08-16 | End: 2025-08-22 | Stop reason: HOSPADM

## 2025-08-16 RX ORDER — METFORMIN HYDROCHLORIDE 500 MG/1
1000 TABLET, EXTENDED RELEASE ORAL
Status: DISCONTINUED | OUTPATIENT
Start: 2025-08-17 | End: 2025-08-22 | Stop reason: HOSPADM

## 2025-08-16 RX ORDER — TRAZODONE HYDROCHLORIDE 50 MG/1
50 TABLET ORAL NIGHTLY PRN
Status: DISCONTINUED | OUTPATIENT
Start: 2025-08-16 | End: 2025-08-16

## 2025-08-16 RX ORDER — HALOPERIDOL 5 MG/1
5 TABLET ORAL EVERY 6 HOURS PRN
Status: DISCONTINUED | OUTPATIENT
Start: 2025-08-16 | End: 2025-08-22 | Stop reason: HOSPADM

## 2025-08-16 RX ORDER — PRAZOSIN HYDROCHLORIDE 1 MG/1
2 CAPSULE ORAL NIGHTLY
Status: DISCONTINUED | OUTPATIENT
Start: 2025-08-16 | End: 2025-08-16

## 2025-08-16 RX ORDER — METFORMIN HYDROCHLORIDE 500 MG/1
1000 TABLET, EXTENDED RELEASE ORAL
Status: DISCONTINUED | OUTPATIENT
Start: 2025-08-16 | End: 2025-08-16

## 2025-08-16 RX ORDER — BENZTROPINE MESYLATE 1 MG/1
1 TABLET ORAL 2 TIMES DAILY
Status: DISCONTINUED | OUTPATIENT
Start: 2025-08-16 | End: 2025-08-22 | Stop reason: HOSPADM

## 2025-08-16 RX ORDER — INSULIN LISPRO 100 [IU]/ML
20 INJECTION, SOLUTION INTRAVENOUS; SUBCUTANEOUS DAILY
Status: DISCONTINUED | OUTPATIENT
Start: 2025-08-16 | End: 2025-08-16

## 2025-08-16 RX ORDER — HALOPERIDOL 5 MG/1
10 TABLET ORAL EVERY 6 HOURS PRN
Status: DISCONTINUED | OUTPATIENT
Start: 2025-08-16 | End: 2025-08-22 | Stop reason: HOSPADM

## 2025-08-16 RX ORDER — MIDAZOLAM HYDROCHLORIDE 1 MG/ML
2 INJECTION, SOLUTION INTRAMUSCULAR; INTRAVENOUS EVERY 6 HOURS PRN
Status: DISCONTINUED | OUTPATIENT
Start: 2025-08-16 | End: 2025-08-22 | Stop reason: HOSPADM

## 2025-08-16 RX ORDER — NITROFURANTOIN 25; 75 MG/1; MG/1
100 CAPSULE ORAL DAILY
Status: DISCONTINUED | OUTPATIENT
Start: 2025-08-16 | End: 2025-08-22 | Stop reason: HOSPADM

## 2025-08-16 RX ORDER — HYDROXYZINE HYDROCHLORIDE 25 MG/1
50 TABLET, FILM COATED ORAL EVERY 6 HOURS PRN
Status: DISCONTINUED | OUTPATIENT
Start: 2025-08-16 | End: 2025-08-22 | Stop reason: HOSPADM

## 2025-08-16 RX ORDER — DIPHENHYDRAMINE HCL 50 MG
50 CAPSULE ORAL EVERY 6 HOURS PRN
Status: DISCONTINUED | OUTPATIENT
Start: 2025-08-16 | End: 2025-08-22 | Stop reason: HOSPADM

## 2025-08-16 RX ORDER — METFORMIN HYDROCHLORIDE 500 MG/1
1000 TABLET, EXTENDED RELEASE ORAL DAILY
Status: DISCONTINUED | OUTPATIENT
Start: 2025-08-16 | End: 2025-08-16

## 2025-08-16 RX ORDER — TRAZODONE HYDROCHLORIDE 50 MG/1
75 TABLET ORAL NIGHTLY
Status: DISCONTINUED | OUTPATIENT
Start: 2025-08-16 | End: 2025-08-22 | Stop reason: HOSPADM

## 2025-08-16 RX ORDER — PRAZOSIN HYDROCHLORIDE 1 MG/1
3 CAPSULE ORAL NIGHTLY
Status: DISCONTINUED | OUTPATIENT
Start: 2025-08-16 | End: 2025-08-22 | Stop reason: HOSPADM

## 2025-08-16 RX ORDER — HALOPERIDOL LACTATE 5 MG/ML
10 INJECTION, SOLUTION INTRAMUSCULAR EVERY 6 HOURS PRN
Status: DISCONTINUED | OUTPATIENT
Start: 2025-08-16 | End: 2025-08-22 | Stop reason: HOSPADM

## 2025-08-16 RX ORDER — LORAZEPAM 1 MG/1
2 TABLET ORAL EVERY 6 HOURS PRN
Status: DISCONTINUED | OUTPATIENT
Start: 2025-08-16 | End: 2025-08-22 | Stop reason: HOSPADM

## 2025-08-16 RX ORDER — HALOPERIDOL LACTATE 5 MG/ML
5 INJECTION, SOLUTION INTRAMUSCULAR EVERY 6 HOURS PRN
Status: DISCONTINUED | OUTPATIENT
Start: 2025-08-16 | End: 2025-08-22 | Stop reason: HOSPADM

## 2025-08-16 RX ORDER — DIVALPROEX SODIUM 500 MG/1
500 TABLET, DELAYED RELEASE ORAL EVERY 12 HOURS SCHEDULED
Status: DISCONTINUED | OUTPATIENT
Start: 2025-08-16 | End: 2025-08-22 | Stop reason: HOSPADM

## 2025-08-16 RX ADMIN — METOPROLOL TARTRATE 25 MG: 25 TABLET, FILM COATED ORAL at 21:09

## 2025-08-16 RX ADMIN — BENZTROPINE MESYLATE 1 MG: 1 TABLET ORAL at 21:09

## 2025-08-16 RX ADMIN — DIVALPROEX SODIUM 500 MG: 500 TABLET, DELAYED RELEASE ORAL at 21:08

## 2025-08-16 RX ADMIN — BENZTROPINE MESYLATE 1 MG: 1 TABLET ORAL at 12:52

## 2025-08-16 RX ADMIN — METOPROLOL TARTRATE 25 MG: 25 TABLET, FILM COATED ORAL at 12:52

## 2025-08-16 RX ADMIN — TRAZODONE HYDROCHLORIDE 75 MG: 50 TABLET ORAL at 21:08

## 2025-08-16 RX ADMIN — MIRTAZAPINE 45 MG: 15 TABLET, FILM COATED ORAL at 21:08

## 2025-08-16 RX ADMIN — NITROFURANTOIN MONOHYDRATE/MACROCRYSTALLINE 100 MG: 25; 75 CAPSULE ORAL at 12:54

## 2025-08-16 RX ADMIN — HALOPERIDOL 15 MG: 5 TABLET ORAL at 21:08

## 2025-08-16 RX ADMIN — HYDROXYZINE HYDROCHLORIDE 50 MG: 25 TABLET, FILM COATED ORAL at 17:01

## 2025-08-16 RX ADMIN — PRAZOSIN HYDROCHLORIDE 3 MG: 1 CAPSULE ORAL at 21:08

## 2025-08-16 RX ADMIN — DIVALPROEX SODIUM 500 MG: 500 TABLET, DELAYED RELEASE ORAL at 12:53

## 2025-08-16 RX ADMIN — HALOPERIDOL 15 MG: 5 TABLET ORAL at 12:52

## 2025-08-16 SDOH — HEALTH STABILITY: PHYSICAL HEALTH
HOW OFTEN DO YOU NEED TO HAVE SOMEONE HELP YOU WHEN YOU READ INSTRUCTIONS, PAMPHLETS, OR OTHER WRITTEN MATERIAL FROM YOUR DOCTOR OR PHARMACY?: SOMETIMES

## 2025-08-16 SDOH — SOCIAL STABILITY: SOCIAL INSECURITY: HAS ANYONE EVER THREATENED TO HURT YOUR FAMILY OR YOUR PETS?: NO

## 2025-08-16 SDOH — SOCIAL STABILITY: SOCIAL INSECURITY: FAMILY BEHAVIORS: UNABLE TO ASSESS

## 2025-08-16 SDOH — HEALTH STABILITY: PHYSICAL HEALTH: ON AVERAGE, HOW MANY MINUTES DO YOU ENGAGE IN EXERCISE AT THIS LEVEL?: 0 MIN

## 2025-08-16 SDOH — ECONOMIC STABILITY: INCOME INSECURITY: IN THE PAST 12 MONTHS HAS THE ELECTRIC, GAS, OIL, OR WATER COMPANY THREATENED TO SHUT OFF SERVICES IN YOUR HOME?: NO

## 2025-08-16 SDOH — HEALTH STABILITY: MENTAL HEALTH: HAVE YOU HAD THESE THOUGHTS AND HAD SOME INTENTION OF ACTING ON THEM?: YES

## 2025-08-16 SDOH — SOCIAL STABILITY: SOCIAL INSECURITY: WITHIN THE LAST YEAR, HAVE YOU BEEN AFRAID OF YOUR PARTNER OR EX-PARTNER?: NO

## 2025-08-16 SDOH — HEALTH STABILITY: MENTAL HEALTH: HOW OFTEN DO YOU HAVE A DRINK CONTAINING ALCOHOL?: NEVER

## 2025-08-16 SDOH — HEALTH STABILITY: MENTAL HEALTH: RISK OF SUICIDE: HIGH RISK

## 2025-08-16 SDOH — HEALTH STABILITY: PHYSICAL HEALTH: ON AVERAGE, HOW MANY DAYS PER WEEK DO YOU ENGAGE IN MODERATE TO STRENUOUS EXERCISE (LIKE A BRISK WALK)?: 0 DAYS

## 2025-08-16 SDOH — ECONOMIC STABILITY: HOUSING INSECURITY: AT ANY TIME IN THE PAST 12 MONTHS, WERE YOU HOMELESS OR LIVING IN A SHELTER (INCLUDING NOW)?: NO

## 2025-08-16 SDOH — SOCIAL STABILITY: SOCIAL INSECURITY: DO YOU FEEL UNSAFE GOING BACK TO THE PLACE WHERE YOU ARE LIVING?: NO

## 2025-08-16 SDOH — HEALTH STABILITY: MENTAL HEALTH
DO YOU FEEL STRESS - TENSE, RESTLESS, NERVOUS, OR ANXIOUS, OR UNABLE TO SLEEP AT NIGHT BECAUSE YOUR MIND IS TROUBLED ALL THE TIME - THESE DAYS?: TO SOME EXTENT

## 2025-08-16 SDOH — HEALTH STABILITY: MENTAL HEALTH: HAVE YOU BEEN THINKING ABOUT HOW YOU MIGHT DO THIS?: YES

## 2025-08-16 SDOH — ECONOMIC STABILITY: FOOD INSECURITY: WITHIN THE PAST 12 MONTHS, YOU WORRIED THAT YOUR FOOD WOULD RUN OUT BEFORE YOU GOT THE MONEY TO BUY MORE.: NEVER TRUE

## 2025-08-16 SDOH — HEALTH STABILITY: MENTAL HEALTH: HOW OFTEN DO YOU HAVE SIX OR MORE DRINKS ON ONE OCCASION?: NEVER

## 2025-08-16 SDOH — SOCIAL STABILITY: SOCIAL NETWORK
DO YOU BELONG TO ANY CLUBS OR ORGANIZATIONS SUCH AS CHURCH GROUPS, UNIONS, FRATERNAL OR ATHLETIC GROUPS, OR SCHOOL GROUPS?: NO

## 2025-08-16 SDOH — ECONOMIC STABILITY: HOUSING INSECURITY: IN THE LAST 12 MONTHS, WAS THERE A TIME WHEN YOU WERE NOT ABLE TO PAY THE MORTGAGE OR RENT ON TIME?: NO

## 2025-08-16 SDOH — SOCIAL STABILITY: SOCIAL INSECURITY: ARE YOU MARRIED, WIDOWED, DIVORCED, SEPARATED, NEVER MARRIED, OR LIVING WITH A PARTNER?: NEVER MARRIED

## 2025-08-16 SDOH — SOCIAL STABILITY: SOCIAL NETWORK: HOW OFTEN DO YOU ATTEND CHURCH OR RELIGIOUS SERVICES?: NEVER

## 2025-08-16 SDOH — SOCIAL STABILITY: SOCIAL INSECURITY: ARE THERE ANY APPARENT SIGNS OF INJURIES/BEHAVIORS THAT COULD BE RELATED TO ABUSE/NEGLECT?: NO

## 2025-08-16 SDOH — HEALTH STABILITY: MENTAL HEALTH: HALLUCINATION: AUDITORY;COMMAND

## 2025-08-16 SDOH — SOCIAL STABILITY: SOCIAL INSECURITY: WERE YOU ABLE TO COMPLETE ALL THE BEHAVIORAL HEALTH SCREENINGS?: YES

## 2025-08-16 SDOH — HEALTH STABILITY: MENTAL HEALTH: HAVE YOU WISHED YOU WERE DEAD OR WISHED YOU COULD GO TO SLEEP AND NOT WAKE UP?: YES

## 2025-08-16 SDOH — SOCIAL STABILITY: SOCIAL NETWORK: HOW OFTEN DO YOU ATTEND MEETINGS OF THE CLUBS OR ORGANIZATIONS YOU BELONG TO?: NEVER

## 2025-08-16 SDOH — ECONOMIC STABILITY: FOOD INSECURITY: WITHIN THE PAST 12 MONTHS, THE FOOD YOU BOUGHT JUST DIDN'T LAST AND YOU DIDN'T HAVE MONEY TO GET MORE.: NEVER TRUE

## 2025-08-16 SDOH — SOCIAL STABILITY: SOCIAL INSECURITY: DOES ANYONE TRY TO KEEP YOU FROM HAVING/CONTACTING OTHER FRIENDS OR DOING THINGS OUTSIDE YOUR HOME?: NO

## 2025-08-16 SDOH — ECONOMIC STABILITY: FOOD INSECURITY: HOW HARD IS IT FOR YOU TO PAY FOR THE VERY BASICS LIKE FOOD, HOUSING, MEDICAL CARE, AND HEATING?: NOT HARD AT ALL

## 2025-08-16 SDOH — SOCIAL STABILITY: SOCIAL INSECURITY: WITHIN THE LAST YEAR, HAVE YOU BEEN HUMILIATED OR EMOTIONALLY ABUSED IN OTHER WAYS BY YOUR PARTNER OR EX-PARTNER?: NO

## 2025-08-16 SDOH — ECONOMIC STABILITY: HOUSING INSECURITY: IN THE PAST 12 MONTHS, HOW MANY TIMES HAVE YOU MOVED WHERE YOU WERE LIVING?: 0

## 2025-08-16 SDOH — SOCIAL STABILITY: SOCIAL NETWORK: HOW OFTEN DO YOU GET TOGETHER WITH FRIENDS OR RELATIVES?: MORE THAN THREE TIMES A WEEK

## 2025-08-16 SDOH — HEALTH STABILITY: MENTAL HEALTH: HAVE YOU ACTUALLY HAD ANY THOUGHTS OF KILLING YOURSELF?: YES

## 2025-08-16 SDOH — SOCIAL STABILITY: SOCIAL INSECURITY: POSSIBLE ABUSE REPORTED TO:: OTHER (COMMENT)

## 2025-08-16 SDOH — SOCIAL STABILITY: SOCIAL INSECURITY: DO YOU FEEL ANYONE HAS EXPLOITED OR TAKEN ADVANTAGE OF YOU FINANCIALLY OR OF YOUR PERSONAL PROPERTY?: NO

## 2025-08-16 SDOH — SOCIAL STABILITY: SOCIAL INSECURITY: ABUSE: ADULT

## 2025-08-16 SDOH — SOCIAL STABILITY: SOCIAL INSECURITY
WITHIN THE LAST YEAR, HAVE YOU BEEN RAPED OR FORCED TO HAVE ANY KIND OF SEXUAL ACTIVITY BY YOUR PARTNER OR EX-PARTNER?: NO

## 2025-08-16 SDOH — HEALTH STABILITY: MENTAL HEALTH: HOW MANY DRINKS CONTAINING ALCOHOL DO YOU HAVE ON A TYPICAL DAY WHEN YOU ARE DRINKING?: PATIENT DOES NOT DRINK

## 2025-08-16 SDOH — HEALTH STABILITY: MENTAL HEALTH: EXPERIENCED ANY OF THE FOLLOWING LIFE EVENTS: SEXUAL ASSAULT

## 2025-08-16 SDOH — HEALTH STABILITY: MENTAL HEALTH: HAVE YOU EVER DONE ANYTHING, STARTED TO DO ANYTHING, OR PREPARED TO DO ANYTHING TO END YOUR LIFE?: YES

## 2025-08-16 SDOH — SOCIAL STABILITY: SOCIAL NETWORK: VISITOR BEHAVIORS: UNABLE TO ASSESS

## 2025-08-16 SDOH — HEALTH STABILITY: MENTAL HEALTH: FOR HIGH RISK PATIENTS: 1:1 PATIENT OBSERVER AT ALL TIMES

## 2025-08-16 SDOH — SOCIAL STABILITY: SOCIAL INSECURITY: ARE YOU OR HAVE YOU BEEN THREATENED OR ABUSED PHYSICALLY, EMOTIONALLY, OR SEXUALLY BY ANYONE?: NO

## 2025-08-16 SDOH — SOCIAL STABILITY: SOCIAL INSECURITY: HAVE YOU HAD ANY THOUGHTS OF HARMING ANYONE ELSE?: YES

## 2025-08-16 SDOH — SOCIAL STABILITY: SOCIAL INSECURITY: HAVE YOU HAD THOUGHTS OF HARMING ANYONE ELSE?: NO

## 2025-08-16 SDOH — ECONOMIC STABILITY: TRANSPORTATION INSECURITY: IN THE PAST 12 MONTHS, HAS LACK OF TRANSPORTATION KEPT YOU FROM MEDICAL APPOINTMENTS OR FROM GETTING MEDICATIONS?: NO

## 2025-08-16 SDOH — SOCIAL STABILITY: SOCIAL INSECURITY: HAVE YOU HAD ANY THOUGHTS OF HARMING ANYONE ELSE?: NO

## 2025-08-16 SDOH — HEALTH STABILITY: MENTAL HEALTH: BEHAVIORS/MOOD: APPROPRIATE FOR SITUATION;PLEASANT

## 2025-08-16 SDOH — HEALTH STABILITY: MENTAL HEALTH: EXPERIENCED ANY OF THE FOLLOWING LIFE EVENTS: SEXUAL ASSAULT;DEATH OF FAMILY/FRIEND

## 2025-08-16 SDOH — HEALTH STABILITY: MENTAL HEALTH: SLEEP PATTERN: UNABLE TO ASSESS

## 2025-08-16 ASSESSMENT — PAIN - FUNCTIONAL ASSESSMENT
PAIN_FUNCTIONAL_ASSESSMENT: 0-10
PAIN_FUNCTIONAL_ASSESSMENT: 0-10

## 2025-08-16 ASSESSMENT — LIFESTYLE VARIABLES
SKIP TO QUESTIONS 9-10: 1
HOW MANY STANDARD DRINKS CONTAINING ALCOHOL DO YOU HAVE ON A TYPICAL DAY: PATIENT DOES NOT DRINK
HOW OFTEN DO YOU HAVE 6 OR MORE DRINKS ON ONE OCCASION: NEVER
AUDIT-C TOTAL SCORE: 0
AGITATION: NORMAL ACTIVITY
ANXIETY: NO ANXIETY, AT EASE
AUDITORY DISTURBANCES: NOT PRESENT
PAROXYSMAL SWEATS: NO SWEAT VISIBLE
AUDIT-C TOTAL SCORE: 0
SUBSTANCE_ABUSE_PAST_12_MONTHS: NO
CIWA TOTAL SCORE: 0
SUBSTANCE_ABUSE_PAST_12_MONTHS: NO
TREMOR: NO TREMOR
HOW OFTEN DO YOU HAVE A DRINK CONTAINING ALCOHOL: NEVER
HOW MANY STANDARD DRINKS CONTAINING ALCOHOL DO YOU HAVE ON A TYPICAL DAY: PATIENT DOES NOT DRINK
AUDIT-C TOTAL SCORE: 0
SKIP TO QUESTIONS 9-10: 1
AUDIT-C TOTAL SCORE: 0
VISUAL DISTURBANCES: NOT PRESENT
AUDIT-C TOTAL SCORE: 0
HOW OFTEN DO YOU HAVE 6 OR MORE DRINKS ON ONE OCCASION: NEVER
PRESCIPTION_ABUSE_PAST_12_MONTHS: NO
PRESCIPTION_ABUSE_PAST_12_MONTHS: NO
ORIENTATION AND CLOUDING OF SENSORIUM: ORIENTED AND CAN DO SERIAL ADDITIONS
NAUSEA AND VOMITING: NO NAUSEA AND NO VOMITING
TOTAL_SCORE: 2
HEADACHE, FULLNESS IN HEAD: NOT PRESENT
SKIP TO QUESTIONS 9-10: 1
HOW OFTEN DO YOU HAVE A DRINK CONTAINING ALCOHOL: NEVER

## 2025-08-16 ASSESSMENT — PAIN SCALES - GENERAL
PAINLEVEL_OUTOF10: 0 - NO PAIN
PAINLEVEL_OUTOF10: 0 - NO PAIN

## 2025-08-16 ASSESSMENT — ACTIVITIES OF DAILY LIVING (ADL)
DRESSING YOURSELF: INDEPENDENT
PATIENT'S MEMORY ADEQUATE TO SAFELY COMPLETE DAILY ACTIVITIES?: YES
WALKS IN HOME: INDEPENDENT
JUDGMENT_ADEQUATE_SAFELY_COMPLETE_DAILY_ACTIVITIES: YES
BATHING: INDEPENDENT
GROOMING: INDEPENDENT
LACK_OF_TRANSPORTATION: NO
FEEDING YOURSELF: INDEPENDENT
HEARING - RIGHT EAR: FUNCTIONAL
ADEQUATE_TO_COMPLETE_ADL: YES
TOILETING: INDEPENDENT
LACK_OF_TRANSPORTATION: NO
HEARING - LEFT EAR: FUNCTIONAL

## 2025-08-16 ASSESSMENT — ENCOUNTER SYMPTOMS: CONSTIPATION: 1

## 2025-08-16 ASSESSMENT — PATIENT HEALTH QUESTIONNAIRE - PHQ9
1. LITTLE INTEREST OR PLEASURE IN DOING THINGS: SEVERAL DAYS
2. FEELING DOWN, DEPRESSED OR HOPELESS: SEVERAL DAYS
SUM OF ALL RESPONSES TO PHQ9 QUESTIONS 1 & 2: 2

## 2025-08-17 VITALS
TEMPERATURE: 97.3 F | DIASTOLIC BLOOD PRESSURE: 95 MMHG | WEIGHT: 226.41 LBS | SYSTOLIC BLOOD PRESSURE: 135 MMHG | RESPIRATION RATE: 16 BRPM | OXYGEN SATURATION: 97 % | HEIGHT: 67 IN | HEART RATE: 97 BPM | BODY MASS INDEX: 35.54 KG/M2

## 2025-08-17 LAB
25(OH)D3 SERPL-MCNC: 42 NG/ML (ref 30–100)
APPEARANCE UR: CLEAR
BILIRUB UR STRIP.AUTO-MCNC: NEGATIVE MG/DL
COLOR UR: NORMAL
GLUCOSE BLD MANUAL STRIP-MCNC: 101 MG/DL (ref 74–99)
GLUCOSE BLD MANUAL STRIP-MCNC: 103 MG/DL (ref 74–99)
GLUCOSE BLD MANUAL STRIP-MCNC: 77 MG/DL (ref 74–99)
GLUCOSE BLD MANUAL STRIP-MCNC: 88 MG/DL (ref 74–99)
GLUCOSE P FAST SERPL-MCNC: 89 MG/DL (ref 74–99)
GLUCOSE UR STRIP.AUTO-MCNC: NORMAL MG/DL
KETONES UR STRIP.AUTO-MCNC: NEGATIVE MG/DL
LEUKOCYTE ESTERASE UR QL STRIP.AUTO: NEGATIVE
NITRITE UR QL STRIP.AUTO: NEGATIVE
PH UR STRIP.AUTO: 7 [PH]
PROT UR STRIP.AUTO-MCNC: NEGATIVE MG/DL
RBC # UR STRIP.AUTO: NEGATIVE MG/DL
SP GR UR STRIP.AUTO: 1.01
UROBILINOGEN UR STRIP.AUTO-MCNC: NORMAL MG/DL

## 2025-08-17 PROCEDURE — 82947 ASSAY GLUCOSE BLOOD QUANT: CPT

## 2025-08-17 PROCEDURE — 97165 OT EVAL LOW COMPLEX 30 MIN: CPT | Mod: GO | Performed by: OCCUPATIONAL THERAPIST

## 2025-08-17 PROCEDURE — 2500000002 HC RX 250 W HCPCS SELF ADMINISTERED DRUGS (ALT 637 FOR MEDICARE OP, ALT 636 FOR OP/ED): Performed by: PSYCHIATRY & NEUROLOGY

## 2025-08-17 PROCEDURE — 81003 URINALYSIS AUTO W/O SCOPE: CPT | Performed by: INTERNAL MEDICINE

## 2025-08-17 PROCEDURE — 99232 SBSQ HOSP IP/OBS MODERATE 35: CPT | Performed by: PSYCHIATRY & NEUROLOGY

## 2025-08-17 PROCEDURE — 99221 1ST HOSP IP/OBS SF/LOW 40: CPT | Performed by: INTERNAL MEDICINE

## 2025-08-17 PROCEDURE — 82306 VITAMIN D 25 HYDROXY: CPT | Mod: GEALAB | Performed by: PSYCHIATRY & NEUROLOGY

## 2025-08-17 PROCEDURE — 2500000001 HC RX 250 WO HCPCS SELF ADMINISTERED DRUGS (ALT 637 FOR MEDICARE OP): Performed by: INTERNAL MEDICINE

## 2025-08-17 PROCEDURE — 36415 COLL VENOUS BLD VENIPUNCTURE: CPT | Performed by: PSYCHIATRY & NEUROLOGY

## 2025-08-17 PROCEDURE — 82947 ASSAY GLUCOSE BLOOD QUANT: CPT | Performed by: PSYCHIATRY & NEUROLOGY

## 2025-08-17 PROCEDURE — 1240000001 HC SEMI-PRIVATE BH ROOM DAILY

## 2025-08-17 PROCEDURE — 2500000001 HC RX 250 WO HCPCS SELF ADMINISTERED DRUGS (ALT 637 FOR MEDICARE OP): Performed by: PSYCHIATRY & NEUROLOGY

## 2025-08-17 RX ADMIN — BENZTROPINE MESYLATE 1 MG: 1 TABLET ORAL at 08:25

## 2025-08-17 RX ADMIN — METFORMIN HYDROCHLORIDE 1000 MG: 500 TABLET, EXTENDED RELEASE ORAL at 08:25

## 2025-08-17 RX ADMIN — METOPROLOL TARTRATE 25 MG: 25 TABLET, FILM COATED ORAL at 08:25

## 2025-08-17 RX ADMIN — MIRTAZAPINE 45 MG: 15 TABLET, FILM COATED ORAL at 20:56

## 2025-08-17 RX ADMIN — BENZTROPINE MESYLATE 1 MG: 1 TABLET ORAL at 20:56

## 2025-08-17 RX ADMIN — TRAZODONE HYDROCHLORIDE 75 MG: 50 TABLET ORAL at 20:56

## 2025-08-17 RX ADMIN — HALOPERIDOL 15 MG: 5 TABLET ORAL at 08:25

## 2025-08-17 RX ADMIN — METOPROLOL TARTRATE 25 MG: 25 TABLET, FILM COATED ORAL at 20:56

## 2025-08-17 RX ADMIN — HALOPERIDOL 15 MG: 5 TABLET ORAL at 20:55

## 2025-08-17 RX ADMIN — DIVALPROEX SODIUM 500 MG: 500 TABLET, DELAYED RELEASE ORAL at 08:25

## 2025-08-17 RX ADMIN — PRAZOSIN HYDROCHLORIDE 3 MG: 1 CAPSULE ORAL at 20:55

## 2025-08-17 RX ADMIN — DIVALPROEX SODIUM 500 MG: 500 TABLET, DELAYED RELEASE ORAL at 20:55

## 2025-08-17 RX ADMIN — LINACLOTIDE 72 MCG: 72 CAPSULE, GELATIN COATED ORAL at 06:59

## 2025-08-17 RX ADMIN — NITROFURANTOIN MONOHYDRATE/MACROCRYSTALLINE 100 MG: 25; 75 CAPSULE ORAL at 08:25

## 2025-08-17 ASSESSMENT — PAIN - FUNCTIONAL ASSESSMENT
PAIN_FUNCTIONAL_ASSESSMENT: 0-10
PAIN_FUNCTIONAL_ASSESSMENT: 0-10

## 2025-08-17 ASSESSMENT — PAIN SCALES - GENERAL
PAINLEVEL_OUTOF10: 0 - NO PAIN
PAINLEVEL_OUTOF10: 0 - NO PAIN

## 2025-08-18 LAB
GLUCOSE BLD MANUAL STRIP-MCNC: 137 MG/DL (ref 74–99)
GLUCOSE BLD MANUAL STRIP-MCNC: 84 MG/DL (ref 74–99)
GLUCOSE BLD MANUAL STRIP-MCNC: 95 MG/DL (ref 74–99)
GLUCOSE BLD MANUAL STRIP-MCNC: 97 MG/DL (ref 74–99)
HOLD SPECIMEN: NORMAL

## 2025-08-18 PROCEDURE — 82947 ASSAY GLUCOSE BLOOD QUANT: CPT

## 2025-08-18 PROCEDURE — 2500000001 HC RX 250 WO HCPCS SELF ADMINISTERED DRUGS (ALT 637 FOR MEDICARE OP): Performed by: INTERNAL MEDICINE

## 2025-08-18 PROCEDURE — 2500000001 HC RX 250 WO HCPCS SELF ADMINISTERED DRUGS (ALT 637 FOR MEDICARE OP): Performed by: PSYCHIATRY & NEUROLOGY

## 2025-08-18 PROCEDURE — 2500000002 HC RX 250 W HCPCS SELF ADMINISTERED DRUGS (ALT 637 FOR MEDICARE OP, ALT 636 FOR OP/ED): Performed by: PSYCHIATRY & NEUROLOGY

## 2025-08-18 PROCEDURE — 97150 GROUP THERAPEUTIC PROCEDURES: CPT | Mod: GO,CO

## 2025-08-18 PROCEDURE — 1240000001 HC SEMI-PRIVATE BH ROOM DAILY

## 2025-08-18 PROCEDURE — 99233 SBSQ HOSP IP/OBS HIGH 50: CPT | Performed by: PSYCHIATRY & NEUROLOGY

## 2025-08-18 RX ADMIN — DIVALPROEX SODIUM 500 MG: 500 TABLET, DELAYED RELEASE ORAL at 08:48

## 2025-08-18 RX ADMIN — TRAZODONE HYDROCHLORIDE 75 MG: 50 TABLET ORAL at 21:20

## 2025-08-18 RX ADMIN — BENZTROPINE MESYLATE 1 MG: 1 TABLET ORAL at 08:48

## 2025-08-18 RX ADMIN — METOPROLOL TARTRATE 25 MG: 25 TABLET, FILM COATED ORAL at 21:21

## 2025-08-18 RX ADMIN — PRAZOSIN HYDROCHLORIDE 3 MG: 1 CAPSULE ORAL at 21:21

## 2025-08-18 RX ADMIN — MIRTAZAPINE 45 MG: 15 TABLET, FILM COATED ORAL at 21:20

## 2025-08-18 RX ADMIN — HALOPERIDOL 15 MG: 5 TABLET ORAL at 08:48

## 2025-08-18 RX ADMIN — BENZTROPINE MESYLATE 1 MG: 1 TABLET ORAL at 21:20

## 2025-08-18 RX ADMIN — DIVALPROEX SODIUM 500 MG: 500 TABLET, DELAYED RELEASE ORAL at 21:21

## 2025-08-18 RX ADMIN — LINACLOTIDE 72 MCG: 72 CAPSULE, GELATIN COATED ORAL at 06:36

## 2025-08-18 RX ADMIN — METFORMIN HYDROCHLORIDE 1000 MG: 500 TABLET, EXTENDED RELEASE ORAL at 08:48

## 2025-08-18 RX ADMIN — HALOPERIDOL 15 MG: 5 TABLET ORAL at 21:21

## 2025-08-18 RX ADMIN — NITROFURANTOIN MONOHYDRATE/MACROCRYSTALLINE 100 MG: 25; 75 CAPSULE ORAL at 08:48

## 2025-08-18 ASSESSMENT — PAIN SCALES - GENERAL
PAINLEVEL_OUTOF10: 0 - NO PAIN
PAINLEVEL_OUTOF10: 0 - NO PAIN

## 2025-08-18 ASSESSMENT — PAIN - FUNCTIONAL ASSESSMENT
PAIN_FUNCTIONAL_ASSESSMENT: 0-10
PAIN_FUNCTIONAL_ASSESSMENT: 0-10

## 2025-08-19 LAB — GLUCOSE BLD MANUAL STRIP-MCNC: 87 MG/DL (ref 74–99)

## 2025-08-19 PROCEDURE — 2500000001 HC RX 250 WO HCPCS SELF ADMINISTERED DRUGS (ALT 637 FOR MEDICARE OP): Performed by: PSYCHIATRY & NEUROLOGY

## 2025-08-19 PROCEDURE — 97150 GROUP THERAPEUTIC PROCEDURES: CPT | Mod: GO,CO

## 2025-08-19 PROCEDURE — 82947 ASSAY GLUCOSE BLOOD QUANT: CPT

## 2025-08-19 PROCEDURE — 2500000002 HC RX 250 W HCPCS SELF ADMINISTERED DRUGS (ALT 637 FOR MEDICARE OP, ALT 636 FOR OP/ED): Performed by: PSYCHIATRY & NEUROLOGY

## 2025-08-19 PROCEDURE — 2500000001 HC RX 250 WO HCPCS SELF ADMINISTERED DRUGS (ALT 637 FOR MEDICARE OP): Performed by: INTERNAL MEDICINE

## 2025-08-19 PROCEDURE — 99233 SBSQ HOSP IP/OBS HIGH 50: CPT | Performed by: PSYCHIATRY & NEUROLOGY

## 2025-08-19 PROCEDURE — 1240000001 HC SEMI-PRIVATE BH ROOM DAILY

## 2025-08-19 RX ORDER — TRAZODONE HYDROCHLORIDE 50 MG/1
25 TABLET ORAL NIGHTLY PRN
Status: DISCONTINUED | OUTPATIENT
Start: 2025-08-19 | End: 2025-08-22 | Stop reason: HOSPADM

## 2025-08-19 RX ADMIN — METFORMIN HYDROCHLORIDE 1000 MG: 500 TABLET, EXTENDED RELEASE ORAL at 08:53

## 2025-08-19 RX ADMIN — DIVALPROEX SODIUM 500 MG: 500 TABLET, DELAYED RELEASE ORAL at 21:06

## 2025-08-19 RX ADMIN — TRAZODONE HYDROCHLORIDE 75 MG: 50 TABLET ORAL at 21:07

## 2025-08-19 RX ADMIN — METOPROLOL TARTRATE 25 MG: 25 TABLET, FILM COATED ORAL at 21:07

## 2025-08-19 RX ADMIN — METOPROLOL TARTRATE 25 MG: 25 TABLET, FILM COATED ORAL at 08:53

## 2025-08-19 RX ADMIN — BENZTROPINE MESYLATE 1 MG: 1 TABLET ORAL at 21:06

## 2025-08-19 RX ADMIN — BENZTROPINE MESYLATE 1 MG: 1 TABLET ORAL at 08:53

## 2025-08-19 RX ADMIN — DIVALPROEX SODIUM 500 MG: 500 TABLET, DELAYED RELEASE ORAL at 08:53

## 2025-08-19 RX ADMIN — HALOPERIDOL 15 MG: 5 TABLET ORAL at 21:07

## 2025-08-19 RX ADMIN — NITROFURANTOIN MONOHYDRATE/MACROCRYSTALLINE 100 MG: 25; 75 CAPSULE ORAL at 08:53

## 2025-08-19 RX ADMIN — PRAZOSIN HYDROCHLORIDE 3 MG: 1 CAPSULE ORAL at 21:07

## 2025-08-19 RX ADMIN — HYDROXYZINE HYDROCHLORIDE 50 MG: 25 TABLET, FILM COATED ORAL at 21:07

## 2025-08-19 RX ADMIN — HALOPERIDOL 15 MG: 5 TABLET ORAL at 08:53

## 2025-08-19 RX ADMIN — LINACLOTIDE 72 MCG: 72 CAPSULE, GELATIN COATED ORAL at 06:46

## 2025-08-19 ASSESSMENT — PAIN SCALES - GENERAL
PAINLEVEL_OUTOF10: 0 - NO PAIN
PAINLEVEL_OUTOF10: 0 - NO PAIN

## 2025-08-19 ASSESSMENT — PAIN - FUNCTIONAL ASSESSMENT
PAIN_FUNCTIONAL_ASSESSMENT: 0-10
PAIN_FUNCTIONAL_ASSESSMENT: 0-10

## 2025-08-20 PROCEDURE — 2500000002 HC RX 250 W HCPCS SELF ADMINISTERED DRUGS (ALT 637 FOR MEDICARE OP, ALT 636 FOR OP/ED): Performed by: PSYCHIATRY & NEUROLOGY

## 2025-08-20 PROCEDURE — 2500000001 HC RX 250 WO HCPCS SELF ADMINISTERED DRUGS (ALT 637 FOR MEDICARE OP): Performed by: PSYCHIATRY & NEUROLOGY

## 2025-08-20 PROCEDURE — 97150 GROUP THERAPEUTIC PROCEDURES: CPT | Mod: GO,CO

## 2025-08-20 PROCEDURE — 99233 SBSQ HOSP IP/OBS HIGH 50: CPT | Performed by: PSYCHIATRY & NEUROLOGY

## 2025-08-20 PROCEDURE — 2500000001 HC RX 250 WO HCPCS SELF ADMINISTERED DRUGS (ALT 637 FOR MEDICARE OP): Performed by: INTERNAL MEDICINE

## 2025-08-20 PROCEDURE — 1240000001 HC SEMI-PRIVATE BH ROOM DAILY

## 2025-08-20 RX ADMIN — HYDROXYZINE HYDROCHLORIDE 50 MG: 25 TABLET, FILM COATED ORAL at 12:44

## 2025-08-20 RX ADMIN — DIVALPROEX SODIUM 500 MG: 500 TABLET, DELAYED RELEASE ORAL at 21:18

## 2025-08-20 RX ADMIN — BENZTROPINE MESYLATE 1 MG: 1 TABLET ORAL at 21:18

## 2025-08-20 RX ADMIN — METOPROLOL TARTRATE 25 MG: 25 TABLET, FILM COATED ORAL at 21:18

## 2025-08-20 RX ADMIN — DIVALPROEX SODIUM 500 MG: 500 TABLET, DELAYED RELEASE ORAL at 08:49

## 2025-08-20 RX ADMIN — HALOPERIDOL 15 MG: 5 TABLET ORAL at 21:18

## 2025-08-20 RX ADMIN — HALOPERIDOL 15 MG: 5 TABLET ORAL at 08:49

## 2025-08-20 RX ADMIN — PRAZOSIN HYDROCHLORIDE 3 MG: 1 CAPSULE ORAL at 21:17

## 2025-08-20 RX ADMIN — METOPROLOL TARTRATE 25 MG: 25 TABLET, FILM COATED ORAL at 08:49

## 2025-08-20 RX ADMIN — NITROFURANTOIN MONOHYDRATE/MACROCRYSTALLINE 100 MG: 25; 75 CAPSULE ORAL at 08:49

## 2025-08-20 RX ADMIN — BENZTROPINE MESYLATE 1 MG: 1 TABLET ORAL at 08:49

## 2025-08-20 RX ADMIN — METFORMIN HYDROCHLORIDE 1000 MG: 500 TABLET, EXTENDED RELEASE ORAL at 08:49

## 2025-08-20 RX ADMIN — TRAZODONE HYDROCHLORIDE 75 MG: 50 TABLET ORAL at 21:18

## 2025-08-20 ASSESSMENT — PAIN SCALES - GENERAL
PAINLEVEL_OUTOF10: 0 - NO PAIN
PAINLEVEL_OUTOF10: 0 - NO PAIN

## 2025-08-20 ASSESSMENT — PAIN - FUNCTIONAL ASSESSMENT
PAIN_FUNCTIONAL_ASSESSMENT: 0-10
PAIN_FUNCTIONAL_ASSESSMENT: 0-10

## 2025-08-21 PROCEDURE — 1240000001 HC SEMI-PRIVATE BH ROOM DAILY

## 2025-08-21 PROCEDURE — 99233 SBSQ HOSP IP/OBS HIGH 50: CPT | Performed by: PSYCHIATRY & NEUROLOGY

## 2025-08-21 PROCEDURE — 2500000001 HC RX 250 WO HCPCS SELF ADMINISTERED DRUGS (ALT 637 FOR MEDICARE OP): Performed by: PSYCHIATRY & NEUROLOGY

## 2025-08-21 PROCEDURE — 2500000001 HC RX 250 WO HCPCS SELF ADMINISTERED DRUGS (ALT 637 FOR MEDICARE OP): Performed by: INTERNAL MEDICINE

## 2025-08-21 PROCEDURE — 2500000002 HC RX 250 W HCPCS SELF ADMINISTERED DRUGS (ALT 637 FOR MEDICARE OP, ALT 636 FOR OP/ED): Performed by: PSYCHIATRY & NEUROLOGY

## 2025-08-21 PROCEDURE — 2500000001 HC RX 250 WO HCPCS SELF ADMINISTERED DRUGS (ALT 637 FOR MEDICARE OP): Performed by: NURSE PRACTITIONER

## 2025-08-21 RX ORDER — PRAZOSIN HYDROCHLORIDE 1 MG/1
3 CAPSULE ORAL NIGHTLY
Qty: 90 CAPSULE | Refills: 0 | Status: SHIPPED | OUTPATIENT
Start: 2025-08-21 | End: 2025-09-20

## 2025-08-21 RX ORDER — NITROFURANTOIN 25; 75 MG/1; MG/1
100 CAPSULE ORAL DAILY
Qty: 1 CAPSULE | Refills: 0 | Status: SHIPPED | OUTPATIENT
Start: 2025-08-21 | End: 2025-08-28 | Stop reason: SDUPTHER

## 2025-08-21 RX ORDER — IBUPROFEN 400 MG/1
400 TABLET, FILM COATED ORAL EVERY 8 HOURS PRN
Status: DISCONTINUED | OUTPATIENT
Start: 2025-08-21 | End: 2025-08-22 | Stop reason: HOSPADM

## 2025-08-21 RX ORDER — TRAZODONE HYDROCHLORIDE 150 MG/1
75 TABLET ORAL NIGHTLY
Qty: 15 TABLET | Refills: 0 | Status: SHIPPED | OUTPATIENT
Start: 2025-08-21 | End: 2025-09-20

## 2025-08-21 RX ORDER — PALIPERIDONE PALMITATE 234 MG/1.5ML
234 INJECTION INTRAMUSCULAR
Qty: 1.5 ML | Refills: 0 | Status: SHIPPED | OUTPATIENT
Start: 2025-08-21 | End: 2025-09-11

## 2025-08-21 RX ADMIN — METFORMIN HYDROCHLORIDE 1000 MG: 500 TABLET, EXTENDED RELEASE ORAL at 08:21

## 2025-08-21 RX ADMIN — DIVALPROEX SODIUM 500 MG: 500 TABLET, DELAYED RELEASE ORAL at 20:28

## 2025-08-21 RX ADMIN — HALOPERIDOL 15 MG: 5 TABLET ORAL at 08:21

## 2025-08-21 RX ADMIN — HALOPERIDOL 15 MG: 5 TABLET ORAL at 20:28

## 2025-08-21 RX ADMIN — IBUPROFEN 400 MG: 400 TABLET ORAL at 12:30

## 2025-08-21 RX ADMIN — BENZTROPINE MESYLATE 1 MG: 1 TABLET ORAL at 20:28

## 2025-08-21 RX ADMIN — BENZTROPINE MESYLATE 1 MG: 1 TABLET ORAL at 08:21

## 2025-08-21 RX ADMIN — NITROFURANTOIN MONOHYDRATE/MACROCRYSTALLINE 100 MG: 25; 75 CAPSULE ORAL at 08:21

## 2025-08-21 RX ADMIN — DIVALPROEX SODIUM 500 MG: 500 TABLET, DELAYED RELEASE ORAL at 08:21

## 2025-08-21 RX ADMIN — TRAZODONE HYDROCHLORIDE 75 MG: 50 TABLET ORAL at 20:28

## 2025-08-21 RX ADMIN — LINACLOTIDE 72 MCG: 72 CAPSULE, GELATIN COATED ORAL at 06:59

## 2025-08-21 RX ADMIN — PRAZOSIN HYDROCHLORIDE 3 MG: 1 CAPSULE ORAL at 20:29

## 2025-08-21 RX ADMIN — METOPROLOL TARTRATE 25 MG: 25 TABLET, FILM COATED ORAL at 20:28

## 2025-08-21 ASSESSMENT — PAIN - FUNCTIONAL ASSESSMENT
PAIN_FUNCTIONAL_ASSESSMENT: 0-10

## 2025-08-21 ASSESSMENT — PAIN SCALES - GENERAL
PAINLEVEL_OUTOF10: 0 - NO PAIN
PAINLEVEL_OUTOF10: 1
PAINLEVEL_OUTOF10: 0 - NO PAIN

## 2025-08-22 VITALS
WEIGHT: 226.41 LBS | DIASTOLIC BLOOD PRESSURE: 75 MMHG | SYSTOLIC BLOOD PRESSURE: 134 MMHG | HEART RATE: 72 BPM | RESPIRATION RATE: 16 BRPM | HEIGHT: 67 IN | BODY MASS INDEX: 35.54 KG/M2 | TEMPERATURE: 97.3 F | OXYGEN SATURATION: 98 %

## 2025-08-22 PROCEDURE — 99239 HOSP IP/OBS DSCHRG MGMT >30: CPT | Performed by: NURSE PRACTITIONER

## 2025-08-22 PROCEDURE — 2500000001 HC RX 250 WO HCPCS SELF ADMINISTERED DRUGS (ALT 637 FOR MEDICARE OP): Performed by: PSYCHIATRY & NEUROLOGY

## 2025-08-22 PROCEDURE — 2500000001 HC RX 250 WO HCPCS SELF ADMINISTERED DRUGS (ALT 637 FOR MEDICARE OP): Performed by: INTERNAL MEDICINE

## 2025-08-22 PROCEDURE — 2500000002 HC RX 250 W HCPCS SELF ADMINISTERED DRUGS (ALT 637 FOR MEDICARE OP, ALT 636 FOR OP/ED): Performed by: PSYCHIATRY & NEUROLOGY

## 2025-08-22 RX ADMIN — LINACLOTIDE 72 MCG: 72 CAPSULE, GELATIN COATED ORAL at 06:49

## 2025-08-22 RX ADMIN — NITROFURANTOIN MONOHYDRATE/MACROCRYSTALLINE 100 MG: 25; 75 CAPSULE ORAL at 08:29

## 2025-08-22 RX ADMIN — HALOPERIDOL 15 MG: 5 TABLET ORAL at 08:29

## 2025-08-22 RX ADMIN — DIVALPROEX SODIUM 500 MG: 500 TABLET, DELAYED RELEASE ORAL at 08:28

## 2025-08-22 RX ADMIN — METFORMIN HYDROCHLORIDE 1000 MG: 500 TABLET, EXTENDED RELEASE ORAL at 08:29

## 2025-08-22 RX ADMIN — BENZTROPINE MESYLATE 1 MG: 1 TABLET ORAL at 08:29

## 2025-08-22 RX ADMIN — METOPROLOL TARTRATE 25 MG: 25 TABLET, FILM COATED ORAL at 08:29

## 2025-08-22 ASSESSMENT — PAIN SCALES - GENERAL: PAINLEVEL_OUTOF10: 0 - NO PAIN

## 2025-08-22 ASSESSMENT — PAIN - FUNCTIONAL ASSESSMENT: PAIN_FUNCTIONAL_ASSESSMENT: 0-10

## 2025-08-25 ENCOUNTER — APPOINTMENT (OUTPATIENT)
Dept: GASTROENTEROLOGY | Facility: HOSPITAL | Age: 25
End: 2025-08-25
Payer: MEDICARE

## 2025-08-28 ENCOUNTER — TELEPHONE (OUTPATIENT)
Dept: INFECTIOUS DISEASES | Facility: HOSPITAL | Age: 25
End: 2025-08-28
Payer: MEDICARE

## 2025-08-28 DIAGNOSIS — N39.0 UTI (URINARY TRACT INFECTION), UNCOMPLICATED: ICD-10-CM

## 2025-08-28 RX ORDER — NITROFURANTOIN 25; 75 MG/1; MG/1
100 CAPSULE ORAL DAILY
Qty: 90 CAPSULE | Refills: 1 | Status: SHIPPED | OUTPATIENT
Start: 2025-08-28 | End: 2025-11-26

## 2025-09-05 ENCOUNTER — CLINICAL SUPPORT (OUTPATIENT)
Dept: EMERGENCY MEDICINE | Facility: HOSPITAL | Age: 25
End: 2025-09-05
Payer: MEDICARE

## 2025-09-05 ENCOUNTER — HOSPITAL ENCOUNTER (EMERGENCY)
Facility: HOSPITAL | Age: 25
Discharge: PSYCHIATRIC OR SUBSTANCE USE FACILITY | End: 2025-09-06
Attending: EMERGENCY MEDICINE
Payer: MEDICARE

## 2025-09-05 DIAGNOSIS — R45.851 SUICIDAL IDEATION: Primary | ICD-10-CM

## 2025-09-05 LAB
ALBUMIN SERPL BCP-MCNC: 4.2 G/DL (ref 3.4–5)
ALP SERPL-CCNC: 76 U/L (ref 33–110)
ALT SERPL W P-5'-P-CCNC: 10 U/L (ref 7–45)
ANION GAP SERPL CALC-SCNC: 11 MMOL/L (ref 10–20)
APAP SERPL-MCNC: <10 UG/ML (ref ?–30)
AST SERPL W P-5'-P-CCNC: 17 U/L (ref 9–39)
ATRIAL RATE: 73 BPM
BASOPHILS # BLD MANUAL: 0 X10*3/UL (ref 0–0.1)
BASOPHILS NFR BLD MANUAL: 0 %
BILIRUB SERPL-MCNC: 0.3 MG/DL (ref 0–1.2)
BLASTS # BLD MANUAL: 0 X10*3/UL
BLASTS NFR BLD MANUAL: 0 %
BUN SERPL-MCNC: 7 MG/DL (ref 6–23)
CALCIUM SERPL-MCNC: 10.1 MG/DL (ref 8.6–10.6)
CHLORIDE SERPL-SCNC: 104 MMOL/L (ref 98–107)
CO2 SERPL-SCNC: 29 MMOL/L (ref 21–32)
CREAT SERPL-MCNC: 1.03 MG/DL (ref 0.5–1.05)
EGFRCR SERPLBLD CKD-EPI 2021: 78 ML/MIN/1.73M*2
EOSINOPHIL # BLD MANUAL: 0 X10*3/UL (ref 0–0.7)
EOSINOPHIL NFR BLD MANUAL: 0 %
ERYTHROCYTE [DISTWIDTH] IN BLOOD BY AUTOMATED COUNT: 12.9 % (ref 11.5–14.5)
ETHANOL SERPL-MCNC: <10 MG/DL
GLUCOSE SERPL-MCNC: 103 MG/DL (ref 74–99)
HCT VFR BLD AUTO: 40 % (ref 36–46)
HGB BLD-MCNC: 13.9 G/DL (ref 12–16)
IMM GRANULOCYTES # BLD AUTO: 0.05 X10*3/UL (ref 0–0.7)
IMM GRANULOCYTES NFR BLD AUTO: 0.5 % (ref 0–0.9)
LYMPHOCYTES # BLD MANUAL: 6.01 X10*3/UL (ref 1.2–4.8)
LYMPHOCYTES NFR BLD MANUAL: 62.6 %
MCH RBC QN AUTO: 32 PG (ref 26–34)
MCHC RBC AUTO-ENTMCNC: 34.8 G/DL (ref 32–36)
MCV RBC AUTO: 92 FL (ref 80–100)
METAMYELOCYTES # BLD MANUAL: 0 X10*3/UL
METAMYELOCYTES NFR BLD MANUAL: 0 %
MONOCYTES # BLD MANUAL: 0.66 X10*3/UL (ref 0.1–1)
MONOCYTES NFR BLD MANUAL: 6.9 %
MYELOCYTES # BLD MANUAL: 0.09 X10*3/UL
MYELOCYTES NFR BLD MANUAL: 0.9 %
NEUTROPHILS # BLD MANUAL: 2.68 X10*3/UL (ref 1.2–7.7)
NEUTS BAND # BLD MANUAL: 0.09 X10*3/UL (ref 0–0.7)
NEUTS BAND NFR BLD MANUAL: 0.9 %
NEUTS SEG # BLD MANUAL: 2.59 X10*3/UL (ref 1.2–7)
NEUTS SEG NFR BLD MANUAL: 27 %
NRBC BLD MANUAL-RTO: 0 % (ref 0–0)
NRBC BLD-RTO: 0 /100 WBCS (ref 0–0)
P AXIS: 41 DEGREES
P OFFSET: 203 MS
P ONSET: 152 MS
PLASMA CELLS # BLD MANUAL: 0 X10*3/UL
PLASMA CELLS NFR BLD MANUAL: 0 %
PLATELET # BLD AUTO: 354 X10*3/UL (ref 150–450)
POTASSIUM SERPL-SCNC: 4.4 MMOL/L (ref 3.5–5.3)
PR INTERVAL: 138 MS
PROMYELOCYTES # BLD MANUAL: 0 X10*3/UL
PROMYELOCYTES NFR BLD MANUAL: 0 %
PROT SERPL-MCNC: 7.8 G/DL (ref 6.4–8.2)
Q ONSET: 221 MS
QRS COUNT: 12 BEATS
QRS DURATION: 78 MS
QT INTERVAL: 394 MS
QTC CALCULATION(BAZETT): 434 MS
QTC FREDERICIA: 420 MS
R AXIS: 64 DEGREES
RBC # BLD AUTO: 4.35 X10*6/UL (ref 4–5.2)
RBC MORPH BLD: ABNORMAL
SALICYLATES SERPL-MCNC: <3 MG/DL (ref ?–20)
SODIUM SERPL-SCNC: 140 MMOL/L (ref 136–145)
T AXIS: 5 DEGREES
T OFFSET: 418 MS
TOTAL CELLS COUNTED BLD: 115
VARIANT LYMPHS # BLD MANUAL: 0.16 X10*3/UL (ref 0–0.5)
VARIANT LYMPHS NFR BLD: 1.7 %
VENTRICULAR RATE: 73 BPM
WBC # BLD AUTO: 9.6 X10*3/UL (ref 4.4–11.3)
WBC OTHER # BLD MANUAL: 0 X10*3/UL
WBC OTHER NFR BLD MANUAL: 0 %

## 2025-09-05 PROCEDURE — 99285 EMERGENCY DEPT VISIT HI MDM: CPT | Performed by: EMERGENCY MEDICINE

## 2025-09-05 PROCEDURE — 85027 COMPLETE CBC AUTOMATED: CPT

## 2025-09-05 PROCEDURE — 85007 BL SMEAR W/DIFF WBC COUNT: CPT

## 2025-09-05 PROCEDURE — 81025 URINE PREGNANCY TEST: CPT

## 2025-09-05 PROCEDURE — 80320 DRUG SCREEN QUANTALCOHOLS: CPT

## 2025-09-05 PROCEDURE — 80053 COMPREHEN METABOLIC PANEL: CPT

## 2025-09-05 PROCEDURE — 93005 ELECTROCARDIOGRAM TRACING: CPT

## 2025-09-05 PROCEDURE — 36415 COLL VENOUS BLD VENIPUNCTURE: CPT

## 2025-09-05 SDOH — HEALTH STABILITY: MENTAL HEALTH
DESCRIBE YOUR THOUGHTS OF KILLING YOURSELF RIGHT NOW:: "I HEAR VOICES TELLING ME TO KILL MYSELF AND I CAN SEE BLOOD ON MY ARM.

## 2025-09-05 SDOH — HEALTH STABILITY: MENTAL HEALTH: HOW DID YOU TRY TO KILL YOURSELF?: " I TRIED YEARS AGO AND I CAN'T REMEMBER WHEN AND HOW"

## 2025-09-05 SDOH — HEALTH STABILITY: MENTAL HEALTH: IN THE PAST FEW WEEKS, HAVE YOU WISHED YOU WERE DEAD?: YES

## 2025-09-05 SDOH — HEALTH STABILITY: MENTAL HEALTH: ACTIVE SUICIDAL IDEATION WITH SPECIFIC PLAN AND INTENT (PAST 1 MONTH): YES

## 2025-09-05 SDOH — HEALTH STABILITY: MENTAL HEALTH: SUICIDAL BEHAVIOR (3 MONTHS): YES

## 2025-09-05 SDOH — HEALTH STABILITY: MENTAL HEALTH: ACTIVE SUICIDAL IDEATION WITH SOME INTENT TO ACT, WITHOUT SPECIFIC PLAN (PAST 1 MONTH): YES

## 2025-09-05 SDOH — HEALTH STABILITY: MENTAL HEALTH: NON-SPECIFIC ACTIVE SUICIDAL THOUGHTS (PAST 1 MONTH): YES

## 2025-09-05 SDOH — HEALTH STABILITY: MENTAL HEALTH
DEPRESSION SYMPTOMS: CRYING;CHANGE IN ENERGY LEVEL;FEELINGS OF HELPLESSNESS;FEELINGS OF HOPELESSESS;FEELINGS OF WORTHLESSNESS;IMPAIRED CONCENTRATION;ISOLATIVE;LOSS OF INTEREST;SLEEP DISTURBANCE

## 2025-09-05 SDOH — HEALTH STABILITY: MENTAL HEALTH: SUICIDAL BEHAVIOR (LIFETIME): YES

## 2025-09-05 SDOH — HEALTH STABILITY: MENTAL HEALTH: WISH TO BE DEAD (PAST 1 MONTH): YES

## 2025-09-05 SDOH — HEALTH STABILITY: MENTAL HEALTH: ANXIETY SYMPTOMS: GENERALIZED;COMPULSIVE;OBSESSIONS;PALPITATIONS

## 2025-09-05 SDOH — HEALTH STABILITY: MENTAL HEALTH: SUICIDAL BEHAVIOR (DESCRIPTION): SI, "AUDITIORY HALLUCINATIONS TELLING ME TO KILL MYSELF"

## 2025-09-05 SDOH — HEALTH STABILITY: MENTAL HEALTH: ARE YOU HAVING THOUGHTS OF KILLING YOURSELF RIGHT NOW?: YES

## 2025-09-05 SDOH — HEALTH STABILITY: MENTAL HEALTH: IN THE PAST FEW WEEKS, HAVE YOU FELT THAT YOU OR YOUR FAMILY WOULD BE BETTER OFF IF YOU WERE DEAD?: YES

## 2025-09-05 SDOH — ECONOMIC STABILITY: HOUSING INSECURITY: FEELS SAFE LIVING IN HOME: YES

## 2025-09-05 SDOH — HEALTH STABILITY: MENTAL HEALTH: HAVE YOU EVER TRIED TO KILL YOURSELF?: YES

## 2025-09-05 SDOH — HEALTH STABILITY: MENTAL HEALTH: WHEN DID YOU TRY TO KILL YOURSELF?: "I CAN'T REMEMBER WHEN, IT WAS YEARS AGO".

## 2025-09-05 SDOH — HEALTH STABILITY: MENTAL HEALTH: IN THE PAST WEEK, HAVE YOU BEEN HAVING THOUGHTS ABOUT KILLING YOURSELF?: YES

## 2025-09-05 ASSESSMENT — LIFESTYLE VARIABLES
PRESCIPTION_ABUSE_PAST_12_MONTHS: NO
SUBSTANCE_ABUSE_PAST_12_MONTHS: NO

## 2025-09-05 ASSESSMENT — PAIN - FUNCTIONAL ASSESSMENT: PAIN_FUNCTIONAL_ASSESSMENT: 0-10

## 2025-09-06 ENCOUNTER — HOSPITAL ENCOUNTER (INPATIENT)
Facility: HOSPITAL | Age: 25
End: 2025-09-06
Attending: PSYCHIATRY & NEUROLOGY | Admitting: PSYCHIATRY & NEUROLOGY
Payer: MEDICARE

## 2025-09-06 VITALS
SYSTOLIC BLOOD PRESSURE: 132 MMHG | WEIGHT: 231 LBS | HEART RATE: 68 BPM | RESPIRATION RATE: 15 BRPM | TEMPERATURE: 97.4 F | BODY MASS INDEX: 36.26 KG/M2 | OXYGEN SATURATION: 96 % | HEIGHT: 67 IN | DIASTOLIC BLOOD PRESSURE: 93 MMHG

## 2025-09-06 PROBLEM — Z29.89 NEED FOR PROPHYLAXIS AGAINST URINARY TRACT INFECTION: Status: ACTIVE | Noted: 2025-09-06

## 2025-09-06 PROBLEM — Z86.39 HISTORY OF NON-INSULIN DEPENDENT TYPE 2 DIABETES MELLITUS: Status: ACTIVE | Noted: 2025-09-06

## 2025-09-06 PROBLEM — K59.00 CONSTIPATION: Status: ACTIVE | Noted: 2025-09-06

## 2025-09-06 PROBLEM — R45.851 SUICIDE IDEATION: Status: ACTIVE | Noted: 2025-09-06

## 2025-09-06 LAB
AMPHETAMINES UR QL SCN: NORMAL
BARBITURATES UR QL SCN: NORMAL
BENZODIAZ UR QL SCN: NORMAL
BZE UR QL SCN: NORMAL
CANNABINOIDS UR QL SCN: NORMAL
FENTANYL+NORFENTANYL UR QL SCN: NORMAL
METHADONE UR QL SCN: NORMAL
OPIATES UR QL SCN: NORMAL
OXYCODONE+OXYMORPHONE UR QL SCN: NORMAL
PCP UR QL SCN: NORMAL
PREGNANCY TEST URINE, POC: NEGATIVE

## 2025-09-06 PROCEDURE — 1240000001 HC SEMI-PRIVATE BH ROOM DAILY

## 2025-09-06 PROCEDURE — 2500000001 HC RX 250 WO HCPCS SELF ADMINISTERED DRUGS (ALT 637 FOR MEDICARE OP): Performed by: PSYCHIATRY & NEUROLOGY

## 2025-09-06 PROCEDURE — 80307 DRUG TEST PRSMV CHEM ANLYZR: CPT

## 2025-09-06 RX ORDER — DIPHENHYDRAMINE HYDROCHLORIDE 50 MG/ML
50 INJECTION, SOLUTION INTRAMUSCULAR; INTRAVENOUS ONCE AS NEEDED
Status: ACTIVE | OUTPATIENT
Start: 2025-09-06

## 2025-09-06 RX ORDER — DOCUSATE SODIUM 100 MG/1
100 CAPSULE, LIQUID FILLED ORAL 2 TIMES DAILY PRN
Status: ACTIVE | OUTPATIENT
Start: 2025-09-06

## 2025-09-06 RX ORDER — ONDANSETRON 4 MG/1
4 TABLET, ORALLY DISINTEGRATING ORAL EVERY 8 HOURS PRN
Status: ON HOLD | COMMUNITY

## 2025-09-06 RX ORDER — DIVALPROEX SODIUM 500 MG/1
500 TABLET, DELAYED RELEASE ORAL EVERY 12 HOURS SCHEDULED
Status: DISPENSED | OUTPATIENT
Start: 2025-09-06

## 2025-09-06 RX ORDER — POLYETHYLENE GLYCOL 3350 17 G/17G
17 POWDER, FOR SOLUTION ORAL DAILY PRN
Status: ACTIVE | OUTPATIENT
Start: 2025-09-06

## 2025-09-06 RX ORDER — HYDROXYZINE HYDROCHLORIDE 25 MG/1
50 TABLET, FILM COATED ORAL EVERY 6 HOURS PRN
Status: DISCONTINUED | OUTPATIENT
Start: 2025-09-06 | End: 2025-09-06

## 2025-09-06 RX ORDER — MIDAZOLAM HYDROCHLORIDE 1 MG/ML
2 INJECTION, SOLUTION INTRAMUSCULAR; INTRAVENOUS EVERY 6 HOURS PRN
Status: ACTIVE | OUTPATIENT
Start: 2025-09-06

## 2025-09-06 RX ORDER — HYDROXYZINE HYDROCHLORIDE 25 MG/1
50 TABLET, FILM COATED ORAL EVERY 6 HOURS PRN
Status: ACTIVE | OUTPATIENT
Start: 2025-09-06

## 2025-09-06 RX ORDER — TALC
3 POWDER (GRAM) TOPICAL
Status: DISPENSED | OUTPATIENT
Start: 2025-09-07

## 2025-09-06 RX ORDER — LORAZEPAM 1 MG/1
2 TABLET ORAL EVERY 6 HOURS PRN
Status: ACTIVE | OUTPATIENT
Start: 2025-09-06

## 2025-09-06 RX ORDER — TRAZODONE HYDROCHLORIDE 50 MG/1
50 TABLET ORAL NIGHTLY PRN
Status: ACTIVE | OUTPATIENT
Start: 2025-09-06

## 2025-09-06 RX ORDER — UREA 40 %
1 CREAM (GRAM) TOPICAL DAILY
Status: ON HOLD | COMMUNITY

## 2025-09-06 RX ORDER — IBUPROFEN 600 MG/1
600 TABLET, FILM COATED ORAL EVERY 8 HOURS PRN
Status: ON HOLD | COMMUNITY

## 2025-09-06 RX ORDER — IBUPROFEN 600 MG/1
600 TABLET, FILM COATED ORAL EVERY 8 HOURS PRN
Status: ACTIVE | OUTPATIENT
Start: 2025-09-06

## 2025-09-06 RX ORDER — HALOPERIDOL 5 MG/1
15 TABLET ORAL 2 TIMES DAILY
Status: DISCONTINUED | OUTPATIENT
Start: 2025-09-06 | End: 2025-09-07

## 2025-09-06 RX ORDER — ACETAMINOPHEN 325 MG/1
650 TABLET ORAL EVERY 4 HOURS PRN
Status: ACTIVE | OUTPATIENT
Start: 2025-09-06

## 2025-09-06 RX ORDER — PRAZOSIN HYDROCHLORIDE 1 MG/1
3 CAPSULE ORAL NIGHTLY
Status: DISCONTINUED | OUTPATIENT
Start: 2025-09-06 | End: 2025-09-07

## 2025-09-06 RX ORDER — XANOMELINE AND TROSPIUM CHLORIDE 20; 50 MG/1; MG/1
1 CAPSULE, COATED PELLETS ORAL 2 TIMES DAILY
Status: ON HOLD | COMMUNITY

## 2025-09-06 RX ORDER — DIPHENHYDRAMINE HCL 50 MG
50 CAPSULE ORAL EVERY 6 HOURS PRN
Status: ACTIVE | OUTPATIENT
Start: 2025-09-06

## 2025-09-06 RX ORDER — TRAZODONE HYDROCHLORIDE 100 MG/1
100 TABLET ORAL NIGHTLY
Status: DISPENSED | OUTPATIENT
Start: 2025-09-06

## 2025-09-06 RX ORDER — BENZTROPINE MESYLATE 1 MG/1
1 TABLET ORAL 2 TIMES DAILY
Status: DISPENSED | OUTPATIENT
Start: 2025-09-06

## 2025-09-06 RX ORDER — INSULIN,PORK PURIFIED 100/ML
1 VIAL (ML) INJECTION 2 TIMES DAILY
Status: ON HOLD | COMMUNITY

## 2025-09-06 RX ADMIN — DIVALPROEX SODIUM 500 MG: 500 TABLET, DELAYED RELEASE ORAL at 22:51

## 2025-09-06 RX ADMIN — TRAZODONE HYDROCHLORIDE 100 MG: 100 TABLET ORAL at 22:51

## 2025-09-06 RX ADMIN — BENZTROPINE MESYLATE 1 MG: 1 TABLET ORAL at 22:51

## 2025-09-06 RX ADMIN — HALOPERIDOL 15 MG: 5 TABLET ORAL at 22:51

## 2025-09-06 RX ADMIN — PRAZOSIN HYDROCHLORIDE 3 MG: 1 CAPSULE ORAL at 22:51

## 2025-09-06 SDOH — SOCIAL STABILITY: SOCIAL INSECURITY: WITHIN THE LAST YEAR, HAVE YOU BEEN HUMILIATED OR EMOTIONALLY ABUSED IN OTHER WAYS BY YOUR PARTNER OR EX-PARTNER?: NO

## 2025-09-06 SDOH — HEALTH STABILITY: MENTAL HEALTH: BEHAVIORS/MOOD: COOPERATIVE;CALM

## 2025-09-06 SDOH — HEALTH STABILITY: MENTAL HEALTH: SUICIDE ASSESSMENT: ADULT (C-SSRS)

## 2025-09-06 SDOH — HEALTH STABILITY: MENTAL HEALTH: HOW OFTEN DO YOU HAVE A DRINK CONTAINING ALCOHOL?: NEVER

## 2025-09-06 SDOH — ECONOMIC STABILITY: FOOD INSECURITY: WITHIN THE PAST 12 MONTHS, YOU WORRIED THAT YOUR FOOD WOULD RUN OUT BEFORE YOU GOT THE MONEY TO BUY MORE.: NEVER TRUE

## 2025-09-06 SDOH — SOCIAL STABILITY: SOCIAL INSECURITY: DO YOU FEEL UNSAFE GOING BACK TO THE PLACE WHERE YOU ARE LIVING?: NO

## 2025-09-06 SDOH — ECONOMIC STABILITY: FOOD INSECURITY: HOW HARD IS IT FOR YOU TO PAY FOR THE VERY BASICS LIKE FOOD, HOUSING, MEDICAL CARE, AND HEATING?: NOT HARD AT ALL

## 2025-09-06 SDOH — ECONOMIC STABILITY: FOOD INSECURITY: WITHIN THE PAST 12 MONTHS, THE FOOD YOU BOUGHT JUST DIDN'T LAST AND YOU DIDN'T HAVE MONEY TO GET MORE.: NEVER TRUE

## 2025-09-06 SDOH — HEALTH STABILITY: MENTAL HEALTH: EXPERIENCED ANY OF THE FOLLOWING LIFE EVENTS: SEXUAL ASSAULT

## 2025-09-06 SDOH — SOCIAL STABILITY: SOCIAL INSECURITY: WERE YOU ABLE TO COMPLETE ALL THE BEHAVIORAL HEALTH SCREENINGS?: YES

## 2025-09-06 SDOH — HEALTH STABILITY: MENTAL HEALTH: HOW OFTEN DO YOU HAVE SIX OR MORE DRINKS ON ONE OCCASION?: NEVER

## 2025-09-06 SDOH — ECONOMIC STABILITY: INCOME INSECURITY: IN THE PAST 12 MONTHS HAS THE ELECTRIC, GAS, OIL, OR WATER COMPANY THREATENED TO SHUT OFF SERVICES IN YOUR HOME?: NO

## 2025-09-06 SDOH — SOCIAL STABILITY: SOCIAL INSECURITY: ARE YOU OR HAVE YOU BEEN THREATENED OR ABUSED PHYSICALLY, EMOTIONALLY, OR SEXUALLY BY ANYONE?: NO

## 2025-09-06 SDOH — HEALTH STABILITY: MENTAL HEALTH: DELUSIONS: SOMATIC

## 2025-09-06 SDOH — SOCIAL STABILITY: SOCIAL INSECURITY: WITHIN THE LAST YEAR, HAVE YOU BEEN AFRAID OF YOUR PARTNER OR EX-PARTNER?: NO

## 2025-09-06 SDOH — HEALTH STABILITY: MENTAL HEALTH: HAVE YOU WISHED YOU WERE DEAD OR WISHED YOU COULD GO TO SLEEP AND NOT WAKE UP?: YES

## 2025-09-06 SDOH — SOCIAL STABILITY: SOCIAL INSECURITY: DOES ANYONE TRY TO KEEP YOU FROM HAVING/CONTACTING OTHER FRIENDS OR DOING THINGS OUTSIDE YOUR HOME?: NO

## 2025-09-06 SDOH — SOCIAL STABILITY: SOCIAL INSECURITY: DO YOU FEEL ANYONE HAS EXPLOITED OR TAKEN ADVANTAGE OF YOU FINANCIALLY OR OF YOUR PERSONAL PROPERTY?: NO

## 2025-09-06 SDOH — HEALTH STABILITY: MENTAL HEALTH: HAVE YOU EVER TRIED TO KILL YOURSELF?: YES

## 2025-09-06 SDOH — HEALTH STABILITY: MENTAL HEALTH: IN THE PAST FEW WEEKS, HAVE YOU WISHED YOU WERE DEAD?: YES

## 2025-09-06 SDOH — SOCIAL STABILITY: SOCIAL NETWORK: VISITOR BEHAVIORS: UNABLE TO ASSESS

## 2025-09-06 SDOH — HEALTH STABILITY: MENTAL HEALTH: HAVE YOU BEEN THINKING ABOUT HOW YOU MIGHT DO THIS?: YES

## 2025-09-06 SDOH — HEALTH STABILITY: MENTAL HEALTH: IN THE PAST FEW WEEKS, HAVE YOU FELT THAT YOU OR YOUR FAMILY WOULD BE BETTER OFF IF YOU WERE DEAD?: YES

## 2025-09-06 SDOH — HEALTH STABILITY: MENTAL HEALTH: HAVE YOU ACTUALLY HAD ANY THOUGHTS OF KILLING YOURSELF?: YES

## 2025-09-06 SDOH — ECONOMIC STABILITY: HOUSING INSECURITY: IN THE PAST 12 MONTHS, HOW MANY TIMES HAVE YOU MOVED WHERE YOU WERE LIVING?: 0

## 2025-09-06 SDOH — HEALTH STABILITY: MENTAL HEALTH: WAS THIS WITHIN THE PAST THREE MONTHS?: YES

## 2025-09-06 SDOH — ECONOMIC STABILITY: HOUSING INSECURITY: AT ANY TIME IN THE PAST 12 MONTHS, WERE YOU HOMELESS OR LIVING IN A SHELTER (INCLUDING NOW)?: NO

## 2025-09-06 SDOH — ECONOMIC STABILITY: HOUSING INSECURITY: IN THE LAST 12 MONTHS, WAS THERE A TIME WHEN YOU WERE NOT ABLE TO PAY THE MORTGAGE OR RENT ON TIME?: NO

## 2025-09-06 SDOH — HEALTH STABILITY: MENTAL HEALTH: HAVE YOU EVER DONE ANYTHING, STARTED TO DO ANYTHING, OR PREPARED TO DO ANYTHING TO END YOUR LIFE?: YES

## 2025-09-06 SDOH — HEALTH STABILITY: MENTAL HEALTH: BEHAVIORAL HEALTH(WDL): EXCEPTIONS TO WDL

## 2025-09-06 SDOH — HEALTH STABILITY: MENTAL HEALTH: IN THE PAST WEEK, HAVE YOU BEEN HAVING THOUGHTS ABOUT KILLING YOURSELF?: YES

## 2025-09-06 SDOH — SOCIAL STABILITY: SOCIAL INSECURITY: ABUSE: ADULT

## 2025-09-06 SDOH — HEALTH STABILITY: MENTAL HEALTH: HALLUCINATION: AUDITORY;COMMAND

## 2025-09-06 SDOH — SOCIAL STABILITY: SOCIAL INSECURITY: HAS ANYONE EVER THREATENED TO HURT YOUR FAMILY OR YOUR PETS?: NO

## 2025-09-06 SDOH — SOCIAL STABILITY: SOCIAL INSECURITY: HAVE YOU HAD THOUGHTS OF HARMING ANYONE ELSE?: NO

## 2025-09-06 SDOH — HEALTH STABILITY: MENTAL HEALTH: HAVE YOU HAD THESE THOUGHTS AND HAD SOME INTENTION OF ACTING ON THEM?: YES

## 2025-09-06 SDOH — ECONOMIC STABILITY: TRANSPORTATION INSECURITY: IN THE PAST 12 MONTHS, HAS LACK OF TRANSPORTATION KEPT YOU FROM MEDICAL APPOINTMENTS OR FROM GETTING MEDICATIONS?: NO

## 2025-09-06 SDOH — SOCIAL STABILITY: SOCIAL INSECURITY: FAMILY BEHAVIORS: UNABLE TO ASSESS

## 2025-09-06 SDOH — SOCIAL STABILITY: SOCIAL INSECURITY: HAVE YOU HAD ANY THOUGHTS OF HARMING ANYONE ELSE?: NO

## 2025-09-06 SDOH — HEALTH STABILITY: MENTAL HEALTH: ARE YOU HAVING THOUGHTS OF KILLING YOURSELF RIGHT NOW?: YES

## 2025-09-06 SDOH — SOCIAL STABILITY: SOCIAL INSECURITY: ARE THERE ANY APPARENT SIGNS OF INJURIES/BEHAVIORS THAT COULD BE RELATED TO ABUSE/NEGLECT?: NO

## 2025-09-06 ASSESSMENT — PATIENT HEALTH QUESTIONNAIRE - PHQ9
2. FEELING DOWN, DEPRESSED OR HOPELESS: NEARLY EVERY DAY
SUM OF ALL RESPONSES TO PHQ9 QUESTIONS 1 & 2: 6
1. LITTLE INTEREST OR PLEASURE IN DOING THINGS: NEARLY EVERY DAY

## 2025-09-06 ASSESSMENT — PAIN DESCRIPTION - DESCRIPTORS: DESCRIPTORS: OTHER (COMMENT)

## 2025-09-06 ASSESSMENT — ACTIVITIES OF DAILY LIVING (ADL): LACK_OF_TRANSPORTATION: NO

## 2025-09-06 ASSESSMENT — LIFESTYLE VARIABLES
SKIP TO QUESTIONS 9-10: 1
HOW OFTEN DO YOU HAVE 6 OR MORE DRINKS ON ONE OCCASION: NEVER
AUDIT-C TOTAL SCORE: 0
HOW MANY STANDARD DRINKS CONTAINING ALCOHOL DO YOU HAVE ON A TYPICAL DAY: PATIENT DOES NOT DRINK
HOW OFTEN DO YOU HAVE A DRINK CONTAINING ALCOHOL: NEVER
AUDIT-C TOTAL SCORE: 0

## 2025-09-06 ASSESSMENT — PAIN SCALES - GENERAL: PAINLEVEL_OUTOF10: 3

## 2025-09-06 ASSESSMENT — PAIN - FUNCTIONAL ASSESSMENT: PAIN_FUNCTIONAL_ASSESSMENT: 0-10

## 2025-09-07 VITALS
HEART RATE: 91 BPM | RESPIRATION RATE: 16 BRPM | DIASTOLIC BLOOD PRESSURE: 103 MMHG | SYSTOLIC BLOOD PRESSURE: 147 MMHG | BODY MASS INDEX: 35.54 KG/M2 | OXYGEN SATURATION: 99 % | WEIGHT: 226.41 LBS | HEIGHT: 67 IN | TEMPERATURE: 97.5 F

## 2025-09-07 LAB
CHOLEST SERPL-MCNC: 111 MG/DL (ref 0–199)
CHOLESTEROL/HDL RATIO: 3.8
GLUCOSE P FAST SERPL-MCNC: 85 MG/DL (ref 74–99)
HDLC SERPL-MCNC: 29.4 MG/DL
LDLC SERPL CALC-MCNC: 64 MG/DL
NON HDL CHOLESTEROL: 82 MG/DL (ref 0–149)
TRIGL SERPL-MCNC: 87 MG/DL (ref 0–114)
VLDL: 17 MG/DL (ref 0–40)

## 2025-09-07 PROCEDURE — 1240000001 HC SEMI-PRIVATE BH ROOM DAILY

## 2025-09-07 PROCEDURE — 80061 LIPID PANEL: CPT | Performed by: PSYCHIATRY & NEUROLOGY

## 2025-09-07 PROCEDURE — 82947 ASSAY GLUCOSE BLOOD QUANT: CPT | Performed by: PSYCHIATRY & NEUROLOGY

## 2025-09-07 PROCEDURE — 36415 COLL VENOUS BLD VENIPUNCTURE: CPT | Performed by: PSYCHIATRY & NEUROLOGY

## 2025-09-07 PROCEDURE — 2500000002 HC RX 250 W HCPCS SELF ADMINISTERED DRUGS (ALT 637 FOR MEDICARE OP, ALT 636 FOR OP/ED): Performed by: PSYCHIATRY & NEUROLOGY

## 2025-09-07 PROCEDURE — 2500000001 HC RX 250 WO HCPCS SELF ADMINISTERED DRUGS (ALT 637 FOR MEDICARE OP): Performed by: PSYCHIATRY & NEUROLOGY

## 2025-09-07 PROCEDURE — 2500000005 HC RX 250 GENERAL PHARMACY W/O HCPCS: Performed by: PSYCHIATRY & NEUROLOGY

## 2025-09-07 RX ORDER — PRAZOSIN HYDROCHLORIDE 1 MG/1
4 CAPSULE ORAL NIGHTLY
Status: DISPENSED | OUTPATIENT
Start: 2025-09-07

## 2025-09-07 RX ORDER — PALIPERIDONE 3 MG/1
9 TABLET, EXTENDED RELEASE ORAL 2 TIMES DAILY
Status: DISPENSED | OUTPATIENT
Start: 2025-09-07

## 2025-09-07 RX ORDER — METFORMIN HYDROCHLORIDE 500 MG/1
1000 TABLET, EXTENDED RELEASE ORAL
Status: DISPENSED | OUTPATIENT
Start: 2025-09-07

## 2025-09-07 RX ORDER — SERTRALINE HYDROCHLORIDE 50 MG/1
50 TABLET, FILM COATED ORAL DAILY
Status: DISPENSED | OUTPATIENT
Start: 2025-09-07

## 2025-09-07 RX ADMIN — SERTRALINE HYDROCHLORIDE 50 MG: 50 TABLET ORAL at 10:44

## 2025-09-07 RX ADMIN — HALOPERIDOL 15 MG: 5 TABLET ORAL at 08:36

## 2025-09-07 RX ADMIN — Medication 3 MG: at 18:04

## 2025-09-07 RX ADMIN — DIVALPROEX SODIUM 500 MG: 500 TABLET, DELAYED RELEASE ORAL at 08:36

## 2025-09-07 RX ADMIN — PALIPERIDONE 9 MG: 3 TABLET, EXTENDED RELEASE ORAL at 20:46

## 2025-09-07 RX ADMIN — DIVALPROEX SODIUM 500 MG: 500 TABLET, DELAYED RELEASE ORAL at 20:46

## 2025-09-07 RX ADMIN — PRAZOSIN HYDROCHLORIDE 4 MG: 1 CAPSULE ORAL at 20:46

## 2025-09-07 RX ADMIN — PALIPERIDONE 9 MG: 3 TABLET, EXTENDED RELEASE ORAL at 10:44

## 2025-09-07 RX ADMIN — TRAZODONE HYDROCHLORIDE 100 MG: 100 TABLET ORAL at 20:46

## 2025-09-07 RX ADMIN — BENZTROPINE MESYLATE 1 MG: 1 TABLET ORAL at 20:46

## 2025-09-07 RX ADMIN — METFORMIN HYDROCHLORIDE 1000 MG: 500 TABLET, EXTENDED RELEASE ORAL at 18:04

## 2025-09-07 RX ADMIN — BENZTROPINE MESYLATE 1 MG: 1 TABLET ORAL at 08:36

## 2025-09-07 SDOH — SOCIAL STABILITY: SOCIAL INSECURITY: ARE YOU OR HAVE YOU BEEN THREATENED OR ABUSED PHYSICALLY, EMOTIONALLY, OR SEXUALLY BY ANYONE?: YES

## 2025-09-07 SDOH — SOCIAL STABILITY: SOCIAL INSECURITY: ARE THERE ANY APPARENT SIGNS OF INJURIES/BEHAVIORS THAT COULD BE RELATED TO ABUSE/NEGLECT?: NO

## 2025-09-07 SDOH — SOCIAL STABILITY: SOCIAL INSECURITY: DO YOU FEEL ANYONE HAS EXPLOITED OR TAKEN ADVANTAGE OF YOU FINANCIALLY OR OF YOUR PERSONAL PROPERTY?: NO

## 2025-09-07 SDOH — SOCIAL STABILITY: SOCIAL INSECURITY: HAS ANYONE EVER THREATENED TO HURT YOUR FAMILY OR YOUR PETS?: NO

## 2025-09-07 SDOH — SOCIAL STABILITY: SOCIAL INSECURITY: DOES ANYONE TRY TO KEEP YOU FROM HAVING/CONTACTING OTHER FRIENDS OR DOING THINGS OUTSIDE YOUR HOME?: NO

## 2025-09-07 SDOH — SOCIAL STABILITY: SOCIAL INSECURITY: HAVE YOU HAD THOUGHTS OF HARMING ANYONE ELSE?: NO

## 2025-09-07 SDOH — SOCIAL STABILITY: SOCIAL INSECURITY: DO YOU FEEL UNSAFE GOING BACK TO THE PLACE WHERE YOU ARE LIVING?: NO

## 2025-09-07 SDOH — SOCIAL STABILITY: SOCIAL INSECURITY: HAVE YOU HAD ANY THOUGHTS OF HARMING ANYONE ELSE?: NO

## 2025-09-07 SDOH — HEALTH STABILITY: MENTAL HEALTH
EXPERIENCED ANY OF THE FOLLOWING LIFE EVENTS: SEXUAL ASSAULT;CHILDHOOD NEGLECT;SOCIAL LOSS (BANKRUPTCY, DIVORCE, WORK-RELATED STRESS);DEATH OF FAMILY/FRIEND;OTHER (COMMENT)

## 2025-09-07 SDOH — SOCIAL STABILITY: SOCIAL INSECURITY: WERE YOU ABLE TO COMPLETE ALL THE BEHAVIORAL HEALTH SCREENINGS?: YES

## 2025-09-07 SDOH — SOCIAL STABILITY: SOCIAL INSECURITY: ABUSE: ADULT

## 2025-09-07 ASSESSMENT — LIFESTYLE VARIABLES
CIWA TOTAL SCORE: 0
ORIENTATION AND CLOUDING OF SENSORIUM: ORIENTED AND CAN DO SERIAL ADDITIONS
HOW MANY STANDARD DRINKS CONTAINING ALCOHOL DO YOU HAVE ON A TYPICAL DAY: PATIENT DOES NOT DRINK
TOTAL_SCORE: 1
SUBSTANCE_ABUSE_PAST_12_MONTHS: NO
HOW OFTEN DO YOU HAVE 6 OR MORE DRINKS ON ONE OCCASION: NEVER
TREMOR: NO TREMOR
AUDIT-C TOTAL SCORE: 0
SKIP TO QUESTIONS 9-10: 1
VISUAL DISTURBANCES: NOT PRESENT
AUDITORY DISTURBANCES: NOT PRESENT
AGITATION: NORMAL ACTIVITY
PAROXYSMAL SWEATS: NO SWEAT VISIBLE
PRESCIPTION_ABUSE_PAST_12_MONTHS: NO
ANXIETY: NO ANXIETY, AT EASE
NAUSEA AND VOMITING: NO NAUSEA AND NO VOMITING
AUDIT-C TOTAL SCORE: 0
HEADACHE, FULLNESS IN HEAD: NOT PRESENT
HOW OFTEN DO YOU HAVE A DRINK CONTAINING ALCOHOL: NEVER

## 2025-09-07 ASSESSMENT — PAIN SCALES - GENERAL: PAINLEVEL_OUTOF10: 0 - NO PAIN

## 2025-09-07 ASSESSMENT — ACTIVITIES OF DAILY LIVING (ADL)
TOILETING: INDEPENDENT
FEEDING YOURSELF: INDEPENDENT
HEARING - LEFT EAR: FUNCTIONAL
JUDGMENT_ADEQUATE_SAFELY_COMPLETE_DAILY_ACTIVITIES: YES
BATHING: INDEPENDENT
ADEQUATE_TO_COMPLETE_ADL: YES
HEARING - RIGHT EAR: FUNCTIONAL
PATIENT'S MEMORY ADEQUATE TO SAFELY COMPLETE DAILY ACTIVITIES?: YES
WALKS IN HOME: INDEPENDENT
LACK_OF_TRANSPORTATION: NO
GROOMING: INDEPENDENT
DRESSING YOURSELF: INDEPENDENT

## 2025-09-07 ASSESSMENT — PATIENT HEALTH QUESTIONNAIRE - PHQ9: 2. FEELING DOWN, DEPRESSED OR HOPELESS: NEARLY EVERY DAY

## 2025-09-07 ASSESSMENT — PAIN - FUNCTIONAL ASSESSMENT: PAIN_FUNCTIONAL_ASSESSMENT: 0-10

## 2025-09-17 ENCOUNTER — APPOINTMENT (OUTPATIENT)
Dept: ENDOCRINOLOGY | Facility: CLINIC | Age: 25
End: 2025-09-17
Payer: COMMERCIAL

## 2025-10-30 ENCOUNTER — APPOINTMENT (OUTPATIENT)
Dept: NEPHROLOGY | Facility: CLINIC | Age: 25
End: 2025-10-30
Payer: COMMERCIAL